# Patient Record
Sex: FEMALE | Race: WHITE | Employment: OTHER | URBAN - METROPOLITAN AREA
[De-identification: names, ages, dates, MRNs, and addresses within clinical notes are randomized per-mention and may not be internally consistent; named-entity substitution may affect disease eponyms.]

---

## 2017-02-02 PROBLEM — C50.919 INVASIVE CARCINOMA OF BREAST (HCC): Status: ACTIVE | Noted: 2017-02-02

## 2017-04-25 PROBLEM — E78.5 HYPERLIPIDEMIA: Status: ACTIVE | Noted: 2017-04-25

## 2017-04-25 PROBLEM — I10 ESSENTIAL HYPERTENSION: Status: ACTIVE | Noted: 2017-04-25

## 2017-04-25 PROBLEM — Z86.59 H/O: DEPRESSION: Status: ACTIVE | Noted: 2017-04-25

## 2017-04-25 PROBLEM — Z86.69 HISTORY OF MIGRAINE HEADACHES: Status: ACTIVE | Noted: 2017-04-25

## 2017-04-25 PROBLEM — R94.31 ABNORMAL ECG: Status: ACTIVE | Noted: 2017-04-25

## 2017-04-25 PROBLEM — I48.92 PAROXYSMAL ATRIAL FLUTTER (HCC): Status: ACTIVE | Noted: 2017-04-25

## 2017-04-25 PROBLEM — Z86.79 HISTORY OF PSVT (PAROXYSMAL SUPRAVENTRICULAR TACHYCARDIA): Status: ACTIVE | Noted: 2017-04-25

## 2017-05-31 PROBLEM — Z98.890 POST-OPERATIVE STATE: Status: ACTIVE | Noted: 2017-05-31

## 2018-03-13 ENCOUNTER — HOSPITAL ENCOUNTER (OUTPATIENT)
Dept: RADIATION ONCOLOGY | Age: 68
Discharge: HOME | End: 2018-03-13
Payer: MEDICARE

## 2018-03-13 ENCOUNTER — PRE-PROCEDURE TELEPHONE (OUTPATIENT)
Dept: RADIATION ONCOLOGY | Age: 68
End: 2018-03-13

## 2018-03-13 VITALS
SYSTOLIC BLOOD PRESSURE: 136 MMHG | WEIGHT: 179 LBS | BODY MASS INDEX: 28.04 KG/M2 | HEART RATE: 61 BPM | DIASTOLIC BLOOD PRESSURE: 55 MMHG

## 2018-03-13 DIAGNOSIS — C50.211 MALIGNANT NEOPLASM OF UPPER-INNER QUADRANT OF RIGHT BREAST IN FEMALE, ESTROGEN RECEPTOR POSITIVE (HCC): Primary | ICD-10-CM

## 2018-03-13 DIAGNOSIS — Z92.3 S/P RADIATION THERAPY 4-12 WKS AGO: ICD-10-CM

## 2018-03-13 DIAGNOSIS — Z17.0 MALIGNANT NEOPLASM OF UPPER-INNER QUADRANT OF RIGHT BREAST IN FEMALE, ESTROGEN RECEPTOR POSITIVE (HCC): Primary | ICD-10-CM

## 2018-03-13 PROCEDURE — 99999 PR OFFICE/OUTPT VISIT,PROCEDURE ONLY: CPT | Performed by: NURSE PRACTITIONER

## 2018-03-13 RX ORDER — AMPICILLIN TRIHYDRATE 250 MG
CAPSULE ORAL
COMMUNITY
End: 2018-06-11

## 2018-03-13 RX ORDER — VITAMIN B COMPLEX
1 CAPSULE ORAL DAILY
COMMUNITY

## 2018-03-13 RX ORDER — TOPIRAMATE 50 MG/1
TABLET, FILM COATED ORAL
Refills: 0 | COMMUNITY
Start: 2018-03-02 | End: 2019-10-14 | Stop reason: SDUPTHER

## 2018-03-13 RX ORDER — VERAPAMIL HYDROCHLORIDE 180 MG/1
CAPSULE, EXTENDED RELEASE ORAL
COMMUNITY
Start: 2018-03-12 | End: 2018-03-13 | Stop reason: SDUPTHER

## 2018-03-13 NOTE — PROGRESS NOTES
RADIATION ONCOLOGY  6 week follow up         3/13/2018      NAME:  Maria Eugenia Kohler OF BIRTH:  1950    CC: Pt returns today for re-assessment of radiation treatment area. Diagnosis:  Right UI breast ER/CA +, Her 2 marcos neg pT2 (sn) pN1a Stage IIB invasive mammary carcinoma s/p lumpectomy and ALN sampling 4/26/17. Adjuvant AC -T chemotherapy completed on 11/9/17.     Subjective: On 01/29/2018, Olivia Gonsales completed 6,040 cGy in 33 fractions directed to the right breast for management of locally advanced right breast cancer. Seen today in 6 week post radiation follow-up visit. She reports she is feeling well. Denies weakness, fatigue, N/V/D or appetite loss. She denies recent cold or flu-like symptoms. She did have mild erythema to treatment site that developed near end of radiation therapy. Erythema resolved. She is no longer applying lotion or ointment to treatment site previously used Calendula and Neosporin with itch/ pain relief. Her Mediport (Right upper chest) removed 02/28/2018. She is performing SBEs. No new lumps, bumps, rashes, nipple discharge or bleeding. She reports mild tenderness present to well-healed surgical scars. She reports rare occasion of pruritis to right whole breast relieved with application Neosporin with itch relief. Patient is following with Dr. Wero Pitts for Medical Oncology [facility] for Cancer) last seen February 28, 2018, next appointment scheduled May 17, 2018. She started on Anastrozole (Arimidex) March 1st, 2018 she denies any intolerable SE/AE. [facility]. Next Mammogram scheduled April 26, 2018 at [facility]. She reports resuming all her prior activities of daily living including housekeeping and/or cooking. She reports \"I love to cook\". Pain: No pain complaints. Past medical, surgical, social and family histories reviewed and updated as indicated.     ALLERGIES:  [facility], Primary Care and Breast Surgery). Surveillance imaging per Med-Onc and/or Breast Surgery. The patient was given our contact number in the event that if at any time they change their mind and would like to return to the clinic to see either myself or one of the Radiation Oncologists, they can simply call us and we would be happy to see them sooner. Thank you for involving us in the management of this extremely pleasant patient. More than 15 min was in direct contact with pt coordinating/giving care. >50% of the visit was spent in counseling the pt on the following: Follow up care    The nurses notes were reviewed and incorporated into this assessment and plan. Questions answered to apparent satisfaction.       Jose Enrique Contreras, MSN, RN, APRN-CNP  Nurse Practitioner for Radiation Oncology  [place]: [phone]   ([facility]: [phone]: [phone]   ([facility]: [phone]: [phone] ([facility]: [phone])

## 2018-04-20 ASSESSMENT — ENCOUNTER SYMPTOMS
SHORTNESS OF BREATH: 0
WHEEZING: 0
NAUSEA: 0
ROS SKIN COMMENTS: DENIES BREAST SKIN CHANGES, ARM SWELLING, OR PALPABLE AXILLARY OR SUPRACLAVICULAR ADENOPATHY.
EYE PAIN: 0
COLOR CHANGE: 0
STRIDOR: 0
ABDOMINAL DISTENTION: 0
CHEST TIGHTNESS: 0
COUGH: 0
APNEA: 0
ABDOMINAL PAIN: 0
DIARRHEA: 0
BACK PAIN: 0
EYE DISCHARGE: 0
VOMITING: 0
CONSTIPATION: 0
BLOOD IN STOOL: 0

## 2018-04-26 ENCOUNTER — HOSPITAL ENCOUNTER (OUTPATIENT)
Dept: GENERAL RADIOLOGY | Age: 68
Discharge: HOME | End: 2018-04-28
Admitting: NURSE PRACTITIONER
Payer: MEDICARE

## 2018-04-26 ENCOUNTER — OFFICE VISIT (OUTPATIENT)
Dept: BREAST CENTER | Age: 68
End: 2018-04-26
Payer: MEDICARE

## 2018-04-26 VITALS
DIASTOLIC BLOOD PRESSURE: 60 MMHG | HEIGHT: 67 IN | SYSTOLIC BLOOD PRESSURE: 124 MMHG | TEMPERATURE: 97.9 F | RESPIRATION RATE: 17 BRPM | BODY MASS INDEX: 27.7 KG/M2 | HEART RATE: 57 BPM | OXYGEN SATURATION: 99 % | WEIGHT: 176.5 LBS

## 2018-04-26 DIAGNOSIS — Z85.3 HISTORY OF BREAST CANCER: ICD-10-CM

## 2018-04-26 DIAGNOSIS — Z12.39 SCREENING FOR BREAST CANCER: ICD-10-CM

## 2018-04-26 DIAGNOSIS — C50.919 INVASIVE CARCINOMA OF BREAST (HCC): Primary | ICD-10-CM

## 2018-04-26 DIAGNOSIS — Z78.0 POST-MENOPAUSAL: ICD-10-CM

## 2018-04-26 PROCEDURE — 77067 SCR MAMMO BI INCL CAD: CPT

## 2018-04-26 PROCEDURE — 99213 OFFICE O/P EST LOW 20 MIN: CPT | Performed by: NURSE PRACTITIONER

## 2018-04-26 PROCEDURE — 99213 OFFICE O/P EST LOW 20 MIN: CPT

## 2018-04-26 ASSESSMENT — ENCOUNTER SYMPTOMS
TROUBLE SWALLOWING: 0
SINUS PAIN: 0
RHINORRHEA: 0
SORE THROAT: 0
CHOKING: 0
VOICE CHANGE: 0
SINUS PRESSURE: 0
EYE ITCHING: 0

## 2018-05-17 ENCOUNTER — HOSPITAL ENCOUNTER (OUTPATIENT)
Age: 68
Discharge: HOME | End: 2018-05-17
Payer: MEDICARE

## 2018-05-17 ENCOUNTER — HOSPITAL ENCOUNTER (OUTPATIENT)
Dept: GENERAL RADIOLOGY | Age: 68
Discharge: HOME | End: 2018-05-19
Payer: MEDICARE

## 2018-05-17 DIAGNOSIS — R52 PAIN: ICD-10-CM

## 2018-05-17 PROCEDURE — 73502 X-RAY EXAM HIP UNI 2-3 VIEWS: CPT

## 2018-06-11 ENCOUNTER — OFFICE VISIT (OUTPATIENT)
Dept: CARDIOLOGY CLINIC | Age: 68
End: 2018-06-11
Payer: MEDICARE

## 2018-06-11 VITALS
HEART RATE: 54 BPM | DIASTOLIC BLOOD PRESSURE: 68 MMHG | WEIGHT: 174.2 LBS | RESPIRATION RATE: 16 BRPM | SYSTOLIC BLOOD PRESSURE: 130 MMHG | BODY MASS INDEX: 26.4 KG/M2 | HEIGHT: 68 IN

## 2018-06-11 DIAGNOSIS — Z86.69 HISTORY OF MIGRAINE HEADACHES: ICD-10-CM

## 2018-06-11 DIAGNOSIS — N18.30 STAGE 3 CHRONIC KIDNEY DISEASE (HCC): ICD-10-CM

## 2018-06-11 DIAGNOSIS — I48.92 PAROXYSMAL ATRIAL FLUTTER (HCC): Primary | ICD-10-CM

## 2018-06-11 DIAGNOSIS — Z86.59 H/O: DEPRESSION: ICD-10-CM

## 2018-06-11 DIAGNOSIS — Z85.3 HISTORY OF BREAST CANCER: ICD-10-CM

## 2018-06-11 DIAGNOSIS — E78.5 HYPERLIPIDEMIA, UNSPECIFIED HYPERLIPIDEMIA TYPE: ICD-10-CM

## 2018-06-11 DIAGNOSIS — I10 ESSENTIAL HYPERTENSION: ICD-10-CM

## 2018-06-11 DIAGNOSIS — I83.93 VARICOSE VEINS OF BOTH LOWER EXTREMITIES: ICD-10-CM

## 2018-06-11 DIAGNOSIS — Z86.79 HISTORY OF PSVT (PAROXYSMAL SUPRAVENTRICULAR TACHYCARDIA): ICD-10-CM

## 2018-06-11 DIAGNOSIS — Z98.890 H/O CARDIAC RADIOFREQUENCY ABLATION: ICD-10-CM

## 2018-06-11 PROCEDURE — 99213 OFFICE O/P EST LOW 20 MIN: CPT | Performed by: INTERNAL MEDICINE

## 2018-06-11 PROCEDURE — 93000 ELECTROCARDIOGRAM COMPLETE: CPT | Performed by: INTERNAL MEDICINE

## 2018-06-13 ENCOUNTER — HOSPITAL ENCOUNTER (OUTPATIENT)
Dept: RADIATION ONCOLOGY | Age: 68
Discharge: HOME | End: 2018-06-13
Payer: MEDICARE

## 2018-06-13 VITALS
HEART RATE: 58 BPM | SYSTOLIC BLOOD PRESSURE: 118 MMHG | DIASTOLIC BLOOD PRESSURE: 68 MMHG | BODY MASS INDEX: 26.68 KG/M2 | WEIGHT: 175.5 LBS

## 2018-06-13 DIAGNOSIS — C50.911 INVASIVE DUCTAL CARCINOMA OF BREAST, FEMALE, RIGHT (HCC): ICD-10-CM

## 2018-06-13 DIAGNOSIS — Z92.3 S/P RADIATION THERAPY > 12 WKS AGO: Primary | ICD-10-CM

## 2018-06-13 PROCEDURE — 99213 OFFICE O/P EST LOW 20 MIN: CPT | Performed by: NURSE PRACTITIONER

## 2018-07-02 ENCOUNTER — TELEPHONE (OUTPATIENT)
Dept: RADIATION ONCOLOGY | Age: 68
End: 2018-07-02

## 2018-07-02 NOTE — TELEPHONE ENCOUNTER
Message left that Carlyn Lopez called to update me she has resolved Anastrozole (I was unable to receive her call- out of office x 1 week).

## 2018-08-20 LAB
CHOLESTEROL, TOTAL: 201 MG/DL
CHOLESTEROL/HDL RATIO: 4.4
HDLC SERPL-MCNC: 46 MG/DL (ref 35–70)
LDL CHOLESTEROL CALCULATED: 120 MG/DL (ref 0–160)
TRIGL SERPL-MCNC: 229 MG/DL
TSH SERPL DL<=0.05 MIU/L-ACNC: NORMAL UIU/ML
VLDLC SERPL CALC-MCNC: 155 MG/DL

## 2018-09-26 PROBLEM — Z98.890 POST-OPERATIVE STATE: Status: RESOLVED | Noted: 2017-05-31 | Resolved: 2018-09-26

## 2018-12-14 ENCOUNTER — HOSPITAL ENCOUNTER (OUTPATIENT)
Dept: RADIATION ONCOLOGY | Age: 68
Discharge: HOME | End: 2018-12-14
Payer: MEDICARE

## 2018-12-14 VITALS
BODY MASS INDEX: 26.8 KG/M2 | RESPIRATION RATE: 18 BRPM | DIASTOLIC BLOOD PRESSURE: 68 MMHG | TEMPERATURE: 97.8 F | WEIGHT: 176.25 LBS | HEART RATE: 58 BPM | SYSTOLIC BLOOD PRESSURE: 138 MMHG

## 2018-12-14 DIAGNOSIS — Z17.0 MALIGNANT NEOPLASM OF UPPER-INNER QUADRANT OF RIGHT BREAST IN FEMALE, ESTROGEN RECEPTOR POSITIVE (HCC): ICD-10-CM

## 2018-12-14 DIAGNOSIS — Z92.3 S/P RADIATION THERAPY > 12 WKS AGO: Primary | ICD-10-CM

## 2018-12-14 DIAGNOSIS — C50.211 MALIGNANT NEOPLASM OF UPPER-INNER QUADRANT OF RIGHT BREAST IN FEMALE, ESTROGEN RECEPTOR POSITIVE (HCC): ICD-10-CM

## 2018-12-14 PROCEDURE — 99213 OFFICE O/P EST LOW 20 MIN: CPT | Performed by: NURSE PRACTITIONER

## 2018-12-14 NOTE — PROGRESS NOTES
dysuria, hematuria or urinary frequency. Musculoskeletal: No gait disturbance, weakness or joint complaints. Integumentary: No rash or pruritis. Neurological: No headache, diplopia, change in muscle strength, numbness or tingling. No change in gait, balance, coordination, mood, affect, memory, mentation, behavior. Psychiatric: No anxiety or depression. Endocrine: No temperature intolerance. No excessive thirst, fluid intake, or urination. No tremor. Hematologic/Lymphatic: No abnormal bruising or bleeding, blood clots or swollen lymph nodes. Allergic/Immunologic: No nasal congestion or hives. PHYSICAL EXAMINATION:   Vitals:    12/14/18 0902   BP: 138/68   Site: Left Upper Arm   Position: Sitting   Cuff Size: Medium Adult   Pulse: 58   Resp: 18   Temp: 97.8 °F (36.6 °C)   TempSrc: Oral   Weight: 176 lb 4 oz (79.9 kg)       Body mass index is 26.8 kg/m². Appearance: Well-developed, well-nourished 78year old female. Skin: Irradiated skin intact, no erythema. Head: Normocephalic, atraumatic  Eyes: EOMI, no conjunctival erythema  ENMT: No pharyngeal erythema, MMM, no rhinorrhea. Neck: Supple, no elevated JVP  Lungs: Clear to auscultation bilaterally. No wheezes, rales or rhonchi. Cardiac: Regular rate and rhythm, +S1S2, no murmurs apparent  Abdomen: Soft, round and non-tender. Bowel sounds present x 4. Extremities: Moves all extremities x 4, no lower extremity edema  Neurologic: Alert and oriented x 3. No focal motor deficits apparent         IMAGING:    Mammogram Digital Screen W CAD Bilateral, 04/26/2018:   MAMMOGRAM FINDINGS:   Finding 1:   There is an area of post-lumpectomy change seen in the right breast.       No suspicious masses, areas of suspicious architectural distortion, suspicious calcifications, or additional suspicious findings are identified.       IMPRESSION:   Area of post-lumpectomy change in the right breast is benign.       Screening mammogram in 1 year is recommended.

## 2019-04-29 ENCOUNTER — HOSPITAL ENCOUNTER (OUTPATIENT)
Dept: GENERAL RADIOLOGY | Age: 69
Discharge: HOME | End: 2019-05-01
Payer: MEDICARE

## 2019-04-29 DIAGNOSIS — Z85.3 HISTORY OF BREAST CANCER: ICD-10-CM

## 2019-04-29 DIAGNOSIS — R92.8 ABNORMAL MAMMOGRAM: ICD-10-CM

## 2019-04-29 DIAGNOSIS — R92.2 DENSE BREAST: ICD-10-CM

## 2019-04-29 DIAGNOSIS — R92.2 DENSE BREAST TISSUE ON MAMMOGRAM: ICD-10-CM

## 2019-04-29 DIAGNOSIS — Z85.3 HISTORY OF BREAST CANCER: Primary | ICD-10-CM

## 2019-04-29 PROCEDURE — 76642 ULTRASOUND BREAST LIMITED: CPT

## 2019-04-29 PROCEDURE — 77067 SCR MAMMO BI INCL CAD: CPT

## 2019-04-29 PROCEDURE — G0279 TOMOSYNTHESIS, MAMMO: HCPCS

## 2019-04-29 PROCEDURE — 77065 DX MAMMO INCL CAD UNI: CPT

## 2019-04-29 NOTE — PROGRESS NOTES
Spoke with patient, will reschedule appointment with our office after recommended MRI. Agrees, and is having labs done Thursday, May 2 at the [facility].

## 2019-05-03 ENCOUNTER — TELEPHONE (OUTPATIENT)
Dept: BREAST CENTER | Age: 69
End: 2019-05-03

## 2019-05-03 NOTE — TELEPHONE ENCOUNTER
Authorization has been obtained for breast MRI [id number] - Authorization # [id number] valid until 8/1/19 --per [facility] Saira Larkin

## 2019-05-21 ENCOUNTER — TELEPHONE (OUTPATIENT)
Dept: BREAST CENTER | Age: 69
End: 2019-05-21

## 2019-05-21 ENCOUNTER — HOSPITAL ENCOUNTER (OUTPATIENT)
Dept: MRI IMAGING | Age: 69
Discharge: HOME | End: 2019-05-23
Payer: MEDICARE

## 2019-05-21 DIAGNOSIS — R92.2 DENSE BREAST TISSUE ON MAMMOGRAM: ICD-10-CM

## 2019-05-21 DIAGNOSIS — Z85.3 HISTORY OF BREAST CANCER: ICD-10-CM

## 2019-05-21 DIAGNOSIS — R92.8 ABNORMAL MAMMOGRAM: ICD-10-CM

## 2019-05-21 DIAGNOSIS — R92.2 DENSE BREAST: ICD-10-CM

## 2019-05-21 PROCEDURE — 6360000004 HC RX CONTRAST MEDICATION: Performed by: RADIOLOGY

## 2019-05-21 PROCEDURE — A9577 INJ MULTIHANCE: HCPCS | Performed by: RADIOLOGY

## 2019-05-21 PROCEDURE — 77049 MRI BREAST C-+ W/CAD BI: CPT

## 2019-05-21 RX ADMIN — GADOBENATE DIMEGLUMINE 20 ML: 529 INJECTION, SOLUTION INTRAVENOUS at 12:15

## 2019-05-21 NOTE — TELEPHONE ENCOUNTER
Discussed MRI breast results (Negative). Schedule clinical follow up. Butch Ibrahim, RN, MSN, APRN-CNP, [address]  Advanced Oncology Certified Nurse Practitioner  Department of Breast Surgery  [facility]  [facility] [place] in collaboration with Dr. Andreina Kaur.  Jared/Humaira Aguilera

## 2019-05-30 ENCOUNTER — OFFICE VISIT (OUTPATIENT)
Dept: CARDIOLOGY CLINIC | Age: 69
End: 2019-05-30
Payer: MEDICARE

## 2019-05-30 VITALS
BODY MASS INDEX: 27.43 KG/M2 | DIASTOLIC BLOOD PRESSURE: 58 MMHG | WEIGHT: 181 LBS | HEIGHT: 68 IN | SYSTOLIC BLOOD PRESSURE: 134 MMHG | HEART RATE: 56 BPM | RESPIRATION RATE: 18 BRPM

## 2019-05-30 DIAGNOSIS — I48.92 PAROXYSMAL ATRIAL FLUTTER (HCC): Primary | ICD-10-CM

## 2019-05-30 PROCEDURE — 99213 OFFICE O/P EST LOW 20 MIN: CPT | Performed by: CLINICAL NURSE SPECIALIST

## 2019-05-30 PROCEDURE — 93000 ELECTROCARDIOGRAM COMPLETE: CPT | Performed by: INTERNAL MEDICINE

## 2019-05-30 NOTE — PROGRESS NOTES
OFFICE VISIT        PRIMARY CARE PHYSICIAN:    Tanja Joe DO       ALLERGIES / SENSITIVITIES:    Allergies   Allergen Reactions    Darvon [Propoxyphene] Other (See Comments)     DIZZY    Cardizem [Diltiazem] Palpitations    Ceftin [Cefuroxime Axetil] Nausea And Vomiting    Claritin [Loratadine] Palpitations    Erythromycin Nausea And Vomiting    Morphine Nausea And Vomiting    Mucinex [Guaifenesin Er] Palpitations    Zyrtec [Cetirizine] Nausea And Vomiting and Other (See Comments)     WEAK        REVIEWED MEDICATIONS:      Current Outpatient Medications:     calcium carbonate-vitamin D (CALTRATE 600+D) 600-400 MG-UNIT TABS per tab, Take 1 tablet by mouth daily, Disp: , Rfl:     topiramate (TOPAMAX) 50 MG tablet, take 1 tablet bid, Disp: , Rfl: 0    Alpha-Lipoic Acid 100 MG CAPS, Take by mouth, Disp: , Rfl:     b complex vitamins capsule, Take 1 capsule by mouth daily, Disp: , Rfl:     anastrozole (ARIMIDEX) 1 MG tablet, Take 1 mg by mouth daily , Disp: , Rfl:     furosemide (LASIX) 20 MG tablet, Take 20 mg by mouth daily , Disp: , Rfl:     verapamil (CALAN SR) 180 MG extended release tablet, Take 180 mg by mouth daily , Disp: , Rfl:     warfarin (COUMADIN) 6 MG tablet, Take by mouth Currently 6mg daily with 3mg on Thurs & Sun; or as directed by PCP, Disp: , Rfl:     escitalopram (LEXAPRO) 10 MG tablet, Take 10 mg by mouth every evening , Disp: , Rfl:     propafenone (RYTHMOL) 225 MG tablet, Take 225 mg by mouth every 8 hours, Disp: , Rfl:       S: REASON FOR VISIT:    Cardiac follow up. History of hypertension, hyperlipidemia, and supraventricular tachycardia/atrial flutter, S/P radiofrequency ablation of the atrial flutter (at the [facility] in [address] [place] [address]). Kaushik Andrade is a pleasant, 60-year-old lady with cardiovascular history as described below. She, however, denies chest pain, palpitations or dyspnea with her daily activities. She denies orthopnea, PND's or unusual fatigue.  She has had some mild ankle swelling since having chemotherapy, and does note that she's been sitting more lately as her  is in an extended care facility and she spends much of the day visiting with him. She denies dizziness, presyncope or syncope. REVIEW OF SYSTEMS:    CONSTITUTIONAL:  Denies fevers, chills, night sweats or fatigue. HEENT:  She has migraine headaches, which are controlled with Topamax. She has a history of bilateral cataract surgery with lens implants, but denies any recent visual changes. She denies any recent changes in hearing. Denies dysphagia, hoarseness, hemoptysis, hematemesis or epistaxis. ENDOCRINE:  Denies polyphagia, polydipsia or polyuria. Denies heat or cold intolerance. MUSCULOSKELETAL:  Denies arthralgias or myalgias. SKIN:  Denies any rashes, ulcers or itching. HEME/LYMPH:  Denies any bleeding or easy bruisability. HEART:  As above. LUNGS:  As above. Denies any cough or sputum production. GI: Denies abdominal pain, nausea, vomiting, diarrhea, constipation, rectal bleeding or tarry stools. :  Denies hematuria or dysuria. PSYCHIATRIC:  She has been under a great deal of stress lately due to her 's illness and financial concerns in that regard. NEUROLOGIC:  Denies dizziness, syncope, or falls. Positive for numbness (due to neuropathy) of the bottom of her feet.         CARDIOVASCULAR HISTORY:    1. History of SVT/atrial flutter, S/P radiofrequency ablation in 1999.  a. On Rythmol therapy. b.  On chronic Coumadin therapy. 2.  Hypertension. 3.  Hyperlipidemia. 4.  Echocardiogram done on 5/31/2017 showed normal left ventricular size, wall thickness, wall motion and systolic function with an estimated ejection fraction of 60-65% with normal LV diastolic function. Normal right ventricular size and function. Moderately dilated left atrium. Mild thickening of the mitral valve leaflets with moderate mitral regurgitation. Mild aortic regurgitation.    PAST MEDICAL HISTORY:  1. As under cardiovascular history. 2.  History of depression. 3.  History of migraine headaches. 4.  History of tonsillectomy. 5.  History of bilateral cataract surgery with bilateral lens implants. 6.  History of colonoscopy. 7.  Fibroadenosis of the breast.   8.  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 She is S/P radiation and chemotherapy. She had a port inserted on 5/26/2017, which was removed on 2/28/2018. She is currently on Arimidex therapy. 9.  Chronic kidney disease.        O:  COMPLETE PHYSICAL EXAM:      BP (!) 134/58   Pulse 56   Resp 18   Ht 5' 8\" (1.727 m)   Wt 181 lb (82.1 kg)   BMI 27.52 kg/m²       General:                       Middle-aged lady in no acute distress. Head & Neck:              Normocephalic and atraumatic head. No JVD. No carotid bruits. Mucous membranes moist.              Chest:                         Symmetrical and nontender. No deformities. Respirations unlabored              Lungs:                         Clear to auscultation bilaterally. Heart:                           Regular rate and rhythm, no murmurs, rubs, or gallops. Abdomen:                    Soft, nontender without organomegaly or masses. Normal bowel sounds. Extremities:                 Trace ankle edema. Bilateral varicose veins and spider veins. Pulses palpable              Skin:                             Normal turgor. No rashes or ulcers noted. Neurologic:                  Oriented x 3. No motor or sensory deficits.          REVIEW OF DIAGNOSTIC TESTS:    1.    CMP 5/01/2019: sodium 142, potassium 3.7, chloride 107, CO2 30, BUN 14, Cr 0.9, glucose 121, GFR Blood tests from 2/28/2018 reviewed: BUN 19, creatinine 1.0, GFR 64, albumin 4.0.   2. CBC 5/01/2019: WBC 6.2, HGB 12.3, HCT 36.5, platelets 184  3. EKG done today showed sinus bradycardia with a heart rate of 56 bpm with low QRS voltages and with poor R wave progression in V1 to V2 (septal infarct pattern) and PACs       ASSESSMENT / DIAGNOSIS:    1.  Valvular heart disease: Moderate mitral regurgitation and mild aortic regurgitation on echocardiogram in 5/2017. 2.  Hypertension: stable              3.  History of \"mild\" hyperlipidemia: Patient on red yeast rice. 4.  History of SVT and atrial flutter, S/P partially successful ablation therapy in 1999: On Rythmol, Verapamil, and chronic Coumadin therapy              5.  Invasive ductal carcinoma of the right breast upper outer quadrant: S/P right breast lumpectomy, sentinel lymph node dissection, radiation and chemotherapy. Patient on Arimidex. 6.  History of depression. 7.  History of migraine headaches. 8.  Chronic kidney disease.          TREATMENT PLAN:  1. Reassure. 2.  Continue current medications. 3.  Patient advised to remain active. 4.  Follow up with Cardiology in 1 year or as needed. Consider echocardiogram at that time for follow up valvular heart disease. 5.  She was asked to contact our office if she has questions or concerns prior to her next scheduled visit. Tiffanie Dowell [facility] [place] [address]. [facility] [address]  [phone]          .

## 2019-05-30 NOTE — PATIENT INSTRUCTIONS
Patient Education        A Healthy Heart: Care Instructions  Your Care Instructions    Heart disease occurs when a substance called plaque builds up in the vessels that supply oxygen-rich blood to your heart. This can narrow the blood vessels and reduce blood flow. A heart attack happens when blood flow is completely blocked. A high-fat diet, smoking, and other factors increase the risk of heart disease. Your doctor has found that you have a chance of having heart disease. You can do lots of things to keep your heart healthy. It may not be easy, but you can change your diet, exercise more, and quit smoking. These steps really work to lower your chance of heart disease. Follow-up care is a key part of your treatment and safety. Be sure to make and go to all appointments, and call your doctor if you are having problems. It's also a good idea to know your test results and keep a list of the medicines you take. How can you care for yourself at home? Diet    · Use less salt when you cook and eat. This helps lower your blood pressure. Taste food before salting. Add only a little salt when you think you need it. With time, your taste buds will adjust to less salt.     · Eat fewer snack items, fast foods, canned soups, and other high-salt, high-fat, processed foods.     · Read food labels and try to avoid saturated and trans fats. They increase your risk of heart disease by raising cholesterol levels.     · Limit the amount of solid fat-butter, margarine, and shortening-you eat. Use olive, peanut, or canola oil when you cook. Bake, broil, and steam foods instead of frying them.     · Eating fish can lower your risk for heart disease. Eat at least 2 servings of fish a week. [place], mackerel, herring, sardines, and chunk light tuna are very good choices. These fish contain omega-3 fatty acids.     · Eat a variety of fruit and vegetables every day.  Dark green, deep orange, red, or yellow fruits and vegetables are especially good for you. Examples include spinach, carrots, peaches, and berries.     · Foods high in fiber can reduce your cholesterol and provide important vitamins and minerals. High-fiber foods include whole-grain cereals and breads, oatmeal, beans, brown rice, citrus fruits, and apples.     · Limit drinks and foods with added sugar. These include candy, desserts, and soda pop.    Lifestyle changes    · If your doctor recommends it, get more exercise. Walking is a good choice. Bit by bit, increase the amount you walk every day. Try for at least 30 minutes on most days of the week. You also may want to swim, bike, or do other activities.     · Do not smoke. If you need help quitting, talk to your doctor about stop-smoking programs and medicines. These can increase your chances of quitting for good. Quitting smoking may be the most important step you can take to protect your heart. It is never too late to quit. You will get health benefits right away.     · Limit alcohol to 2 drinks a day for men and 1 drink a day for women. Too much alcohol can cause health problems. Medicines    · Take your medicines exactly as prescribed. Call your doctor if you think you are having a problem with your medicine.     · If your doctor recommends aspirin, take the amount directed each day. Make sure you take aspirin and not another kind of pain reliever, such as acetaminophen (Tylenol). If you take ibuprofen (such as Advil or Motrin) for other problems, take aspirin at least 2 hours before taking ibuprofen. When should you call for help? Call 911 if you have symptoms of a heart attack.  These may include:    · Chest pain or pressure, or a strange feeling in the chest.     · Sweating.     · Shortness of breath.     · Pain, pressure, or a strange feeling in the back, neck, jaw, or upper belly or in one or both shoulders or arms.     · Lightheadedness or sudden weakness.     · A fast or irregular heartbeat.    After you call 911, the  may tell you to chew 1 adult-strength or 2 to 4 low-dose aspirin. Wait for an ambulance. Do not try to drive yourself.   Watch closely for changes in your health, and be sure to contact your doctor if you have any problems. Where can you learn more? Go to https://chpepiceweb.[email or web address]. org and sign in to your [employer] account. Enter O789 in the [employer] box to learn more about \"A Healthy Heart: Care Instructions. \"     If you do not have an account, please click on the \"Sign Up Now\" link. Current as of: July 22, 2018  Content Version: 12.0  © 0171-7797 Healthwise, Incorporated. Care instructions adapted under license by [facility] ([facility]). If you have questions about a medical condition or this instruction, always ask your healthcare professional. [address] any warranty or liability for your use of this information.

## 2019-07-05 RX ORDER — ESCITALOPRAM OXALATE 10 MG/1
10 TABLET ORAL EVERY EVENING
Qty: 90 TABLET | Refills: 1 | Status: SHIPPED | OUTPATIENT
Start: 2019-07-05 | End: 2020-01-02 | Stop reason: SDUPTHER

## 2019-07-11 PROBLEM — R94.31 ABNORMAL ECG: Status: RESOLVED | Noted: 2017-04-25 | Resolved: 2019-07-11

## 2019-07-12 ENCOUNTER — OFFICE VISIT (OUTPATIENT)
Dept: FAMILY MEDICINE CLINIC | Age: 69
End: 2019-07-12
Payer: MEDICARE

## 2019-07-12 VITALS
WEIGHT: 179 LBS | OXYGEN SATURATION: 98 % | BODY MASS INDEX: 27.13 KG/M2 | SYSTOLIC BLOOD PRESSURE: 120 MMHG | HEIGHT: 68 IN | TEMPERATURE: 98.1 F | HEART RATE: 54 BPM | DIASTOLIC BLOOD PRESSURE: 80 MMHG | RESPIRATION RATE: 16 BRPM

## 2019-07-12 DIAGNOSIS — I48.0 PAROXYSMAL ATRIAL FIBRILLATION (HCC): Primary | ICD-10-CM

## 2019-07-12 DIAGNOSIS — I10 ESSENTIAL HYPERTENSION: ICD-10-CM

## 2019-07-12 DIAGNOSIS — Z23 NEED FOR VACCINATION: ICD-10-CM

## 2019-07-12 LAB
INTERNATIONAL NORMALIZATION RATIO, POC: 3.6
PROTHROMBIN TIME, POC: 43.7

## 2019-07-12 PROCEDURE — 85610 PROTHROMBIN TIME: CPT | Performed by: FAMILY MEDICINE

## 2019-07-12 PROCEDURE — 90670 PCV13 VACCINE IM: CPT | Performed by: FAMILY MEDICINE

## 2019-07-12 PROCEDURE — 99213 OFFICE O/P EST LOW 20 MIN: CPT | Performed by: FAMILY MEDICINE

## 2019-07-12 PROCEDURE — G0009 ADMIN PNEUMOCOCCAL VACCINE: HCPCS | Performed by: FAMILY MEDICINE

## 2019-07-12 ASSESSMENT — ENCOUNTER SYMPTOMS
DIARRHEA: 0
SINUS PAIN: 0
PHOTOPHOBIA: 0
TROUBLE SWALLOWING: 0
EYE DISCHARGE: 0
SHORTNESS OF BREATH: 0
EYE PAIN: 0
CHEST TIGHTNESS: 0
BACK PAIN: 0
BLOOD IN STOOL: 0
NAUSEA: 0
EYE REDNESS: 0
COUGH: 0
ABDOMINAL PAIN: 0
VOMITING: 0
ALLERGIC/IMMUNOLOGIC NEGATIVE: 1
SORE THROAT: 0

## 2019-07-12 ASSESSMENT — PATIENT HEALTH QUESTIONNAIRE - PHQ9
SUM OF ALL RESPONSES TO PHQ9 QUESTIONS 1 & 2: 1
SUM OF ALL RESPONSES TO PHQ QUESTIONS 1-9: 1
2. FEELING DOWN, DEPRESSED OR HOPELESS: 1
SUM OF ALL RESPONSES TO PHQ QUESTIONS 1-9: 1
1. LITTLE INTEREST OR PLEASURE IN DOING THINGS: 0

## 2019-08-06 RX ORDER — AMPICILLIN TRIHYDRATE 250 MG
1 CAPSULE ORAL 2 TIMES DAILY
COMMUNITY
End: 2021-03-19

## 2019-08-08 PROBLEM — N18.30 STAGE 3 CHRONIC KIDNEY DISEASE (HCC): Status: RESOLVED | Noted: 2018-06-11 | Resolved: 2019-08-08

## 2019-08-08 PROBLEM — I48.92 PAROXYSMAL ATRIAL FLUTTER (HCC): Status: RESOLVED | Noted: 2017-04-25 | Resolved: 2019-08-08

## 2019-08-08 PROBLEM — Z86.69 HISTORY OF MIGRAINE HEADACHES: Status: RESOLVED | Noted: 2017-04-25 | Resolved: 2019-08-08

## 2019-08-09 ENCOUNTER — OFFICE VISIT (OUTPATIENT)
Dept: FAMILY MEDICINE CLINIC | Age: 69
End: 2019-08-09
Payer: MEDICARE

## 2019-08-09 VITALS
TEMPERATURE: 97.7 F | WEIGHT: 180 LBS | SYSTOLIC BLOOD PRESSURE: 135 MMHG | OXYGEN SATURATION: 97 % | HEIGHT: 68 IN | BODY MASS INDEX: 27.28 KG/M2 | RESPIRATION RATE: 14 BRPM | HEART RATE: 51 BPM | DIASTOLIC BLOOD PRESSURE: 75 MMHG

## 2019-08-09 DIAGNOSIS — I48.0 PAROXYSMAL ATRIAL FIBRILLATION (HCC): Primary | ICD-10-CM

## 2019-08-09 DIAGNOSIS — I48.20 CHRONIC ATRIAL FIBRILLATION (HCC): ICD-10-CM

## 2019-08-09 LAB
INTERNATIONAL NORMALIZATION RATIO, POC: 2.2
PROTHROMBIN TIME, POC: 26.4

## 2019-08-09 PROCEDURE — 85610 PROTHROMBIN TIME: CPT | Performed by: FAMILY MEDICINE

## 2019-08-09 PROCEDURE — 99213 OFFICE O/P EST LOW 20 MIN: CPT | Performed by: FAMILY MEDICINE

## 2019-08-09 ASSESSMENT — ENCOUNTER SYMPTOMS
BACK PAIN: 0
NAUSEA: 0
ABDOMINAL PAIN: 0
VOMITING: 0
ALLERGIC/IMMUNOLOGIC NEGATIVE: 1
COUGH: 0
EYE REDNESS: 0
CHEST TIGHTNESS: 0
SINUS PAIN: 0
EYE PAIN: 0
PHOTOPHOBIA: 0
TROUBLE SWALLOWING: 0
EYE DISCHARGE: 0
SHORTNESS OF BREATH: 0
BLOOD IN STOOL: 0
DIARRHEA: 0
SORE THROAT: 0

## 2019-08-26 RX ORDER — PROPAFENONE HYDROCHLORIDE 225 MG/1
225 TABLET, FILM COATED ORAL 3 TIMES DAILY
Qty: 90 TABLET | Refills: 0 | Status: SHIPPED | OUTPATIENT
Start: 2019-08-26 | End: 2019-09-09 | Stop reason: ALTCHOICE

## 2019-08-26 RX ORDER — PROPAFENONE HYDROCHLORIDE 225 MG/1
225 CAPSULE, EXTENDED RELEASE ORAL 3 TIMES DAILY
Qty: 270 CAPSULE | Refills: 1 | Status: SHIPPED | OUTPATIENT
Start: 2019-08-26 | End: 2019-09-19 | Stop reason: CLARIF

## 2019-09-09 ENCOUNTER — ANTI-COAG VISIT (OUTPATIENT)
Dept: FAMILY MEDICINE CLINIC | Age: 69
End: 2019-09-09

## 2019-09-09 ENCOUNTER — OFFICE VISIT (OUTPATIENT)
Dept: FAMILY MEDICINE CLINIC | Age: 69
End: 2019-09-09
Payer: MEDICARE

## 2019-09-09 VITALS
TEMPERATURE: 97.7 F | HEIGHT: 68 IN | RESPIRATION RATE: 20 BRPM | OXYGEN SATURATION: 97 % | WEIGHT: 181 LBS | HEART RATE: 62 BPM | DIASTOLIC BLOOD PRESSURE: 64 MMHG | SYSTOLIC BLOOD PRESSURE: 118 MMHG | BODY MASS INDEX: 27.43 KG/M2

## 2019-09-09 DIAGNOSIS — I10 ESSENTIAL HYPERTENSION: ICD-10-CM

## 2019-09-09 DIAGNOSIS — Z23 NEED FOR VACCINATION: ICD-10-CM

## 2019-09-09 DIAGNOSIS — I48.0 PAROXYSMAL ATRIAL FIBRILLATION (HCC): Primary | ICD-10-CM

## 2019-09-09 DIAGNOSIS — S90.211D CONTUSION OF RIGHT GREAT TOE WITH DAMAGE TO NAIL, SUBSEQUENT ENCOUNTER: ICD-10-CM

## 2019-09-09 LAB
INTERNATIONAL NORMALIZATION RATIO, POC: 2.4
PROTHROMBIN TIME, POC: NORMAL

## 2019-09-09 PROCEDURE — 99213 OFFICE O/P EST LOW 20 MIN: CPT | Performed by: FAMILY MEDICINE

## 2019-09-09 PROCEDURE — 85610 PROTHROMBIN TIME: CPT | Performed by: FAMILY MEDICINE

## 2019-09-09 PROCEDURE — 90653 IIV ADJUVANT VACCINE IM: CPT | Performed by: FAMILY MEDICINE

## 2019-09-09 PROCEDURE — 3288F FALL RISK ASSESSMENT DOCD: CPT | Performed by: FAMILY MEDICINE

## 2019-09-09 PROCEDURE — G0008 ADMIN INFLUENZA VIRUS VAC: HCPCS | Performed by: FAMILY MEDICINE

## 2019-09-09 PROCEDURE — G8510 SCR DEP NEG, NO PLAN REQD: HCPCS | Performed by: FAMILY MEDICINE

## 2019-09-09 ASSESSMENT — ENCOUNTER SYMPTOMS
EYE REDNESS: 0
VOMITING: 0
SORE THROAT: 0
BACK PAIN: 0
TROUBLE SWALLOWING: 0
COUGH: 0
SHORTNESS OF BREATH: 0
EYE PAIN: 0
ALLERGIC/IMMUNOLOGIC NEGATIVE: 1
DIARRHEA: 0
EYE DISCHARGE: 0
BLOOD IN STOOL: 0
NAUSEA: 0
ABDOMINAL PAIN: 0
PHOTOPHOBIA: 0
SINUS PAIN: 0
CHEST TIGHTNESS: 0

## 2019-09-09 ASSESSMENT — PATIENT HEALTH QUESTIONNAIRE - PHQ9
SUM OF ALL RESPONSES TO PHQ QUESTIONS 1-9: 0
SUM OF ALL RESPONSES TO PHQ QUESTIONS 1-9: 0
SUM OF ALL RESPONSES TO PHQ9 QUESTIONS 1 & 2: 0
1. LITTLE INTEREST OR PLEASURE IN DOING THINGS: 0
2. FEELING DOWN, DEPRESSED OR HOPELESS: 0

## 2019-09-09 NOTE — PROGRESS NOTES
 Financial resource strain: Not on file   Anum-Richard insecurity:     Worry: Not on file     Inability: Not on file    Transportation needs:     Medical: Not on file     Non-medical: Not on file   Tobacco Use    Smoking status: Never Smoker    Smokeless tobacco: Never Used   Substance and Sexual Activity    Alcohol use: No    Drug use: No    Sexual activity: Not Currently     Partners: Male   Lifestyle    Physical activity:     Days per week: Not on file     Minutes per session: Not on file    Stress: Not on file   Relationships    Social connections:     Talks on phone: Not on file     Gets together: Not on file     Attends [employer] service: Not on file     Active member of club or organization: Not on file     Attends meetings of clubs or organizations: Not on file     Relationship status: Not on file    Intimate partner violence:     Fear of current or ex partner: Not on file     Emotionally abused: Not on file     Physically abused: Not on file     Forced sexual activity: Not on file   Other Topics Concern    Not on file   Social History Narrative    Drinks occasional Pepsi/Coke       Vitals:    09/09/19 1113   BP: 118/64   Pulse: 62   Resp: 20   Temp: 97.7 °F (36.5 °C)   TempSrc: Oral   SpO2: 97%   Weight: 181 lb (82.1 kg)   Height: 5' 8\" (1.727 m)       Patient appears Normal  Physical Exam   Constitutional: She is oriented to person, place, and time. She appears well-developed and well-nourished. HENT:   Head: Atraumatic. Right Ear: External ear normal.   Left Ear: External ear normal.   Nose: Nose normal.   Mouth/Throat: Oropharynx is clear and moist. No oropharyngeal exudate. Eyes: Pupils are equal, round, and reactive to light. Conjunctivae and EOM are normal.   Neck: Normal range of motion. Neck supple. No tracheal deviation present. No thyromegaly present. Cardiovascular: Normal rate, regular rhythm and intact distal pulses. Exam reveals no gallop and no friction rub.    No murmur

## 2019-09-10 ENCOUNTER — OFFICE VISIT (OUTPATIENT)
Dept: PODIATRY | Age: 69
End: 2019-09-10
Payer: MEDICARE

## 2019-09-10 VITALS — WEIGHT: 179 LBS | HEIGHT: 67 IN | BODY MASS INDEX: 28.09 KG/M2

## 2019-09-10 DIAGNOSIS — S99.921S: ICD-10-CM

## 2019-09-10 DIAGNOSIS — M79.671 RIGHT FOOT PAIN: Primary | ICD-10-CM

## 2019-09-10 DIAGNOSIS — Z79.01 ENCOUNTER FOR CURRENT LONG-TERM USE OF ANTICOAGULANTS: ICD-10-CM

## 2019-09-10 DIAGNOSIS — B35.1 TINEA UNGUIUM: ICD-10-CM

## 2019-09-10 PROCEDURE — 99203 OFFICE O/P NEW LOW 30 MIN: CPT | Performed by: PODIATRIST

## 2019-09-10 NOTE — PROGRESS NOTES
New patient in office for right great toe pain. Injured toe in . Discoloration second toenail right foot. Referred by PCP Dr Rubia Storm last seen 19.     9/10/19  Brittany Hansen : 1950 Sex: female  Age: 80 y.o. Patient was referred by Yeiym Mays DO    CC:    Injury right second toe    HPI:   This pleasant 58-year-old female patient is referred to me today following a new injury right second toe. Does state the injury occurred in  of this year. Did have tenderness as well as loosening of the second toenail. States the nail has not fallen off but still has some tenderness on top of the right second toenail and right second toe. Did have radiographs taken today. Has been walking regular shoe. Does take long-term anticoagulant therapy for atrial fibrillation. Denies any current open wounds or skin abrasions. Denies any additional formal treatment or therapy. No additional pedal complaints at this time.     ROS:  Const: Denies constitutional symptoms  Musculo: Denies symptoms other than stated above  Skin: Denies symptoms other than stated above       Current Outpatient Medications:     propafenone (RYTHMOL SR) 225 MG extended release capsule, Take 1 capsule by mouth 3 times daily, Disp: 270 capsule, Rfl: 1    Red Yeast Rice 600 MG CAPS, Take 1 capsule by mouth 2 times daily, Disp: , Rfl:     verapamil (CALAN SR) 180 MG extended release tablet, Take 1 tablet by mouth daily, Disp: 90 tablet, Rfl: 1    escitalopram (LEXAPRO) 10 MG tablet, Take 1 tablet by mouth every evening, Disp: 90 tablet, Rfl: 1    calcium carbonate-vitamin D (CALTRATE 600+D) 600-400 MG-UNIT TABS per tab, Take 1 tablet by mouth daily, Disp: , Rfl:     topiramate (TOPAMAX) 50 MG tablet, take 1 tablet bid, Disp: , Rfl: 0    Alpha-Lipoic Acid 100 MG CAPS, Take by mouth, Disp: , Rfl:     b complex vitamins capsule, Take 1 capsule by mouth daily, Disp: , Rfl:     anastrozole (ARIMIDEX) 1 MG tablet, Take 1 mg by

## 2019-09-13 NOTE — TELEPHONE ENCOUNTER
[facility] called the patient about her script for propafenone. They told her that she needs to call the office on this because this is not the med she has been on. They told her that she was never on the extended release. She is asking if you have changed it?

## 2019-09-19 RX ORDER — PROPAFENONE HYDROCHLORIDE 225 MG/1
225 TABLET, FILM COATED ORAL 3 TIMES DAILY
Qty: 270 TABLET | Refills: 1 | Status: SHIPPED
Start: 2019-09-19 | End: 2020-03-23 | Stop reason: SDUPTHER

## 2019-09-19 RX ORDER — PROPAFENONE HYDROCHLORIDE 225 MG/1
225 TABLET, FILM COATED ORAL 3 TIMES DAILY
Qty: 90 TABLET | Refills: 0 | Status: SHIPPED | OUTPATIENT
Start: 2019-09-19 | End: 2019-09-19 | Stop reason: SDUPTHER

## 2019-09-27 ENCOUNTER — OFFICE VISIT (OUTPATIENT)
Dept: FAMILY MEDICINE CLINIC | Age: 69
End: 2019-09-27
Payer: MEDICARE

## 2019-09-27 VITALS — DIASTOLIC BLOOD PRESSURE: 66 MMHG | SYSTOLIC BLOOD PRESSURE: 120 MMHG | HEART RATE: 60 BPM | OXYGEN SATURATION: 98 %

## 2019-09-27 DIAGNOSIS — S83.8X1A SPRAIN OF OTHER LIGAMENT OF RIGHT KNEE, INITIAL ENCOUNTER: Primary | ICD-10-CM

## 2019-09-27 PROCEDURE — 99214 OFFICE O/P EST MOD 30 MIN: CPT | Performed by: FAMILY MEDICINE

## 2019-09-27 ASSESSMENT — ENCOUNTER SYMPTOMS
BACK PAIN: 0
SORE THROAT: 0
BLOOD IN STOOL: 0
TROUBLE SWALLOWING: 0
ABDOMINAL PAIN: 0
COUGH: 0
EYE PAIN: 0
NAUSEA: 0
VOMITING: 0
CHEST TIGHTNESS: 0
PHOTOPHOBIA: 0
DIARRHEA: 0
ALLERGIC/IMMUNOLOGIC NEGATIVE: 1
SHORTNESS OF BREATH: 0
SINUS PAIN: 0
EYE DISCHARGE: 0
EYE REDNESS: 0

## 2019-10-11 DIAGNOSIS — Z78.0 POST-MENOPAUSAL: ICD-10-CM

## 2019-10-11 DIAGNOSIS — Z85.3 PERSONAL HISTORY OF BREAST CANCER: Primary | ICD-10-CM

## 2019-10-11 DIAGNOSIS — Z78.0 POST-MENOPAUSAL: Primary | ICD-10-CM

## 2019-10-14 RX ORDER — TOPIRAMATE 50 MG/1
50 TABLET, FILM COATED ORAL 2 TIMES DAILY
Qty: 180 TABLET | Refills: 1 | Status: SHIPPED
Start: 2019-10-14 | End: 2020-03-23 | Stop reason: SDUPTHER

## 2019-10-14 ASSESSMENT — ENCOUNTER SYMPTOMS
WHEEZING: 0
ROS SKIN COMMENTS: DENIES BREAST SKIN CHANGES, ARM SWELLING, OR PALPABLE AXILLARY OR SUPRACLAVICULAR ADENOPATHY.
VOMITING: 0
SINUS PRESSURE: 0
DIARRHEA: 0
RHINORRHEA: 0
APNEA: 0
CONSTIPATION: 0
BACK PAIN: 0
EYE DISCHARGE: 0
CHOKING: 0
EYE PAIN: 0
ABDOMINAL DISTENTION: 0
STRIDOR: 0
SINUS PAIN: 0
COLOR CHANGE: 0
VOICE CHANGE: 0
CHEST TIGHTNESS: 0
NAUSEA: 0
SORE THROAT: 0
SHORTNESS OF BREATH: 0
BLOOD IN STOOL: 0
EYE ITCHING: 0
COUGH: 0
TROUBLE SWALLOWING: 0
ABDOMINAL PAIN: 0

## 2019-10-15 ENCOUNTER — HOSPITAL ENCOUNTER (OUTPATIENT)
Dept: GENERAL RADIOLOGY | Age: 69
Discharge: HOME | End: 2019-10-17
Payer: MEDICARE

## 2019-10-15 ENCOUNTER — OFFICE VISIT (OUTPATIENT)
Dept: BREAST CENTER | Age: 69
End: 2019-10-15
Payer: MEDICARE

## 2019-10-15 VITALS
DIASTOLIC BLOOD PRESSURE: 70 MMHG | HEIGHT: 67 IN | RESPIRATION RATE: 16 BRPM | BODY MASS INDEX: 27.78 KG/M2 | OXYGEN SATURATION: 99 % | HEART RATE: 54 BPM | WEIGHT: 177 LBS | SYSTOLIC BLOOD PRESSURE: 140 MMHG

## 2019-10-15 DIAGNOSIS — Z78.0 POST-MENOPAUSAL: ICD-10-CM

## 2019-10-15 DIAGNOSIS — Z85.3 PERSONAL HISTORY OF BREAST CANCER: Primary | ICD-10-CM

## 2019-10-15 DIAGNOSIS — Z85.3 PERSONAL HISTORY OF BREAST CANCER: ICD-10-CM

## 2019-10-15 DIAGNOSIS — Z12.31 ENCOUNTER FOR SCREENING MAMMOGRAM FOR MALIGNANT NEOPLASM OF BREAST: ICD-10-CM

## 2019-10-15 PROCEDURE — 99214 OFFICE O/P EST MOD 30 MIN: CPT | Performed by: NURSE PRACTITIONER

## 2019-10-15 PROCEDURE — 99213 OFFICE O/P EST LOW 20 MIN: CPT

## 2019-10-15 PROCEDURE — 77080 DXA BONE DENSITY AXIAL: CPT

## 2019-10-18 ENCOUNTER — OFFICE VISIT (OUTPATIENT)
Dept: FAMILY MEDICINE CLINIC | Age: 69
End: 2019-10-18
Payer: MEDICARE

## 2019-10-18 VITALS
DIASTOLIC BLOOD PRESSURE: 70 MMHG | TEMPERATURE: 97.3 F | OXYGEN SATURATION: 96 % | SYSTOLIC BLOOD PRESSURE: 130 MMHG | BODY MASS INDEX: 27.95 KG/M2 | HEART RATE: 55 BPM | WEIGHT: 178.06 LBS | HEIGHT: 67 IN

## 2019-10-18 DIAGNOSIS — B02.9 HERPES ZOSTER WITHOUT COMPLICATION: Primary | ICD-10-CM

## 2019-10-18 DIAGNOSIS — I48.20 CHRONIC ATRIAL FIBRILLATION (HCC): ICD-10-CM

## 2019-10-18 PROCEDURE — 99213 OFFICE O/P EST LOW 20 MIN: CPT | Performed by: FAMILY MEDICINE

## 2019-10-18 RX ORDER — WARFARIN SODIUM 6 MG/1
6 TABLET ORAL DAILY
Qty: 30 TABLET | Refills: 0 | Status: SHIPPED | OUTPATIENT
Start: 2019-10-18 | End: 2019-11-11 | Stop reason: SDUPTHER

## 2019-10-18 RX ORDER — VALACYCLOVIR HYDROCHLORIDE 500 MG/1
500 TABLET, FILM COATED ORAL 3 TIMES DAILY
Qty: 21 TABLET | Refills: 0 | Status: SHIPPED | OUTPATIENT
Start: 2019-10-18 | End: 2019-10-25

## 2019-10-18 ASSESSMENT — ENCOUNTER SYMPTOMS
EYE REDNESS: 0
NAUSEA: 0
SHORTNESS OF BREATH: 0
CHEST TIGHTNESS: 0
COLOR CHANGE: 1
PHOTOPHOBIA: 0
ABDOMINAL PAIN: 0
TROUBLE SWALLOWING: 0
EYE DISCHARGE: 0
ALLERGIC/IMMUNOLOGIC NEGATIVE: 1
BLOOD IN STOOL: 0
COUGH: 0
DIARRHEA: 0
BACK PAIN: 0
VOMITING: 0
EYE PAIN: 0
SINUS PAIN: 0
SORE THROAT: 0

## 2019-11-11 ENCOUNTER — OFFICE VISIT (OUTPATIENT)
Dept: FAMILY MEDICINE CLINIC | Age: 69
End: 2019-11-11
Payer: MEDICARE

## 2019-11-11 VITALS
TEMPERATURE: 97.5 F | OXYGEN SATURATION: 96 % | SYSTOLIC BLOOD PRESSURE: 138 MMHG | HEIGHT: 67 IN | HEART RATE: 58 BPM | WEIGHT: 177.02 LBS | BODY MASS INDEX: 27.78 KG/M2 | DIASTOLIC BLOOD PRESSURE: 70 MMHG

## 2019-11-11 VITALS
WEIGHT: 177 LBS | OXYGEN SATURATION: 96 % | SYSTOLIC BLOOD PRESSURE: 138 MMHG | DIASTOLIC BLOOD PRESSURE: 70 MMHG | HEIGHT: 67 IN | HEART RATE: 58 BPM | TEMPERATURE: 97.5 F | BODY MASS INDEX: 27.78 KG/M2

## 2019-11-11 DIAGNOSIS — I48.20 CHRONIC ATRIAL FIBRILLATION (HCC): Primary | ICD-10-CM

## 2019-11-11 DIAGNOSIS — Z00.00 ROUTINE GENERAL MEDICAL EXAMINATION AT A HEALTH CARE FACILITY: Primary | ICD-10-CM

## 2019-11-11 LAB
INTERNATIONAL NORMALIZATION RATIO, POC: 2.4
PROTHROMBIN TIME, POC: 28.5

## 2019-11-11 PROCEDURE — 99213 OFFICE O/P EST LOW 20 MIN: CPT | Performed by: FAMILY MEDICINE

## 2019-11-11 PROCEDURE — G0438 PPPS, INITIAL VISIT: HCPCS | Performed by: PHYSICIAN ASSISTANT

## 2019-11-11 PROCEDURE — 85610 PROTHROMBIN TIME: CPT | Performed by: FAMILY MEDICINE

## 2019-11-11 RX ORDER — WARFARIN SODIUM 6 MG/1
6 TABLET ORAL DAILY
Qty: 30 TABLET | Refills: 0 | Status: SHIPPED | OUTPATIENT
Start: 2019-11-11 | End: 2019-11-19 | Stop reason: SDUPTHER

## 2019-11-11 ASSESSMENT — ENCOUNTER SYMPTOMS
SORE THROAT: 0
ALLERGIC/IMMUNOLOGIC NEGATIVE: 1
NAUSEA: 0
EYE DISCHARGE: 0
VOMITING: 0
COUGH: 0
CHEST TIGHTNESS: 0
BACK PAIN: 0
TROUBLE SWALLOWING: 0
SHORTNESS OF BREATH: 0
BLOOD IN STOOL: 0
SINUS PAIN: 0
PHOTOPHOBIA: 0
EYE REDNESS: 0
EYE PAIN: 0
ABDOMINAL PAIN: 0
DIARRHEA: 0

## 2019-11-11 ASSESSMENT — LIFESTYLE VARIABLES: HOW OFTEN DO YOU HAVE A DRINK CONTAINING ALCOHOL: 0

## 2019-11-11 ASSESSMENT — PATIENT HEALTH QUESTIONNAIRE - PHQ9
SUM OF ALL RESPONSES TO PHQ QUESTIONS 1-9: 0
SUM OF ALL RESPONSES TO PHQ QUESTIONS 1-9: 0

## 2019-11-13 ENCOUNTER — OFFICE VISIT (OUTPATIENT)
Dept: PODIATRY | Age: 69
End: 2019-11-13
Payer: MEDICARE

## 2019-11-13 DIAGNOSIS — Z79.01 ENCOUNTER FOR CURRENT LONG-TERM USE OF ANTICOAGULANTS: ICD-10-CM

## 2019-11-13 DIAGNOSIS — B35.1 TINEA UNGUIUM: ICD-10-CM

## 2019-11-13 DIAGNOSIS — S99.921S: ICD-10-CM

## 2019-11-13 DIAGNOSIS — M79.671 RIGHT FOOT PAIN: Primary | ICD-10-CM

## 2019-11-13 PROCEDURE — 99213 OFFICE O/P EST LOW 20 MIN: CPT | Performed by: PODIATRIST

## 2019-11-19 DIAGNOSIS — I48.20 CHRONIC ATRIAL FIBRILLATION (HCC): ICD-10-CM

## 2019-11-19 RX ORDER — WARFARIN SODIUM 6 MG/1
6 TABLET ORAL DAILY
Qty: 30 TABLET | Refills: 0 | Status: SHIPPED | OUTPATIENT
Start: 2019-11-19 | End: 2019-11-19 | Stop reason: SDUPTHER

## 2019-11-19 RX ORDER — WARFARIN SODIUM 6 MG/1
TABLET ORAL
Qty: 30 TABLET | Refills: 2 | Status: SHIPPED
Start: 2019-11-19 | End: 2020-03-03 | Stop reason: SDUPTHER

## 2019-12-31 ENCOUNTER — OFFICE VISIT (OUTPATIENT)
Dept: FAMILY MEDICINE CLINIC | Age: 69
End: 2019-12-31
Payer: MEDICARE

## 2019-12-31 VITALS
DIASTOLIC BLOOD PRESSURE: 76 MMHG | OXYGEN SATURATION: 97 % | WEIGHT: 177 LBS | HEART RATE: 58 BPM | SYSTOLIC BLOOD PRESSURE: 158 MMHG | BODY MASS INDEX: 27.72 KG/M2

## 2019-12-31 DIAGNOSIS — J06.9 ACUTE UPPER RESPIRATORY INFECTION: ICD-10-CM

## 2019-12-31 DIAGNOSIS — J02.9 SORE THROAT: Primary | ICD-10-CM

## 2019-12-31 LAB — S PYO AG THROAT QL: NORMAL

## 2019-12-31 PROCEDURE — 87880 STREP A ASSAY W/OPTIC: CPT | Performed by: INTERNAL MEDICINE

## 2019-12-31 PROCEDURE — 99213 OFFICE O/P EST LOW 20 MIN: CPT | Performed by: INTERNAL MEDICINE

## 2019-12-31 RX ORDER — AMOXICILLIN 500 MG/1
500 CAPSULE ORAL 3 TIMES DAILY
Qty: 21 CAPSULE | Refills: 0 | Status: SHIPPED | OUTPATIENT
Start: 2019-12-31 | End: 2020-01-07

## 2020-01-02 NOTE — TELEPHONE ENCOUNTER
Last Appointment:  11/11/2019  Future Appointments   Date Time Provider Comfort Contrerasi   1/10/2020 11:00 AM DO Douglas Rivas [facility]   2/12/2020  9:30 AM Jeanette Rodriguez DPM [facility]   5/26/2020 11:30 AM Mynor Mena MD [address] [facility]      Patient calling in to request a refill of [facility]apro [facility].  Order pended, thank you

## 2020-01-03 RX ORDER — ESCITALOPRAM OXALATE 10 MG/1
10 TABLET ORAL EVERY EVENING
Qty: 90 TABLET | Refills: 1 | Status: SHIPPED
Start: 2020-01-03 | End: 2020-06-26 | Stop reason: SDUPTHER

## 2020-01-10 ENCOUNTER — OFFICE VISIT (OUTPATIENT)
Dept: FAMILY MEDICINE CLINIC | Age: 70
End: 2020-01-10
Payer: MEDICARE

## 2020-01-10 VITALS
SYSTOLIC BLOOD PRESSURE: 136 MMHG | OXYGEN SATURATION: 98 % | WEIGHT: 175 LBS | DIASTOLIC BLOOD PRESSURE: 76 MMHG | BODY MASS INDEX: 27.47 KG/M2 | HEIGHT: 67 IN | HEART RATE: 59 BPM | TEMPERATURE: 98 F | RESPIRATION RATE: 15 BRPM

## 2020-01-10 LAB
INTERNATIONAL NORMALIZATION RATIO, POC: 1.8
PROTHROMBIN TIME, POC: 21.2

## 2020-01-10 PROCEDURE — 3288F FALL RISK ASSESSMENT DOCD: CPT | Performed by: FAMILY MEDICINE

## 2020-01-10 PROCEDURE — 99213 OFFICE O/P EST LOW 20 MIN: CPT | Performed by: FAMILY MEDICINE

## 2020-01-10 PROCEDURE — G8510 SCR DEP NEG, NO PLAN REQD: HCPCS | Performed by: FAMILY MEDICINE

## 2020-01-10 PROCEDURE — 85610 PROTHROMBIN TIME: CPT | Performed by: FAMILY MEDICINE

## 2020-01-10 ASSESSMENT — ENCOUNTER SYMPTOMS
COUGH: 0
NAUSEA: 0
EYE DISCHARGE: 0
ABDOMINAL PAIN: 0
SINUS PAIN: 0
DIARRHEA: 0
BLOOD IN STOOL: 0
VOMITING: 0
SHORTNESS OF BREATH: 0
EYE PAIN: 0
TROUBLE SWALLOWING: 0
SORE THROAT: 0
CHEST TIGHTNESS: 0
EYE REDNESS: 0
ALLERGIC/IMMUNOLOGIC NEGATIVE: 1
PHOTOPHOBIA: 0
BACK PAIN: 0

## 2020-01-10 ASSESSMENT — PATIENT HEALTH QUESTIONNAIRE - PHQ9
1. LITTLE INTEREST OR PLEASURE IN DOING THINGS: 0
SUM OF ALL RESPONSES TO PHQ QUESTIONS 1-9: 0
2. FEELING DOWN, DEPRESSED OR HOPELESS: 0
SUM OF ALL RESPONSES TO PHQ QUESTIONS 1-9: 0
SUM OF ALL RESPONSES TO PHQ9 QUESTIONS 1 & 2: 0

## 2020-01-10 NOTE — PROGRESS NOTES
1/10/20  Vijay Gayle : 1950 Sex: female  Age: 77 y.o. Chief Complaint   Patient presents with    Atrial Fibrillation       Recheck INR, 1.8      Review of Systems   Constitutional: Negative for appetite change, fatigue and unexpected weight change. HENT: Negative for congestion, ear pain, hearing loss, sinus pain, sore throat and trouble swallowing. Eyes: Negative for photophobia, pain, discharge and redness. Respiratory: Negative for cough, chest tightness and shortness of breath. Cardiovascular: Negative for chest pain, palpitations and leg swelling. Gastrointestinal: Negative for abdominal pain, blood in stool, diarrhea, nausea and vomiting. Endocrine: Negative. Genitourinary: Negative for dysuria, flank pain, frequency and hematuria. Musculoskeletal: Negative for arthralgias, back pain, joint swelling and myalgias. Skin: Negative. Allergic/Immunologic: Negative. Neurological: Negative for dizziness, seizures, syncope, weakness, light-headedness, numbness and headaches. Hematological: Negative for adenopathy. Does not bruise/bleed easily. Psychiatric/Behavioral: Negative.           Current Outpatient Medications:     escitalopram (LEXAPRO) 10 MG tablet, Take 1 tablet by mouth every evening, Disp: 90 tablet, Rfl: 1    warfarin (COUMADIN) 6 MG tablet, Indications: monday, wednesday, friday 3 mg, all other days are 6 mg 1/2 tab on  and 1 tab all other days of the week OR AS DIRECTED, Disp: 30 tablet, Rfl: 2    topiramate (TOPAMAX) 50 MG tablet, Take 1 tablet by mouth 2 times daily, Disp: 180 tablet, Rfl: 1    propafenone (RYTHMOL) 225 MG tablet, Take 1 tablet by mouth three times daily, Disp: 270 tablet, Rfl: 1    Red Yeast Rice 600 MG CAPS, Take 1 capsule by mouth 2 times daily, Disp: , Rfl:     verapamil (CALAN SR) 180 MG extended release tablet, Take 1 tablet by mouth daily, Disp: 90 tablet, Rfl: 1    calcium carbonate-vitamin D (CALTRATE 600+D) 600-400 MG-UNIT TABS per tab, Take 1 tablet by mouth daily, Disp: , Rfl:     Alpha-Lipoic Acid 100 MG CAPS, Take by mouth, Disp: , Rfl:     b complex vitamins capsule, Take 1 capsule by mouth daily, Disp: , Rfl:     anastrozole (ARIMIDEX) 1 MG tablet, Take 1 mg by mouth daily , Disp: , Rfl:     furosemide (LASIX) 20 MG tablet, Take 20 mg by mouth daily , Disp: , Rfl:   Allergies   Allergen Reactions    Diltiazem Hcl Other (See Comments)     Other reaction(s): weak    Cardizem [Diltiazem] Palpitations    Cefuroxime Axetil Nausea And Vomiting, Other (See Comments) and Palpitations    Claritin [Loratadine] Palpitations    Erythromycin Nausea And Vomiting    Morphine Nausea And Vomiting    Mucinex [Guaifenesin Er] Palpitations    Propoxyphene Other (See Comments) and Nausea And Vomiting     DIZZY  Other reaction(s): Free Text    Zyrtec [Cetirizine] Nausea And Vomiting and Other (See Comments)     WEAK       Past Medical History:   Diagnosis Date    Breast cancer ([facility] [address] 04/2017    Chronic atrial fibrillation     Fibroadenosis of breast     Headache     Hyperlipidemia     Hypertension      Past Surgical History:   Procedure Laterality Date    [facility]GERY  1999    [facility]    BREAST LUMPECTOMY Right 04/26/2017    COLONOSCOPY      EYE SURGERY      SUDHIR LENSE IMPLANTS    OTHER SURGICAL HISTORY      port removal    TONSILLECTOMY      TUNNELED VENOUS PORT PLACEMENT  05/26/2017    Dr. Armando Bueno     Family History   Problem Relation Age of Onset    Cancer Maternal Cousin 80        lung    Heart Disease Mother     Other Mother         [place] Palsy    Other Father         gall bladder problem, hip fx; AAA rupture    Coronary Art Dis Father         MI     Social History     Socioeconomic History    Marital status:      Spouse name: Nano Guerra Number of children: Not on file    Years of education: Not on file    Highest education level: Not on file   Occupational History    Occupation: retired      Comment: Retired 4 th    Social Needs    Financial resource strain: Not on file    Food insecurity:     Worry: Not on file     Inability: Not on file   [employer] needs:     Medical: Not on file     Non-medical: Not on file   Tobacco Use    Smoking status: Never Smoker    Smokeless tobacco: Never Used   Substance and Sexual Activity    Alcohol use: No    Drug use: No    Sexual activity: Not Currently     Partners: Male   Lifestyle    Physical activity:     Days per week: Not on file     Minutes per session: Not on file    Stress: Not on file   Relationships    Social connections:     Talks on phone: Not on file     Gets together: Not on file     Attends Christian service: Not on file     Active member of club or organization: Not on file     Attends meetings of clubs or organizations: Not on file     Relationship status: Not on file    Intimate partner violence:     Fear of current or ex partner: Not on file     Emotionally abused: Not on file     Physically abused: Not on file     Forced sexual activity: Not on file   Other Topics Concern    Not on file   Social History Narrative    Drinks occasional Pepsi/Coke       Vitals:    01/10/20 1125 01/10/20 1138   BP: (!) 140/78 136/76   Pulse: 59    Resp: 15    Temp: 98 °F (36.7 °C)    TempSrc: Temporal    SpO2: 98%    Weight: 175 lb (79.4 kg)    Height: 5' 7\" (1.702 m)        Physical Exam  Vitals signs and nursing note reviewed. Constitutional:       Appearance: She is well-developed. HENT:      Head: Atraumatic. Right Ear: External ear normal.      Left Ear: External ear normal.      Nose: Nose normal.      Mouth/Throat:      Pharynx: No oropharyngeal exudate. Eyes:      Conjunctiva/sclera: Conjunctivae normal.      Pupils: Pupils are equal, round, and reactive to light. Neck:      Musculoskeletal: Normal range of motion and neck supple. Thyroid: No thyromegaly.       Trachea: No tracheal deviation. Cardiovascular:      Rate and Rhythm: Normal rate and regular rhythm. Heart sounds: No murmur. No friction rub. No gallop. Pulmonary:      Effort: Pulmonary effort is normal. No respiratory distress. Breath sounds: Normal breath sounds. Abdominal:      General: Bowel sounds are normal.      Palpations: Abdomen is soft. Musculoskeletal: Normal range of motion. General: No tenderness or deformity. Lymphadenopathy:      Cervical: No cervical adenopathy. Skin:     General: Skin is warm and dry. Capillary Refill: Capillary refill takes less than 2 seconds. Findings: No rash. Neurological:      Mental Status: She is alert and oriented to person, place, and time. Sensory: No sensory deficit. Motor: No abnormal muscle tone. Coordination: Coordination normal.      Deep Tendon Reflexes: Reflexes normal.         Assessment and Plan: India Ramirez was seen today for atrial fibrillation. Diagnoses and all orders for this visit:    Chronic atrial fibrillation  -     POCT INR; Standing  -     POCT INR        Return in about 2 weeks (around 1/24/2020).       Seen By:  Slava Marquez DO

## 2020-01-24 ENCOUNTER — OFFICE VISIT (OUTPATIENT)
Dept: FAMILY MEDICINE CLINIC | Age: 70
End: 2020-01-24
Payer: MEDICARE

## 2020-01-24 VITALS
OXYGEN SATURATION: 96 % | SYSTOLIC BLOOD PRESSURE: 128 MMHG | WEIGHT: 175 LBS | TEMPERATURE: 97.6 F | BODY MASS INDEX: 27.47 KG/M2 | HEIGHT: 67 IN | DIASTOLIC BLOOD PRESSURE: 68 MMHG | HEART RATE: 66 BPM

## 2020-01-24 LAB
INTERNATIONAL NORMALIZATION RATIO, POC: 2.2
PROTHROMBIN TIME, POC: 26.5

## 2020-01-24 PROCEDURE — 99213 OFFICE O/P EST LOW 20 MIN: CPT | Performed by: FAMILY MEDICINE

## 2020-01-24 PROCEDURE — 85610 PROTHROMBIN TIME: CPT | Performed by: FAMILY MEDICINE

## 2020-01-24 ASSESSMENT — ENCOUNTER SYMPTOMS
SORE THROAT: 0
EYE REDNESS: 0
SHORTNESS OF BREATH: 0
EYE PAIN: 0
ABDOMINAL PAIN: 0
DIARRHEA: 0
BACK PAIN: 0
CHEST TIGHTNESS: 0
SINUS PAIN: 0
BLOOD IN STOOL: 0
ALLERGIC/IMMUNOLOGIC NEGATIVE: 1
COUGH: 0
EYE DISCHARGE: 0
NAUSEA: 0
TROUBLE SWALLOWING: 0
PHOTOPHOBIA: 0
VOMITING: 0

## 2020-01-24 NOTE — PROGRESS NOTES
20  Ani Mccloud : 1950 Sex: female  Age: 77 y.o. Chief Complaint   Patient presents with    Office Visit for Anticoagulation Management     PT INR       Recheck, INR, 2.2      Review of Systems   Constitutional: Negative for appetite change, fatigue and unexpected weight change. HENT: Negative for congestion, ear pain, hearing loss, sinus pain, sore throat and trouble swallowing. Eyes: Negative for photophobia, pain, discharge and redness. Respiratory: Negative for cough, chest tightness and shortness of breath. Cardiovascular: Negative for chest pain, palpitations and leg swelling. Gastrointestinal: Negative for abdominal pain, blood in stool, diarrhea, nausea and vomiting. Endocrine: Negative. Genitourinary: Negative for dysuria, flank pain, frequency and hematuria. Musculoskeletal: Negative for arthralgias, back pain, joint swelling and myalgias. Skin: Negative. Allergic/Immunologic: Negative. Neurological: Negative for dizziness, seizures, syncope, weakness, light-headedness, numbness and headaches. Hematological: Negative for adenopathy. Does not bruise/bleed easily. Psychiatric/Behavioral: Negative.           Current Outpatient Medications:     escitalopram (LEXAPRO) 10 MG tablet, Take 1 tablet by mouth every evening, Disp: 90 tablet, Rfl: 1    warfarin (COUMADIN) 6 MG tablet, Indications: monday, wednesday, friday 3 mg, all other days are 6 mg 1/2 tab on M W  and 1 tab all other days of the week OR AS DIRECTED, Disp: 30 tablet, Rfl: 2    topiramate (TOPAMAX) 50 MG tablet, Take 1 tablet by mouth 2 times daily, Disp: 180 tablet, Rfl: 1    propafenone (RYTHMOL) 225 MG tablet, Take 1 tablet by mouth three times daily, Disp: 270 tablet, Rfl: 1    Red Yeast Rice 600 MG CAPS, Take 1 capsule by mouth 2 times daily, Disp: , Rfl:     verapamil (CALAN SR) 180 MG extended release tablet, Take 1 tablet by mouth daily, Disp: 90 tablet, Rfl: 1    calcium Not on file   Occupational History    Occupation: retired      Comment: Retired 4 th    Social Needs    Financial resource strain: Not on file    Food insecurity:     Worry: Not on file     Inability: Not on file   [employer] needs:     Medical: Not on file     Non-medical: Not on file   Tobacco Use    Smoking status: Never Smoker    Smokeless tobacco: Never Used   Substance and Sexual Activity    Alcohol use: No    Drug use: No    Sexual activity: Not Currently     Partners: Male   Lifestyle    Physical activity:     Days per week: Not on file     Minutes per session: Not on file    Stress: Not on file   Relationships    Social connections:     Talks on phone: Not on file     Gets together: Not on file     Attends Buddhism service: Not on file     Active member of club or organization: Not on file     Attends meetings of clubs or organizations: Not on file     Relationship status: Not on file    Intimate partner violence:     Fear of current or ex partner: Not on file     Emotionally abused: Not on file     Physically abused: Not on file     Forced sexual activity: Not on file   Other Topics Concern    Not on file   Social History Narrative    Drinks occasional Pepsi/Coke       Vitals:    01/24/20 1104   BP: 128/68   Site: Left Upper Arm   Position: Sitting   Cuff Size: Medium Adult   Pulse: 66   Temp: 97.6 °F (36.4 °C)   TempSrc: Temporal   SpO2: 96%   Weight: 175 lb (79.4 kg)   Height: 5' 7\" (1.702 m)       Physical Exam  Vitals signs and nursing note reviewed. Constitutional:       Appearance: She is well-developed. HENT:      Head: Atraumatic. Right Ear: External ear normal.      Left Ear: External ear normal.      Nose: Nose normal.      Mouth/Throat:      Pharynx: No oropharyngeal exudate. Eyes:      Conjunctiva/sclera: Conjunctivae normal.      Pupils: Pupils are equal, round, and reactive to light.    Neck:      Musculoskeletal: Normal range of motion and neck supple. Thyroid: No thyromegaly. Trachea: No tracheal deviation. Cardiovascular:      Rate and Rhythm: Normal rate and regular rhythm. Heart sounds: No murmur. No friction rub. No gallop. Pulmonary:      Effort: Pulmonary effort is normal. No respiratory distress. Breath sounds: Normal breath sounds. Abdominal:      General: Bowel sounds are normal.      Palpations: Abdomen is soft. Musculoskeletal: Normal range of motion. General: No tenderness or deformity. Lymphadenopathy:      Cervical: No cervical adenopathy. Skin:     General: Skin is warm and dry. Capillary Refill: Capillary refill takes less than 2 seconds. Findings: No rash. Neurological:      Mental Status: She is alert and oriented to person, place, and time. Sensory: No sensory deficit. Motor: No abnormal muscle tone. Coordination: Coordination normal.      Deep Tendon Reflexes: Reflexes normal.         Assessment and Plan: Memo Lutz was seen today for office visit for anticoagulation management. Diagnoses and all orders for this visit:    Chronic atrial fibrillation  -     POCT INR        Return in about 2 months (around 3/24/2020).       Seen By:  Chas Dale DO

## 2020-02-12 ENCOUNTER — OFFICE VISIT (OUTPATIENT)
Dept: PODIATRY | Age: 70
End: 2020-02-12
Payer: MEDICARE

## 2020-02-12 PROCEDURE — 99999 PR OFFICE/OUTPT VISIT,PROCEDURE ONLY: CPT | Performed by: PODIATRIST

## 2020-02-12 PROCEDURE — 11720 DEBRIDE NAIL 1-5: CPT | Performed by: PODIATRIST

## 2020-02-12 NOTE — PROGRESS NOTES
Patient in office to follow up with pain right foot second toe. No complaints at this time. Oracio Matthews : 1950 Sex: female  Age: 77 y.o. Patient was referred by Mayela Headley DO    CC:    Follow-up injury right second toe    HPI:   Follow-up injury right second toe. Continues long-term Coumadin. No open wound right second toe. Does continue to improve. Still has some tenderness when she is in a closed toed shoe. No additional pedal complaints at this time.     ROS:  Const: Denies constitutional symptoms  Musculo: Denies symptoms other than stated above  Skin: Denies symptoms other than stated above       Current Outpatient Medications:     escitalopram (LEXAPRO) 10 MG tablet, Take 1 tablet by mouth every evening, Disp: 90 tablet, Rfl: 1    warfarin (COUMADIN) 6 MG tablet, Indications: monday, wednesday, friday 3 mg, all other days are 6 mg 1/2 tab on  and 1 tab all other days of the week OR AS DIRECTED, Disp: 30 tablet, Rfl: 2    topiramate (TOPAMAX) 50 MG tablet, Take 1 tablet by mouth 2 times daily, Disp: 180 tablet, Rfl: 1    propafenone (RYTHMOL) 225 MG tablet, Take 1 tablet by mouth three times daily, Disp: 270 tablet, Rfl: 1    Red Yeast Rice 600 MG CAPS, Take 1 capsule by mouth 2 times daily, Disp: , Rfl:     verapamil (CALAN SR) 180 MG extended release tablet, Take 1 tablet by mouth daily, Disp: 90 tablet, Rfl: 1    calcium carbonate-vitamin D (CALTRATE 600+D) 600-400 MG-UNIT TABS per tab, Take 1 tablet by mouth daily, Disp: , Rfl:     Alpha-Lipoic Acid 100 MG CAPS, Take by mouth, Disp: , Rfl:     b complex vitamins capsule, Take 1 capsule by mouth daily, Disp: , Rfl:     anastrozole (ARIMIDEX) 1 MG tablet, Take 1 mg by mouth daily , Disp: , Rfl:     furosemide (LASIX) 20 MG tablet, Take 20 mg by mouth daily , Disp: , Rfl:   Allergies   Allergen Reactions    Diltiazem Hcl Other (See Comments)     Other reaction(s): weak    Cardizem [Diltiazem] Palpitations    Cefuroxime Axetil Nausea And Vomiting, Other (See Comments) and Palpitations    Claritin [Loratadine] Palpitations    Erythromycin Nausea And Vomiting    Morphine Nausea And Vomiting    Mucinex [Guaifenesin Er] Palpitations    Propoxyphene Other (See Comments) and Nausea And Vomiting     DIZZY  Other reaction(s): Free Text    Zyrtec [Cetirizine] Nausea And Vomiting and Other (See Comments)     WEAK       Past Medical History:   Diagnosis Date    Breast cancer ([facility] [address] 04/2017    Chronic atrial fibrillation     Fibroadenosis of breast     Headache     Hyperlipidemia     Hypertension            There were no vitals filed for this visit. Work History/Social History: Retired  [address] [employer]    Focused Lower Extremity Physical Exam:    Neurovascular examination:    Dorsalis Pedis palpable bilateral.  Posterior tibialis palpable bilateral.    Capillary Refill Time:  Immediate return  Hair growth:  Symmetrical and bilateral   Skin:  Not atrophic  Edema: No edema bilateral feet or ankles. Neurologic:  Light touch intact bilateral.      Musculoskeletal/ Orthopedic examination:    Equinis: Absent bilateral  Dorsiflexion, plantarflexion, inversion, eversion bilateral 5 out of 5 muscle strength  Wiggling toes  Negative Homans  No significant tenderness to palpation right second toe. No tenderness with range of motion right second metatarsal phalangeal joint or IPJ right second toe. Dermatology examination:    No open skin lesions or abrasions bilateral lower extremity. Toenails 1-5 right foot thickened, elongated, mycotic, dystrophic with subungual debris. Assessment and Plan: Jaclyn Gabriel was seen today for follow-up and foot pain. Diagnoses and all orders for this visit:    Tinea unguium    Encounter for current long-term use of anticoagulants    Injury of second toe, right, sequela      Follow-up tinea unguium. Pain following injury right second toe is completely healed.   Progressing well.  Manual debridement with standard technique toenails 1 through 5 right in thickness and length. Patient tolerated procedure well. Patient does continue long-term anticoagulant therapy. I will follow-up in 3 months to monitor overall progression. Return in about 2 months (around 4/12/2020). Seen By:  Laura Barksdale DPM      Document was created using voice recognition software. Note was reviewed, however may contain grammatical errors.

## 2020-03-03 ENCOUNTER — OFFICE VISIT (OUTPATIENT)
Dept: FAMILY MEDICINE CLINIC | Age: 70
End: 2020-03-03
Payer: MEDICARE

## 2020-03-03 VITALS
DIASTOLIC BLOOD PRESSURE: 68 MMHG | SYSTOLIC BLOOD PRESSURE: 136 MMHG | WEIGHT: 177 LBS | HEIGHT: 67 IN | HEART RATE: 55 BPM | BODY MASS INDEX: 27.78 KG/M2 | TEMPERATURE: 97.1 F | OXYGEN SATURATION: 98 %

## 2020-03-03 PROCEDURE — 99213 OFFICE O/P EST LOW 20 MIN: CPT | Performed by: FAMILY MEDICINE

## 2020-03-03 RX ORDER — AMOXICILLIN 500 MG/1
500 CAPSULE ORAL 3 TIMES DAILY
Qty: 30 CAPSULE | Refills: 0 | Status: SHIPPED | OUTPATIENT
Start: 2020-03-03 | End: 2020-03-13

## 2020-03-03 RX ORDER — WARFARIN SODIUM 6 MG/1
TABLET ORAL
Qty: 30 TABLET | Refills: 2 | Status: SHIPPED
Start: 2020-03-03 | End: 2020-06-18 | Stop reason: SDUPTHER

## 2020-03-03 ASSESSMENT — ENCOUNTER SYMPTOMS
BACK PAIN: 0
SHORTNESS OF BREATH: 0
DIARRHEA: 0
SINUS PAIN: 0
SINUS PRESSURE: 1
TROUBLE SWALLOWING: 0
EYE PAIN: 0
EYE DISCHARGE: 0
CHEST TIGHTNESS: 0
ABDOMINAL PAIN: 0
PHOTOPHOBIA: 0
ALLERGIC/IMMUNOLOGIC NEGATIVE: 1
COUGH: 1
NAUSEA: 0
SORE THROAT: 0
BLOOD IN STOOL: 0
EYE REDNESS: 0
VOMITING: 0

## 2020-03-03 NOTE — PROGRESS NOTES
3/3/20  Oracio Matthews : 1950 Sex: female  Age: 77 y.o. Chief Complaint   Patient presents with    Pharyngitis    Headache    Epistaxis     pt said that their nose hurts to breathe in with, the air is buring it.  Otalgia     bilateral ear pain, onset about 5 days now. Congestion, pressure, drainage, facial tenderness, mild headache, nose bleeds, onset 5 days ago. Denies fever, chills, diaphoresis, nausea, vomiting, decreased oral intake. Denies other GI or  complaints. OTC treatments minimally effective. Review of Systems   Constitutional: Negative for appetite change, fatigue and unexpected weight change. HENT: Positive for nosebleeds and sinus pressure. Negative for congestion, ear pain, hearing loss, sinus pain, sore throat and trouble swallowing. Eyes: Negative for photophobia, pain, discharge and redness. Respiratory: Positive for cough. Negative for chest tightness and shortness of breath. Cardiovascular: Negative for chest pain, palpitations and leg swelling. Gastrointestinal: Negative for abdominal pain, blood in stool, diarrhea, nausea and vomiting. Endocrine: Negative. Genitourinary: Negative for dysuria, flank pain, frequency and hematuria. Musculoskeletal: Negative for arthralgias, back pain, joint swelling and myalgias. Skin: Negative. Allergic/Immunologic: Negative. Neurological: Negative for dizziness, seizures, syncope, weakness, light-headedness, numbness and headaches. Hematological: Negative for adenopathy. Does not bruise/bleed easily. Psychiatric/Behavioral: Negative.           Current Outpatient Medications:     verapamil (CALAN SR) 180 MG extended release tablet, Take 1 tablet by mouth daily, Disp: 90 tablet, Rfl: 1    warfarin (COUMADIN) 6 MG tablet, Indications: monday, wednesday, friday 3 mg, all other days are 6 mg 1/2 tab on  and 1 tab all other days of the week OR AS DIRECTED, Disp: 30 tablet, Rfl: 2   TUNNELED VENOUS PORT PLACEMENT  05/26/2017    Dr. Josh Russell     Family History   Problem Relation Age of Onset    Cancer Maternal Cousin 80        lung    Heart Disease Mother     Other Mother         [place] Palsy    Other Father         gall bladder problem, hip fx; AAA rupture    Coronary Art Dis Father         MI     Social History     Socioeconomic History    Marital status:      Spouse name: Griffin Bass Number of children: Not on file    Years of education: Not on file    Highest education level: Not on file   Occupational History    Occupation: retired      Comment: Retired 4 th    Social Needs    Financial resource strain: Not on file    Food insecurity:     Worry: Not on file     Inability: Not on file   [employer] needs:     Medical: Not on file     Non-medical: Not on file   Tobacco Use    Smoking status: Never Smoker    Smokeless tobacco: Never Used   Substance and Sexual Activity    Alcohol use: No    Drug use: No    Sexual activity: Not Currently     Partners: Male   Lifestyle    Physical activity:     Days per week: Not on file     Minutes per session: Not on file    Stress: Not on file   Relationships    Social connections:     Talks on phone: Not on file     Gets together: Not on file     Attends Roman Catholic service: Not on file     Active member of club or organization: Not on file     Attends meetings of clubs or organizations: Not on file     Relationship status: Not on file    Intimate partner violence:     Fear of current or ex partner: Not on file     Emotionally abused: Not on file     Physically abused: Not on file     Forced sexual activity: Not on file   Other Topics Concern    Not on file   Social History Narrative    Drinks occasional Pepsi/Coke       Vitals:    03/03/20 0952   BP: 136/68   Pulse: 55   Temp: 97.1 °F (36.2 °C)   TempSrc: Temporal   SpO2: 98%   Weight: 177 lb (80.3 kg)   Height: 5' 7\" (1.702 m)       Physical Exam  Vitals signs and

## 2020-03-03 NOTE — PROGRESS NOTES
3/3/2020  No chief complaint on file. HPI   Denies chest pain, edema, fatigue, palpitations and syncope. Compliance reviewed. No medication side effects noted. Review of Systems   Constitutional: Negative for appetite change, fatigue and unexpected weight change. HENT: Negative for congestion and trouble swallowing. Eyes: Negative for visual disturbance. Respiratory: Negative for chest tightness and shortness of breath. Cardiovascular: Negative for chest pain and leg swelling. Gastrointestinal: Negative for abdominal pain and blood in stool. Endocrine: Negative for cold intolerance, heat intolerance and polyuria. Genitourinary: Negative for difficulty urinating, hematuria and menstrual problem. Musculoskeletal: Negative for arthralgias, gait problem, joint swelling and myalgias. Allergic/Immunologic: Negative for environmental allergies and food allergies. Neurological: Negative for syncope, weakness, light-headedness and numbness. Hematological: Negative for adenopathy. Psychiatric/Behavioral: Negative for suicidal ideas. The patient is not nervous/anxious.          NOT DEPRESSED

## 2020-03-19 LAB
ALBUMIN SERPL-MCNC: 4.1 G/DL
ALP BLD-CCNC: 73 U/L
ALT SERPL-CCNC: 11 U/L
ANION GAP SERPL CALCULATED.3IONS-SCNC: 1.7 MMOL/L
AST SERPL-CCNC: 14 U/L
BASOPHILS ABSOLUTE: 58 /ΜL
BASOPHILS RELATIVE PERCENT: 0.7 %
BILIRUB SERPL-MCNC: 0.5 MG/DL (ref 0.1–1.4)
BUN BLDV-MCNC: 17 MG/DL
CALCIUM SERPL-MCNC: 9.3 MG/DL
CHLORIDE BLD-SCNC: 105 MMOL/L
CO2: 30 MMOL/L
CREAT SERPL-MCNC: 1.07 MG/DL
EOSINOPHILS ABSOLUTE: 133 /ΜL
EOSINOPHILS RELATIVE PERCENT: 1.6 %
GFR CALCULATED: 53
GLUCOSE BLD-MCNC: 95 MG/DL
HCT VFR BLD CALC: 37.1 % (ref 36–46)
HEMOGLOBIN: 12.4 G/DL (ref 12–16)
LYMPHOCYTES ABSOLUTE: 1394 /ΜL
LYMPHOCYTES RELATIVE PERCENT: 16.8 %
MCH RBC QN AUTO: 30.2 PG
MCHC RBC AUTO-ENTMCNC: 33.4 G/DL
MCV RBC AUTO: 90.3 FL
MONOCYTES ABSOLUTE: 789 /ΜL
MONOCYTES RELATIVE PERCENT: 9.5 %
NEUTROPHILS ABSOLUTE: 5926 /ΜL
NEUTROPHILS RELATIVE PERCENT: 71.4 %
PLATELET # BLD: 159 K/ΜL
PMV BLD AUTO: 9 FL
POTASSIUM SERPL-SCNC: 3.9 MMOL/L
RBC # BLD: 4.11 10^6/ΜL
SODIUM BLD-SCNC: 140 MMOL/L
TOTAL PROTEIN: 6.5
WBC # BLD: 8.3 10^3/ML

## 2020-03-23 ENCOUNTER — OFFICE VISIT (OUTPATIENT)
Dept: FAMILY MEDICINE CLINIC | Age: 70
End: 2020-03-23
Payer: MEDICARE

## 2020-03-23 VITALS
HEART RATE: 62 BPM | OXYGEN SATURATION: 98 % | BODY MASS INDEX: 27.78 KG/M2 | WEIGHT: 177 LBS | DIASTOLIC BLOOD PRESSURE: 60 MMHG | SYSTOLIC BLOOD PRESSURE: 120 MMHG | TEMPERATURE: 97.2 F | HEIGHT: 67 IN

## 2020-03-23 PROBLEM — C77.3 BREAST CANCER METASTASIZED TO AXILLARY LYMPH NODE, RIGHT (HCC): Status: ACTIVE | Noted: 2020-03-23

## 2020-03-23 PROBLEM — C50.911 BREAST CANCER METASTASIZED TO AXILLARY LYMPH NODE, RIGHT (HCC): Status: ACTIVE | Noted: 2020-03-23

## 2020-03-23 LAB
BILIRUBIN, POC: NEGATIVE
BLOOD URINE, POC: NORMAL
CLARITY, POC: CLEAR
COLOR, POC: NORMAL
GLUCOSE URINE, POC: NEGATIVE
INTERNATIONAL NORMALIZATION RATIO, POC: 2.6
KETONES, POC: NEGATIVE
LEUKOCYTE EST, POC: NEGATIVE
NITRITE, POC: NORMAL
PH, POC: 6.5
PROTEIN, POC: NEGATIVE
PROTHROMBIN TIME, POC: 30.8
SPECIFIC GRAVITY, POC: 1.01
UROBILINOGEN, POC: 0.2

## 2020-03-23 PROCEDURE — 85610 PROTHROMBIN TIME: CPT | Performed by: FAMILY MEDICINE

## 2020-03-23 PROCEDURE — 81002 URINALYSIS NONAUTO W/O SCOPE: CPT | Performed by: FAMILY MEDICINE

## 2020-03-23 PROCEDURE — 99213 OFFICE O/P EST LOW 20 MIN: CPT | Performed by: FAMILY MEDICINE

## 2020-03-23 RX ORDER — TOPIRAMATE 50 MG/1
50 TABLET, FILM COATED ORAL 2 TIMES DAILY
Qty: 180 TABLET | Refills: 1 | Status: SHIPPED
Start: 2020-03-23 | End: 2020-07-22 | Stop reason: SDUPTHER

## 2020-03-23 RX ORDER — PROPAFENONE HYDROCHLORIDE 225 MG/1
225 TABLET, FILM COATED ORAL 3 TIMES DAILY
Qty: 270 TABLET | Refills: 1 | Status: SHIPPED
Start: 2020-03-23 | End: 2020-09-21 | Stop reason: SDUPTHER

## 2020-03-23 RX ORDER — CIPROFLOXACIN 500 MG/1
500 TABLET, FILM COATED ORAL 2 TIMES DAILY
Qty: 14 TABLET | Refills: 0 | Status: SHIPPED | OUTPATIENT
Start: 2020-03-23 | End: 2020-03-30

## 2020-03-23 ASSESSMENT — ENCOUNTER SYMPTOMS
NAUSEA: 0
ABDOMINAL PAIN: 0
SHORTNESS OF BREATH: 0
VOMITING: 0
CHEST TIGHTNESS: 0
TROUBLE SWALLOWING: 0
ALLERGIC/IMMUNOLOGIC NEGATIVE: 1
PHOTOPHOBIA: 0
EYE DISCHARGE: 0
BLOOD IN STOOL: 0
SINUS PAIN: 0
EYE REDNESS: 0
COUGH: 0
SORE THROAT: 0
EYE PAIN: 0
DIARRHEA: 0
BACK PAIN: 0

## 2020-03-23 NOTE — PROGRESS NOTES
3/23/20  Samia Frank : 1950 Sex: female  Age: 77 y.o. Chief Complaint   Patient presents with    Urinary Tract Infection     pt said they said that the [facility] sent the result to Dr. Brice Claudio, the Doctor at the [facility] is ill now.  Medication Refill     pt would also like to discuss the medication she was given at the [facility] for uti. she said that it's making her really sick so she did not take it today and would like to maybe see about getting something else for the uti       The patient states that they have had dysuria and urinary frequency for the last 5  days. The patient does not admit to suprapubic pressure. The patient has had urgency but denies gross hematuria. The patient denies any abdominal or flank pain. The patient denies nausea and vomiting. No fevers of chills. No prior history of kidney stones. The patient has a history of UTI's and states that this feels the same. [facility] cultured E Coli and placed on Macrobid which she couldn't tolerate      Review of Systems   Constitutional: Negative for appetite change, fatigue and unexpected weight change. HENT: Negative for congestion, ear pain, hearing loss, sinus pain, sore throat and trouble swallowing. Eyes: Negative for photophobia, pain, discharge and redness. Respiratory: Negative for cough, chest tightness and shortness of breath. Cardiovascular: Negative for chest pain, palpitations and leg swelling. Gastrointestinal: Negative for abdominal pain, blood in stool, diarrhea, nausea and vomiting. Endocrine: Negative. Genitourinary: Positive for dysuria, frequency and urgency. Negative for flank pain and hematuria. Musculoskeletal: Negative for arthralgias, back pain, joint swelling and myalgias. Skin: Negative. Allergic/Immunologic: Negative. Neurological: Negative for dizziness, seizures, syncope, weakness, light-headedness, numbness and headaches.    Hematological: Negative for adenopathy. Does not bruise/bleed easily. Psychiatric/Behavioral: Negative.           Current Outpatient Medications:     propafenone (RYTHMOL) 225 MG tablet, Take 1 tablet by mouth three times daily, Disp: 270 tablet, Rfl: 1    topiramate (TOPAMAX) 50 MG tablet, Take 1 tablet by mouth 2 times daily, Disp: 180 tablet, Rfl: 1    verapamil (CALAN SR) 180 MG extended release tablet, Take 1 tablet by mouth daily, Disp: 90 tablet, Rfl: 1    warfarin (COUMADIN) 6 MG tablet, Indications: monday, wednesday, friday 3 mg, all other days are 6 mg 1/2 tab on M W F and 1 tab all other days of the week OR AS DIRECTED, Disp: 30 tablet, Rfl: 2    escitalopram (LEXAPRO) 10 MG tablet, Take 1 tablet by mouth every evening, Disp: 90 tablet, Rfl: 1    Red Yeast Rice 600 MG CAPS, Take 1 capsule by mouth 2 times daily, Disp: , Rfl:     calcium carbonate-vitamin D (CALTRATE 600+D) 600-400 MG-UNIT TABS per tab, Take 1 tablet by mouth daily, Disp: , Rfl:     Alpha-Lipoic Acid 100 MG CAPS, Take by mouth, Disp: , Rfl:     b complex vitamins capsule, Take 1 capsule by mouth daily, Disp: , Rfl:     anastrozole (ARIMIDEX) 1 MG tablet, Take 1 mg by mouth daily , Disp: , Rfl:     furosemide (LASIX) 20 MG tablet, Take 20 mg by mouth daily , Disp: , Rfl:   Allergies   Allergen Reactions    Diltiazem Hcl Other (See Comments)     Other reaction(s): weak    Cardizem [Diltiazem] Palpitations    Cefuroxime Axetil Nausea And Vomiting, Other (See Comments) and Palpitations    Claritin [Loratadine] Palpitations    Erythromycin Nausea And Vomiting    Morphine Nausea And Vomiting    Mucinex [Guaifenesin Er] Palpitations    Propoxyphene Other (See Comments) and Nausea And Vomiting     DIZZY  Other reaction(s): Free Text    Zyrtec [Cetirizine] Nausea And Vomiting and Other (See Comments)     WEAK       Past Medical History:   Diagnosis Date    Breast cancer ([facility] [address] 04/2017    Chronic atrial fibrillation     Fibroadenosis of breast     Headache     Hyperlipidemia     Hypertension      Past Surgical History:   Procedure Laterality Date    [facility]  1999    [facility]    BREAST LUMPECTOMY Right 04/26/2017    COLONOSCOPY      EYE SURGERY      SUDHIR LENSE IMPLANTS    OTHER SURGICAL HISTORY      port removal    TONSILLECTOMY      TUNNELED VENOUS PORT PLACEMENT  05/26/2017    Dr. Sharlene Abebe     Family History   Problem Relation Age of Onset    Cancer Maternal Cousin 80        lung    Heart Disease Mother     Other Mother         [place] Palsy    Other Father         gall bladder problem, hip fx; AAA rupture    Coronary Art Dis Father         MI     Social History     Socioeconomic History    Marital status:      Spouse name: Carmen Butcher Number of children: Not on file    Years of education: Not on file    Highest education level: Not on file   Occupational History    Occupation: retired      Comment: Retired 4 th    Social Needs    Financial resource strain: Not on file    Food insecurity     Worry: Not on file     Inability: Not on file   [employer] needs     Medical: Not on file     Non-medical: Not on file   Tobacco Use    Smoking status: Never Smoker    Smokeless tobacco: Never Used   Substance and Sexual Activity    Alcohol use: No    Drug use: No    Sexual activity: Not Currently     Partners: Male   Lifestyle    Physical activity     Days per week: Not on file     Minutes per session: Not on file    Stress: Not on file   Relationships    Social connections     Talks on phone: Not on file     Gets together: Not on file     Attends Orthodoxy service: Not on file     Active member of club or organization: Not on file     Attends meetings of clubs or organizations: Not on file     Relationship status: Not on file    Intimate partner violence     Fear of current or ex partner: Not on file     Emotionally abused: Not on file     Physically abused: Not on file     Forced sexual

## 2020-04-01 ENCOUNTER — TELEPHONE (OUTPATIENT)
Dept: PRIMARY CARE CLINIC | Age: 70
End: 2020-04-01

## 2020-04-02 ENCOUNTER — OFFICE VISIT (OUTPATIENT)
Dept: PRIMARY CARE CLINIC | Age: 70
End: 2020-04-02
Payer: MEDICARE

## 2020-04-02 ENCOUNTER — HOSPITAL ENCOUNTER (OUTPATIENT)
Age: 70
Discharge: HOME | End: 2020-04-04
Payer: MEDICARE

## 2020-04-02 VITALS
WEIGHT: 179 LBS | RESPIRATION RATE: 15 BRPM | BODY MASS INDEX: 28.09 KG/M2 | HEIGHT: 67 IN | SYSTOLIC BLOOD PRESSURE: 124 MMHG | DIASTOLIC BLOOD PRESSURE: 70 MMHG | OXYGEN SATURATION: 98 % | HEART RATE: 68 BPM | TEMPERATURE: 97.8 F

## 2020-04-02 LAB
INTERNATIONAL NORMALIZATION RATIO, POC: 1.9
PROTHROMBIN TIME, POC: 22.2

## 2020-04-02 PROCEDURE — 99213 OFFICE O/P EST LOW 20 MIN: CPT | Performed by: FAMILY MEDICINE

## 2020-04-02 PROCEDURE — 85610 PROTHROMBIN TIME: CPT | Performed by: FAMILY MEDICINE

## 2020-04-02 PROCEDURE — 87088 URINE BACTERIA CULTURE: CPT

## 2020-04-02 RX ORDER — FLUCONAZOLE 150 MG/1
150 TABLET ORAL ONCE
Qty: 1 TABLET | Refills: 0 | Status: SHIPPED | OUTPATIENT
Start: 2020-04-02 | End: 2020-04-02

## 2020-04-02 RX ORDER — OXYCODONE HYDROCHLORIDE AND ACETAMINOPHEN 5; 325 MG/1; MG/1
1 TABLET ORAL EVERY 6 HOURS PRN
Qty: 30 TABLET | Refills: 0 | Status: CANCELLED | OUTPATIENT
Start: 2020-04-02 | End: 2020-04-09

## 2020-04-02 ASSESSMENT — ENCOUNTER SYMPTOMS
NAUSEA: 0
EYE PAIN: 0
ABDOMINAL PAIN: 0
BLOOD IN STOOL: 0
TROUBLE SWALLOWING: 0
SORE THROAT: 0
ALLERGIC/IMMUNOLOGIC NEGATIVE: 1
COUGH: 0
SINUS PAIN: 0
EYE DISCHARGE: 0
EYE REDNESS: 0
VOMITING: 0
DIARRHEA: 0
BACK PAIN: 0
CHEST TIGHTNESS: 0
PHOTOPHOBIA: 0
SHORTNESS OF BREATH: 0

## 2020-04-02 NOTE — PROGRESS NOTES
Conjunctiva/sclera: Conjunctivae normal.      Pupils: Pupils are equal, round, and reactive to light. Neck:      Musculoskeletal: Normal range of motion and neck supple. Thyroid: No thyromegaly. Trachea: No tracheal deviation. Cardiovascular:      Rate and Rhythm: Normal rate and regular rhythm. Heart sounds: No murmur. No friction rub. No gallop. Pulmonary:      Effort: Pulmonary effort is normal. No respiratory distress. Breath sounds: Normal breath sounds. Abdominal:      General: Bowel sounds are normal.      Palpations: Abdomen is soft. Musculoskeletal: Normal range of motion. General: No tenderness or deformity. Lymphadenopathy:      Cervical: No cervical adenopathy. Skin:     General: Skin is warm and dry. Capillary Refill: Capillary refill takes less than 2 seconds. Findings: No rash. Neurological:      Mental Status: She is alert and oriented to person, place, and time. Sensory: No sensory deficit. Motor: No abnormal muscle tone. Coordination: Coordination normal.      Deep Tendon Reflexes: Reflexes normal.         Assessment and Plan: Lenn Hammans was seen today for urinary tract infection and atrial fibrillation. Diagnoses and all orders for this visit:    Urinary tract infection with hematuria, site unspecified  -     Culture, Urine; Future  -     fluconazole (DIFLUCAN) 150 MG tablet; Take 1 tablet by mouth once for 1 dose    History of breast cancer    Chronic atrial fibrillation  -     POCT INR    Adjust warfarin to 6 mg 5x week, 3 mg 2x week    Return in about 4 weeks (around 4/30/2020).       Seen By:  Tatiana Gamez DO

## 2020-04-04 LAB — URINE CULTURE, ROUTINE: NORMAL

## 2020-04-28 ENCOUNTER — HOSPITAL ENCOUNTER (OUTPATIENT)
Age: 70
Discharge: HOME | End: 2020-04-30
Payer: MEDICARE

## 2020-04-28 PROCEDURE — 87077 CULTURE AEROBIC IDENTIFY: CPT

## 2020-04-28 PROCEDURE — 87088 URINE BACTERIA CULTURE: CPT

## 2020-04-28 PROCEDURE — 87186 SC STD MICRODIL/AGAR DIL: CPT

## 2020-04-28 PROCEDURE — 88175 CYTOPATH C/V AUTO FLUID REDO: CPT

## 2020-05-01 LAB
ORGANISM: ABNORMAL
URINE CULTURE, ROUTINE: ABNORMAL

## 2020-05-04 ENCOUNTER — OFFICE VISIT (OUTPATIENT)
Dept: PRIMARY CARE CLINIC | Age: 70
End: 2020-05-04
Payer: MEDICARE

## 2020-05-04 VITALS
SYSTOLIC BLOOD PRESSURE: 138 MMHG | BODY MASS INDEX: 28.72 KG/M2 | HEIGHT: 67 IN | WEIGHT: 183 LBS | OXYGEN SATURATION: 98 % | RESPIRATION RATE: 15 BRPM | TEMPERATURE: 97.5 F | DIASTOLIC BLOOD PRESSURE: 70 MMHG | HEART RATE: 56 BPM

## 2020-05-04 LAB
INTERNATIONAL NORMALIZATION RATIO, POC: 2.4
PROTHROMBIN TIME, POC: 29.2

## 2020-05-04 PROCEDURE — 85610 PROTHROMBIN TIME: CPT | Performed by: FAMILY MEDICINE

## 2020-05-04 PROCEDURE — 99213 OFFICE O/P EST LOW 20 MIN: CPT | Performed by: FAMILY MEDICINE

## 2020-05-04 ASSESSMENT — ENCOUNTER SYMPTOMS
TROUBLE SWALLOWING: 0
SINUS PAIN: 0
SHORTNESS OF BREATH: 0
SORE THROAT: 0
PHOTOPHOBIA: 0
VOMITING: 0
BACK PAIN: 0
COUGH: 0
EYE REDNESS: 0
NAUSEA: 0
ABDOMINAL PAIN: 0
ALLERGIC/IMMUNOLOGIC NEGATIVE: 1
BLOOD IN STOOL: 0
DIARRHEA: 0
EYE DISCHARGE: 0
CHEST TIGHTNESS: 0
EYE PAIN: 0

## 2020-05-04 NOTE — PROGRESS NOTES
20  Brenna Zamora : 1950 Sex: female  Age: 77 y.o. Chief Complaint   Patient presents with   [facility] Visit for Anticoagulation Management       Recheck, INR, 2.4. Denies any new complaints      Review of Systems   Constitutional: Negative for appetite change, fatigue and unexpected weight change. HENT: Negative for congestion, ear pain, hearing loss, sinus pain, sore throat and trouble swallowing. Eyes: Negative for photophobia, pain, discharge and redness. Respiratory: Negative for cough, chest tightness and shortness of breath. Cardiovascular: Negative for chest pain, palpitations and leg swelling. Gastrointestinal: Negative for abdominal pain, blood in stool, diarrhea, nausea and vomiting. Endocrine: Negative. Genitourinary: Negative for dysuria, flank pain, frequency and hematuria. Musculoskeletal: Negative for arthralgias, back pain, joint swelling and myalgias. Skin: Negative. Allergic/Immunologic: Negative. Neurological: Negative for dizziness, seizures, syncope, weakness, light-headedness, numbness and headaches. Hematological: Negative for adenopathy. Does not bruise/bleed easily. Psychiatric/Behavioral: Negative.           Current Outpatient Medications:     propafenone (RYTHMOL) 225 MG tablet, Take 1 tablet by mouth three times daily, Disp: 270 tablet, Rfl: 1    topiramate (TOPAMAX) 50 MG tablet, Take 1 tablet by mouth 2 times daily, Disp: 180 tablet, Rfl: 1    verapamil (CALAN SR) 180 MG extended release tablet, Take 1 tablet by mouth daily, Disp: 90 tablet, Rfl: 1    warfarin (COUMADIN) 6 MG tablet, Indications: monday, wednesday, friday 3 mg, all other days are 6 mg 1/2 tab on  and 1 tab all other days of the week OR AS DIRECTED, Disp: 30 tablet, Rfl: 2    escitalopram (LEXAPRO) 10 MG tablet, Take 1 tablet by mouth every evening, Disp: 90 tablet, Rfl: 1    Red Yeast Rice 600 MG CAPS, Take 1 capsule by mouth 2 times daily, Disp: , Rfl:    calcium carbonate-vitamin D (CALTRATE 600+D) 600-400 MG-UNIT TABS per tab, Take 1 tablet by mouth daily, Disp: , Rfl:     Alpha-Lipoic Acid 100 MG CAPS, Take by mouth, Disp: , Rfl:     b complex vitamins capsule, Take 1 capsule by mouth daily, Disp: , Rfl:     anastrozole (ARIMIDEX) 1 MG tablet, Take 1 mg by mouth daily , Disp: , Rfl:     furosemide (LASIX) 20 MG tablet, Take 20 mg by mouth daily , Disp: , Rfl:   Allergies   Allergen Reactions    Diltiazem Hcl Other (See Comments)     Other reaction(s): weak    Cardizem [Diltiazem] Palpitations    Cefuroxime Axetil Nausea And Vomiting, Other (See Comments) and Palpitations    Claritin [Loratadine] Palpitations    Erythromycin Nausea And Vomiting    Morphine Nausea And Vomiting    Mucinex [Guaifenesin Er] Palpitations    Propoxyphene Other (See Comments) and Nausea And Vomiting     DIZZY  Other reaction(s): Free Text    Zyrtec [Cetirizine] Nausea And Vomiting and Other (See Comments)     WEAK       Past Medical History:   Diagnosis Date    Breast cancer ([facility] [address] 04/2017    Chronic atrial fibrillation     Fibroadenosis of breast     Headache     Hyperlipidemia     Hypertension      Past Surgical History:   Procedure Laterality Date    [facility]    BREAST LUMPECTOMY Right 04/26/2017    COLONOSCOPY      EYE SURGERY      SUDHIR LENSE IMPLANTS    OTHER SURGICAL HISTORY      port removal    TONSILLECTOMY      TUNNELED VENOUS PORT PLACEMENT  05/26/2017    Dr. Usman James     Family History   Problem Relation Age of Onset    Cancer Maternal Cousin 80        lung    Heart Disease Mother     Other Mother         [place] Palsy    Other Father         gall bladder problem, hip fx; AAA rupture    Coronary Art Dis Father         MI     Social History     Socioeconomic History    Marital status:      Spouse name: Jeris Babinski Number of children: Not on file    Years of education: Not on file   Expect Labs education tracheal deviation. Cardiovascular:      Rate and Rhythm: Normal rate and regular rhythm. Heart sounds: No murmur. No friction rub. No gallop. Pulmonary:      Effort: Pulmonary effort is normal. No respiratory distress. Breath sounds: Normal breath sounds. Abdominal:      General: Bowel sounds are normal.      Palpations: Abdomen is soft. Musculoskeletal: Normal range of motion. General: No tenderness or deformity. Lymphadenopathy:      Cervical: No cervical adenopathy. Skin:     General: Skin is warm and dry. Capillary Refill: Capillary refill takes less than 2 seconds. Findings: No rash. Neurological:      Mental Status: She is alert and oriented to person, place, and time. Sensory: No sensory deficit. Motor: No abnormal muscle tone. Coordination: Coordination normal.      Deep Tendon Reflexes: Reflexes normal.         Assessment and Plan: Ana Paula Mya was seen today for office visit for anticoagulation management. Diagnoses and all orders for this visit:    Chronic atrial fibrillation  -     POCT INR    Essential hypertension        Return in about 4 weeks (around 6/1/2020).       Seen By:  Argentina Baires DO

## 2020-05-08 ENCOUNTER — TELEPHONE (OUTPATIENT)
Dept: CARDIOLOGY CLINIC | Age: 70
End: 2020-05-08

## 2020-05-12 ENCOUNTER — VIRTUAL VISIT (OUTPATIENT)
Dept: CARDIOLOGY CLINIC | Age: 70
End: 2020-05-12
Payer: MEDICARE

## 2020-05-12 VITALS — HEIGHT: 67 IN | WEIGHT: 180 LBS | BODY MASS INDEX: 28.25 KG/M2 | OXYGEN SATURATION: 96 %

## 2020-05-12 PROCEDURE — 99213 OFFICE O/P EST LOW 20 MIN: CPT | Performed by: INTERNAL MEDICINE

## 2020-05-12 NOTE — PROGRESS NOTES
fraction of 60-65% with normal LV diastolic function. Normal right ventricular size and function. Moderately dilated left atrium. Mild thickening of the mitral valve leaflets with moderate mitral regurgitation. Mild aortic regurgitation.       PAST MEDICAL HISTORY:  1.  As under cardiovascular history. 2.  History of depression. 3.  History of migraine headaches. 4.  History of tonsillectomy. 5.  History of bilateral cataract surgery with bilateral lens implants. 6.  History of colonoscopy. 7.  Fibroadenosis of the breast.   8.  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 She is S/P radiation and chemotherapy. She had a port inserted on 5/26/2017, which was removed on 2/28/2018. She is currently on Arimidex therapy. 9.  Chronic kidney disease.        Prior to Visit Medications    Medication Sig Taking?  Authorizing Provider   nitrofurantoin, macrocrystal-monohydrate, (MACROBID) 100 MG capsule Take 1 capsule by mouth 2 times daily for 7 days Yes Olvin Kearney MD   propafenone (RYTHMOL) 225 MG tablet Take 1 tablet by mouth three times daily Yes Ilioneli Ayala DO   topiramate (TOPAMAX) 50 MG tablet Take 1 tablet by mouth 2 times daily Yes Duke Ayala DO   verapamil (CALAN SR) 180 MG extended release tablet Take 1 tablet by mouth daily Yes Duke Ayala DO   warfarin (COUMADIN) 6 MG tablet Indications: monday, wednesday, friday 3 mg, all other days are 6 mg 1/2 tab on M W F and 1 tab all other days of the week OR AS DIRECTED Yes Duke Ayala DO   escitalopram (LEXAPRO) 10 MG tablet Take 1 tablet by mouth every evening Yes Ilion JILLIAN Ayala DO   Red Yeast Rice 600 MG CAPS Take 1 capsule by mouth 2 times daily Yes Historical Provider, MD   calcium carbonate-vitamin D (CALTRATE 600+D) 600-400 MG-UNIT TABS per tab Take 1 tablet by mouth daily Yes Historical Provider, MD Alpha-Lipoic Acid 100 MG CAPS Take by mouth Yes Historical Provider, MD   b complex vitamins capsule Take 1 capsule by mouth daily Yes Historical Provider, MD   anastrozole (ARIMIDEX) 1 MG tablet Take 1 mg by mouth daily  Yes Historical Provider, MD   furosemide (LASIX) 20 MG tablet Take 20 mg by mouth daily  Yes Historical Provider, MD       Social History     Tobacco Use    Smoking status: Never Smoker    Smokeless tobacco: Never Used   Substance Use Topics    Alcohol use: No     Comment: occasionally pop    Drug use: No            PHYSICAL EXAMINATION:  [ INSTRUCTIONS:  \"[x]\" Indicates a positive item  \"[]\" Indicates a negative item  -- DELETE ALL ITEMS NOT EXAMINED]  Vital Signs: (As obtained by patient/caregiver or practitioner observation)    Blood pressure-  Heart rate- 66   Respiratory rate- 16   Temperature-  Pulse oximetry- 96% RA    Constitutional: [x] Appears well-developed and well-nourished [x] No apparent distress      [] Abnormal-   Mental status  [x] Alert and awake  [x] Oriented to person/place/time [x]Able to follow commands      Eyes:  EOM    [x]  Normal  [] Abnormal-  Sclera  [x]  Normal  [] Abnormal -         Discharge [x]  None visible  [] Abnormal -    HENT:   [x] Normocephalic, atraumatic.   [] Abnormal   [] Mouth/Throat: Mucous membranes are moist.     External Ears [x] Normal  [] Abnormal-     Neck: [x] No visualized mass     Pulmonary/Chest: [x] Respiratory effort normal.  [x] No visualized signs of difficulty breathing or respiratory distress        [] Abnormal-      Musculoskeletal:   [x] Normal gait with no signs of ataxia         [x] Normal range of motion of neck        [] Abnormal-       Neurological:        [x] No Facial Asymmetry (Cranial nerve 7 motor function) (limited exam to video visit)          [x] No gaze palsy        [] Abnormal-         Skin:        [x] No significant exanthematous lesions or discoloration noted on facial skin         [] Abnormal- Psychiatric:       [x] Normal Affect [x] No Hallucinations        [] Abnormal-     Other pertinent observable physical exam findings-     ASSESSMENT:   1.  Valvular heart disease: Moderate mitral regurgitation and mild aortic regurgitation on echocardiogram in 5/2017.       2.  Hypertension: stable    3.  Mixed hyperlipidemia: Patient on red yeast rice.      4.  History of SVT and atrial flutter, S/P partially successful ablation therapy in 1999: On Rythmol, Verapamil, and chronic Coumadin therapy     5.  Invasive ductal carcinoma of the right breast upper outer quadrant: S/P right breast lumpectomy, sentinel lymph node dissection, radiation and chemotherapy. Patient on Arimidex.       6.  History of depression.       7.  History of migraine headaches. 8.  Chronic kidney disease, stage II       PLAN:  1. Reassure  2. Stay active  3. Check Echo  4. FU 1 year ( pending results of Echo )    Brenna Zamora is a 77 y.o. female being evaluated by a Virtual Visit (video visit) encounter to address concerns as mentioned above. A caregiver was present when appropriate. Due to this being a TeleHealth encounter (During [facility] public health emergency), evaluation of the following organ systems was limited: Vitals/Constitutional/EENT/Resp/CV/GI//MS/Neuro/Skin/Heme-Lymph-Imm. Pursuant to the emergency declaration under the [address] [place] [address] and the [employer] and [employer] Act, this Virtual Visit was conducted with patient's (and/or legal guardian's) consent, to reduce the patient's risk of exposure to COVID-19 and provide necessary medical care. The patient (and/or legal guardian) has also been advised to contact this office for worsening conditions or problems, and seek emergency medical treatment and/or call 911 if deemed necessary.      Patient identification was verified at the start of the visit: Yes    Total time spent

## 2020-05-13 ENCOUNTER — OFFICE VISIT (OUTPATIENT)
Dept: PODIATRY | Age: 70
End: 2020-05-13
Payer: MEDICARE

## 2020-05-13 PROCEDURE — 11055 PARING/CUTG B9 HYPRKER LES 1: CPT | Performed by: PODIATRIST

## 2020-05-13 NOTE — PROGRESS NOTES
Jeff Deutsch is here today for nail care. her PCP is Mac Isidro DO last OV was  20. No longer having pain to right foot/toes. Jeff Deutsch : 1950 Sex: female  Age: 77 y.o. Patient was referred by Mac Isidro DO    CC:    Follow-up painful toenails 1-5 right    HPI:   Follow-up painful toenails 1-5 right  No open wounds. Left foot no pain. Right second toenail still some tenderness  Continues long-term Coumadin. No additional pedal complaints at this time.     ROS:  Const: Denies constitutional symptoms  Musculo: Denies symptoms other than stated above  Skin: Denies symptoms other than stated above       Current Outpatient Medications:     propafenone (RYTHMOL) 225 MG tablet, Take 1 tablet by mouth three times daily, Disp: 270 tablet, Rfl: 1    topiramate (TOPAMAX) 50 MG tablet, Take 1 tablet by mouth 2 times daily, Disp: 180 tablet, Rfl: 1    verapamil (CALAN SR) 180 MG extended release tablet, Take 1 tablet by mouth daily, Disp: 90 tablet, Rfl: 1    warfarin (COUMADIN) 6 MG tablet, Indications: monday, wednesday, friday 3 mg, all other days are 6 mg 1/2 tab on M W  and 1 tab all other days of the week OR AS DIRECTED, Disp: 30 tablet, Rfl: 2    escitalopram (LEXAPRO) 10 MG tablet, Take 1 tablet by mouth every evening, Disp: 90 tablet, Rfl: 1    Red Yeast Rice 600 MG CAPS, Take 1 capsule by mouth 2 times daily, Disp: , Rfl:     calcium carbonate-vitamin D (CALTRATE 600+D) 600-400 MG-UNIT TABS per tab, Take 1 tablet by mouth daily, Disp: , Rfl:     Alpha-Lipoic Acid 100 MG CAPS, Take by mouth, Disp: , Rfl:     b complex vitamins capsule, Take 1 capsule by mouth daily, Disp: , Rfl:     anastrozole (ARIMIDEX) 1 MG tablet, Take 1 mg by mouth daily , Disp: , Rfl:     furosemide (LASIX) 20 MG tablet, Take 20 mg by mouth daily , Disp: , Rfl:   Allergies   Allergen Reactions    Diltiazem Hcl Other (See Comments)     Other reaction(s): weak    Cardizem [Diltiazem]

## 2020-05-22 ASSESSMENT — ENCOUNTER SYMPTOMS
WHEEZING: 0
TROUBLE SWALLOWING: 0
COUGH: 0
STRIDOR: 0
SHORTNESS OF BREATH: 0
RHINORRHEA: 0
BACK PAIN: 0
CHOKING: 0
EYE PAIN: 0
CHEST TIGHTNESS: 0
EYE DISCHARGE: 0
CONSTIPATION: 0
DIARRHEA: 0
VOICE CHANGE: 0
ROS SKIN COMMENTS: DENIES BREAST SKIN CHANGES, ARM SWELLING, OR PALPABLE AXILLARY OR SUPRACLAVICULAR ADENOPATHY.
SINUS PRESSURE: 0
ABDOMINAL PAIN: 0
SINUS PAIN: 0
VOMITING: 0
BLOOD IN STOOL: 0
NAUSEA: 0
APNEA: 0
ABDOMINAL DISTENTION: 0
EYE ITCHING: 0
SORE THROAT: 0
COLOR CHANGE: 0

## 2020-05-22 NOTE — PROGRESS NOTES
Subjective:  Stage II carcinoma of the right breast: ER/PA positive HER-2/marcos negative disease.  -Adjuvant chemotherapy per Dr. Leroy Hull with AC-T.completed on 11/09/2017.    -Post-operative RT:  Radiation completed 01/29/2018.   -ET:  Arimidex    Some elements of all sections below were copied from my previous note 10/15/19 and have been re-examined and updated where appropriate. All elements reflect the medical decision making of today May 27, 2020    Patient ID: Genesis Nascimento is a 77 y.o. female. HPI  Patient is here for a follow up visit. She underwent a right breast needle localized lumpectomy, right axillary sentinel lymph node excision on April 26, 2017. Pathological evaluation demonstrated:    A. Right breast, upper quadrant: Invasive mammary carcinoma of no special type (ductal, not otherwise specified), (grade 1) involving the anterior margin of excision. Tumor size: 2.2cm    B. Right breast, anterior inferior margin: Positive for small focus of invasive mammary carcinoma of no special type (ductal, not otherwise specified).  Final margin free of carcinoma.    C. Buckner lymph node #1: Negative for metastatic carcinoma on H&E and pankeratin stained sections.    D. Buckner lymph node #2: Positive for metastatic carcinoma on H&E and pankeratin stained sections.    E. Buckner lymph node #3: Positive for metastatic carcinoma on H&E and pankeratin stained sections.    F. Buckner lymph node #4: Negative for metastatic carcinoma on H&E and pankeratin stained sections.     Lymph-Vascular Invasion  -present  Pathologic Staging  -pT2 (sn)pN1a  Ancillary Studies  (on [id number])   Estrogen receptor: 93%    Progesterone receptor:  77%    HER-2/marcos protein expression: Negative, 1+                                          Past Medical History:   Diagnosis Date    Breast cancer ([facility] [address] 04/2017    Chronic atrial fibrillation     Fibroadenosis of breast     Headache     Hyperlipidemia     Hypertension      Past Surgical History:   Procedure Laterality Date    [facility]    BREAST LUMPECTOMY Right 04/26/2017    COLONOSCOPY      EYE SURGERY      SUDHIR LENSE IMPLANTS    OTHER SURGICAL HISTORY      port removal    TONSILLECTOMY      TUNNELED VENOUS PORT PLACEMENT  05/26/2017    Dr. Raghu Harmon     Social History     Socioeconomic History    Marital status:      Spouse name: Ilan Valencia Number of children: Not on file    Years of education: Not on file    Highest education level: Not on file   Occupational History    Occupation: retired      Comment: Retired 4 th    Social Needs    Financial resource strain: Not on file    Food insecurity     Worry: Not on file     Inability: Not on file   [employer] needs     Medical: Not on file     Non-medical: Not on file   Tobacco Use    Smoking status: Never Smoker    Smokeless tobacco: Never Used   Substance and Sexual Activity    Alcohol use: No     Comment: occasionally pop    Drug use: No    Sexual activity: Not Currently     Partners: Male   Lifestyle    Physical activity     Days per week: Not on file     Minutes per session: Not on file    Stress: Not on file   Relationships    Social connections     Talks on phone: Not on file     Gets together: Not on file     Attends Zoroastrianism service: Not on file     Active member of club or organization: Not on file     Attends meetings of clubs or organizations: Not on file     Relationship status: Not on file    Intimate partner violence     Fear of current or ex partner: Not on file     Emotionally abused: Not on file     Physically abused: Not on file     Forced sexual activity: Not on file   Other Topics Concern    Not on file   Social History Narrative    Drinks occasional Pepsi/Coke     Current Outpatient Medications on File Prior to Visit   Medication Sig Dispense Refill    propafenone (RYTHMOL) 225 MG tablet Take 1 tablet by mouth three times daily 270

## 2020-05-27 ENCOUNTER — OFFICE VISIT (OUTPATIENT)
Dept: BREAST CENTER | Age: 70
End: 2020-05-27
Payer: MEDICARE

## 2020-05-27 ENCOUNTER — HOSPITAL ENCOUNTER (OUTPATIENT)
Dept: GENERAL RADIOLOGY | Age: 70
Discharge: HOME | End: 2020-05-29
Payer: MEDICARE

## 2020-05-27 ENCOUNTER — OFFICE VISIT (OUTPATIENT)
Dept: FAMILY MEDICINE CLINIC | Age: 70
End: 2020-05-27
Payer: MEDICARE

## 2020-05-27 ENCOUNTER — HOSPITAL ENCOUNTER (OUTPATIENT)
Age: 70
Discharge: HOME | End: 2020-05-29
Payer: MEDICARE

## 2020-05-27 VITALS
HEIGHT: 67 IN | DIASTOLIC BLOOD PRESSURE: 70 MMHG | RESPIRATION RATE: 18 BRPM | WEIGHT: 182 LBS | OXYGEN SATURATION: 98 % | HEART RATE: 66 BPM | BODY MASS INDEX: 28.56 KG/M2 | SYSTOLIC BLOOD PRESSURE: 132 MMHG | TEMPERATURE: 98 F

## 2020-05-27 VITALS
SYSTOLIC BLOOD PRESSURE: 122 MMHG | TEMPERATURE: 98.2 F | HEART RATE: 53 BPM | DIASTOLIC BLOOD PRESSURE: 60 MMHG | BODY MASS INDEX: 28.25 KG/M2 | RESPIRATION RATE: 20 BRPM | WEIGHT: 180 LBS | OXYGEN SATURATION: 98 % | HEIGHT: 67 IN

## 2020-05-27 LAB
APPEARANCE FLUID: NORMAL
BILIRUBIN, POC: NORMAL
BLOOD URINE, POC: NORMAL
CLARITY, POC: CLEAR
COLOR, POC: YELLOW
GLUCOSE URINE, POC: NORMAL
KETONES, POC: NORMAL
LEUKOCYTE EST, POC: NORMAL
NITRITE, POC: NORMAL
PH, POC: 7
PROTEIN, POC: NORMAL
SPECIFIC GRAVITY, POC: 1.01
UROBILINOGEN, POC: 0.2

## 2020-05-27 PROCEDURE — 81002 URINALYSIS NONAUTO W/O SCOPE: CPT | Performed by: FAMILY MEDICINE

## 2020-05-27 PROCEDURE — 87088 URINE BACTERIA CULTURE: CPT

## 2020-05-27 PROCEDURE — 99213 OFFICE O/P EST LOW 20 MIN: CPT | Performed by: NURSE PRACTITIONER

## 2020-05-27 PROCEDURE — 99213 OFFICE O/P EST LOW 20 MIN: CPT | Performed by: FAMILY MEDICINE

## 2020-05-27 PROCEDURE — 77063 BREAST TOMOSYNTHESIS BI: CPT

## 2020-05-27 RX ORDER — NITROFURANTOIN 25; 75 MG/1; MG/1
100 CAPSULE ORAL 2 TIMES DAILY
Qty: 20 CAPSULE | Refills: 0 | Status: SHIPPED | OUTPATIENT
Start: 2020-05-27 | End: 2020-06-06

## 2020-05-27 ASSESSMENT — ENCOUNTER SYMPTOMS
EYE DISCHARGE: 0
SHORTNESS OF BREATH: 0
PHOTOPHOBIA: 0
BLOOD IN STOOL: 0
SINUS PAIN: 0
BACK PAIN: 0
ABDOMINAL PAIN: 0
DIARRHEA: 0
NAUSEA: 0
COUGH: 0
ALLERGIC/IMMUNOLOGIC NEGATIVE: 1
SORE THROAT: 0
EYE REDNESS: 0
TROUBLE SWALLOWING: 0
EYE PAIN: 0
VOMITING: 0
CHEST TIGHTNESS: 0

## 2020-05-27 NOTE — PROGRESS NOTES
180 tablet, Rfl: 1    verapamil (CALAN SR) 180 MG extended release tablet, Take 1 tablet by mouth daily, Disp: 90 tablet, Rfl: 1    warfarin (COUMADIN) 6 MG tablet, Indications: monday, wednesday, friday 3 mg, all other days are 6 mg 1/2 tab on M W F and 1 tab all other days of the week OR AS DIRECTED, Disp: 30 tablet, Rfl: 2    escitalopram (LEXAPRO) 10 MG tablet, Take 1 tablet by mouth every evening, Disp: 90 tablet, Rfl: 1    Red Yeast Rice 600 MG CAPS, Take 1 capsule by mouth 2 times daily, Disp: , Rfl:     calcium carbonate-vitamin D (CALTRATE 600+D) 600-400 MG-UNIT TABS per tab, Take 1 tablet by mouth daily, Disp: , Rfl:     Alpha-Lipoic Acid 100 MG CAPS, Take by mouth, Disp: , Rfl:     b complex vitamins capsule, Take 1 capsule by mouth daily, Disp: , Rfl:     anastrozole (ARIMIDEX) 1 MG tablet, Take 1 mg by mouth daily , Disp: , Rfl:     furosemide (LASIX) 20 MG tablet, Take 20 mg by mouth daily , Disp: , Rfl:   Allergies   Allergen Reactions    Diltiazem Hcl Other (See Comments)     Other reaction(s): weak    Cardizem [Diltiazem] Palpitations    Cefuroxime Axetil Nausea And Vomiting, Other (See Comments) and Palpitations    Claritin [Loratadine] Palpitations    Erythromycin Nausea And Vomiting    Morphine Nausea And Vomiting    Mucinex [Guaifenesin Er] Palpitations    Propoxyphene Other (See Comments) and Nausea And Vomiting     DIZZY  Other reaction(s): Free Text    Zyrtec [Cetirizine] Nausea And Vomiting and Other (See Comments)     WEAK       Past Medical History:   Diagnosis Date    Breast cancer ([facility] [address] 04/2017    Chronic atrial fibrillation     Fibroadenosis of breast     Headache     Hyperlipidemia     Hypertension      Past Surgical History:   Procedure Laterality Date    [facility]    BREAST LUMPECTOMY Right 04/26/2017    COLONOSCOPY      EYE SURGERY      SUDHIR LENSE IMPLANTS    OTHER SURGICAL HISTORY      port removal    capsule by mouth 2 times daily for 10 days        Return in about 2 weeks (around 6/10/2020).       Seen By:  Braeden Garcia DO

## 2020-05-30 LAB — URINE CULTURE, ROUTINE: NORMAL

## 2020-06-04 ENCOUNTER — OFFICE VISIT (OUTPATIENT)
Dept: FAMILY MEDICINE CLINIC | Age: 70
End: 2020-06-04
Payer: MEDICARE

## 2020-06-04 VITALS
HEIGHT: 67 IN | OXYGEN SATURATION: 99 % | RESPIRATION RATE: 16 BRPM | SYSTOLIC BLOOD PRESSURE: 120 MMHG | HEART RATE: 56 BPM | TEMPERATURE: 97.3 F | BODY MASS INDEX: 28.41 KG/M2 | DIASTOLIC BLOOD PRESSURE: 72 MMHG | WEIGHT: 181 LBS

## 2020-06-04 LAB
INTERNATIONAL NORMALIZATION RATIO, POC: 1.9
PROTHROMBIN TIME, POC: 23

## 2020-06-04 PROCEDURE — 85610 PROTHROMBIN TIME: CPT | Performed by: FAMILY MEDICINE

## 2020-06-04 PROCEDURE — 99213 OFFICE O/P EST LOW 20 MIN: CPT | Performed by: FAMILY MEDICINE

## 2020-06-04 ASSESSMENT — ENCOUNTER SYMPTOMS
EYE DISCHARGE: 0
BACK PAIN: 0
CHEST TIGHTNESS: 0
DIARRHEA: 0
SORE THROAT: 0
PHOTOPHOBIA: 0
SINUS PAIN: 0
ABDOMINAL PAIN: 0
TROUBLE SWALLOWING: 0
BLOOD IN STOOL: 0
EYE REDNESS: 0
ALLERGIC/IMMUNOLOGIC NEGATIVE: 1
VOMITING: 0
EYE PAIN: 0
NAUSEA: 0
SHORTNESS OF BREATH: 0
COUGH: 0

## 2020-06-04 NOTE — PROGRESS NOTES
20  Everett Quezada : 1950 Sex: female  Age: 77 y.o. Chief Complaint   Patient presents with    Atrial Fibrillation       Recheck INR, was on antibiotics recently. INR today 1.9, 5 mg warfarin daily      Review of Systems   Constitutional: Negative for appetite change, fatigue and unexpected weight change. HENT: Negative for congestion, ear pain, hearing loss, sinus pain, sore throat and trouble swallowing. Eyes: Negative for photophobia, pain, discharge and redness. Respiratory: Negative for cough, chest tightness and shortness of breath. Cardiovascular: Negative for chest pain, palpitations and leg swelling. Gastrointestinal: Negative for abdominal pain, blood in stool, diarrhea, nausea and vomiting. Endocrine: Negative. Genitourinary: Negative for dysuria, flank pain, frequency and hematuria. Musculoskeletal: Negative for arthralgias, back pain, joint swelling and myalgias. Skin: Negative. Allergic/Immunologic: Negative. Neurological: Negative for dizziness, seizures, syncope, weakness, light-headedness, numbness and headaches. Hematological: Negative for adenopathy. Does not bruise/bleed easily. Psychiatric/Behavioral: Negative.           Current Outpatient Medications:     nitrofurantoin, macrocrystal-monohydrate, (MACROBID) 100 MG capsule, Take 1 capsule by mouth 2 times daily for 10 days, Disp: 20 capsule, Rfl: 0    propafenone (RYTHMOL) 225 MG tablet, Take 1 tablet by mouth three times daily, Disp: 270 tablet, Rfl: 1    topiramate (TOPAMAX) 50 MG tablet, Take 1 tablet by mouth 2 times daily, Disp: 180 tablet, Rfl: 1    verapamil (CALAN SR) 180 MG extended release tablet, Take 1 tablet by mouth daily, Disp: 90 tablet, Rfl: 1    warfarin (COUMADIN) 6 MG tablet, Indications: monday, wednesday, friday 3 mg, all other days are 6 mg 1/2 tab on  W  and 1 tab all other days of the week OR AS DIRECTED, Disp: 30 tablet, Rfl: 2    escitalopram (LEXAPRO) 10 MG Socioeconomic History    Marital status:      Spouse name: Rubina Pires Number of children: Not on file    Years of education: Not on file    Highest education level: Not on file   Occupational History    Occupation: retired      Comment: Retired 4 th    Social Needs    Financial resource strain: Not on file    Food insecurity     Worry: Not on file     Inability: Not on file   [employer] needs     Medical: Not on file     Non-medical: Not on file   Tobacco Use    Smoking status: Never Smoker    Smokeless tobacco: Never Used   Substance and Sexual Activity    Alcohol use: No     Comment: occasionally pop    Drug use: No    Sexual activity: Not Currently     Partners: Male   Lifestyle    Physical activity     Days per week: Not on file     Minutes per session: Not on file    Stress: Not on file   Relationships    Social connections     Talks on phone: Not on file     Gets together: Not on file     Attends Cheondoism service: Not on file     Active member of club or organization: Not on file     Attends meetings of clubs or organizations: Not on file     Relationship status: Not on file    Intimate partner violence     Fear of current or ex partner: Not on file     Emotionally abused: Not on file     Physically abused: Not on file     Forced sexual activity: Not on file   Other Topics Concern    Not on file   Social History Narrative    Drinks occasional Pepsi/Coke       Vitals:    06/04/20 1038   BP: 120/72   Pulse: 56   Resp: 16   Temp: 97.3 °F (36.3 °C)   TempSrc: Temporal   SpO2: 99%   Weight: 181 lb (82.1 kg)   Height: 5' 7\" (1.702 m)       Physical Exam  Vitals signs and nursing note reviewed. Constitutional:       Appearance: She is well-developed. HENT:      Head: Atraumatic. Right Ear: External ear normal.      Left Ear: External ear normal.      Nose: Nose normal.      Mouth/Throat:      Pharynx: No oropharyngeal exudate.    Eyes:      Conjunctiva/sclera: Conjunctivae normal.      Pupils: Pupils are equal, round, and reactive to light. Neck:      Musculoskeletal: Normal range of motion and neck supple. Thyroid: No thyromegaly. Trachea: No tracheal deviation. Cardiovascular:      Rate and Rhythm: Normal rate and regular rhythm. Heart sounds: No murmur. No friction rub. No gallop. Pulmonary:      Effort: Pulmonary effort is normal. No respiratory distress. Breath sounds: Normal breath sounds. Abdominal:      General: Bowel sounds are normal.      Palpations: Abdomen is soft. Musculoskeletal: Normal range of motion. General: No tenderness or deformity. Lymphadenopathy:      Cervical: No cervical adenopathy. Skin:     General: Skin is warm and dry. Capillary Refill: Capillary refill takes less than 2 seconds. Findings: No rash. Neurological:      Mental Status: She is alert and oriented to person, place, and time. Sensory: No sensory deficit. Motor: No abnormal muscle tone. Coordination: Coordination normal.      Deep Tendon Reflexes: Reflexes normal.         Assessment and Plan: [facility] was seen today for atrial fibrillation. Diagnoses and all orders for this visit:    Chronic atrial fibrillation  -     POCT INR    Extra 1/2 dose today, then resume regular schedule    Return in about 4 weeks (around 7/2/2020).       Seen By:  Tatinaa Gamez DO

## 2020-06-12 ENCOUNTER — TELEPHONE (OUTPATIENT)
Dept: CARDIOLOGY | Age: 70
End: 2020-06-12

## 2020-06-16 ENCOUNTER — TELEPHONE (OUTPATIENT)
Dept: CARDIOLOGY | Age: 70
End: 2020-06-16

## 2020-06-18 RX ORDER — WARFARIN SODIUM 6 MG/1
TABLET ORAL
Qty: 30 TABLET | Refills: 5 | Status: SHIPPED
Start: 2020-06-18 | End: 2021-01-04 | Stop reason: SDUPTHER

## 2020-06-19 ENCOUNTER — HOSPITAL ENCOUNTER (OUTPATIENT)
Dept: CARDIOLOGY | Age: 70
Discharge: HOME | End: 2020-06-19
Payer: MEDICARE

## 2020-06-19 LAB
LV EF: 60 %
LVEF MODALITY: NORMAL

## 2020-06-19 PROCEDURE — 93306 TTE W/DOPPLER COMPLETE: CPT

## 2020-06-26 RX ORDER — ESCITALOPRAM OXALATE 10 MG/1
10 TABLET ORAL EVERY EVENING
Qty: 90 TABLET | Refills: 1 | Status: SHIPPED
Start: 2020-06-26 | End: 2020-12-04 | Stop reason: SDUPTHER

## 2020-07-06 ENCOUNTER — OFFICE VISIT (OUTPATIENT)
Dept: FAMILY MEDICINE CLINIC | Age: 70
End: 2020-07-06
Payer: MEDICARE

## 2020-07-06 VITALS
TEMPERATURE: 97.5 F | HEIGHT: 67 IN | OXYGEN SATURATION: 99 % | HEART RATE: 60 BPM | WEIGHT: 181 LBS | BODY MASS INDEX: 28.41 KG/M2 | SYSTOLIC BLOOD PRESSURE: 126 MMHG | DIASTOLIC BLOOD PRESSURE: 68 MMHG

## 2020-07-06 LAB
INTERNATIONAL NORMALIZATION RATIO, POC: 2.5
PROTHROMBIN TIME, POC: 30.1

## 2020-07-06 PROCEDURE — 99213 OFFICE O/P EST LOW 20 MIN: CPT | Performed by: FAMILY MEDICINE

## 2020-07-06 PROCEDURE — 85610 PROTHROMBIN TIME: CPT | Performed by: FAMILY MEDICINE

## 2020-07-06 ASSESSMENT — ENCOUNTER SYMPTOMS
NAUSEA: 0
SORE THROAT: 0
DIARRHEA: 0
ABDOMINAL PAIN: 0
BLOOD IN STOOL: 0
SINUS PAIN: 0
ALLERGIC/IMMUNOLOGIC NEGATIVE: 1
EYE PAIN: 0
COUGH: 0
VOMITING: 0
BACK PAIN: 0
CHEST TIGHTNESS: 0
EYE REDNESS: 0
TROUBLE SWALLOWING: 0
SHORTNESS OF BREATH: 0
PHOTOPHOBIA: 0
EYE DISCHARGE: 0

## 2020-07-06 NOTE — PROGRESS NOTES
calcium carbonate-vitamin D (CALTRATE 600+D) 600-400 MG-UNIT TABS per tab, Take 1 tablet by mouth daily, Disp: , Rfl:     Alpha-Lipoic Acid 100 MG CAPS, Take by mouth, Disp: , Rfl:     b complex vitamins capsule, Take 1 capsule by mouth daily, Disp: , Rfl:     anastrozole (ARIMIDEX) 1 MG tablet, Take 1 mg by mouth daily , Disp: , Rfl:     furosemide (LASIX) 20 MG tablet, Take 20 mg by mouth daily , Disp: , Rfl:   Allergies   Allergen Reactions    Diltiazem Hcl Other (See Comments)     Other reaction(s): weak    Cardizem [Diltiazem] Palpitations    Cefuroxime Axetil Nausea And Vomiting, Other (See Comments) and Palpitations    Claritin [Loratadine] Palpitations    Erythromycin Nausea And Vomiting    Morphine Nausea And Vomiting    Mucinex [Guaifenesin Er] Palpitations    Propoxyphene Other (See Comments) and Nausea And Vomiting     DIZZY  Other reaction(s): Free Text    Zyrtec [Cetirizine] Nausea And Vomiting and Other (See Comments)     WEAK       Past Medical History:   Diagnosis Date    Breast cancer ([facility] [address] 04/2017    Chronic atrial fibrillation     Fibroadenosis of breast     Headache     Hyperlipidemia     Hypertension      Past Surgical History:   Procedure Laterality Date    [facility]    BREAST LUMPECTOMY Right 04/26/2017    COLONOSCOPY      EYE SURGERY      SUDHIR LENSE IMPLANTS    OTHER SURGICAL HISTORY      port removal    TONSILLECTOMY      TUNNELED VENOUS PORT PLACEMENT  05/26/2017    Dr. Jessi Headley     Family History   Problem Relation Age of Onset    Cancer Maternal Cousin 80        lung    Heart Disease Mother     Other Mother         [place] Palsy    Other Father         gall bladder problem, hip fx; AAA rupture    Coronary Art Dis Father         MI     Social History     Socioeconomic History    Marital status:      Spouse name: Ez Suzi Number of children: Not on file    Years of education: Not on file   [employer] education

## 2020-07-22 ENCOUNTER — OFFICE VISIT (OUTPATIENT)
Dept: PODIATRY | Age: 70
End: 2020-07-22
Payer: MEDICARE

## 2020-07-22 ENCOUNTER — TELEPHONE (OUTPATIENT)
Dept: FAMILY MEDICINE CLINIC | Age: 70
End: 2020-07-22

## 2020-07-22 VITALS — BODY MASS INDEX: 28.41 KG/M2 | WEIGHT: 181 LBS | HEIGHT: 67 IN

## 2020-07-22 PROCEDURE — 99213 OFFICE O/P EST LOW 20 MIN: CPT | Performed by: PODIATRIST

## 2020-07-22 PROCEDURE — 29580 STRAPPING UNNA BOOT: CPT | Performed by: PODIATRIST

## 2020-07-22 RX ORDER — TOPIRAMATE 50 MG/1
50 TABLET, FILM COATED ORAL 2 TIMES DAILY
Qty: 15 TABLET | Refills: 0 | Status: SHIPPED
Start: 2020-07-22 | End: 2020-09-21 | Stop reason: SDUPTHER

## 2020-07-22 RX ORDER — METHYLPREDNISOLONE 4 MG/1
TABLET ORAL
Qty: 1 KIT | Refills: 0 | Status: SHIPPED
Start: 2020-07-22 | End: 2020-09-09

## 2020-07-22 NOTE — PROGRESS NOTES
Santa Avery is here today with c/o  bilat foot pain. her PCP is Nichelle Su DO last OV was 20. States pain started Monday. Denies any injury. Having difficulty walking. Santa Avery : 1950 Sex: female  Age: 77 y.o. Patient was referred by Nichelle Su DO    CC:    Pain left heel. HPI:   Presents today with a new pain which started on Monday on the bottom of her left heel. States she has had difficulty walking denies injury or trauma. Denies any calf pain. States pain is on the bottom the left heel. Worse in the morning worse at the end the day. Has been standing on her feet more as her family is in town. Has been walking more. States she has not fallen or any injury she is aware of. Denies current use of anti-inflammatories. Does continue long-term anticoagulant therapy.       ROS:  Const: Denies constitutional symptoms  Musculo: Denies symptoms other than stated above  Skin: Denies symptoms other than stated above       Current Outpatient Medications:     methylPREDNISolone (MEDROL DOSEPACK) 4 MG tablet, Take 1 filippo by mouth as directed, Disp: 1 kit, Rfl: 0    topiramate (TOPAMAX) 50 MG tablet, Take 1 tablet by mouth 2 times daily, Disp: 15 tablet, Rfl: 0    escitalopram (LEXAPRO) 10 MG tablet, Take 1 tablet by mouth every evening, Disp: 90 tablet, Rfl: 1    warfarin (COUMADIN) 6 MG tablet, Indications: monday, wednesday, friday 3 mg, all other days are 6 mg 1/2 tab on M W  and 1 tab all other days of the week OR AS DIRECTED, Disp: 30 tablet, Rfl: 5    propafenone (RYTHMOL) 225 MG tablet, Take 1 tablet by mouth three times daily, Disp: 270 tablet, Rfl: 1    verapamil (CALAN SR) 180 MG extended release tablet, Take 1 tablet by mouth daily, Disp: 90 tablet, Rfl: 1    Red Yeast Rice 600 MG CAPS, Take 1 capsule by mouth 2 times daily, Disp: , Rfl:     calcium carbonate-vitamin D (CALTRATE 600+D) 600-400 MG-UNIT TABS per tab, Take 1 tablet by mouth daily, Disp: , Rfl:     Alpha-Lipoic Acid 100 MG CAPS, Take by mouth, Disp: , Rfl:     b complex vitamins capsule, Take 1 capsule by mouth daily, Disp: , Rfl:     anastrozole (ARIMIDEX) 1 MG tablet, Take 1 mg by mouth daily , Disp: , Rfl:     furosemide (LASIX) 20 MG tablet, Take 20 mg by mouth daily , Disp: , Rfl:   Allergies   Allergen Reactions    Diltiazem Hcl Other (See Comments)     Other reaction(s): weak    Cardizem [Diltiazem] Palpitations    Cefuroxime Axetil Nausea And Vomiting, Other (See Comments) and Palpitations    Claritin [Loratadine] Palpitations    Erythromycin Nausea And Vomiting    Morphine Nausea And Vomiting    Mucinex [Guaifenesin Er] Palpitations    Propoxyphene Other (See Comments) and Nausea And Vomiting     DIZZY  Other reaction(s): Free Text    Zyrtec [Cetirizine] Nausea And Vomiting and Other (See Comments)     WEAK       Past Medical History:   Diagnosis Date    [facility]    Chronic atrial fibrillation     Fibroadenosis of breast     Headache     Hyperlipidemia     Hypertension            Vitals:    07/22/20 1036   Weight: 181 lb (82.1 kg)   Height: 5' 7\" (1.702 m)       Work History/Social History: Retired  [employer]    Focused Lower Extremity Physical Exam:    Neurovascular examination:    Dorsalis Pedis palpable bilateral.  Posterior tibialis palpable bilateral.    Capillary Refill Time:  Immediate return  Hair growth:  Symmetrical and bilateral   Skin:  Not atrophic  Edema: No edema bilateral feet or ankles. Neurologic:  Light touch intact bilateral.      Musculoskeletal/ Orthopedic examination:    Equinis: Absent bilateral  Dorsiflexion, plantarflexion, inversion, eversion bilateral 5 out of 5 muscle strength  Wiggling toes  Negative Homans  Mild tenderness second toenail right foot  Tenderness to palpation medial and central band left plantar fascia.   Tenderness with windlass mechanism medial band left plantar fascia  Negative calcaneal squeeze test    Dermatology examination:    No open skin lesions or abrasions bilateral lower extremity. Toenails 1-5 right foot thickened, elongated, mycotic, dystrophic with subungual debris. Assessment and Plan: Inessa Alford was seen today for callouses and nail problem. Diagnoses and all orders for this visit:    Encounter for current long-term use of anticoagulants    Plantar fasciitis    Pain in left foot    Corns and callosities    Other orders  -     methylPREDNISolone (MEDROL DOSEPACK) 4 MG tablet; Take 1 filippo by mouth as directed      Patient presents today new pain on the bottom left heel. Presents clinically left plantar fasciitis    Medrol Dosepak take as directed. Risk and benefits discussed. An unna boot  compressive wrap was applied to the left lower extremity. It's purpose is to  decrease  the amount of edema present, and to allow proper healing of the soft tissues. The patient was instructed to keep the unna boot clean, dry and intact until the next follow up visit. The patient was instructed to look for signs of redness, irritation, blistering and pain. If these or any other symptoms were to develop, they were advised to contact the office immediately for reevaluation. Unna boot and Ace bandage for edema control. Keep clean dry intact next 3-5 days. Any increasing calf pain removed sooner to go directly to the emergency room. Follow-up 1 week. Formal debridement all toenails at this time. Return in about 1 week (around 7/29/2020). Seen By:  Virginie Ramachandran DPM      Document was created using voice recognition software. Note was reviewed, however may contain grammatical errors.

## 2020-07-22 NOTE — TELEPHONE ENCOUNTER
She forgot to order her Topamax refill from [facility] and now she is down to 2 pills. She is asking you to send a weeks worth of this to [facility], while she is waiting for the shipment to come. I have this ready to send.

## 2020-07-29 ENCOUNTER — OFFICE VISIT (OUTPATIENT)
Dept: PODIATRY | Age: 70
End: 2020-07-29
Payer: MEDICARE

## 2020-07-29 PROCEDURE — 11056 PARNG/CUTG B9 HYPRKR LES 2-4: CPT | Performed by: PODIATRIST

## 2020-07-29 PROCEDURE — 11721 DEBRIDE NAIL 6 OR MORE: CPT | Performed by: PODIATRIST

## 2020-07-29 NOTE — PROGRESS NOTES
Ursula Mazariegos is here today for nail care and to follow up with bilat foot pain. Unna boot removed from left ankle at apt. States pain is better. her PCP is Agnes Jefferson DO last OV was 20. Ursula Mazariegos : 1950 Sex: female  Age: 77 y.o. Patient was referred by Agnes Jefferson DO    CC:    Follow-up pain left foot  Follow-up painful elongated toenails 1 through 5 right and left. HPI:   Follow-up pain left foot. Did have heel pain on last visit. Tolerating the boot very well. Denies any heel pain today. Does continue long-term anticoagulant therapy with Coumadin. Here today painful elongated toenails and calluses. No new pedal complaints.         ROS:  Const: Denies constitutional symptoms  Musculo: Denies symptoms other than stated above  Skin: Denies symptoms other than stated above       Current Outpatient Medications:     methylPREDNISolone (MEDROL DOSEPACK) 4 MG tablet, Take 1 filippo by mouth as directed, Disp: 1 kit, Rfl: 0    topiramate (TOPAMAX) 50 MG tablet, Take 1 tablet by mouth 2 times daily, Disp: 15 tablet, Rfl: 0    escitalopram (LEXAPRO) 10 MG tablet, Take 1 tablet by mouth every evening, Disp: 90 tablet, Rfl: 1    warfarin (COUMADIN) 6 MG tablet, Indications: monday, wednesday, friday 3 mg, all other days are 6 mg 1/2 tab on M W F and 1 tab all other days of the week OR AS DIRECTED, Disp: 30 tablet, Rfl: 5    propafenone (RYTHMOL) 225 MG tablet, Take 1 tablet by mouth three times daily, Disp: 270 tablet, Rfl: 1    verapamil (CALAN SR) 180 MG extended release tablet, Take 1 tablet by mouth daily, Disp: 90 tablet, Rfl: 1    Red Yeast Rice 600 MG CAPS, Take 1 capsule by mouth 2 times daily, Disp: , Rfl:     calcium carbonate-vitamin D (CALTRATE 600+D) 600-400 MG-UNIT TABS per tab, Take 1 tablet by mouth daily, Disp: , Rfl:     Alpha-Lipoic Acid 100 MG CAPS, Take by mouth, Disp: , Rfl:     b complex vitamins capsule, Take 1 capsule by mouth daily, Disp: , Rfl:   anastrozole (ARIMIDEX) 1 MG tablet, Take 1 mg by mouth daily , Disp: , Rfl:     furosemide (LASIX) 20 MG tablet, Take 20 mg by mouth daily , Disp: , Rfl:   Allergies   Allergen Reactions    Diltiazem Hcl Other (See Comments)     Other reaction(s): weak    Cardizem [Diltiazem] Palpitations    Cefuroxime Axetil Nausea And Vomiting, Other (See Comments) and Palpitations    Claritin [Loratadine] Palpitations    Erythromycin Nausea And Vomiting    Morphine Nausea And Vomiting    Mucinex [Guaifenesin Er] Palpitations    Propoxyphene Other (See Comments) and Nausea And Vomiting     DIZZY  Other reaction(s): Free Text    Zyrtec [Cetirizine] Nausea And Vomiting and Other (See Comments)     WEAK       Past Medical History:   Diagnosis Date    Breast cancer ([facility] [address] 04/2017    Chronic atrial fibrillation     Fibroadenosis of breast     Headache     Hyperlipidemia     Hypertension            There were no vitals filed for this visit. Work History/Social History: Retired  [employer]    Focused Lower Extremity Physical Exam:    Neurovascular examination:    Dorsalis Pedis palpable bilateral.  Posterior tibialis palpable bilateral.    Capillary Refill Time:  Immediate return  Hair growth:  Symmetrical and bilateral   Skin:  Not atrophic  Edema: No edema bilateral feet or ankles. Neurologic:  Light touch intact bilateral.      Musculoskeletal/ Orthopedic examination:    Equinis: Absent bilateral  Dorsiflexion, plantarflexion, inversion, eversion bilateral 5 out of 5 muscle strength  Wiggling toes  Negative Homans  No pain to palpation medial band plantar fascial left heel  Negative calcaneal squeeze test  Tenderness to palpation all toenails 1-5 right and left. Dermatology examination:    No open skin lesions or abrasions bilateral lower extremity. Toenails 1-5 right foot thickened, elongated, mycotic, dystrophic with subungual debris.   Hyperkeratotic tissue plantar first metatarsal head bilateral      Assessment and Plan: Giana Mccallum was seen today for callouses, nail problem and foot pain. Diagnoses and all orders for this visit:    Tinea unguium    Corns and callosities    Encounter for current long-term use of anticoagulants    Plantar fasciitis    Pain in left foot      Follow-up tinea unguium. Did have heel pain at last visit. Has responded very well with conservative treatment with Unna boot. Pain-free today. Manual debridement with standard technique toenails 1 through 5 right in thickness and length. Patient tolerated procedure well. Paring with #15 blade hyperkeratotic tissue plantar first metatarsal head bilateral.  No open skin lesions or abrasions. She does continue long-term anticoagulant therapy with Coumadin. I will follow-up 2 months    Return in about 2 months (around 9/29/2020). Seen By:  Esteban Cowan DPM      Document was created using voice recognition software. Note was reviewed, however may contain grammatical errors.

## 2020-08-06 ENCOUNTER — OFFICE VISIT (OUTPATIENT)
Dept: FAMILY MEDICINE CLINIC | Age: 70
End: 2020-08-06
Payer: MEDICARE

## 2020-08-06 VITALS
OXYGEN SATURATION: 98 % | TEMPERATURE: 97.6 F | HEART RATE: 60 BPM | RESPIRATION RATE: 16 BRPM | SYSTOLIC BLOOD PRESSURE: 122 MMHG | DIASTOLIC BLOOD PRESSURE: 70 MMHG | BODY MASS INDEX: 27.47 KG/M2 | HEIGHT: 67 IN | WEIGHT: 175 LBS

## 2020-08-06 LAB
INTERNATIONAL NORMALIZATION RATIO, POC: 2.9
PROTHROMBIN TIME, POC: 34.4

## 2020-08-06 PROCEDURE — 85610 PROTHROMBIN TIME: CPT | Performed by: FAMILY MEDICINE

## 2020-08-06 PROCEDURE — 99213 OFFICE O/P EST LOW 20 MIN: CPT | Performed by: FAMILY MEDICINE

## 2020-08-06 ASSESSMENT — ENCOUNTER SYMPTOMS
ALLERGIC/IMMUNOLOGIC NEGATIVE: 1
DIARRHEA: 0
SHORTNESS OF BREATH: 0
NAUSEA: 0
SINUS PAIN: 0
EYE REDNESS: 0
TROUBLE SWALLOWING: 0
COUGH: 0
BLOOD IN STOOL: 0
EYE DISCHARGE: 0
VOMITING: 0
BACK PAIN: 0
SORE THROAT: 0
EYE PAIN: 0
PHOTOPHOBIA: 0
CHEST TIGHTNESS: 0
ABDOMINAL PAIN: 0

## 2020-08-06 NOTE — PROGRESS NOTES
20  [facility] 1950 Sex: female  Age: 77 y.o. Chief Complaint   Patient presents with    Atrial Fibrillation       Recheck, INR 2.9      Review of Systems   Constitutional: Negative for appetite change, fatigue and unexpected weight change. HENT: Negative for congestion, ear pain, hearing loss, sinus pain, sore throat and trouble swallowing. Eyes: Negative for photophobia, pain, discharge and redness. Respiratory: Negative for cough, chest tightness and shortness of breath. Cardiovascular: Negative for chest pain, palpitations and leg swelling. Gastrointestinal: Negative for abdominal pain, blood in stool, diarrhea, nausea and vomiting. Endocrine: Negative. Genitourinary: Negative for dysuria, flank pain, frequency and hematuria. Musculoskeletal: Negative for arthralgias, back pain, joint swelling and myalgias. Skin: Negative. Allergic/Immunologic: Negative. Neurological: Negative for dizziness, seizures, syncope, weakness, light-headedness, numbness and headaches. Hematological: Negative for adenopathy. Does not bruise/bleed easily. Psychiatric/Behavioral: Negative.           Current Outpatient Medications:     methylPREDNISolone (MEDROL DOSEPACK) 4 MG tablet, Take 1 filippo by mouth as directed, Disp: 1 kit, Rfl: 0    topiramate (TOPAMAX) 50 MG tablet, Take 1 tablet by mouth 2 times daily, Disp: 15 tablet, Rfl: 0    escitalopram (LEXAPRO) 10 MG tablet, Take 1 tablet by mouth every evening, Disp: 90 tablet, Rfl: 1    warfarin (COUMADIN) 6 MG tablet, Indications: monday, wednesday, friday 3 mg, all other days are 6 mg 1/2 tab on  and 1 tab all other days of the week OR AS DIRECTED, Disp: 30 tablet, Rfl: 5    propafenone (RYTHMOL) 225 MG tablet, Take 1 tablet by mouth three times daily, Disp: 270 tablet, Rfl: 1    verapamil (CALAN SR) 180 MG extended release tablet, Take 1 tablet by mouth daily, Disp: 90 tablet, Rfl: 1    Red Yeast Rice 600 MG CAPS, Take 1 capsule by mouth 2 times daily, Disp: , Rfl:     calcium carbonate-vitamin D (CALTRATE 600+D) 600-400 MG-UNIT TABS per tab, Take 1 tablet by mouth daily, Disp: , Rfl:     Alpha-Lipoic Acid 100 MG CAPS, Take by mouth, Disp: , Rfl:     b complex vitamins capsule, Take 1 capsule by mouth daily, Disp: , Rfl:     anastrozole (ARIMIDEX) 1 MG tablet, Take 1 mg by mouth daily , Disp: , Rfl:     furosemide (LASIX) 20 MG tablet, Take 20 mg by mouth daily , Disp: , Rfl:   Allergies   Allergen Reactions    Diltiazem Hcl Other (See Comments)     Other reaction(s): weak    Cardizem [Diltiazem] Palpitations    Cefuroxime Axetil Nausea And Vomiting, Other (See Comments) and Palpitations    Claritin [Loratadine] Palpitations    Erythromycin Nausea And Vomiting    Morphine Nausea And Vomiting    Mucinex [Guaifenesin Er] Palpitations    Propoxyphene Other (See Comments) and Nausea And Vomiting     DIZZY  Other reaction(s): Free Text    Zyrtec [Cetirizine] Nausea And Vomiting and Other (See Comments)     WEAK       Past Medical History:   Diagnosis Date    Breast cancer ([facility] [address] 04/2017    Chronic atrial fibrillation     Fibroadenosis of breast     Headache     Hyperlipidemia     Hypertension      Past Surgical History:   Procedure Laterality Date    [facility]ERY  [facility]    BREAST LUMPECTOMY Right 04/26/2017    COLONOSCOPY      EYE SURGERY      SUDHIR LENSE IMPLANTS    OTHER SURGICAL HISTORY      port removal    TONSILLECTOMY      TUNNELED VENOUS PORT PLACEMENT  05/26/2017    Dr. Pam Nicole     Family History   Problem Relation Age of Onset    Cancer Maternal Cousin 80        lung    Heart Disease Mother     Other Mother         [place] Palsy    Other Father         gall bladder problem, hip fx; AAA rupture    Coronary Art Dis Father         MI     Social History     Socioeconomic History    Marital status:      Spouse name: Uziel Szymanski Number of children: Not on file   

## 2020-08-26 NOTE — TELEPHONE ENCOUNTER
Name of Medication(s) Requested:  Verapamil    Pharmacy Requested:   [facility]    Medication(s) pended? [x] Yes  [] No    Last Appointment:  8/6/2020    Future appts:  Future Appointments   Date Time Provider Comfort Cassidy   9/9/2020  1:30 PM [place] Ashlyn Yoo  [address] [place] [address]   9/30/2020 10:00 AM Blanka Colbert DPM [place] [facility]   10/16/2020  9:15 AM [facility] BCC DEXA SEYZ ABDU [facility]   10/16/2020 10:00 AM Olga Melendez, APRN - CNP [facility]   5/4/2021 10:00 AM Joan De MD ADV [facility]        Does patient need call back?   [] Yes  [x] No

## 2020-09-09 ENCOUNTER — OFFICE VISIT (OUTPATIENT)
Dept: FAMILY MEDICINE CLINIC | Age: 70
End: 2020-09-09
Payer: MEDICARE

## 2020-09-09 VITALS
DIASTOLIC BLOOD PRESSURE: 78 MMHG | RESPIRATION RATE: 16 BRPM | TEMPERATURE: 97.3 F | HEART RATE: 56 BPM | WEIGHT: 177 LBS | SYSTOLIC BLOOD PRESSURE: 120 MMHG | OXYGEN SATURATION: 98 % | HEIGHT: 67 IN | BODY MASS INDEX: 27.78 KG/M2

## 2020-09-09 LAB
INTERNATIONAL NORMALIZATION RATIO, POC: 2.7
PROTHROMBIN TIME, POC: 32.8

## 2020-09-09 PROCEDURE — 99213 OFFICE O/P EST LOW 20 MIN: CPT | Performed by: FAMILY MEDICINE

## 2020-09-09 PROCEDURE — 85610 PROTHROMBIN TIME: CPT | Performed by: FAMILY MEDICINE

## 2020-09-09 ASSESSMENT — ENCOUNTER SYMPTOMS
ABDOMINAL PAIN: 0
EYE PAIN: 0
SORE THROAT: 0
PHOTOPHOBIA: 0
VOMITING: 0
EYE DISCHARGE: 0
SHORTNESS OF BREATH: 0
COUGH: 0
EYE REDNESS: 0
TROUBLE SWALLOWING: 0
SINUS PAIN: 0
BACK PAIN: 0
NAUSEA: 0
CHEST TIGHTNESS: 0
DIARRHEA: 0
BLOOD IN STOOL: 0
ALLERGIC/IMMUNOLOGIC NEGATIVE: 1

## 2020-09-09 NOTE — PROGRESS NOTES
20  Meeta Segal : 1950 Sex: female  Age: 77 y.o. Chief Complaint   Patient presents with    Atrial Fibrillation       Recheck, refills, doing well      Review of Systems   Constitutional: Negative for appetite change, fatigue and unexpected weight change. HENT: Negative for congestion, ear pain, hearing loss, sinus pain, sore throat and trouble swallowing. Eyes: Negative for photophobia, pain, discharge and redness. Respiratory: Negative for cough, chest tightness and shortness of breath. Cardiovascular: Negative for chest pain, palpitations and leg swelling. Gastrointestinal: Negative for abdominal pain, blood in stool, diarrhea, nausea and vomiting. Endocrine: Negative. Genitourinary: Negative for dysuria, flank pain, frequency and hematuria. Musculoskeletal: Negative for arthralgias, back pain, joint swelling and myalgias. Skin: Negative. Allergic/Immunologic: Negative. Neurological: Negative for dizziness, seizures, syncope, weakness, light-headedness, numbness and headaches. Hematological: Negative for adenopathy. Does not bruise/bleed easily. Psychiatric/Behavioral: Negative.           Current Outpatient Medications:     verapamil (CALAN SR) 180 MG extended release tablet, Take 1 tablet by mouth daily, Disp: 90 tablet, Rfl: 0    topiramate (TOPAMAX) 50 MG tablet, Take 1 tablet by mouth 2 times daily, Disp: 15 tablet, Rfl: 0    escitalopram (LEXAPRO) 10 MG tablet, Take 1 tablet by mouth every evening, Disp: 90 tablet, Rfl: 1    warfarin (COUMADIN) 6 MG tablet, Indications: monday, wednesday, friday 3 mg, all other days are 6 mg 1/2 tab on  and 1 tab all other days of the week OR AS DIRECTED, Disp: 30 tablet, Rfl: 5    propafenone (RYTHMOL) 225 MG tablet, Take 1 tablet by mouth three times daily, Disp: 270 tablet, Rfl: 1    Red Yeast Rice 600 MG CAPS, Take 1 capsule by mouth 2 times daily, Disp: , Rfl:     calcium carbonate-vitamin D (CALTRATE 600+D) 600-400 MG-UNIT TABS per tab, Take 1 tablet by mouth daily, Disp: , Rfl:     Alpha-Lipoic Acid 100 MG CAPS, Take by mouth, Disp: , Rfl:     b complex vitamins capsule, Take 1 capsule by mouth daily, Disp: , Rfl:     anastrozole (ARIMIDEX) 1 MG tablet, Take 1 mg by mouth daily , Disp: , Rfl:     furosemide (LASIX) 20 MG tablet, Take 20 mg by mouth daily , Disp: , Rfl:   Allergies   Allergen Reactions    Diltiazem Hcl Other (See Comments)     Other reaction(s): weak    Cardizem [Diltiazem] Palpitations    Cefuroxime Axetil Nausea And Vomiting, Other (See Comments) and Palpitations    Claritin [Loratadine] Palpitations    Erythromycin Nausea And Vomiting    Morphine Nausea And Vomiting    Mucinex [Guaifenesin Er] Palpitations    Propoxyphene Other (See Comments) and Nausea And Vomiting     DIZZY  Other reaction(s): Free Text    Zyrtec [Cetirizine] Nausea And Vomiting and Other (See Comments)     WEAK       Past Medical History:   Diagnosis Date    Breast cancer ([facility] [address] 04/2017    Chronic atrial fibrillation     Fibroadenosis of breast     Headache     Hyperlipidemia     Hypertension      Past Surgical History:   Procedure Laterality Date    [facility]    BREAST LUMPECTOMY Right 04/26/2017    COLONOSCOPY      EYE SURGERY      SUDHIR LENSE IMPLANTS    OTHER SURGICAL HISTORY      port removal    TONSILLECTOMY      TUNNELED VENOUS PORT PLACEMENT  05/26/2017    Dr. Karina Souza     Family History   Problem Relation Age of Onset    Cancer Maternal Cousin 80        lung    Heart Disease Mother     Other Mother         [place] Palsy    Other Father         gall bladder problem, hip fx; AAA rupture    Coronary Art Dis Father         MI     Social History     Socioeconomic History    Marital status:      Spouse name: Clifford Barnett Number of children: Not on file    Years of education: Not on file    Highest education level: Not on file   Occupational History    Occupation: retired      Comment: Retired 4 th    Social Needs    Financial resource strain: Not on file    Food insecurity     Worry: Not on file     Inability: Not on file   [employer] needs     Medical: Not on file     Non-medical: Not on file   Tobacco Use    Smoking status: Never Smoker    Smokeless tobacco: Never Used   Substance and Sexual Activity    Alcohol use: No     Comment: occasionally pop    Drug use: No    Sexual activity: Not Currently     Partners: Male   Lifestyle    Physical activity     Days per week: Not on file     Minutes per session: Not on file    Stress: Not on file   Relationships    Social connections     Talks on phone: Not on file     Gets together: Not on file     Attends Jewish service: Not on file     Active member of club or organization: Not on file     Attends meetings of clubs or organizations: Not on file     Relationship status: Not on file    Intimate partner violence     Fear of current or ex partner: Not on file     Emotionally abused: Not on file     Physically abused: Not on file     Forced sexual activity: Not on file   Other Topics Concern    Not on file   Social History Narrative    Drinks occasional Pepsi/Coke       Vitals:    09/09/20 1338   BP: 120/78   Pulse: 56   Resp: 16   Temp: 97.3 °F (36.3 °C)   TempSrc: Temporal   SpO2: 98%   Weight: 177 lb (80.3 kg)   Height: 5' 7\" (1.702 m)       Physical Exam  Vitals signs and nursing note reviewed. Constitutional:       Appearance: She is well-developed. HENT:      Head: Atraumatic. Right Ear: External ear normal.      Left Ear: External ear normal.      Nose: Nose normal.      Mouth/Throat:      Pharynx: No oropharyngeal exudate. Eyes:      Conjunctiva/sclera: Conjunctivae normal.      Pupils: Pupils are equal, round, and reactive to light. Neck:      Musculoskeletal: Normal range of motion and neck supple. Thyroid: No thyromegaly.       Trachea: No tracheal deviation. Cardiovascular:      Rate and Rhythm: Normal rate and regular rhythm. Heart sounds: No murmur. No friction rub. No gallop. Pulmonary:      Effort: Pulmonary effort is normal. No respiratory distress. Breath sounds: Normal breath sounds. Abdominal:      General: Bowel sounds are normal.      Palpations: Abdomen is soft. Musculoskeletal: Normal range of motion. General: No tenderness or deformity. Lymphadenopathy:      Cervical: No cervical adenopathy. Skin:     General: Skin is warm and dry. Capillary Refill: Capillary refill takes less than 2 seconds. Findings: No rash. Neurological:      Mental Status: She is alert and oriented to person, place, and time. Sensory: No sensory deficit. Motor: No abnormal muscle tone. Coordination: Coordination normal.      Deep Tendon Reflexes: Reflexes normal.         Assessment and Plan: Jimmie Hopkins was seen today for atrial fibrillation. Diagnoses and all orders for this visit:    Essential hypertension    Chronic atrial fibrillation  -     POCT INR    Migraine without aura, not refractory        Return in about 4 weeks (around 10/7/2020).       Seen By:  Bora Tolbert DO

## 2020-09-21 RX ORDER — PROPAFENONE HYDROCHLORIDE 225 MG/1
225 TABLET, FILM COATED ORAL 3 TIMES DAILY
Qty: 270 TABLET | Refills: 1 | Status: SHIPPED
Start: 2020-09-21 | End: 2021-03-26 | Stop reason: SDUPTHER

## 2020-09-21 RX ORDER — TOPIRAMATE 50 MG/1
50 TABLET, FILM COATED ORAL 2 TIMES DAILY
Qty: 180 TABLET | Refills: 0 | Status: SHIPPED
Start: 2020-09-21 | End: 2021-01-19 | Stop reason: SDUPTHER

## 2020-09-21 NOTE — TELEPHONE ENCOUNTER
Name of Medication(s) Requested:  Propafenone & Topamax. Pharmacy Requested:   [facility]    Medication(s) pended? [x] Yes  [] No    Last Appointment:  9/9/2020    Future appts:  Future Appointments   Date Time Provider Comfort Benjamin   9/30/2020 10:00 AM [facility]   10/9/2020 11:00 AM [place] Sunday Martin, DO AGUSTIN RANGEL [facility]   10/16/2020  9:15 AM [facility] BCC DEXA SEYZ ABDU BC [facility]   10/16/2020 10:00 AM Nahomy Almeida, APRN - CNP [facility]   5/4/2021 10:00 AM Parul Pike MD ADV [facility]        Does patient need call back?   [] Yes  [x] No

## 2020-09-29 ENCOUNTER — OFFICE VISIT (OUTPATIENT)
Dept: FAMILY MEDICINE CLINIC | Age: 70
End: 2020-09-29
Payer: MEDICARE

## 2020-09-29 VITALS
BODY MASS INDEX: 28.09 KG/M2 | DIASTOLIC BLOOD PRESSURE: 80 MMHG | WEIGHT: 179 LBS | SYSTOLIC BLOOD PRESSURE: 138 MMHG | HEIGHT: 67 IN | TEMPERATURE: 98.4 F | HEART RATE: 65 BPM | OXYGEN SATURATION: 98 %

## 2020-09-29 PROCEDURE — 99213 OFFICE O/P EST LOW 20 MIN: CPT | Performed by: FAMILY MEDICINE

## 2020-09-29 RX ORDER — PREDNISONE 10 MG/1
TABLET ORAL
Qty: 18 TABLET | Refills: 0 | Status: SHIPPED | OUTPATIENT
Start: 2020-09-29 | End: 2020-10-08

## 2020-09-29 RX ORDER — AMOXICILLIN 500 MG/1
500 CAPSULE ORAL 3 TIMES DAILY
Qty: 30 CAPSULE | Refills: 0 | Status: SHIPPED | OUTPATIENT
Start: 2020-09-29 | End: 2020-10-09

## 2020-09-29 ASSESSMENT — ENCOUNTER SYMPTOMS
EYE REDNESS: 0
DIARRHEA: 0
SHORTNESS OF BREATH: 0
SORE THROAT: 0
VOMITING: 0
EYE PAIN: 0
CHEST TIGHTNESS: 0
NAUSEA: 0
SINUS PRESSURE: 1
TROUBLE SWALLOWING: 0
BLOOD IN STOOL: 0
ABDOMINAL PAIN: 0
COUGH: 0
BACK PAIN: 0
PHOTOPHOBIA: 0
EYE DISCHARGE: 0
SINUS PAIN: 0
ALLERGIC/IMMUNOLOGIC NEGATIVE: 1

## 2020-09-29 ASSESSMENT — PATIENT HEALTH QUESTIONNAIRE - PHQ9
2. FEELING DOWN, DEPRESSED OR HOPELESS: 0
SUM OF ALL RESPONSES TO PHQ9 QUESTIONS 1 & 2: 0
SUM OF ALL RESPONSES TO PHQ QUESTIONS 1-9: 0
SUM OF ALL RESPONSES TO PHQ QUESTIONS 1-9: 0
1. LITTLE INTEREST OR PLEASURE IN DOING THINGS: 0

## 2020-09-29 NOTE — PROGRESS NOTES
20  Meeta Segal : 1950 Sex: female  Age: 77 y.o. Chief Complaint   Patient presents with    Ear Fullness     [id number]       Congestion, pressure, drainage, facial tenderness, right earache, vertigo, onset 3 days ago. Denies fever, chills, diaphoresis, nausea, vomiting, decreased oral intake. Denies other GI or  complaints. OTC treatments minimally effective. Review of Systems   Constitutional: Negative for appetite change, fatigue and unexpected weight change. HENT: Positive for ear pain, postnasal drip and sinus pressure. Negative for congestion, hearing loss, sinus pain, sore throat and trouble swallowing. Eyes: Negative for photophobia, pain, discharge and redness. Respiratory: Negative for cough, chest tightness and shortness of breath. Cardiovascular: Negative for chest pain, palpitations and leg swelling. Gastrointestinal: Negative for abdominal pain, blood in stool, diarrhea, nausea and vomiting. Endocrine: Negative. Genitourinary: Negative for dysuria, flank pain, frequency and hematuria. Musculoskeletal: Negative for arthralgias, back pain, joint swelling and myalgias. Skin: Negative. Allergic/Immunologic: Negative. Neurological: Positive for dizziness. Negative for seizures, syncope, weakness, light-headedness, numbness and headaches. Hematological: Negative for adenopathy. Does not bruise/bleed easily. Psychiatric/Behavioral: Negative. Current Outpatient Medications:     predniSONE (DELTASONE) 10 MG tablet, Take 3 tablets by mouth daily for 3 days, THEN 2 tablets daily for 3 days, THEN 1 tablet daily for 3 days. , Disp: 18 tablet, Rfl: 0    amoxicillin (AMOXIL) 500 MG capsule, Take 1 capsule by mouth 3 times daily for 10 days, Disp: 30 capsule, Rfl: 0    propafenone (RYTHMOL) 225 MG tablet, Take 1 tablet by mouth three times daily, Disp: 270 tablet, Rfl: 1    topiramate (TOPAMAX) 50 MG tablet, Take 1 tablet by mouth 2 times daily, Disp: 180 tablet, Rfl: 0    verapamil (CALAN SR) 180 MG extended release tablet, Take 1 tablet by mouth daily, Disp: 90 tablet, Rfl: 0    escitalopram (LEXAPRO) 10 MG tablet, Take 1 tablet by mouth every evening, Disp: 90 tablet, Rfl: 1    warfarin (COUMADIN) 6 MG tablet, Indications: monday, wednesday, friday 3 mg, all other days are 6 mg 1/2 tab on M W F and 1 tab all other days of the week OR AS DIRECTED, Disp: 30 tablet, Rfl: 5    Red Yeast Rice 600 MG CAPS, Take 1 capsule by mouth 2 times daily, Disp: , Rfl:     calcium carbonate-vitamin D (CALTRATE 600+D) 600-400 MG-UNIT TABS per tab, Take 1 tablet by mouth daily, Disp: , Rfl:     Alpha-Lipoic Acid 100 MG CAPS, Take by mouth, Disp: , Rfl:     b complex vitamins capsule, Take 1 capsule by mouth daily, Disp: , Rfl:     anastrozole (ARIMIDEX) 1 MG tablet, Take 1 mg by mouth daily , Disp: , Rfl:     furosemide (LASIX) 20 MG tablet, Take 20 mg by mouth daily , Disp: , Rfl:   Allergies   Allergen Reactions    Diltiazem Hcl Other (See Comments)     Other reaction(s): weak    Cardizem [Diltiazem] Palpitations    Cefuroxime Axetil Nausea And Vomiting, Other (See Comments) and Palpitations    Claritin [Loratadine] Palpitations    Erythromycin Nausea And Vomiting    Morphine Nausea And Vomiting    Mucinex [Guaifenesin Er] Palpitations    Propoxyphene Other (See Comments) and Nausea And Vomiting     DIZZY  Other reaction(s): Free Text    Zyrtec [Cetirizine] Nausea And Vomiting and Other (See Comments)     WEAK       Past Medical History:   Diagnosis Date    Breast cancer ([facility] [address] 04/2017    Chronic atrial fibrillation     Fibroadenosis of breast     Headache     Hyperlipidemia     Hypertension      Past Surgical History:   Procedure Laterality Date    [facility]    BREAST LUMPECTOMY Right 04/26/2017    COLONOSCOPY      EYE SURGERY      SUDHIR LENSE IMPLANTS    OTHER SURGICAL HISTORY      port removal    TONSILLECTOMY      TUNNELED VENOUS PORT PLACEMENT  05/26/2017    Dr. Josue Calderon     Family History   Problem Relation Age of Onset    Cancer Maternal Cousin 80        lung    Heart Disease Mother     Other Mother         [place] Palsy    Other Father         gall bladder problem, hip fx; AAA rupture    Coronary Art Dis Father         MI     Social History     Socioeconomic History    Marital status:      Spouse name: Kali Baker Number of children: Not on file    Years of education: Not on file    Highest education level: Not on file   Occupational History    Occupation: retired      Comment: Retired 4 th    Social Needs    Financial resource strain: Not on file    Food insecurity     Worry: Not on file     Inability: Not on file   [employer] needs     Medical: Not on file     Non-medical: Not on file   Tobacco Use    Smoking status: Never Smoker    Smokeless tobacco: Never Used   Substance and Sexual Activity    Alcohol use: No     Comment: occasionally pop    Drug use: No    Sexual activity: Not Currently     Partners: Male   Lifestyle    Physical activity     Days per week: Not on file     Minutes per session: Not on file    Stress: Not on file   Relationships    Social connections     Talks on phone: Not on file     Gets together: Not on file     Attends Yarsanism service: Not on file     Active member of club or organization: Not on file     Attends meetings of clubs or organizations: Not on file     Relationship status: Not on file    Intimate partner violence     Fear of current or ex partner: Not on file     Emotionally abused: Not on file     Physically abused: Not on file     Forced sexual activity: Not on file   Other Topics Concern    Not on file   Social History Narrative    Drinks occasional Pepsi/Coke       Vitals:    09/29/20 0857   BP: 138/80   Pulse: 65   Temp: 98.4 °F (36.9 °C)   SpO2: 98%   Weight: 179 lb (81.2 kg)   Height: 5' 7\" (1.702 m) Physical Exam  Vitals signs and nursing note reviewed. Constitutional:       Appearance: She is well-developed. HENT:      Head: Atraumatic. Right Ear: External ear normal. There is impacted cerumen. Left Ear: External ear normal.      Nose: Nose normal.      Mouth/Throat:      Pharynx: No oropharyngeal exudate. Eyes:      Conjunctiva/sclera: Conjunctivae normal.      Pupils: Pupils are equal, round, and reactive to light. Neck:      Musculoskeletal: Normal range of motion and neck supple. Thyroid: No thyromegaly. Trachea: No tracheal deviation. Cardiovascular:      Rate and Rhythm: Normal rate and regular rhythm. Heart sounds: No murmur. No friction rub. No gallop. Pulmonary:      Effort: Pulmonary effort is normal. No respiratory distress. Breath sounds: Normal breath sounds. Abdominal:      General: Bowel sounds are normal.      Palpations: Abdomen is soft. Musculoskeletal: Normal range of motion. General: No tenderness or deformity. Lymphadenopathy:      Cervical: No cervical adenopathy. Skin:     General: Skin is warm and dry. Capillary Refill: Capillary refill takes less than 2 seconds. Findings: No rash. Neurological:      Mental Status: She is alert and oriented to person, place, and time. Sensory: No sensory deficit. Motor: No abnormal muscle tone. Coordination: Coordination normal.      Deep Tendon Reflexes: Reflexes normal.         Assessment and Plan: Eliza Mann was seen today for ear fullness. Diagnoses and all orders for this visit:    Acute non-recurrent pansinusitis  -     predniSONE (DELTASONE) 10 MG tablet; Take 3 tablets by mouth daily for 3 days, THEN 2 tablets daily for 3 days, THEN 1 tablet daily for 3 days. -     amoxicillin (AMOXIL) 500 MG capsule;  Take 1 capsule by mouth 3 times daily for 10 days    Impacted cerumen of right ear  Debrox drops 2 AD BID      Return recheck 3-5 days if not improved.       Seen By:  Kaiden Show, DO

## 2020-09-30 ENCOUNTER — PROCEDURE VISIT (OUTPATIENT)
Dept: PODIATRY | Age: 70
End: 2020-09-30

## 2020-09-30 VITALS — RESPIRATION RATE: 12 BRPM | BODY MASS INDEX: 28.09 KG/M2 | HEIGHT: 67 IN | WEIGHT: 179 LBS

## 2020-09-30 NOTE — PROGRESS NOTES
Nick Gallardo is here today for nail care. her PCP is Selene Duncan DO last OV was 20. Nick Gallardo : 1950 Sex: female  Age: 77 y.o. Patient was referred by Selene Duncan DO    CC:    Follow-up painful toenails 1-5 right    HPI:   Follow-up painful toenails 1-5 right  Does continue long-term anticoagulant therapy with Coumadin. Denies any foot or ankle pain today. Does have tenderness right foot overlying especially the second toenail. No open wounds. ROS:  Const: Denies constitutional symptoms  Musculo: Denies symptoms other than stated above  Skin: Denies symptoms other than stated above       Current Outpatient Medications:     predniSONE (DELTASONE) 10 MG tablet, Take 3 tablets by mouth daily for 3 days, THEN 2 tablets daily for 3 days, THEN 1 tablet daily for 3 days. , Disp: 18 tablet, Rfl: 0    amoxicillin (AMOXIL) 500 MG capsule, Take 1 capsule by mouth 3 times daily for 10 days, Disp: 30 capsule, Rfl: 0    propafenone (RYTHMOL) 225 MG tablet, Take 1 tablet by mouth three times daily, Disp: 270 tablet, Rfl: 1    topiramate (TOPAMAX) 50 MG tablet, Take 1 tablet by mouth 2 times daily, Disp: 180 tablet, Rfl: 0    verapamil (CALAN SR) 180 MG extended release tablet, Take 1 tablet by mouth daily, Disp: 90 tablet, Rfl: 0    escitalopram (LEXAPRO) 10 MG tablet, Take 1 tablet by mouth every evening, Disp: 90 tablet, Rfl: 1    warfarin (COUMADIN) 6 MG tablet, Indications: monday, wednesday, friday 3 mg, all other days are 6 mg 1/2 tab on M  and 1 tab all other days of the week OR AS DIRECTED, Disp: 30 tablet, Rfl: 5    Red Yeast Rice 600 MG CAPS, Take 1 capsule by mouth 2 times daily, Disp: , Rfl:     calcium carbonate-vitamin D (CALTRATE 600+D) 600-400 MG-UNIT TABS per tab, Take 1 tablet by mouth daily, Disp: , Rfl:     Alpha-Lipoic Acid 100 MG CAPS, Take by mouth, Disp: , Rfl:     b complex vitamins capsule, Take 1 capsule by mouth daily, Disp: , Rfl:     anastrozole metatarsal head right foot. Assessment and Plan: Giana Mccallum was seen today for nail problem. Diagnoses and all orders for this visit:    Tinea unguium    Corns and callosities    Encounter for current long-term use of anticoagulants    Plantar fasciitis    Hammer toe of right foot      Patient seen follow-up today. Manual debridement with standard technique toenails 1 through 5 right in thickness and length. Patient tolerated procedure well. Patient does have follow-up with her oncologist upcoming at the [facility]. Follow-up 2 months. Return in about 2 months (around 11/30/2020). Seen By:  Esteban Cowan DPM      Document was created using voice recognition software. Note was reviewed, however may contain grammatical errors.

## 2020-10-02 ENCOUNTER — OFFICE VISIT (OUTPATIENT)
Dept: FAMILY MEDICINE CLINIC | Age: 70
End: 2020-10-02
Payer: MEDICARE

## 2020-10-02 VITALS
HEART RATE: 61 BPM | DIASTOLIC BLOOD PRESSURE: 68 MMHG | OXYGEN SATURATION: 98 % | RESPIRATION RATE: 18 BRPM | TEMPERATURE: 96.9 F | SYSTOLIC BLOOD PRESSURE: 126 MMHG | BODY MASS INDEX: 28.09 KG/M2 | HEIGHT: 67 IN | WEIGHT: 179 LBS

## 2020-10-02 PROCEDURE — 99213 OFFICE O/P EST LOW 20 MIN: CPT | Performed by: FAMILY MEDICINE

## 2020-10-02 ASSESSMENT — ENCOUNTER SYMPTOMS
DIARRHEA: 0
SORE THROAT: 1
SINUS PAIN: 0
CHEST TIGHTNESS: 0
BACK PAIN: 0
EYE DISCHARGE: 0
EYE REDNESS: 0
ABDOMINAL PAIN: 0
SHORTNESS OF BREATH: 0
NAUSEA: 0
EYE PAIN: 0
VOMITING: 0
TROUBLE SWALLOWING: 0
PHOTOPHOBIA: 0
ALLERGIC/IMMUNOLOGIC NEGATIVE: 1
BLOOD IN STOOL: 0
COUGH: 0

## 2020-10-02 NOTE — PROGRESS NOTES
normal. Tympanic membrane is erythematous and retracted. Left Ear: External ear normal.      Nose: Nose normal.      Mouth/Throat:      Pharynx: No oropharyngeal exudate. Eyes:      Conjunctiva/sclera: Conjunctivae normal.      Pupils: Pupils are equal, round, and reactive to light. Neck:      Musculoskeletal: Normal range of motion and neck supple. Thyroid: No thyromegaly. Trachea: No tracheal deviation. Cardiovascular:      Rate and Rhythm: Normal rate and regular rhythm. Heart sounds: No murmur. No friction rub. No gallop. Pulmonary:      Effort: Pulmonary effort is normal. No respiratory distress. Breath sounds: Normal breath sounds. Abdominal:      General: Bowel sounds are normal.      Palpations: Abdomen is soft. Musculoskeletal: Normal range of motion. General: No tenderness or deformity. Lymphadenopathy:      Cervical: No cervical adenopathy. Skin:     General: Skin is warm and dry. Capillary Refill: Capillary refill takes less than 2 seconds. Findings: No rash. Neurological:      Mental Status: She is alert and oriented to person, place, and time. Sensory: No sensory deficit. Motor: No abnormal muscle tone. Coordination: Coordination normal.      Deep Tendon Reflexes: Reflexes normal.         Assessment and Plan: Judy Lawton was seen today for otalgia. Diagnoses and all orders for this visit:    Non-recurrent acute suppurative otitis media of right ear without spontaneous rupture of tympanic membrane  Continue and finish natibiotics      Return in about 1 week (around 10/9/2020).       Seen By:  Tete Holliday DO

## 2020-10-09 ENCOUNTER — OFFICE VISIT (OUTPATIENT)
Dept: FAMILY MEDICINE CLINIC | Age: 70
End: 2020-10-09
Payer: MEDICARE

## 2020-10-09 VITALS
SYSTOLIC BLOOD PRESSURE: 138 MMHG | RESPIRATION RATE: 16 BRPM | DIASTOLIC BLOOD PRESSURE: 70 MMHG | BODY MASS INDEX: 27.94 KG/M2 | TEMPERATURE: 97.3 F | WEIGHT: 178 LBS | HEART RATE: 54 BPM | HEIGHT: 67 IN | OXYGEN SATURATION: 99 %

## 2020-10-09 LAB
INTERNATIONAL NORMALIZATION RATIO, POC: 2.4
PROTHROMBIN TIME, POC: 29

## 2020-10-09 PROCEDURE — 85610 PROTHROMBIN TIME: CPT | Performed by: FAMILY MEDICINE

## 2020-10-09 PROCEDURE — 99213 OFFICE O/P EST LOW 20 MIN: CPT | Performed by: FAMILY MEDICINE

## 2020-10-09 PROCEDURE — 90694 VACC AIIV4 NO PRSRV 0.5ML IM: CPT | Performed by: FAMILY MEDICINE

## 2020-10-09 PROCEDURE — G0008 ADMIN INFLUENZA VIRUS VAC: HCPCS | Performed by: FAMILY MEDICINE

## 2020-10-09 RX ORDER — PREDNISONE 10 MG/1
10 TABLET ORAL 2 TIMES DAILY
Qty: 14 TABLET | Refills: 0 | Status: SHIPPED | OUTPATIENT
Start: 2020-10-09 | End: 2020-10-14

## 2020-10-09 ASSESSMENT — ENCOUNTER SYMPTOMS
VOMITING: 0
EYE REDNESS: 0
CHEST TIGHTNESS: 0
COUGH: 0
SORE THROAT: 0
BACK PAIN: 0
DIARRHEA: 0
BLOOD IN STOOL: 0
PHOTOPHOBIA: 0
SHORTNESS OF BREATH: 0
EYE DISCHARGE: 0
SINUS PAIN: 0
ALLERGIC/IMMUNOLOGIC NEGATIVE: 1
TROUBLE SWALLOWING: 0
EYE PAIN: 0
ABDOMINAL PAIN: 0
NAUSEA: 0

## 2020-10-09 NOTE — PROGRESS NOTES
10/9/20  Mimi Bryant : 1950 Sex: female  Age: 72 y.o. Chief Complaint   Patient presents with    Atrial Fibrillation       Recheck, refills, fluvacc      Review of Systems   Constitutional: Negative for appetite change, fatigue and unexpected weight change. HENT: Negative for congestion, ear pain, hearing loss, sinus pain, sore throat and trouble swallowing. Eyes: Negative for photophobia, pain, discharge and redness. Respiratory: Negative for cough, chest tightness and shortness of breath. Cardiovascular: Negative for chest pain, palpitations and leg swelling. Gastrointestinal: Negative for abdominal pain, blood in stool, diarrhea, nausea and vomiting. Endocrine: Negative. Genitourinary: Negative for dysuria, flank pain, frequency and hematuria. Musculoskeletal: Negative for arthralgias, back pain, joint swelling and myalgias. Skin: Negative. Allergic/Immunologic: Negative. Neurological: Negative for dizziness, seizures, syncope, weakness, light-headedness, numbness and headaches. Hematological: Negative for adenopathy. Does not bruise/bleed easily. Psychiatric/Behavioral: Negative.           Current Outpatient Medications:     predniSONE (DELTASONE) 10 MG tablet, Take 1 tablet by mouth 2 times daily for 5 days, Disp: 14 tablet, Rfl: 0    amoxicillin (AMOXIL) 500 MG capsule, Take 1 capsule by mouth 3 times daily for 10 days, Disp: 30 capsule, Rfl: 0    propafenone (RYTHMOL) 225 MG tablet, Take 1 tablet by mouth three times daily, Disp: 270 tablet, Rfl: 1    topiramate (TOPAMAX) 50 MG tablet, Take 1 tablet by mouth 2 times daily, Disp: 180 tablet, Rfl: 0    verapamil (CALAN SR) 180 MG extended release tablet, Take 1 tablet by mouth daily, Disp: 90 tablet, Rfl: 0    escitalopram (LEXAPRO) 10 MG tablet, Take 1 tablet by mouth every evening, Disp: 90 tablet, Rfl: 1    warfarin (COUMADIN) 6 MG tablet, Indications: monday, wednesday, friday 3 mg, all other days are 6 mg 1/2 tab on M W F and 1 tab all other days of the week OR AS DIRECTED, Disp: 30 tablet, Rfl: 5    Red Yeast Rice 600 MG CAPS, Take 1 capsule by mouth 2 times daily, Disp: , Rfl:     calcium carbonate-vitamin D (CALTRATE 600+D) 600-400 MG-UNIT TABS per tab, Take 1 tablet by mouth daily, Disp: , Rfl:     Alpha-Lipoic Acid 100 MG CAPS, Take by mouth, Disp: , Rfl:     b complex vitamins capsule, Take 1 capsule by mouth daily, Disp: , Rfl:     anastrozole (ARIMIDEX) 1 MG tablet, Take 1 mg by mouth daily , Disp: , Rfl:     furosemide (LASIX) 20 MG tablet, Take 20 mg by mouth daily , Disp: , Rfl:   Allergies   Allergen Reactions    Diltiazem Hcl Other (See Comments)     Other reaction(s): weak    Cardizem [Diltiazem] Palpitations    Cefuroxime Axetil Nausea And Vomiting, Other (See Comments) and Palpitations    Claritin [Loratadine] Palpitations    Erythromycin Nausea And Vomiting    Morphine Nausea And Vomiting    Mucinex [Guaifenesin Er] Palpitations    Propoxyphene Other (See Comments) and Nausea And Vomiting     DIZZY  Other reaction(s): Free Text    Zyrtec [Cetirizine] Nausea And Vomiting and Other (See Comments)     WEAK       Past Medical History:   Diagnosis Date    Breast cancer ([facility] [address] 04/2017    Chronic atrial fibrillation (HCC)     Fibroadenosis of breast     Headache     Hyperlipidemia     Hypertension      Past Surgical History:   Procedure Laterality Date    [facility]    BREAST LUMPECTOMY Right 04/26/2017    COLONOSCOPY      EYE SURGERY      SUDHIR LENSE IMPLANTS    OTHER SURGICAL HISTORY      port removal    TONSILLECTOMY      TUNNELED VENOUS PORT PLACEMENT  05/26/2017    Dr. Guillermo Congress     Family History   Problem Relation Age of Onset    Cancer Maternal Cousin 80        lung    Heart Disease Mother     Other Mother         [place] Palsy    Other Father         gall bladder problem, hip fx; AAA rupture    Coronary Art Dis Father         MI Social History     Socioeconomic History    Marital status:      Spouse name: Carrol Silvestre Number of children: Not on file    Years of education: Not on file    Highest education level: Not on file   Occupational History    Occupation: retired      Comment: Retired 4 th    Social Needs    Financial resource strain: Not on file    Food insecurity     Worry: Not on file     Inability: Not on file   [employer] needs     Medical: Not on file     Non-medical: Not on file   Tobacco Use    Smoking status: Never Smoker    Smokeless tobacco: Never Used   Substance and Sexual Activity    Alcohol use: No     Comment: occasionally pop    Drug use: No    Sexual activity: Not Currently     Partners: Male   Lifestyle    Physical activity     Days per week: Not on file     Minutes per session: Not on file    Stress: Not on file   Relationships    Social connections     Talks on phone: Not on file     Gets together: Not on file     Attends Jewish service: Not on file     Active member of club or organization: Not on file     Attends meetings of clubs or organizations: Not on file     Relationship status: Not on file    Intimate partner violence     Fear of current or ex partner: Not on file     Emotionally abused: Not on file     Physically abused: Not on file     Forced sexual activity: Not on file   Other Topics Concern    Not on file   Social History Narrative    Drinks occasional Pepsi/Coke       Vitals:    10/09/20 1104   BP: 138/70   Pulse: 54   Resp: 16   Temp: 97.3 °F (36.3 °C)   TempSrc: Temporal   SpO2: 99%   Weight: 178 lb (80.7 kg)   Height: 5' 7\" (1.702 m)       Physical Exam  Vitals signs and nursing note reviewed. Constitutional:       Appearance: She is well-developed. HENT:      Head: Atraumatic. Right Ear: External ear normal.      Left Ear: External ear normal.      Nose: Nose normal.      Mouth/Throat:      Pharynx: No oropharyngeal exudate.    Eyes: Conjunctiva/sclera: Conjunctivae normal.      Pupils: Pupils are equal, round, and reactive to light. Neck:      Musculoskeletal: Normal range of motion and neck supple. Thyroid: No thyromegaly. Trachea: No tracheal deviation. Cardiovascular:      Rate and Rhythm: Normal rate and regular rhythm. Heart sounds: No murmur. No friction rub. No gallop. Pulmonary:      Effort: Pulmonary effort is normal. No respiratory distress. Breath sounds: Normal breath sounds. Abdominal:      General: Bowel sounds are normal.      Palpations: Abdomen is soft. Musculoskeletal: Normal range of motion. General: No tenderness or deformity. Lymphadenopathy:      Cervical: No cervical adenopathy. Skin:     General: Skin is warm and dry. Capillary Refill: Capillary refill takes less than 2 seconds. Findings: No rash. Neurological:      Mental Status: She is alert and oriented to person, place, and time. Sensory: No sensory deficit. Motor: No abnormal muscle tone. Coordination: Coordination normal.      Deep Tendon Reflexes: Reflexes normal.         Assessment and Plan: Judy Lawton was seen today for atrial fibrillation. Diagnoses and all orders for this visit:    Need for vaccination  -     INFLUENZA, QUADV, ADJUVANTED, 72 YRS =, IM, PF, PREFILL SYR, 0.5ML (FLUAD)    Chronic atrial fibrillation (HCC)  -     POCT INR    Dysfunction of right eustachian tube  -     predniSONE (DELTASONE) 10 MG tablet; Take 1 tablet by mouth 2 times daily for 5 days        Return in about 4 weeks (around 11/6/2020).       Seen By:  Tete Holliday DO

## 2020-10-12 ASSESSMENT — ENCOUNTER SYMPTOMS
TROUBLE SWALLOWING: 0
SORE THROAT: 0
WHEEZING: 0
ROS SKIN COMMENTS: DENIES BREAST SKIN CHANGES, ARM SWELLING, OR PALPABLE AXILLARY OR SUPRACLAVICULAR ADENOPATHY.
VOICE CHANGE: 0
CHOKING: 0
DIARRHEA: 0
SHORTNESS OF BREATH: 0
BACK PAIN: 0
ABDOMINAL PAIN: 0
BLOOD IN STOOL: 0
STRIDOR: 0
VOMITING: 0
EYE PAIN: 0
SINUS PRESSURE: 0
SINUS PAIN: 0
EYE DISCHARGE: 0
APNEA: 0
CONSTIPATION: 0
RHINORRHEA: 0
COLOR CHANGE: 0
ABDOMINAL DISTENTION: 0
EYE ITCHING: 0
CHEST TIGHTNESS: 0
COUGH: 0
NAUSEA: 0

## 2020-10-12 NOTE — PROGRESS NOTES
Subjective:  Stage II carcinoma of the right breast: ER/[place] positive HER-2/marcos negative disease.  -Adjuvant chemotherapy per Dr. Corbett Kussmaul with AC-T.completed on 11/09/2017.    -Post-operative RT:  Radiation completed 01/29/2018.   -ET:  Arimidex Feb 2018    Some elements of all sections below were copied from my previous note 10/15/19 and have been re-examined and updated where appropriate. All elements reflect the medical decision making of today May 27, 2020    Patient ID: Heather Fuentes is a 72 y.o. female. HPI  Patient is here for a follow up visit. She underwent a right breast needle localized lumpectomy, right axillary sentinel lymph node excision on April 26, 2017. Pathological evaluation demonstrated:    A. Right breast, upper quadrant: Invasive mammary carcinoma of no special type (ductal, not otherwise specified), (grade 1) involving the anterior margin of excision. Tumor size: 2.2cm    B. Right breast, anterior inferior margin: Positive for small focus of invasive mammary carcinoma of no special type (ductal, not otherwise specified).  Final margin free of carcinoma.    C. [place] lymph node #1: Negative for metastatic carcinoma on H&E and pankeratin stained sections.    D. [place] lymph node #2: Positive for metastatic carcinoma on H&E and pankeratin stained sections.    E. [place] lymph node #3: Positive for metastatic carcinoma on H&E and pankeratin stained sections.    F. [place] lymph node #4: Negative for metastatic carcinoma on H&E and pankeratin stained sections.     Lymph-Vascular Invasion  -present  Pathologic Staging  -pT2 (sn)pN1a  Ancillary Studies  (on [id number])   Estrogen receptor: 93%    Progesterone receptor:  77%    HER-2/marcos protein expression: Negative, 1+                                          Past Medical History:   Diagnosis Date    Breast cancer ([facility] [address] 04/2017    Chronic atrial fibrillation (HCC)     Fibroadenosis of breast     Headache     Hyperlipidemia     Hypertension      Past Surgical History:   Procedure Laterality Date    [facility]    BREAST LUMPECTOMY Right 04/26/2017    COLONOSCOPY      EYE SURGERY      SUDHIR LENSE IMPLANTS    OTHER SURGICAL HISTORY      port removal    TONSILLECTOMY      TUNNELED VENOUS PORT PLACEMENT  05/26/2017    Dr. Ramiro Madrid     Social History     Socioeconomic History    Marital status:      Spouse name: Wilfrido Wahl Number of children: Not on file    Years of education: Not on file    Highest education level: Not on file   Occupational History    Occupation: retired      Comment: Retired 4 th    Social Needs    Financial resource strain: Not on file    Food insecurity     Worry: Not on file     Inability: Not on file   [employer] needs     Medical: Not on file     Non-medical: Not on file   Tobacco Use    Smoking status: Never Smoker    Smokeless tobacco: Never Used   Substance and Sexual Activity    Alcohol use: No     Comment: occasionally pop    Drug use: No    Sexual activity: Not Currently     Partners: Male   Lifestyle    Physical activity     Days per week: Not on file     Minutes per session: Not on file    Stress: Not on file   Relationships    Social connections     Talks on phone: Not on file     Gets together: Not on file     Attends Christianity service: Not on file     Active member of club or organization: Not on file     Attends meetings of clubs or organizations: Not on file     Relationship status: Not on file    Intimate partner violence     Fear of current or ex partner: Not on file     Emotionally abused: Not on file     Physically abused: Not on file     Forced sexual activity: Not on file   Other Topics Concern    Not on file   Social History Narrative    Drinks occasional Pepsi/Coke     Current Outpatient Medications on File Prior to Visit   Medication Sig Dispense Refill    predniSONE (DELTASONE) 10 MG tablet Take 1 tablet by mouth 2 times daily for 5 days 14 tablet 0    propafenone (RYTHMOL) 225 MG tablet Take 1 tablet by mouth three times daily 270 tablet 1    topiramate (TOPAMAX) 50 MG tablet Take 1 tablet by mouth 2 times daily 180 tablet 0    verapamil (CALAN SR) 180 MG extended release tablet Take 1 tablet by mouth daily 90 tablet 0    escitalopram (LEXAPRO) 10 MG tablet Take 1 tablet by mouth every evening 90 tablet 1    warfarin (COUMADIN) 6 MG tablet Indications: monday, wednesday, friday 3 mg, all other days are 6 mg 1/2 tab on M W F and 1 tab all other days of the week OR AS DIRECTED 30 tablet 5    Red Yeast Rice 600 MG CAPS Take 1 capsule by mouth 2 times daily      calcium carbonate-vitamin D (CALTRATE 600+D) 600-400 MG-UNIT TABS per tab Take 1 tablet by mouth daily      Alpha-Lipoic Acid 100 MG CAPS Take by mouth      b complex vitamins capsule Take 1 capsule by mouth daily      anastrozole (ARIMIDEX) 1 MG tablet Take 1 mg by mouth daily       furosemide (LASIX) 20 MG tablet Take 20 mg by mouth daily        Current Facility-Administered Medications on File Prior to Visit   Medication Dose Route Frequency Provider Last Rate Last Dose    perflutren lipid microspheres (DEFINITY) injection 1.65 mg  1.5 mL Intravenous ONCE PRN Taylor Patterson MD         Allergies   Allergen Reactions    Diltiazem Hcl Other (See Comments)     Other reaction(s): weak    Cardizem [Diltiazem] Palpitations    Cefuroxime Axetil Nausea And Vomiting, Other (See Comments) and Palpitations    Claritin [Loratadine] Palpitations    Erythromycin Nausea And Vomiting    Morphine Nausea And Vomiting    Mucinex [Guaifenesin Er] Palpitations    Propoxyphene Other (See Comments) and Nausea And Vomiting     DIZZY  Other reaction(s): Free Text    Zyrtec [Cetirizine] Nausea And Vomiting and Other (See Comments)     WEAK       Review of Systems   Constitutional: Negative for activity change, appetite change, chills, fatigue, fever and unexpected weight change. Doing well. She still cares for her  who is non-ambulatory. She reports her tumor markers were elevated at her medical oncology office; she underwent restaging scans on 10/15/2020. HENT: Negative. Negative for congestion, postnasal drip, rhinorrhea, sinus pressure, sinus pain, sore throat, trouble swallowing and voice change. Eyes: Negative for pain, discharge, itching and visual disturbance. Respiratory: Negative for apnea, cough, choking, chest tightness, shortness of breath, wheezing and stridor. Cardiovascular: Negative for chest pain, palpitations and leg swelling. Trace ankle edema bilaterally. Gastrointestinal: Negative for abdominal distention, abdominal pain, blood in stool, constipation, diarrhea, nausea and vomiting. Endocrine: Negative for cold intolerance and heat intolerance. Genitourinary: Negative for difficulty urinating, dysuria, frequency and hematuria. Recurrent UTI's with E-Coli. AB per Gyn and PCP. In addition, she reports both uterine and bladder prolapse. Musculoskeletal: Negative for arthralgias, back pain, gait problem, joint swelling, myalgias, neck pain and neck stiffness. Intermittent back and hip discomfort; likely r/t lifting and caring for her . It is stable. Skin: Negative for color change, pallor and rash. Denies breast skin changes, arm swelling, or palpable axillary or supraclavicular adenopathy. Allergic/Immunologic: Negative for environmental allergies and food allergies. Neurological: Positive for numbness (CIPN remains subjectively stable. ). Negative for dizziness, seizures, syncope, speech difficulty, weakness, light-headedness and headaches. 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 Stable. Hematological: Negative for adenopathy. Does not bruise/bleed easily.    Psychiatric/Behavioral: Negative for agitation, confusion and decreased right breast: ER/DE positive, HER-2/marcos negative disease. She who underwent a right breast needle localized lumpectomy, right axillary sentinel lymph node excision on April 26, 2017. Pathological evaluation demonstrated:    A. Right breast, upper quadrant: Invasive mammary carcinoma of no special type (ductal, not otherwise specified), (grade 1) involving the anterior margin of excision. Tumor size: 2.2cm    B. Right breast, anterior inferior margin: Positive for small focus of invasive mammary carcinoma of no special type (ductal, not otherwise specified).  Final margin free of carcinoma.    C. [place] lymph node #1: Negative for metastatic carcinoma on H&E and pankeratin stained sections.    D. [place] lymph node #2: Positive for metastatic carcinoma on H&E and pankeratin stained sections.    E. [place] lymph node #3: Positive for metastatic carcinoma on H&E and pankeratin stained sections.    F. [place] lymph node #4: Negative for metastatic carcinoma on H&E and pankeratin stained sections. Lymph-Vascular Invasion  -present  Pathologic Staging  -pT2 (sn)pN1a  Ancillary Studies  (on [id number])   Estrogen receptor: 93%    Progesterone receptor:  77%    HER-2/marcos protein expression: Negative, 1+                                        Metastatic workup May 12, 2017 (ct chest, abdomen/pelvis and bone scan): Negative.    -Adjuvant chemotherapy per Dr. Stefany Mathew with AC-T.completed on 11/09/2017.    -Post-operative RT:  Radiation completed 01/29/2018.    -CIPN:  Subjectively stable. -Bilateral Mammogram 04/29/2019:  Asymmetry in CC view of left breast.  Additional imaging recommended.  -Left breast US, finding in question on mammogram is not seen on US. MRI recommended. -Dexa Bone Density 10/14/2019:  Normal bone density LS; Osteopenia of bilateral femoral necks; no statistically significant change.   On Ca+/Vit D.    -MRI bilateral breast on 05/21/2019:  Benign with no evidence of malignancy.  -Digital bilateral screening mammogram 05/27/2020 @ [facility]:  Negative. -Reports recent tumor markers were mildly elevated at medical oncology office. -10/15/2020 Restaging scans (CT of the chest, abdomen and pelvis, and bone scan) with no convincing evidence of metastatic disease. On CT of the abdomen and pelvis, there was mention of a tiny locule of gas in the urinary bladder. She has had no recent bladder instrumentation. In light of her recurrent urinary tract infections, her reported bladder and uterine prolapse, will refer her to urology for further evaluation and recommendations. Her  follows with Dr. Wisam Thomas and she requests referral to same. Clinically, she continues to do well and without evidence of recurrent disease. Plan:       1. Continue monthly breast self examination. Bring any changes to your physician's attention. 2. Continue healthy diet and exercise routinely as tolerated. 3. Avoid alcohol or limit alcohol intake to < 3 drinks per week. 4. Arimidex 1 mg daily at the discretion of Medical Oncology. 5. Ca/Vit D daily while on Arimidex (1200 mg Ca+ daily/400-800 IU Vit D daily). 6. Mammogram May 2021 (ordered). 7. Continue follow up with Medical Oncology and Primary Care. 8. Refer to urology for recurrent UTI as well as recent CT scanning noting a tiny gas locule in the urinary bladder. 9. RTC 6 months with repeat mammogram and bone density prior. During today's visit, face-to-face time 25 minutes, greater than 50% in counseling education and coordination of care. All questions were answered to her apparent satisfaction, and she is agreeable to the plan as outlined above. Theo Padilla, RN, MSN, APRN-CNP, [address]  Advanced Oncology Certified Nurse Practitioner  Department of Breast Surgery  [facility]/  [facility] in collaboration with Dr. Cassidy Dubon.  Jared/Dr. Young Mealing     10/16/2020

## 2020-10-15 ENCOUNTER — HOSPITAL ENCOUNTER (OUTPATIENT)
Dept: NUCLEAR MEDICINE | Age: 70
Discharge: HOME | End: 2020-10-15
Payer: MEDICARE

## 2020-10-15 ENCOUNTER — HOSPITAL ENCOUNTER (OUTPATIENT)
Dept: CT IMAGING | Age: 70
Discharge: HOME | End: 2020-10-17
Payer: MEDICARE

## 2020-10-15 PROCEDURE — 3430000000 HC RX DIAGNOSTIC RADIOPHARMACEUTICAL: Performed by: RADIOLOGY

## 2020-10-15 PROCEDURE — 71260 CT THORAX DX C+: CPT

## 2020-10-15 PROCEDURE — 74177 CT ABD & PELVIS W/CONTRAST: CPT

## 2020-10-15 PROCEDURE — 78306 BONE IMAGING WHOLE BODY: CPT

## 2020-10-15 PROCEDURE — A9503 TC99M MEDRONATE: HCPCS | Performed by: RADIOLOGY

## 2020-10-15 PROCEDURE — 6360000004 HC RX CONTRAST MEDICATION: Performed by: RADIOLOGY

## 2020-10-15 RX ORDER — TC 99M MEDRONATE 20 MG/10ML
25 INJECTION, POWDER, LYOPHILIZED, FOR SOLUTION INTRAVENOUS
Status: COMPLETED | OUTPATIENT
Start: 2020-10-15 | End: 2020-10-15

## 2020-10-15 RX ADMIN — TC 99M MEDRONATE 25 MILLICURIE: 20 INJECTION, POWDER, LYOPHILIZED, FOR SOLUTION INTRAVENOUS at 11:00

## 2020-10-15 RX ADMIN — IOHEXOL 50 ML: 240 INJECTION, SOLUTION INTRATHECAL; INTRAVASCULAR; INTRAVENOUS; ORAL at 12:30

## 2020-10-15 RX ADMIN — IOPAMIDOL 100 ML: 755 INJECTION, SOLUTION INTRAVENOUS at 12:30

## 2020-10-16 ENCOUNTER — TELEPHONE (OUTPATIENT)
Dept: BREAST CENTER | Age: 70
End: 2020-10-16

## 2020-10-16 ENCOUNTER — OFFICE VISIT (OUTPATIENT)
Dept: BREAST CENTER | Age: 70
End: 2020-10-16
Payer: MEDICARE

## 2020-10-16 ENCOUNTER — HOSPITAL ENCOUNTER (OUTPATIENT)
Dept: GENERAL RADIOLOGY | Age: 70
Discharge: HOME | End: 2020-10-18
Payer: MEDICARE

## 2020-10-16 VITALS
HEIGHT: 67 IN | SYSTOLIC BLOOD PRESSURE: 138 MMHG | TEMPERATURE: 97.3 F | RESPIRATION RATE: 18 BRPM | HEART RATE: 52 BPM | WEIGHT: 177 LBS | OXYGEN SATURATION: 97 % | BODY MASS INDEX: 27.78 KG/M2 | DIASTOLIC BLOOD PRESSURE: 72 MMHG

## 2020-10-16 PROCEDURE — 99213 OFFICE O/P EST LOW 20 MIN: CPT | Performed by: NURSE PRACTITIONER

## 2020-10-16 PROCEDURE — 99214 OFFICE O/P EST MOD 30 MIN: CPT | Performed by: NURSE PRACTITIONER

## 2020-10-16 NOTE — TELEPHONE ENCOUNTER
Submitted referral to [facility] - faxed patient information. Their office staff will contact patient.

## 2020-10-16 NOTE — PATIENT INSTRUCTIONS

## 2020-10-20 ENCOUNTER — TELEPHONE (OUTPATIENT)
Dept: FAMILY MEDICINE CLINIC | Age: 70
End: 2020-10-20

## 2020-10-20 NOTE — TELEPHONE ENCOUNTER
Patient calling she has had no improvement in her ear discomfort.  Please refer her to ENT dr Sisi Sun

## 2020-10-22 ENCOUNTER — TELEPHONE (OUTPATIENT)
Dept: ENT CLINIC | Age: 70
End: 2020-10-22

## 2020-10-22 NOTE — TELEPHONE ENCOUNTER
Called patient in regards to scheduling appointment patient has had ear infection for two months rt ear and has taken antibiotics ,ear drops, steroids showing no relief.

## 2020-10-22 NOTE — TELEPHONE ENCOUNTER
Pt called in to scheduled an appt, we have a referral from Dr. Dawood Thompson. Pt would like to know if we could move her appt up sooner? Please, advise. Thank you.

## 2020-10-28 ENCOUNTER — OFFICE VISIT (OUTPATIENT)
Dept: ENT CLINIC | Age: 70
End: 2020-10-28
Payer: MEDICARE

## 2020-10-28 ENCOUNTER — PROCEDURE VISIT (OUTPATIENT)
Dept: AUDIOLOGY | Age: 70
End: 2020-10-28
Payer: MEDICARE

## 2020-10-28 VITALS — TEMPERATURE: 98 F | HEIGHT: 67 IN | BODY MASS INDEX: 27.47 KG/M2 | WEIGHT: 175 LBS

## 2020-10-28 PROCEDURE — 92567 TYMPANOMETRY: CPT | Performed by: AUDIOLOGIST

## 2020-10-28 PROCEDURE — 99204 OFFICE O/P NEW MOD 45 MIN: CPT | Performed by: OTOLARYNGOLOGY

## 2020-10-28 PROCEDURE — 92557 COMPREHENSIVE HEARING TEST: CPT | Performed by: AUDIOLOGIST

## 2020-10-28 ASSESSMENT — ENCOUNTER SYMPTOMS
ALLERGIC/IMMUNOLOGIC NEGATIVE: 1
APNEA: 0
RHINORRHEA: 0
CHEST TIGHTNESS: 0
EYE PAIN: 0
ABDOMINAL DISTENTION: 0
EYE DISCHARGE: 0
SINUS PRESSURE: 1
ABDOMINAL PAIN: 0

## 2020-10-28 NOTE — PROGRESS NOTES
Subjective:      Patient ID:  Apolonia Berger is a 72 y.o. female. HPI:    Patient presents today for right sided Hearing loss. Condition has been present for 2 month(s). She states she has right sided hearing loss, with popping sensation when yawning. She describes intermittent pain and tinnitus. She does describe double vision and dizziness in the AM during which time she hears a sloshing sound in her ears.        Past Medical History:   Diagnosis Date    Breast cancer ([address] 04/2017    Chronic atrial fibrillation (HCC)     Fibroadenosis of breast     Headache     Hyperlipidemia     Hypertension      Past Surgical History:   Procedure Laterality Date    [facility]    BREAST LUMPECTOMY Right 04/26/2017    COLONOSCOPY      EYE SURGERY      SUDHIR LENSE IMPLANTS    OTHER SURGICAL HISTORY      port removal    TONSILLECTOMY      TUNNELED VENOUS PORT PLACEMENT  05/26/2017    Dr. Shashank Bell     Family History   Problem Relation Age of Onset    Cancer Maternal Cousin 80        lung    Heart Disease Mother     Other Mother         [place] Palsy    Other Father         gall bladder problem, hip fx; AAA rupture    Coronary Art Dis Father         MI     Social History     Socioeconomic History    Marital status:      Spouse name: Herman Cheadle Number of children: None    Years of education: None    Highest education level: None   Occupational History    Occupation: retired      Comment: Retired 4 th    Social Needs    Financial resource strain: None    Food insecurity     Worry: None     Inability: None    Transportation needs     Medical: None     Non-medical: None   Tobacco Use    Smoking status: Never Smoker    Smokeless tobacco: Never Used   Substance and Sexual Activity    Alcohol use: No     Comment: occasionally pop    Drug use: No    Sexual activity: Not Currently     Partners: Male   Lifestyle    Physical activity     Days per week: None Minutes per session: None    Stress: None   Relationships    Social connections     Talks on phone: None     Gets together: None     Attends Worship service: None     Active member of club or organization: None     Attends meetings of clubs or organizations: None     Relationship status: None    Intimate partner violence     Fear of current or ex partner: None     Emotionally abused: None     Physically abused: None     Forced sexual activity: None   Other Topics Concern    None   Social History Narrative    Drinks occasional Pepsi/Coke     Allergies   Allergen Reactions    Diltiazem Hcl Other (See Comments)     Other reaction(s): weak    Cardizem [Diltiazem] Palpitations    Cefuroxime Axetil Nausea And Vomiting, Other (See Comments) and Palpitations    Claritin [Loratadine] Palpitations    Erythromycin Nausea And Vomiting    Morphine Nausea And Vomiting    Mucinex [Guaifenesin Er] Palpitations    Propoxyphene Other (See Comments) and Nausea And Vomiting     DIZZY  Other reaction(s): Free Text    Zyrtec [Cetirizine] Nausea And Vomiting and Other (See Comments)     WEAK       Review of Systems   Constitutional: Negative for chills and fever. HENT: Positive for congestion, ear pain, hearing loss, nosebleeds, sinus pressure and tinnitus. Negative for ear discharge, postnasal drip and rhinorrhea. Eyes: Negative for pain and discharge. Respiratory: Negative for apnea and chest tightness. Cardiovascular: Negative for chest pain. Gastrointestinal: Negative for abdominal distention and abdominal pain. Endocrine: Negative for cold intolerance and heat intolerance. Genitourinary: Positive for difficulty urinating. Musculoskeletal: Negative. Allergic/Immunologic: Negative. Neurological: Positive for dizziness. Negative for headaches. Hematological: Negative. Psychiatric/Behavioral: Negative.                 Objective:     Vitals:    10/28/20 1002   Temp: 98 °F (36.7 °C) Physical Exam  Constitutional:       Appearance: Normal appearance. HENT:      Head: Normocephalic and atraumatic. Right Ear: Ear canal and external ear normal. Tympanic membrane is retracted. Left Ear: Tympanic membrane, ear canal and external ear normal.      Nose:      Left Nostril: Epistaxis present. Mouth/Throat:      Mouth: Mucous membranes are moist.   Eyes:      Extraocular Movements: Extraocular movements intact. Pupils: Pupils are equal, round, and reactive to light. Neck:      Musculoskeletal: Normal range of motion. Cardiovascular:      Rate and Rhythm: Normal rate and regular rhythm. Pulses: Normal pulses. Heart sounds: Normal heart sounds. Pulmonary:      Effort: Pulmonary effort is normal.      Breath sounds: Normal breath sounds. Abdominal:      General: Abdomen is flat. Musculoskeletal: Normal range of motion. Skin:     General: Skin is warm. Neurological:      General: No focal deficit present. Mental Status: She is alert and oriented to person, place, and time. Psychiatric:         Mood and Affect: Mood normal.         Audio:              Assessment:       Diagnosis Orders   1. Sensorineural hearing loss (SNHL) of both ears  Ambulatory referral to Audiology              Plan:      Hearing is diminished. Pt does not have hearing aids at this time. Pt is  a candidate for hearing aids and is not interested in discussing this with audiology. Also discussed the importance of hearing protection from now on to preserve remaining hearing.      Call or return to clinic prn if these symptoms worsen or fail to improve as anticipated    Follow up in 1 year(s)

## 2020-10-29 NOTE — PROGRESS NOTES
This patient was referred for audiometric/tympanometric testing by Dr. Nicole Mattson due to hearing loss. Audiometry revealed mild to moderate sensorineural hearing loss bilaterally. Reliability was good. Speech reception thresholds were in good agreement with the pure tone averages, bilaterally. Speech discrimination scores were excellent, bilaterally. Tympanometry revealed normal middle ear peak pressure and compliance, bilaterally. The results were reviewed with the patient and physician. Recommendations for follow up will be made pending physician consult.     Chin Harris/CCC-A  [place] Lic # [id number]

## 2020-11-06 ENCOUNTER — OFFICE VISIT (OUTPATIENT)
Dept: FAMILY MEDICINE CLINIC | Age: 70
End: 2020-11-06
Payer: MEDICARE

## 2020-11-06 VITALS
BODY MASS INDEX: 28.25 KG/M2 | WEIGHT: 180 LBS | OXYGEN SATURATION: 99 % | RESPIRATION RATE: 16 BRPM | HEART RATE: 63 BPM | TEMPERATURE: 97.2 F | HEIGHT: 67 IN | DIASTOLIC BLOOD PRESSURE: 76 MMHG | SYSTOLIC BLOOD PRESSURE: 140 MMHG

## 2020-11-06 LAB
INTERNATIONAL NORMALIZATION RATIO, POC: 2.5
PROTHROMBIN TIME, POC: 29.7

## 2020-11-06 PROCEDURE — 85610 PROTHROMBIN TIME: CPT | Performed by: FAMILY MEDICINE

## 2020-11-06 PROCEDURE — 99213 OFFICE O/P EST LOW 20 MIN: CPT | Performed by: FAMILY MEDICINE

## 2020-11-06 ASSESSMENT — ENCOUNTER SYMPTOMS
ALLERGIC/IMMUNOLOGIC NEGATIVE: 1
EYE REDNESS: 0
DIARRHEA: 0
ABDOMINAL PAIN: 0
SORE THROAT: 0
EYE PAIN: 0
NAUSEA: 0
VOMITING: 0
PHOTOPHOBIA: 0
BACK PAIN: 0
EYE DISCHARGE: 0
SHORTNESS OF BREATH: 0
CHEST TIGHTNESS: 0
SINUS PAIN: 0
COUGH: 0
TROUBLE SWALLOWING: 0
BLOOD IN STOOL: 0

## 2020-11-06 NOTE — PROGRESS NOTES
20  Heath Thayer : 1950 Sex: female  Age: 72 y.o. Chief Complaint   Patient presents with    Atrial Fibrillation       Recheck, INR 2.5, doing well. Denies any new complaints      Review of Systems   Constitutional: Negative for appetite change, fatigue and unexpected weight change. HENT: Negative for congestion, ear pain, hearing loss, sinus pain, sore throat and trouble swallowing. Eyes: Negative for photophobia, pain, discharge and redness. Respiratory: Negative for cough, chest tightness and shortness of breath. Cardiovascular: Negative for chest pain, palpitations and leg swelling. Gastrointestinal: Negative for abdominal pain, blood in stool, diarrhea, nausea and vomiting. Endocrine: Negative. Genitourinary: Negative for dysuria, flank pain, frequency and hematuria. Musculoskeletal: Negative for arthralgias, back pain, joint swelling and myalgias. Skin: Negative. Allergic/Immunologic: Negative. Neurological: Negative for dizziness, seizures, syncope, weakness, light-headedness, numbness and headaches. Hematological: Negative for adenopathy. Does not bruise/bleed easily. Psychiatric/Behavioral: Negative.           Current Outpatient Medications:     verapamil (CALAN SR) 180 MG extended release tablet, Take 1 tablet by mouth daily, Disp: 90 tablet, Rfl: 0    propafenone (RYTHMOL) 225 MG tablet, Take 1 tablet by mouth three times daily, Disp: 270 tablet, Rfl: 1    topiramate (TOPAMAX) 50 MG tablet, Take 1 tablet by mouth 2 times daily, Disp: 180 tablet, Rfl: 0    escitalopram (LEXAPRO) 10 MG tablet, Take 1 tablet by mouth every evening, Disp: 90 tablet, Rfl: 1    warfarin (COUMADIN) 6 MG tablet, Indications: monday, wednesday, friday 3 mg, all other days are 6 mg 1/2 tab on M W  and 1 tab all other days of the week OR AS DIRECTED, Disp: 30 tablet, Rfl: 5    Red Yeast Rice 600 MG CAPS, Take 1 capsule by mouth 2 times daily, Disp: , Rfl:     calcium carbonate-vitamin D (CALTRATE 600+D) 600-400 MG-UNIT TABS per tab, Take 1 tablet by mouth daily, Disp: , Rfl:     Alpha-Lipoic Acid 100 MG CAPS, Take by mouth, Disp: , Rfl:     b complex vitamins capsule, Take 1 capsule by mouth daily, Disp: , Rfl:     anastrozole (ARIMIDEX) 1 MG tablet, Take 1 mg by mouth daily , Disp: , Rfl:     furosemide (LASIX) 20 MG tablet, Take 20 mg by mouth daily , Disp: , Rfl:   Allergies   Allergen Reactions    Diltiazem Hcl Other (See Comments)     Other reaction(s): weak    Cardizem [Diltiazem] Palpitations    Cefuroxime Axetil Nausea And Vomiting, Other (See Comments) and Palpitations    Claritin [Loratadine] Palpitations    Erythromycin Nausea And Vomiting    Morphine Nausea And Vomiting    Mucinex [Guaifenesin Er] Palpitations    Propoxyphene Other (See Comments) and Nausea And Vomiting     DIZZY  Other reaction(s): Free Text    Zyrtec [Cetirizine] Nausea And Vomiting and Other (See Comments)     WEAK       Past Medical History:   Diagnosis Date    Breast cancer ([facility] [address] 04/2017    Chronic atrial fibrillation (HCC)     Fibroadenosis of breast     Headache     Hyperlipidemia     Hypertension      Past Surgical History:   Procedure Laterality Date    [facility]    BREAST LUMPECTOMY Right 04/26/2017    COLONOSCOPY      EYE SURGERY      SUDHIR LENSE IMPLANTS    OTHER SURGICAL HISTORY      port removal    TONSILLECTOMY      TUNNELED VENOUS PORT PLACEMENT  05/26/2017    Dr. Tobi Alves     Family History   Problem Relation Age of Onset    Cancer Maternal Cousin 80        lung    Heart Disease Mother     Other Mother         [place] Palsy    Other Father         gall bladder problem, hip fx; AAA rupture    Coronary Art Dis Father         MI     Social History     Socioeconomic History    Marital status:      Spouse name: Cathleen George Number of children: Not on file    Years of education: Not on file   Elyssafregori education level: Not on file   Occupational History    Occupation: retired      Comment: Retired 4 th    Social Needs    Financial resource strain: Not on file    Food insecurity     Worry: Not on file     Inability: Not on file   [employer] needs     Medical: Not on file     Non-medical: Not on file   Tobacco Use    Smoking status: Never Smoker    Smokeless tobacco: Never Used   Substance and Sexual Activity    Alcohol use: No     Comment: occasionally pop    Drug use: No    Sexual activity: Not Currently     Partners: Male   Lifestyle    Physical activity     Days per week: Not on file     Minutes per session: Not on file    Stress: Not on file   Relationships    Social connections     Talks on phone: Not on file     Gets together: Not on file     Attends Anglican service: Not on file     Active member of club or organization: Not on file     Attends meetings of clubs or organizations: Not on file     Relationship status: Not on file    Intimate partner violence     Fear of current or ex partner: Not on file     Emotionally abused: Not on file     Physically abused: Not on file     Forced sexual activity: Not on file   Other Topics Concern    Not on file   Social History Narrative    Drinks occasional Pepsi/Coke       Vitals:    11/06/20 1102   BP: (!) 140/76   Pulse: 63   Resp: 16   Temp: 97.2 °F (36.2 °C)   TempSrc: Temporal   SpO2: 99%   Weight: 180 lb (81.6 kg)   Height: 5' 7\" (1.702 m)       Physical Exam  Vitals signs and nursing note reviewed. Constitutional:       Appearance: She is well-developed. HENT:      Head: Atraumatic. Right Ear: External ear normal.      Left Ear: External ear normal.      Nose: Nose normal.      Mouth/Throat:      Pharynx: No oropharyngeal exudate. Eyes:      Conjunctiva/sclera: Conjunctivae normal.      Pupils: Pupils are equal, round, and reactive to light. Neck:      Musculoskeletal: Normal range of motion and neck supple.       Thyroid: No

## 2020-11-18 ENCOUNTER — OFFICE VISIT (OUTPATIENT)
Dept: FAMILY MEDICINE CLINIC | Age: 70
End: 2020-11-18
Payer: MEDICARE

## 2020-11-18 VITALS
DIASTOLIC BLOOD PRESSURE: 68 MMHG | BODY MASS INDEX: 28.25 KG/M2 | SYSTOLIC BLOOD PRESSURE: 130 MMHG | TEMPERATURE: 97.1 F | HEIGHT: 67 IN | WEIGHT: 180 LBS | OXYGEN SATURATION: 96 % | HEART RATE: 60 BPM | RESPIRATION RATE: 16 BRPM

## 2020-11-18 PROCEDURE — 99213 OFFICE O/P EST LOW 20 MIN: CPT | Performed by: FAMILY MEDICINE

## 2020-11-18 RX ORDER — PREDNISONE 10 MG/1
10 TABLET ORAL 2 TIMES DAILY
Qty: 10 TABLET | Refills: 0 | Status: SHIPPED | OUTPATIENT
Start: 2020-11-18 | End: 2020-11-23

## 2020-11-18 RX ORDER — AMOXICILLIN 500 MG/1
500 CAPSULE ORAL 3 TIMES DAILY
Qty: 30 CAPSULE | Refills: 0 | Status: SHIPPED | OUTPATIENT
Start: 2020-11-18 | End: 2020-11-28

## 2020-11-18 ASSESSMENT — ENCOUNTER SYMPTOMS
EYE DISCHARGE: 0
PHOTOPHOBIA: 0
DIARRHEA: 0
SHORTNESS OF BREATH: 0
ALLERGIC/IMMUNOLOGIC NEGATIVE: 1
SINUS PAIN: 0
CHEST TIGHTNESS: 0
COUGH: 0
EYE REDNESS: 0
VOMITING: 0
SORE THROAT: 1
TROUBLE SWALLOWING: 0
EYE PAIN: 0
ABDOMINAL PAIN: 0
SINUS PRESSURE: 1
NAUSEA: 0
BACK PAIN: 0
BLOOD IN STOOL: 0

## 2020-11-18 NOTE — PROGRESS NOTES
20  Pawel Harmon : 1950 Sex: female  Age: 72 y.o. Chief Complaint   Patient presents with    Otalgia       Congestion, pressure, drainage, facial tenderness, sore throat, onset 3 days ago. Denies fever, chills, diaphoresis, nausea, vomiting, decreased oral intake. Denies other GI or  complaints. OTC treatments minimally effective. Review of Systems   Constitutional: Negative for appetite change, fatigue and unexpected weight change. HENT: Positive for congestion, sinus pressure and sore throat. Negative for ear pain, hearing loss, sinus pain and trouble swallowing. Eyes: Negative for photophobia, pain, discharge and redness. Respiratory: Negative for cough, chest tightness and shortness of breath. Cardiovascular: Negative for chest pain, palpitations and leg swelling. Gastrointestinal: Negative for abdominal pain, blood in stool, diarrhea, nausea and vomiting. Endocrine: Negative. Genitourinary: Negative for dysuria, flank pain, frequency and hematuria. Musculoskeletal: Negative for arthralgias, back pain, joint swelling and myalgias. Skin: Negative. Allergic/Immunologic: Negative. Neurological: Negative for dizziness, seizures, syncope, weakness, light-headedness, numbness and headaches. Hematological: Negative for adenopathy. Does not bruise/bleed easily. Psychiatric/Behavioral: Negative.           Current Outpatient Medications:     amoxicillin (AMOXIL) 500 MG capsule, Take 1 capsule by mouth 3 times daily for 10 days, Disp: 30 capsule, Rfl: 0    predniSONE (DELTASONE) 10 MG tablet, Take 1 tablet by mouth 2 times daily for 5 days, Disp: 10 tablet, Rfl: 0    verapamil (CALAN SR) 180 MG extended release tablet, Take 1 tablet by mouth daily, Disp: 90 tablet, Rfl: 0    propafenone (RYTHMOL) 225 MG tablet, Take 1 tablet by mouth three times daily, Disp: 270 tablet, Rfl: 1    topiramate (TOPAMAX) 50 MG tablet, Take 1 tablet by mouth 2 times daily, Disp: 180 tablet, Rfl: 0    escitalopram (LEXAPRO) 10 MG tablet, Take 1 tablet by mouth every evening, Disp: 90 tablet, Rfl: 1    warfarin (COUMADIN) 6 MG tablet, Indications: monday, wednesday, friday 3 mg, all other days are 6 mg 1/2 tab on M W F and 1 tab all other days of the week OR AS DIRECTED, Disp: 30 tablet, Rfl: 5    Red Yeast Rice 600 MG CAPS, Take 1 capsule by mouth 2 times daily, Disp: , Rfl:     calcium carbonate-vitamin D (CALTRATE 600+D) 600-400 MG-UNIT TABS per tab, Take 1 tablet by mouth daily, Disp: , Rfl:     Alpha-Lipoic Acid 100 MG CAPS, Take by mouth, Disp: , Rfl:     b complex vitamins capsule, Take 1 capsule by mouth daily, Disp: , Rfl:     anastrozole (ARIMIDEX) 1 MG tablet, Take 1 mg by mouth daily , Disp: , Rfl:     furosemide (LASIX) 20 MG tablet, Take 20 mg by mouth daily , Disp: , Rfl:   Allergies   Allergen Reactions    Diltiazem Hcl Other (See Comments)     Other reaction(s): weak    Cardizem [Diltiazem] Palpitations    Cefuroxime Axetil Nausea And Vomiting, Other (See Comments) and Palpitations    Claritin [Loratadine] Palpitations    Erythromycin Nausea And Vomiting    Morphine Nausea And Vomiting    Mucinex [Guaifenesin Er] Palpitations    Propoxyphene Other (See Comments) and Nausea And Vomiting     DIZZY  Other reaction(s): Free Text    Zyrtec [Cetirizine] Nausea And Vomiting and Other (See Comments)     WEAK       Past Medical History:   Diagnosis Date    Breast cancer ([facility] [address] 04/2017    Chronic atrial fibrillation (HCC)     Fibroadenosis of breast     Headache     Hyperlipidemia     Hypertension      Past Surgical History:   Procedure Laterality Date    [facility]  1999    [facility]    BREAST LUMPECTOMY Right 04/26/2017    COLONOSCOPY      EYE SURGERY      SUDHIR LENSE IMPLANTS    OTHER SURGICAL HISTORY      port removal    TONSILLECTOMY      TUNNELED VENOUS PORT PLACEMENT  05/26/2017    Dr. Guillermo [place]     Family History   Problem Relation Age of Onset    Cancer Maternal Cousin 80        lung    Heart Disease Mother     Other Mother         [place] Palsy    Other Father         gall bladder problem, hip fx; AAA rupture    Coronary Art Dis Father         MI     Social History     Socioeconomic History    Marital status:      Spouse name: Tiney Collet Number of children: Not on file    Years of education: Not on file    Highest education level: Not on file   Occupational History    Occupation: retired      Comment: Retired 4 th    Social Needs    Financial resource strain: Not on file    Food insecurity     Worry: Not on file     Inability: Not on file   [employer] needs     Medical: Not on file     Non-medical: Not on file   Tobacco Use    Smoking status: Never Smoker    Smokeless tobacco: Never Used   Substance and Sexual Activity    Alcohol use: No     Comment: occasionally pop    Drug use: No    Sexual activity: Not Currently     Partners: Male   Lifestyle    Physical activity     Days per week: Not on file     Minutes per session: Not on file    Stress: Not on file   Relationships    Social connections     Talks on phone: Not on file     Gets together: Not on file     Attends Buddhist service: Not on file     Active member of club or organization: Not on file     Attends meetings of clubs or organizations: Not on file     Relationship status: Not on file    Intimate partner violence     Fear of current or ex partner: Not on file     Emotionally abused: Not on file     Physically abused: Not on file     Forced sexual activity: Not on file   Other Topics Concern    Not on file   Social History Narrative    Drinks occasional Pepsi/Coke       Vitals:    11/18/20 1151   BP: 130/68   Pulse: 60   Resp: 16   Temp: 97.1 °F (36.2 °C)   TempSrc: Temporal   SpO2: 96%   Weight: 180 lb (81.6 kg)   Height: 5' 7\" (1.702 m)       Physical Exam  Vitals signs and nursing note reviewed.    Constitutional: Appearance: She is well-developed. HENT:      Head: Atraumatic. Right Ear: External ear normal. Tympanic membrane is retracted. Left Ear: External ear normal. Tympanic membrane is retracted. Nose: Nose normal.      Mouth/Throat:      Pharynx: Posterior oropharyngeal erythema present. No oropharyngeal exudate. Eyes:      Conjunctiva/sclera: Conjunctivae normal.      Pupils: Pupils are equal, round, and reactive to light. Neck:      Musculoskeletal: Normal range of motion and neck supple. Thyroid: No thyromegaly. Trachea: No tracheal deviation. Cardiovascular:      Rate and Rhythm: Normal rate and regular rhythm. Heart sounds: No murmur. No friction rub. No gallop. Pulmonary:      Effort: Pulmonary effort is normal. No respiratory distress. Breath sounds: Normal breath sounds. Abdominal:      General: Bowel sounds are normal.      Palpations: Abdomen is soft. Musculoskeletal: Normal range of motion. General: No tenderness or deformity. Lymphadenopathy:      Cervical: No cervical adenopathy. Skin:     General: Skin is warm and dry. Capillary Refill: Capillary refill takes less than 2 seconds. Findings: No rash. Neurological:      Mental Status: She is alert and oriented to person, place, and time. Sensory: No sensory deficit. Motor: No abnormal muscle tone. Coordination: Coordination normal.      Deep Tendon Reflexes: Reflexes normal.         Assessment and Plan: [facility] was seen today for otalgia. Diagnoses and all orders for this visit:    Acute non-recurrent pansinusitis  -     amoxicillin (AMOXIL) 500 MG capsule; Take 1 capsule by mouth 3 times daily for 10 days  -     predniSONE (DELTASONE) 10 MG tablet;  Take 1 tablet by mouth 2 times daily for 5 days    Tylenol, fluids, rest, cool mist, call, recheck here or to ER immediately if condition worsens      Return in about 1 week (around 11/25/2020), or rechekc if not improved.       Seen By:  Felecia Su, DO

## 2020-12-02 ENCOUNTER — PROCEDURE VISIT (OUTPATIENT)
Dept: PODIATRY | Age: 70
End: 2020-12-02
Payer: MEDICARE

## 2020-12-02 PROCEDURE — 11721 DEBRIDE NAIL 6 OR MORE: CPT | Performed by: PODIATRIST

## 2020-12-02 RX ORDER — AMMONIUM LACTATE 12 G/100G
LOTION TOPICAL
Qty: 400 G | Refills: 2 | Status: SHIPPED
Start: 2020-12-02 | End: 2022-07-11

## 2020-12-02 NOTE — PROGRESS NOTES
Apolonia Berger is here today for nail care. her PCP is Eldon Hector DO last OV was 20. Apolonia Berger : 1950 Sex: female  Age: 72 y.o. Patient was referred by Eldon Hector DO    CC:    Follow-up painful toenails 1-5     HPI:   Follow-up painful toenails 1-5   No open wounds or skin lesions. Does have ammonium lactate 12%. Does have history of heel pain no heel pain today. No calf pain no additional pedal complaints.     ROS:  Const: Denies constitutional symptoms  Musculo: Denies symptoms other than stated above  Skin: Denies symptoms other than stated above       Current Outpatient Medications:     ammonium lactate (LAC-HYDRIN) 12 % lotion, Apply topically twice daily, Disp: 400 g, Rfl: 2    escitalopram (LEXAPRO) 10 MG tablet, Take 1 tablet by mouth every evening, Disp: 90 tablet, Rfl: 1    verapamil (CALAN SR) 180 MG extended release tablet, Take 1 tablet by mouth daily, Disp: 90 tablet, Rfl: 0    propafenone (RYTHMOL) 225 MG tablet, Take 1 tablet by mouth three times daily, Disp: 270 tablet, Rfl: 1    topiramate (TOPAMAX) 50 MG tablet, Take 1 tablet by mouth 2 times daily, Disp: 180 tablet, Rfl: 0    warfarin (COUMADIN) 6 MG tablet, Indications: monday, wednesday, friday 3 mg, all other days are 6 mg 1/2 tab on M W F and 1 tab all other days of the week OR AS DIRECTED, Disp: 30 tablet, Rfl: 5    Red Yeast Rice 600 MG CAPS, Take 1 capsule by mouth 2 times daily, Disp: , Rfl:     calcium carbonate-vitamin D (CALTRATE 600+D) 600-400 MG-UNIT TABS per tab, Take 1 tablet by mouth daily, Disp: , Rfl:     Alpha-Lipoic Acid 100 MG CAPS, Take by mouth, Disp: , Rfl:     b complex vitamins capsule, Take 1 capsule by mouth daily, Disp: , Rfl:     anastrozole (ARIMIDEX) 1 MG tablet, Take 1 mg by mouth daily , Disp: , Rfl:     furosemide (LASIX) 20 MG tablet, Take 20 mg by mouth daily , Disp: , Rfl:   Allergies   Allergen Reactions    Diltiazem Hcl Other (See Comments)     Other reaction(s): weak    Cardizem [Diltiazem] Palpitations    Cefuroxime Axetil Nausea And Vomiting, Other (See Comments) and Palpitations    Claritin [Loratadine] Palpitations    Erythromycin Nausea And Vomiting    Morphine Nausea And Vomiting    Mucinex [Guaifenesin Er] Palpitations    Propoxyphene Other (See Comments) and Nausea And Vomiting     DIZZY  Other reaction(s): Free Text    Zyrtec [Cetirizine] Nausea And Vomiting and Other (See Comments)     WEAK       Past Medical History:   Diagnosis Date    Breast cancer ([facility] [address] 04/2017    Chronic atrial fibrillation (HCC)     Fibroadenosis of breast     Headache     Hyperlipidemia     Hypertension            There were no vitals filed for this visit. Work History/Social History: Retired  [employer] [employer]    Focused Lower Extremity Physical Exam:    Neurovascular examination:    Dorsalis Pedis palpable bilateral.  Posterior tibialis palpable bilateral.    Capillary Refill Time:  Immediate return  Hair growth:  Symmetrical and bilateral   Skin:  Not atrophic  Edema: No edema bilateral feet or ankles. Neurologic:  Light touch intact bilateral.      Musculoskeletal/ Orthopedic examination:    Equinis: Absent bilateral  Dorsiflexion, plantarflexion, inversion, eversion bilateral 5 out of 5 muscle strength  Wiggling toes  Negative Homans  No pain to palpation bilateral heels  Flexible contracted hammertoe second third and fourth digit right foot    Dermatology examination:    No open skin lesions or abrasions bilateral lower extremity. Toenails 1-5 right foot thickened, elongated, mycotic, dystrophic with subungual debris. No open wounds. Assessment and Plan: Chu Marquis was seen today for callouses and nail problem.     Diagnoses and all orders for this visit:    Tinea unguium    Corns and callosities    Encounter for current long-term use of anticoagulants    Plantar fasciitis    Hammer toe of right foot    Other orders  -     ammonium lactate (LAC-HYDRIN) 12 % lotion; Apply topically twice daily            Follow-up  Does continue long-term anticoagulant therapy. Manual debridement with standard technique toenails 1 through 5 right in thickness and length. Patient tolerated procedure well. Continue ammonium lactate 12% daily on the bottom both feet. Avoid barefoot. Follow-up 2 months. Return in about 2 months (around 2/2/2021). Seen By:  Alan Sandoval DPM      Document was created using voice recognition software. Note was reviewed, however may contain grammatical errors.

## 2020-12-04 ENCOUNTER — OFFICE VISIT (OUTPATIENT)
Dept: FAMILY MEDICINE CLINIC | Age: 70
End: 2020-12-04
Payer: MEDICARE

## 2020-12-04 VITALS — WEIGHT: 179 LBS | BODY MASS INDEX: 28.09 KG/M2 | RESPIRATION RATE: 16 BRPM | HEIGHT: 67 IN

## 2020-12-04 LAB
INTERNATIONAL NORMALIZATION RATIO, POC: 3
PROTHROMBIN TIME, POC: 36.4

## 2020-12-04 PROCEDURE — 85610 PROTHROMBIN TIME: CPT | Performed by: FAMILY MEDICINE

## 2020-12-04 PROCEDURE — 99213 OFFICE O/P EST LOW 20 MIN: CPT | Performed by: FAMILY MEDICINE

## 2020-12-04 RX ORDER — ESCITALOPRAM OXALATE 10 MG/1
10 TABLET ORAL EVERY EVENING
Qty: 90 TABLET | Refills: 1 | Status: SHIPPED
Start: 2020-12-04 | End: 2021-07-07 | Stop reason: SDUPTHER

## 2020-12-04 ASSESSMENT — ENCOUNTER SYMPTOMS
SHORTNESS OF BREATH: 0
EYE PAIN: 0
VOMITING: 0
BACK PAIN: 0
TROUBLE SWALLOWING: 0
BLOOD IN STOOL: 0
EYE REDNESS: 0
ALLERGIC/IMMUNOLOGIC NEGATIVE: 1
SORE THROAT: 0
CHEST TIGHTNESS: 0
ABDOMINAL PAIN: 0
DIARRHEA: 0
EYE DISCHARGE: 0
PHOTOPHOBIA: 0
COUGH: 0
SINUS PAIN: 0
NAUSEA: 0

## 2020-12-04 NOTE — PROGRESS NOTES
other days of the week OR AS DIRECTED, Disp: 30 tablet, Rfl: 5    Red Yeast Rice 600 MG CAPS, Take 1 capsule by mouth 2 times daily, Disp: , Rfl:     calcium carbonate-vitamin D (CALTRATE 600+D) 600-400 MG-UNIT TABS per tab, Take 1 tablet by mouth daily, Disp: , Rfl:     Alpha-Lipoic Acid 100 MG CAPS, Take by mouth, Disp: , Rfl:     b complex vitamins capsule, Take 1 capsule by mouth daily, Disp: , Rfl:     anastrozole (ARIMIDEX) 1 MG tablet, Take 1 mg by mouth daily , Disp: , Rfl:     furosemide (LASIX) 20 MG tablet, Take 20 mg by mouth daily , Disp: , Rfl:   Allergies   Allergen Reactions    Diltiazem Hcl Other (See Comments)     Other reaction(s): weak    Cardizem [Diltiazem] Palpitations    Cefuroxime Axetil Nausea And Vomiting, Other (See Comments) and Palpitations    Claritin [Loratadine] Palpitations    Erythromycin Nausea And Vomiting    Morphine Nausea And Vomiting    Mucinex [Guaifenesin Er] Palpitations    Propoxyphene Other (See Comments) and Nausea And Vomiting     DIZZY  Other reaction(s): Free Text    Zyrtec [Cetirizine] Nausea And Vomiting and Other (See Comments)     WEAK       Past Medical History:   Diagnosis Date    Breast cancer ([facility] [address] 04/2017    Chronic atrial fibrillation (HCC)     Fibroadenosis of breast     Headache     Hyperlipidemia     Hypertension      Past Surgical History:   Procedure Laterality Date    [facility]    BREAST LUMPECTOMY Right 04/26/2017    COLONOSCOPY      EYE SURGERY      SUDHIR LENSE IMPLANTS    OTHER SURGICAL HISTORY      port removal    TONSILLECTOMY      TUNNELED VENOUS PORT PLACEMENT  05/26/2017    Dr. Jose Mendez     Family History   Problem Relation Age of Onset    Cancer Maternal Cousin 80        lung    Heart Disease Mother     Other Mother         [place] Palsy    Other Father         gall bladder problem, hip fx; AAA rupture    Coronary Art Dis Father         MI     Social History Socioeconomic History    Marital status:      Spouse name: Wilfrido Wahl Number of children: Not on file    Years of education: Not on file    Highest education level: Not on file   Occupational History    Occupation: retired      Comment: Retired 4 th    Social Needs    Financial resource strain: Not on file    Food insecurity     Worry: Not on file     Inability: Not on file   [employer] needs     Medical: Not on file     Non-medical: Not on file   Tobacco Use    Smoking status: Never Smoker    Smokeless tobacco: Never Used   Substance and Sexual Activity    Alcohol use: No     Comment: occasionally pop    Drug use: No    Sexual activity: Not Currently     Partners: Male   Lifestyle    Physical activity     Days per week: Not on file     Minutes per session: Not on file    Stress: Not on file   Relationships    Social connections     Talks on phone: Not on file     Gets together: Not on file     Attends Latter-day service: Not on file     Active member of club or organization: Not on file     Attends meetings of clubs or organizations: Not on file     Relationship status: Not on file    Intimate partner violence     Fear of current or ex partner: Not on file     Emotionally abused: Not on file     Physically abused: Not on file     Forced sexual activity: Not on file   Other Topics Concern    Not on file   Social History Narrative    Drinks occasional Pepsi/Coke       Vitals:    12/04/20 1053   Resp: 16   Weight: 179 lb (81.2 kg)   Height: 5' 7\" (1.702 m)       Physical Exam  Vitals signs and nursing note reviewed. Constitutional:       Appearance: She is well-developed. HENT:      Head: Atraumatic. Right Ear: External ear normal. Tympanic membrane is retracted. Left Ear: External ear normal. Tympanic membrane is retracted. Ears:      Comments: Possible small perforation AD     Nose: Nose normal.      Mouth/Throat:      Pharynx: No oropharyngeal exudate. Eyes:      Conjunctiva/sclera: Conjunctivae normal.      Pupils: Pupils are equal, round, and reactive to light. Neck:      Musculoskeletal: Normal range of motion and neck supple. Thyroid: No thyromegaly. Trachea: No tracheal deviation. Cardiovascular:      Rate and Rhythm: Normal rate and regular rhythm. Heart sounds: No murmur. No friction rub. No gallop. Pulmonary:      Effort: Pulmonary effort is normal. No respiratory distress. Breath sounds: Normal breath sounds. Abdominal:      General: Bowel sounds are normal.      Palpations: Abdomen is soft. Musculoskeletal: Normal range of motion. General: No tenderness or deformity. Lymphadenopathy:      Cervical: No cervical adenopathy. Skin:     General: Skin is warm and dry. Capillary Refill: Capillary refill takes less than 2 seconds. Findings: No rash. Neurological:      Mental Status: She is alert and oriented to person, place, and time. Sensory: No sensory deficit. Motor: No abnormal muscle tone. Coordination: Coordination normal.      Deep Tendon Reflexes: Reflexes normal.         Assessment and Plan: Reggie Bautista was seen today for atrial fibrillation and otalgia. Diagnoses and all orders for this visit:    Right ear pain  See Dr Jeramy Macias for this, to call his office for appointment    Chronic atrial fibrillation ([address]  -     POCT INR        Return in about 4 weeks (around 1/1/2021).       Seen By:  Toby Steinberg DO

## 2020-12-23 ENCOUNTER — OFFICE VISIT (OUTPATIENT)
Dept: ENT CLINIC | Age: 70
End: 2020-12-23
Payer: MEDICARE

## 2020-12-23 VITALS — WEIGHT: 180 LBS | HEIGHT: 67 IN | BODY MASS INDEX: 28.25 KG/M2 | TEMPERATURE: 98 F

## 2020-12-23 PROCEDURE — 99213 OFFICE O/P EST LOW 20 MIN: CPT | Performed by: OTOLARYNGOLOGY

## 2020-12-23 PROCEDURE — 69210 REMOVE IMPACTED EAR WAX UNI: CPT | Performed by: OTOLARYNGOLOGY

## 2020-12-23 ASSESSMENT — ENCOUNTER SYMPTOMS
CHEST TIGHTNESS: 0
APNEA: 0
RHINORRHEA: 0
SINUS PRESSURE: 1
EYE PAIN: 0
ALLERGIC/IMMUNOLOGIC NEGATIVE: 1
ABDOMINAL PAIN: 0
EYE DISCHARGE: 0
ABDOMINAL DISTENTION: 0

## 2020-12-23 NOTE — PROGRESS NOTES
Subjective:      Patient ID:  Mabel Montague is a 72 y.o. female. HPI:    Patient presents today for recheck of the right ear. Condition has been present for 2 week(s). Pt states her ear popped and she could hear better.   PCP thinks there may be a perforation      Past Medical History:   Diagnosis Date    Breast cancer ([address] 04/2017    Chronic atrial fibrillation (HCC)     Fibroadenosis of breast     Headache     Hyperlipidemia     Hypertension      Past Surgical History:   Procedure Laterality Date    [facility]    BREAST LUMPECTOMY Right 04/26/2017    COLONOSCOPY      EYE SURGERY      SUDHIR LENSE IMPLANTS    OTHER SURGICAL HISTORY      port removal    TONSILLECTOMY      TUNNELED VENOUS PORT PLACEMENT  05/26/2017    Dr. Mustapha Garcia     Family History   Problem Relation Age of Onset    Cancer Maternal Cousin 80        lung    Heart Disease Mother     Other Mother         [place] Palsy    Other Father         gall bladder problem, hip fx; AAA rupture    Coronary Art Dis Father         MI     Social History     Socioeconomic History    Marital status:      Spouse name: Emanuel Isidro Number of children: None    Years of education: None    Highest education level: None   Occupational History    Occupation: retired      Comment: Retired 4 th    Social Needs    Financial resource strain: None    Food insecurity     Worry: None     Inability: None    Transportation needs     Medical: None     Non-medical: None   Tobacco Use    Smoking status: Never Smoker    Smokeless tobacco: Never Used   Substance and Sexual Activity    Alcohol use: No     Comment: occasionally pop    Drug use: No    Sexual activity: Not Currently     Partners: Male   Lifestyle    Physical activity     Days per week: None     Minutes per session: None    Stress: None   Relationships    Social connections     Talks on phone: None     Gets together: None     Attends Zoroastrianism service: None     Active member of club or organization: None     Attends meetings of clubs or organizations: None     Relationship status: None    Intimate partner violence     Fear of current or ex partner: None     Emotionally abused: None     Physically abused: None     Forced sexual activity: None   Other Topics Concern    None   Social History Narrative    Drinks occasional Pepsi/Coke     Allergies   Allergen Reactions    Diltiazem Hcl Other (See Comments)     Other reaction(s): weak    Cardizem [Diltiazem] Palpitations    Cefuroxime Axetil Nausea And Vomiting, Other (See Comments) and Palpitations    Claritin [Loratadine] Palpitations    Erythromycin Nausea And Vomiting    Morphine Nausea And Vomiting    Mucinex [Guaifenesin Er] Palpitations    Propoxyphene Other (See Comments) and Nausea And Vomiting     DIZZY  Other reaction(s): Free Text    Zyrtec [Cetirizine] Nausea And Vomiting and Other (See Comments)     WEAK       Review of Systems   Constitutional: Negative for chills and fever. HENT: Positive for congestion, ear pain, hearing loss, nosebleeds, sinus pressure and tinnitus. Negative for ear discharge, postnasal drip and rhinorrhea. Eyes: Negative for pain and discharge. Respiratory: Negative for apnea and chest tightness. Cardiovascular: Negative for chest pain. Gastrointestinal: Negative for abdominal distention and abdominal pain. Endocrine: Negative for cold intolerance and heat intolerance. Genitourinary: Positive for difficulty urinating. Musculoskeletal: Negative. Allergic/Immunologic: Negative. Neurological: Positive for dizziness. Negative for headaches. Hematological: Negative. Psychiatric/Behavioral: Negative. Objective:     Vitals:    12/23/20 1020   Temp: 98 °F (36.7 °C)     Physical Exam  Constitutional:       Appearance: Normal appearance. HENT:      Head: Normocephalic and atraumatic.       Right Ear: Ear canal and external ear normal. There is impacted cerumen. Tympanic membrane is not retracted. Left Ear: Tympanic membrane, ear canal and external ear normal.      Nose:      Left Nostril: Epistaxis present. Mouth/Throat:      Mouth: Mucous membranes are moist.   Eyes:      Extraocular Movements: Extraocular movements intact. Pupils: Pupils are equal, round, and reactive to light. Neck:      Musculoskeletal: Normal range of motion. Cardiovascular:      Rate and Rhythm: Normal rate and regular rhythm. Pulses: Normal pulses. Heart sounds: Normal heart sounds. Pulmonary:      Effort: Pulmonary effort is normal.      Breath sounds: Normal breath sounds. Abdominal:      General: Abdomen is flat. Musculoskeletal: Normal range of motion. Skin:     General: Skin is warm. Neurological:      General: No focal deficit present. Mental Status: She is alert and oriented to person, place, and time. Psychiatric:         Mood and Affect: Mood normal.         Cerumen removal      Auditory canal(s) right ear partially obstructed with cerumen. A microscope was used due to deep impaction of the cerumen. Cerumen was gently removed using soft plastic curette. Tympanic membranes are intact following the procedure. Auditory canals appear normal.                Assessment:       Diagnosis Orders   1.  Sensorineural hearing loss (SNHL) of both ears                Plan:      Ear is doing better I would like to recheck the hearing test again as the scheduled visit  Follow up as scheduled

## 2021-01-04 ENCOUNTER — OFFICE VISIT (OUTPATIENT)
Dept: FAMILY MEDICINE CLINIC | Age: 71
End: 2021-01-04
Payer: MEDICARE

## 2021-01-04 VITALS
RESPIRATION RATE: 18 BRPM | DIASTOLIC BLOOD PRESSURE: 76 MMHG | OXYGEN SATURATION: 98 % | HEART RATE: 55 BPM | BODY MASS INDEX: 28.31 KG/M2 | SYSTOLIC BLOOD PRESSURE: 140 MMHG | WEIGHT: 180.4 LBS | HEIGHT: 67 IN | TEMPERATURE: 96.7 F

## 2021-01-04 VITALS
HEART RATE: 57 BPM | BODY MASS INDEX: 28.25 KG/M2 | HEIGHT: 67 IN | SYSTOLIC BLOOD PRESSURE: 140 MMHG | OXYGEN SATURATION: 98 % | TEMPERATURE: 96.9 F | WEIGHT: 180 LBS | RESPIRATION RATE: 18 BRPM | DIASTOLIC BLOOD PRESSURE: 72 MMHG

## 2021-01-04 DIAGNOSIS — Z00.00 ROUTINE GENERAL MEDICAL EXAMINATION AT A HEALTH CARE FACILITY: Primary | ICD-10-CM

## 2021-01-04 DIAGNOSIS — I48.20 CHRONIC ATRIAL FIBRILLATION (HCC): ICD-10-CM

## 2021-01-04 LAB
INTERNATIONAL NORMALIZATION RATIO, POC: 2.4
PROTHROMBIN TIME, POC: 28.5

## 2021-01-04 PROCEDURE — 99213 OFFICE O/P EST LOW 20 MIN: CPT | Performed by: FAMILY MEDICINE

## 2021-01-04 PROCEDURE — 85610 PROTHROMBIN TIME: CPT | Performed by: FAMILY MEDICINE

## 2021-01-04 PROCEDURE — G0439 PPPS, SUBSEQ VISIT: HCPCS | Performed by: FAMILY MEDICINE

## 2021-01-04 PROCEDURE — G8510 SCR DEP NEG, NO PLAN REQD: HCPCS | Performed by: FAMILY MEDICINE

## 2021-01-04 RX ORDER — WARFARIN SODIUM 6 MG/1
TABLET ORAL
Qty: 30 TABLET | Refills: 5 | Status: SHIPPED
Start: 2021-01-04 | End: 2021-08-23 | Stop reason: SDUPTHER

## 2021-01-04 ASSESSMENT — ENCOUNTER SYMPTOMS
ALLERGIC/IMMUNOLOGIC NEGATIVE: 1
BLOOD IN STOOL: 0
SHORTNESS OF BREATH: 0
SORE THROAT: 0
EYE DISCHARGE: 0
CHEST TIGHTNESS: 0
SINUS PAIN: 0
EYE PAIN: 0
EYE REDNESS: 0
ABDOMINAL PAIN: 0
TROUBLE SWALLOWING: 0
VOMITING: 0
NAUSEA: 0
DIARRHEA: 0
BACK PAIN: 0
COUGH: 0
PHOTOPHOBIA: 0

## 2021-01-04 ASSESSMENT — PATIENT HEALTH QUESTIONNAIRE - PHQ9
SUM OF ALL RESPONSES TO PHQ QUESTIONS 1-9: 0

## 2021-01-04 ASSESSMENT — LIFESTYLE VARIABLES: HOW OFTEN DO YOU HAVE A DRINK CONTAINING ALCOHOL: 0

## 2021-01-04 NOTE — PROGRESS NOTES
Medicare Annual Wellness Visit  Name: Iker Koenig Date: 2021   MRN: [id number] Sex: Female   Age: 72 y.o. Ethnicity: Non-/Non    : 1950 Race: Leslye Stahl is here for Medicare AWV    Screenings for behavioral, psychosocial and functional/safety risks, and cognitive dysfunction are all negative except as indicated below. These results, as well as other patient data from the [address] form, are documented in Flowsheets linked to this Encounter. Allergies   Allergen Reactions    Diltiazem Hcl Other (See Comments)     Other reaction(s): weak    Cardizem [Diltiazem] Palpitations    Cefuroxime Axetil Nausea And Vomiting, Other (See Comments) and Palpitations    Claritin [Loratadine] Palpitations    Erythromycin Nausea And Vomiting    Morphine Nausea And Vomiting    Mucinex [Guaifenesin Er] Palpitations    Propoxyphene Other (See Comments) and Nausea And Vomiting     DIZZY  Other reaction(s): Free Text    Zyrtec [Cetirizine] Nausea And Vomiting and Other (See Comments)     WEAK         Prior to Visit Medications    Medication Sig Taking?  Authorizing Provider   warfarin (COUMADIN) 6 MG tablet Indications: monday, wednesday, friday 3 mg, all other days are 6 mg 1/2 tab on M W  and 1 tab all other days of the week OR AS DIRECTED  Chandler JILLIAN Ayala, DO   escitalopram (LEXAPRO) 10 MG tablet Take 1 tablet by mouth every evening  Chandler L Jamie, DO   ammonium lactate (LAC-HYDRIN) 12 % lotion Apply topically twice daily  Galindo Mcmullen DPM   verapamil (CALAN SR) 180 MG extended release tablet Take 1 tablet by mouth daily  Chandler L Ayala, DO   propafenone (RYTHMOL) 225 MG tablet Take 1 tablet by mouth three times daily  Chandler L Ayala, DO   topiramate (TOPAMAX) 50 MG tablet Take 1 tablet by mouth 2 times daily  Chandler L Ayala, DO   Red Yeast Rice 600 MG CAPS Take 1 capsule by mouth 2 times daily  Historical Provider, MD   calcium carbonate-vitamin D (CALTRATE 600+D) 600-400 MG-UNIT TABS per tab Take 1 tablet by mouth daily  Historical Provider, MD   Alpha-Lipoic Acid 100 MG CAPS Take by mouth  Historical Provider, MD   b complex vitamins capsule Take 1 capsule by mouth daily  Historical Provider, MD   anastrozole (ARIMIDEX) 1 MG tablet Take 1 mg by mouth daily   Historical Provider, MD   furosemide (LASIX) 20 MG tablet Take 20 mg by mouth daily   Historical Provider, MD         Past Medical History:   Diagnosis Date    Breast cancer ([address] 04/2017    Chronic atrial fibrillation (HCC)     Fibroadenosis of breast     Headache     Hyperlipidemia     Hypertension        Past Surgical History:   Procedure Laterality Date    [facility]    BREAST LUMPECTOMY Right 04/26/2017    COLONOSCOPY      EYE SURGERY      SUDHIR LENSE IMPLANTS    OTHER SURGICAL HISTORY      port removal    TONSILLECTOMY      TUNNELED VENOUS PORT PLACEMENT  05/26/2017    Dr. Laly Mac         Family History   Problem Relation Age of Onset    Cancer Maternal Cousin 80        lung    Heart Disease Mother     Other Mother         [place] Palsy    Other Father         gall bladder problem, hip fx; AAA rupture    Coronary Art Dis Father         MI       CareTeam (Including outside providers/suppliers regularly involved in providing care):   Patient Care Team:  Mark Ayala DO as PCP - General (Family Medicine)  Mark Ayala DO as PCP - [facility] Empaneled Provider  Claudio Moreau MD as Consulting Physician (Cardiology)  Ralph Porter DPM as Consulting Physician (Podiatry)  Danyel Kraft MD (Ophthalmology)  Ramiro Brantley MD as Consulting Physician (Hematology and Oncology)  Radha Workman MD as Surgeon (General Surgery)    Wt Readings from Last 3 Encounters:   01/04/21 180 lb (81.6 kg)   01/04/21 180 lb 6.4 oz (81.8 kg)   12/23/20 180 lb (81.6 kg)     Vitals:    01/04/21 1314   BP: (!) 140/72   Pulse: 57   Resp: 18   Temp: 96.9 °F (36.1 °C) SpO2: 98%   Weight: 180 lb (81.6 kg)   Height: 5' 7\" (1.702 m)     Body mass index is 28.19 kg/m². Based upon direct observation of the patient, evaluation of cognition reveals recent and remote memory intact. Patient's complete Health Risk Assessment and screening values have been reviewed and are found in Flowsheets. The following problems were reviewed today and where indicated follow up appointments were made and/or referrals ordered. Positive Risk Factor Screenings with Interventions:               No Positive Risk Factors identified today.     Personalized Preventive Plan   Current Health Maintenance Status  Immunization History   Administered Date(s) Administered    Influenza Vaccine, unspecified formulation 09/21/2009, 10/14/2011, 10/03/2012, 09/29/2013, 10/13/2014, 10/10/2015    Influenza Virus Vaccine 09/21/2009, 10/14/2011, 10/19/2017    Influenza, High Dose (Fluzone 65 yrs and older) 10/19/2016, 11/04/2018    Influenza, Quadv, adjuvanted, 65 yrs +, IM, PF (Fluad) 10/09/2020    Influenza, Triv, inactivated, subunit, adjuvanted, IM (Fluad 65 yrs and older) 09/09/2019    Pneumococcal Conjugate 13-valent (Calleen Fossa) 10/19/2016, 07/12/2019    Tdap (Boostrix, Adacel) 09/01/2009    Zoster Recombinant (Shingrix) 11/13/2019, 02/19/2020        Health Maintenance   Topic Date Due    Hepatitis C screen  1950    Annual Wellness Visit (AWV)  06/21/2019    Pneumococcal 65+ yrs at Risk Vaccine (2 of 2 - PPSV23) 07/12/2020    Potassium monitoring  03/18/2021    Creatinine monitoring  03/18/2021    Breast cancer screen  05/27/2021    Colon cancer screen colonoscopy  06/06/2021    Lipid screen  08/20/2023    DTaP/Tdap/Td vaccine (3 - Td) 06/04/2030    DEXA (modify frequency per FRAX score)  Completed    Flu vaccine  Completed    Shingles Vaccine  Completed    Hepatitis A vaccine  Aged Out    Hepatitis B vaccine  Aged Out    Hib vaccine  Aged Out    Meningococcal (ACWY) vaccine Aged Out     Recommendations for Preventive Services Due: see orders and patient instructions/AVS.  . Recommended screening schedule for the next 5-10 years is provided to the patient in written form: see Patient Ziyad Leyva was seen today for medicare awv. Diagnoses and all orders for this visit:    Routine general medical examination at a health care facility  1 year                 Cardiovascular Disease Risk Counseling: Assessed the patient's risk to develop cardiovascular disease and reviewed main risk factors. Reviewed steps to reduce disease risk including:   · Quitting tobacco use, reducing amount smoked, or not starting the habit  · Making healthy food choices  · Being physically active and gradualy increasing activity levels   · Reduce weight and determine a healthy BMI goal  · Monitor blood pressure and treat if higher than 140/90 mmHg  · Maintain blood total cholesterol levels under 5 mmol/l or 190 mg/dl  · Maintain LDL cholesterol levels under 3.0 mmol/l or 115 mg/dl   · Control blood glucose levels  · Consider taking aspirin (75 mg daily), once blood pressure is controlled   Provided a follow up plan.   Time spent (minutes): 5 min

## 2021-01-04 NOTE — PROGRESS NOTES
carbonate-vitamin D (CALTRATE 600+D) 600-400 MG-UNIT TABS per tab, Take 1 tablet by mouth daily, Disp: , Rfl:     Alpha-Lipoic Acid 100 MG CAPS, Take by mouth, Disp: , Rfl:     b complex vitamins capsule, Take 1 capsule by mouth daily, Disp: , Rfl:     anastrozole (ARIMIDEX) 1 MG tablet, Take 1 mg by mouth daily , Disp: , Rfl:     furosemide (LASIX) 20 MG tablet, Take 20 mg by mouth daily , Disp: , Rfl:     topiramate (TOPAMAX) 50 MG tablet, Take 1 tablet by mouth 2 times daily, Disp: 180 tablet, Rfl: 0  Allergies   Allergen Reactions    Diltiazem Hcl Other (See Comments)     Other reaction(s): weak    Cardizem [Diltiazem] Palpitations    Cefuroxime Axetil Nausea And Vomiting, Other (See Comments) and Palpitations    Claritin [Loratadine] Palpitations    Erythromycin Nausea And Vomiting    Morphine Nausea And Vomiting    Mucinex [Guaifenesin Er] Palpitations    Propoxyphene Other (See Comments) and Nausea And Vomiting     DIZZY  Other reaction(s): Free Text    Zyrtec [Cetirizine] Nausea And Vomiting and Other (See Comments)     WEAK       Past Medical History:   Diagnosis Date    Breast [facility] ([address] 04/2017    Chronic atrial fibrillation (HCC)     Fibroadenosis of breast     Headache     Hyperlipidemia     Hypertension      Past Surgical History:   Procedure Laterality Date    [facility]    BREAST LUMPECTOMY Right 04/26/2017    COLONOSCOPY      EYE SURGERY      SUDHIR LENSE IMPLANTS    OTHER SURGICAL HISTORY      port removal    TONSILLECTOMY      TUNNELED VENOUS PORT PLACEMENT  05/26/2017    Dr. Amari Hill     Family History   Problem Relation Age of Onset    Cancer Maternal Cousin 80        lung    Heart Disease Mother     Other Mother         [place]alsy    Other Father         gall bladder problem, hip fx; AAA rupture    Coronary Art Dis Father         MI     Social History     Socioeconomic History    Marital status:      Spouse name: Zoie Pate Number of children: Not on file    Years of education: Not on file    Highest education level: Not on file   Occupational History    Occupation: retired      Comment: Retired 4 th    Social Needs    Financial resource strain: Not on file    Food insecurity     Worry: Not on file     Inability: Not on file   [employer] needs     Medical: Not on file     Non-medical: Not on file   Tobacco Use    Smoking status: Never Smoker    Smokeless tobacco: Never Used   Substance and Sexual Activity    Alcohol use: No     Comment: occasionally pop    Drug use: No    Sexual activity: Not Currently     Partners: Male   Lifestyle    Physical activity     Days per week: Not on file     Minutes per session: Not on file    Stress: Not on file   Relationships    Social connections     Talks on phone: Not on file     Gets together: Not on file     Attends Worship service: Not on file     Active member of club or organization: Not on file     Attends meetings of clubs or organizations: Not on file     Relationship status: Not on file    Intimate partner violence     Fear of current or ex partner: Not on file     Emotionally abused: Not on file     Physically abused: Not on file     Forced sexual activity: Not on file   Other Topics Concern    Not on file   Social History Narrative    Drinks occasional Pepsi/Coke       Vitals:    01/04/21 1255   BP: (!) 140/76   Pulse: 55   Resp: 18   Temp: 96.7 °F (35.9 °C)   SpO2: 98%   Weight: 180 lb 6.4 oz (81.8 kg)   Height: 5' 7\" (1.702 m)       Physical Exam  Vitals signs and nursing note reviewed. Constitutional:       Appearance: She is well-developed. HENT:      Head: Atraumatic. Right Ear: External ear normal.      Left Ear: External ear normal.      Nose: Nose normal.      Mouth/Throat:      Pharynx: No oropharyngeal exudate. Eyes:      Conjunctiva/sclera: Conjunctivae normal.      Pupils: Pupils are equal, round, and reactive to light. Neck:      Musculoskeletal: Normal range of motion and neck supple. Thyroid: No thyromegaly. Trachea: No tracheal deviation. Cardiovascular:      Rate and Rhythm: Normal rate. Rhythm irregular. Heart sounds: No murmur. No friction rub. No gallop. Pulmonary:      Effort: Pulmonary effort is normal. No respiratory distress. Breath sounds: Normal breath sounds. Abdominal:      General: Bowel sounds are normal.      Palpations: Abdomen is soft. Musculoskeletal: Normal range of motion. General: No tenderness or deformity. Lymphadenopathy:      Cervical: No cervical adenopathy. Skin:     General: Skin is warm and dry. Capillary Refill: Capillary refill takes less than 2 seconds. Findings: No rash. Neurological:      Mental Status: She is alert and oriented to person, place, and time. Sensory: No sensory deficit. Motor: No abnormal muscle tone. Coordination: Coordination normal.      Deep Tendon Reflexes: Reflexes normal.         Assessment and Plan: Nghia Bergeron was seen today for other and atrial fibrillation. Diagnoses and all orders for this visit:    Chronic atrial fibrillation (HCC)  -     warfarin (COUMADIN) 6 MG tablet; Indications: monday, wednesday, friday 3 mg, all other days are 6 mg 1/2 tab on M W F and 1 tab all other days of the week OR AS DIRECTED  -     POCT INR        Return in about 4 weeks (around 2/1/2021).       Seen By:  Mark Ayala DO

## 2021-01-04 NOTE — PATIENT INSTRUCTIONS
Personalized Preventive Plan for Freya Moyer - 1/4/2021  Medicare offers a range of preventive health benefits. Some of the tests and screenings are paid in full while other may be subject to a deductible, co-insurance, and/or copay. Some of these benefits include a comprehensive review of your medical history including lifestyle, illnesses that may run in your family, and various assessments and screenings as appropriate. After reviewing your medical record and screening and assessments performed today your provider may have ordered immunizations, labs, imaging, and/or referrals for you. A list of these orders (if applicable) as well as your Preventive Care list are included within your After Visit Summary for your review. Other Preventive Recommendations:    · A preventive eye exam performed by an eye specialist is recommended every 1-2 years to screen for glaucoma; cataracts, macular degeneration, and other eye disorders. · A preventive dental visit is recommended every 6 months. · Try to get at least 150 minutes of exercise per week or 10,000 steps per day on a pedometer . · Order or download the FREE \"Exercise & Physical Activity: Your Everyday Guide\" from [employer]. Call 5-609.589.4815 or search [employer] online. · You need 4142-3929 mg of calcium and 5400-5810 IU of vitamin D per day. It is possible to meet your calcium requirement with diet alone, but a vitamin D supplement is usually necessary to meet this goal.  · When exposed to the sun, use a sunscreen that protects against both UVA and UVB radiation with an SPF of 30 or greater. Reapply every 2 to 3 hours or after sweating, drying off with a towel, or swimming. · Always wear a seat belt when traveling in a car. Always wear a helmet when riding a bicycle or motorcycle.

## 2021-01-19 DIAGNOSIS — G43.009 MIGRAINE WITHOUT AURA, NOT REFRACTORY: ICD-10-CM

## 2021-01-19 RX ORDER — TOPIRAMATE 50 MG/1
50 TABLET, FILM COATED ORAL 2 TIMES DAILY
Qty: 180 TABLET | Refills: 1 | Status: SHIPPED
Start: 2021-01-19 | End: 2021-07-27 | Stop reason: SDUPTHER

## 2021-01-19 NOTE — TELEPHONE ENCOUNTER
Last Appointment:  1/4/2021  Future Appointments   Date Time Provider Comfort Benjamin   2/3/2021 10:45 AM SHEA [facility]   2/4/2021 10:30 AM DO AGUSTIN Vasquez [facility]   4/19/2021 10:00 AM Nadine Reyes MD [address]   5/4/2021 10:00 AM Reza Gregory MD [facility]   10/29/2021  9:00 AM Christina Sweeney DO [facility]

## 2021-02-03 ENCOUNTER — PROCEDURE VISIT (OUTPATIENT)
Dept: PODIATRY | Age: 71
End: 2021-02-03
Payer: MEDICARE

## 2021-02-03 DIAGNOSIS — M20.41 HAMMER TOE OF RIGHT FOOT: ICD-10-CM

## 2021-02-03 DIAGNOSIS — Z79.01 ENCOUNTER FOR CURRENT LONG-TERM USE OF ANTICOAGULANTS: ICD-10-CM

## 2021-02-03 DIAGNOSIS — L84 CORNS AND CALLOSITIES: ICD-10-CM

## 2021-02-03 DIAGNOSIS — M72.2 PLANTAR FASCIITIS: ICD-10-CM

## 2021-02-03 DIAGNOSIS — B35.1 TINEA UNGUIUM: Primary | ICD-10-CM

## 2021-02-03 PROCEDURE — 11721 DEBRIDE NAIL 6 OR MORE: CPT | Performed by: PODIATRIST

## 2021-02-03 NOTE — PROGRESS NOTES
Ashley Mckenzie is here today for nail care. her PCP is Mercy Shaikh DO last OV was 21. Ashley Mckenzie : 1950 Sex: female  Age: 72 y.o. Patient was referred by Mercy Shaikh DO    CC:    Follow-up painful toenails 1-5 right and left    HPI:   Follow-up painful toenails 1-5 right and left  Follow-up elongated toenails both feet. Does have callus tissue right second toe and thickened elongated right great toenail and right second toenail. No open wounds. No additional pedal complaints.           ROS:  Const: Denies constitutional symptoms  Musculo: Denies symptoms other than stated above  Skin: Denies symptoms other than stated above       Current Outpatient Medications:     topiramate (TOPAMAX) 50 MG tablet, Take 1 tablet by mouth 2 times daily, Disp: 180 tablet, Rfl: 1    warfarin (COUMADIN) 6 MG tablet, Indications: monday, wednesday, friday 3 mg, all other days are 6 mg 1/2 tab on  W  and 1 tab all other days of the week OR AS DIRECTED, Disp: 30 tablet, Rfl: 5    escitalopram (LEXAPRO) 10 MG tablet, Take 1 tablet by mouth every evening, Disp: 90 tablet, Rfl: 1    ammonium lactate (LAC-HYDRIN) 12 % lotion, Apply topically twice daily, Disp: 400 g, Rfl: 2    verapamil (CALAN SR) 180 MG extended release tablet, Take 1 tablet by mouth daily, Disp: 90 tablet, Rfl: 0    propafenone (RYTHMOL) 225 MG tablet, Take 1 tablet by mouth three times daily, Disp: 270 tablet, Rfl: 1    Red Yeast Rice 600 MG CAPS, Take 1 capsule by mouth 2 times daily, Disp: , Rfl:     calcium carbonate-vitamin D (CALTRATE 600+D) 600-400 MG-UNIT TABS per tab, Take 1 tablet by mouth daily, Disp: , Rfl:     Alpha-Lipoic Acid 100 MG CAPS, Take by mouth, Disp: , Rfl:     b complex vitamins capsule, Take 1 capsule by mouth daily, Disp: , Rfl:     anastrozole (ARIMIDEX) 1 MG tablet, Take 1 mg by mouth daily , Disp: , Rfl:     furosemide (LASIX) 20 MG tablet, Take 20 mg by mouth daily , Disp: , Rfl:   Allergies Allergen Reactions    Diltiazem Hcl Other (See Comments)     Other reaction(s): weak    Cardizem [Diltiazem] Palpitations    Cefuroxime Axetil Nausea And Vomiting, Other (See Comments) and Palpitations    Claritin [Loratadine] Palpitations    Erythromycin Nausea And Vomiting    Morphine Nausea And Vomiting    Mucinex [Guaifenesin Er] Palpitations    Propoxyphene Other (See Comments) and Nausea And Vomiting     DIZZY  Other reaction(s): Free Text    Zyrtec [Cetirizine] Nausea And Vomiting and Other (See Comments)     WEAK       Past Medical History:   Diagnosis Date    Breast cancer ([facility] [address] 04/2017    Chronic atrial fibrillation (HCC)     Fibroadenosis of breast     Headache     Hyperlipidemia     Hypertension            There were no vitals filed for this visit. Work History/Social History: Retired  [employer] [employer]    Focused Lower Extremity Physical Exam:    Neurovascular examination:    Dorsalis Pedis palpable bilateral.  Posterior tibialis palpable bilateral.    Capillary Refill Time:  Immediate return  Hair growth:  Symmetrical and bilateral   Skin:  Not atrophic  Edema: No edema bilateral feet or ankles. Neurologic:  Light touch intact bilateral.      Musculoskeletal/ Orthopedic examination:    Equinis: Absent bilateral  Dorsiflexion, plantarflexion, inversion, eversion bilateral 5 out of 5 muscle strength  Wiggling toes  Negative Homans  No pain to palpation bilateral heels  Flexible contracted hammertoe second third and fourth digit right foot    Dermatology examination:    No open skin lesions or abrasions bilateral lower extremity. Toenails 1-5 right and left foot thickened, elongated, mycotic, dystrophic with subungual debris. Hyperkeratotic tissue distal second digit right foot      Assessment and Plan: John Denise was seen today for callouses and nail problem.     Diagnoses and all orders for this visit:    Tinea unguium    Corns and callosities    Encounter for current long-term use of anticoagulants    Plantar fasciitis    Hammer toe of right foot          Eden Cuevas presents for follow-up elongated toenails both feet. Does continue long-term anticoagulant therapy    Manual debridement with standard technique toenails 1 through 5 right and left in thickness and length. Patient tolerated procedure well. Continue ammonium lactate 12% twice daily. Avoid barefoot. Follow-up 2 months        Return in about 2 months (around 4/3/2021). Seen By:  Sharon Vergara DPM      Document was created using voice recognition software. Note was reviewed, however may contain grammatical errors.

## 2021-02-04 ENCOUNTER — OFFICE VISIT (OUTPATIENT)
Dept: FAMILY MEDICINE CLINIC | Age: 71
End: 2021-02-04
Payer: MEDICARE

## 2021-02-04 VITALS
TEMPERATURE: 97.4 F | OXYGEN SATURATION: 99 % | SYSTOLIC BLOOD PRESSURE: 138 MMHG | HEART RATE: 56 BPM | DIASTOLIC BLOOD PRESSURE: 70 MMHG | WEIGHT: 182 LBS | BODY MASS INDEX: 28.56 KG/M2 | RESPIRATION RATE: 16 BRPM | HEIGHT: 67 IN

## 2021-02-04 DIAGNOSIS — I48.20 CHRONIC ATRIAL FIBRILLATION (HCC): ICD-10-CM

## 2021-02-04 LAB
INTERNATIONAL NORMALIZATION RATIO, POC: 2.2
PROTHROMBIN TIME, POC: 26.4

## 2021-02-04 PROCEDURE — 85610 PROTHROMBIN TIME: CPT | Performed by: FAMILY MEDICINE

## 2021-02-04 PROCEDURE — 99213 OFFICE O/P EST LOW 20 MIN: CPT | Performed by: FAMILY MEDICINE

## 2021-02-04 ASSESSMENT — ENCOUNTER SYMPTOMS
VOMITING: 0
BLOOD IN STOOL: 0
COUGH: 0
PHOTOPHOBIA: 0
SHORTNESS OF BREATH: 0
EYE DISCHARGE: 0
EYE REDNESS: 0
TROUBLE SWALLOWING: 0
EYE PAIN: 0
DIARRHEA: 0
CHEST TIGHTNESS: 0
NAUSEA: 0
ALLERGIC/IMMUNOLOGIC NEGATIVE: 1
BACK PAIN: 0
SINUS PAIN: 0
SORE THROAT: 0
ABDOMINAL PAIN: 0

## 2021-02-04 NOTE — PROGRESS NOTES
21  Hima Solis : 1950 Sex: female  Age: 72 y.o. Assessment and Plan: Galindo Santamaria was seen today for atrial fibrillation. Diagnoses and all orders for this visit:    Chronic atrial fibrillation ([facility] [address]  -     POCT INR        Return in about 4 weeks (around 3/4/2021). Chief Complaint   Patient presents with    Atrial Fibrillation       Presents today for recheck INR. Is therapeutic at 2.1. No Dospan of warfarin dose needed, she is to continue on current schedule. Review of Systems   Constitutional: Negative for appetite change, fatigue and unexpected weight change. HENT: Negative for congestion, ear pain, hearing loss, sinus pain, sore throat and trouble swallowing. Eyes: Negative for photophobia, pain, discharge and redness. Respiratory: Negative for cough, chest tightness and shortness of breath. Cardiovascular: Negative for chest pain, palpitations and leg swelling. Gastrointestinal: Negative for abdominal pain, blood in stool, diarrhea, nausea and vomiting. Endocrine: Negative. Genitourinary: Negative for dysuria, flank pain, frequency and hematuria. Musculoskeletal: Negative for arthralgias, back pain, joint swelling and myalgias. Skin: Negative. Allergic/Immunologic: Negative. Neurological: Negative for dizziness, seizures, syncope, weakness, light-headedness, numbness and headaches. Hematological: Negative for adenopathy. Does not bruise/bleed easily. Psychiatric/Behavioral: Negative.           Current Outpatient Medications:     topiramate (TOPAMAX) 50 MG tablet, Take 1 tablet by mouth 2 times daily, Disp: 180 tablet, Rfl: 1    warfarin (COUMADIN) 6 MG tablet, Indications: monday, wednesday, friday 3 mg, all other days are 6 mg 1/2 tab on M W  and 1 tab all other days of the week OR AS DIRECTED, Disp: 30 tablet, Rfl: 5    escitalopram (LEXAPRO) 10 MG tablet, Take 1 tablet by mouth every evening, Disp: 90 tablet, Rfl: 1    ammonium lactate (LAC-HYDRIN) 12 % lotion, Apply topically twice daily, Disp: 400 g, Rfl: 2    verapamil (CALAN SR) 180 MG extended release tablet, Take 1 tablet by mouth daily, Disp: 90 tablet, Rfl: 0    propafenone (RYTHMOL) 225 MG tablet, Take 1 tablet by mouth three times daily, Disp: 270 tablet, Rfl: 1    Red Yeast Rice 600 MG CAPS, Take 1 capsule by mouth 2 times daily, Disp: , Rfl:     calcium carbonate-vitamin D (CALTRATE 600+D) 600-400 MG-UNIT TABS per tab, Take 1 tablet by mouth daily, Disp: , Rfl:     Alpha-Lipoic Acid 100 MG CAPS, Take by mouth, Disp: , Rfl:     b complex vitamins capsule, Take 1 capsule by mouth daily, Disp: , Rfl:     anastrozole (ARIMIDEX) 1 MG tablet, Take 1 mg by mouth daily , Disp: , Rfl:     furosemide (LASIX) 20 MG tablet, Take 20 mg by mouth daily , Disp: , Rfl:   Allergies   Allergen Reactions    Diltiazem Hcl Other (See Comments)     Other reaction(s): weak    Cardizem [Diltiazem] Palpitations    Cefuroxime Axetil Nausea And Vomiting, Other (See Comments) and Palpitations    Claritin [Loratadine] Palpitations    Erythromycin Nausea And Vomiting    Morphine Nausea And Vomiting    Mucinex [Guaifenesin Er] Palpitations    Propoxyphene Other (See Comments) and Nausea And Vomiting     DIZZY  Other reaction(s): Free Text    Zyrtec [Cetirizine] Nausea And Vomiting and Other (See Comments)     WEAK       Past Medical History:   Diagnosis Date    Breast cancer ([facility] [address] 04/2017    Chronic atrial fibrillation (HCC)     Fibroadenosis of breast     Headache     Hyperlipidemia     Hypertension      Past Surgical History:   Procedure Laterality Date    [facility]    BREAST LUMPECTOMY Right 04/26/2017    COLONOSCOPY      EYE SURGERY      SUDHIR LENSE IMPLANTS    OTHER SURGICAL HISTORY      port removal    TONSILLECTOMY      TUNNELED VENOUS PORT PLACEMENT  05/26/2017    Dr. Rojas Pate     Family History   Problem Relation Age of Onset    Cancer Maternal Cousin 80        lung    Heart Disease Mother     Other Mother         [place] Palsy    Other Father         gall bladder problem, hip fx; AAA rupture    Coronary Art Dis Father         MI     Social History     Socioeconomic History    Marital status:      Spouse name: Elizabeth Davies Number of children: Not on file    Years of education: Not on file    Highest education level: Not on file   Occupational History    Occupation: retired      Comment: Retired 4 th    Social Needs    Financial resource strain: Not on file    Food insecurity     Worry: Not on file     Inability: Not on file   [employer] needs     Medical: Not on file     Non-medical: Not on file   Tobacco Use    Smoking status: Never Smoker    Smokeless tobacco: Never Used   Substance and Sexual Activity    Alcohol use: No     Comment: occasionally pop    Drug use: No    Sexual activity: Not Currently     Partners: Male   Lifestyle    Physical activity     Days per week: Not on file     Minutes per session: Not on file    Stress: Not on file   Relationships    Social connections     Talks on phone: Not on file     Gets together: Not on file     Attends Islam service: Not on file     Active member of club or organization: Not on file     Attends meetings of clubs or organizations: Not on file     Relationship status: Not on file    Intimate partner violence     Fear of current or ex partner: Not on file     Emotionally abused: Not on file     Physically abused: Not on file     Forced sexual activity: Not on file   Other Topics Concern    Not on file   Social History Narrative    Drinks occasional Pepsi/Coke       Vitals:    02/04/21 1033   BP: 138/70   Pulse: 56   Resp: 16   Temp: 97.4 °F (36.3 °C)   TempSrc: Temporal   SpO2: 99%   Weight: 182 lb (82.6 kg)   Height: 5' 7\" (1.702 m)       Physical Exam  Vitals signs and nursing note reviewed. Constitutional:       Appearance: She is well-developed. HENT:      Head: Atraumatic. Right Ear: External ear normal.      Left Ear: External ear normal.      Nose: Nose normal.      Mouth/Throat:      Pharynx: No oropharyngeal exudate. Eyes:      Conjunctiva/sclera: Conjunctivae normal.      Pupils: Pupils are equal, round, and reactive to light. Neck:      Musculoskeletal: Normal range of motion and neck supple. Thyroid: No thyromegaly. Trachea: No tracheal deviation. Cardiovascular:      Rate and Rhythm: Normal rate and regular rhythm. Heart sounds: No murmur. No friction rub. No gallop. Pulmonary:      Effort: Pulmonary effort is normal. No respiratory distress. Breath sounds: Normal breath sounds. Abdominal:      General: Bowel sounds are normal.      Palpations: Abdomen is soft. Musculoskeletal: Normal range of motion. General: No tenderness or deformity. Lymphadenopathy:      Cervical: No cervical adenopathy. Skin:     General: Skin is warm and dry. Capillary Refill: Capillary refill takes less than 2 seconds. Findings: No rash. Neurological:      Mental Status: She is alert and oriented to person, place, and time. Sensory: No sensory deficit. Motor: No abnormal muscle tone.       Coordination: Coordination normal.      Deep Tendon Reflexes: Reflexes normal.             Seen By:  Tip Gagnon DO

## 2021-02-19 ENCOUNTER — APPOINTMENT (OUTPATIENT)
Dept: CT IMAGING | Age: 71
End: 2021-02-19
Payer: MEDICARE

## 2021-02-19 ENCOUNTER — HOSPITAL ENCOUNTER (EMERGENCY)
Age: 71
Discharge: HOME | End: 2021-02-19
Attending: EMERGENCY MEDICINE
Payer: MEDICARE

## 2021-02-19 ENCOUNTER — OFFICE VISIT (OUTPATIENT)
Dept: FAMILY MEDICINE CLINIC | Age: 71
End: 2021-02-19
Payer: MEDICARE

## 2021-02-19 VITALS
SYSTOLIC BLOOD PRESSURE: 155 MMHG | HEART RATE: 60 BPM | DIASTOLIC BLOOD PRESSURE: 69 MMHG | RESPIRATION RATE: 16 BRPM | HEIGHT: 67 IN | TEMPERATURE: 96.9 F | WEIGHT: 181 LBS | OXYGEN SATURATION: 98 % | BODY MASS INDEX: 28.41 KG/M2

## 2021-02-19 VITALS
OXYGEN SATURATION: 99 % | DIASTOLIC BLOOD PRESSURE: 74 MMHG | BODY MASS INDEX: 28.41 KG/M2 | RESPIRATION RATE: 18 BRPM | SYSTOLIC BLOOD PRESSURE: 138 MMHG | WEIGHT: 181 LBS | HEIGHT: 67 IN | HEART RATE: 59 BPM | TEMPERATURE: 97.7 F

## 2021-02-19 DIAGNOSIS — R30.0 DYSURIA: ICD-10-CM

## 2021-02-19 DIAGNOSIS — M89.9 LESION OF PELVIC BONE: ICD-10-CM

## 2021-02-19 DIAGNOSIS — R30.0 DYSURIA: Primary | ICD-10-CM

## 2021-02-19 DIAGNOSIS — R10.32 LEFT LOWER QUADRANT ABDOMINAL PAIN: ICD-10-CM

## 2021-02-19 DIAGNOSIS — R10.32 LEFT LOWER QUADRANT ABDOMINAL PAIN: Primary | ICD-10-CM

## 2021-02-19 DIAGNOSIS — M54.16 LUMBAR RADICULOPATHY: ICD-10-CM

## 2021-02-19 LAB
ALBUMIN SERPL-MCNC: 4.3 G/DL (ref 3.5–5.2)
ALP BLD-CCNC: 74 U/L (ref 35–104)
ALT SERPL-CCNC: 14 U/L (ref 0–32)
ANION GAP SERPL CALCULATED.3IONS-SCNC: 7 MMOL/L (ref 7–16)
APPEARANCE FLUID: NORMAL
AST SERPL-CCNC: 18 U/L (ref 0–31)
BACTERIA: ABNORMAL /HPF
BASOPHILS ABSOLUTE: 0.07 E9/L (ref 0–0.2)
BASOPHILS RELATIVE PERCENT: 1.1 % (ref 0–2)
BILIRUB SERPL-MCNC: 0.5 MG/DL (ref 0–1.2)
BILIRUBIN URINE: NEGATIVE
BILIRUBIN, POC: NORMAL
BLOOD URINE, POC: NORMAL
BLOOD, URINE: ABNORMAL
BUN BLDV-MCNC: 17 MG/DL (ref 8–23)
CALCIUM SERPL-MCNC: 9.8 MG/DL (ref 8.6–10.2)
CHLORIDE BLD-SCNC: 106 MMOL/L (ref 98–107)
CLARITY, POC: CLEAR
CLARITY: CLEAR
CO2: 28 MMOL/L (ref 22–29)
COLOR, POC: YELLOW
COLOR: YELLOW
CREAT SERPL-MCNC: 1.1 MG/DL (ref 0.5–1)
EKG ATRIAL RATE: 58 BPM
EKG P AXIS: 92 DEGREES
EKG P-R INTERVAL: 162 MS
EKG Q-T INTERVAL: 438 MS
EKG QRS DURATION: 90 MS
EKG QTC CALCULATION (BAZETT): 429 MS
EKG R AXIS: -4 DEGREES
EKG T AXIS: -16 DEGREES
EKG VENTRICULAR RATE: 58 BPM
EOSINOPHILS ABSOLUTE: 0.1 E9/L (ref 0.05–0.5)
EOSINOPHILS RELATIVE PERCENT: 1.6 % (ref 0–6)
GFR AFRICAN AMERICAN: 60
GFR NON-AFRICAN AMERICAN: 49 ML/MIN/1.73
GLUCOSE BLD-MCNC: 93 MG/DL (ref 74–99)
GLUCOSE URINE, POC: NORMAL
GLUCOSE URINE: NEGATIVE MG/DL
HCT VFR BLD CALC: 40.1 % (ref 34–48)
HEMOGLOBIN: 13 G/DL (ref 11.5–15.5)
IMMATURE GRANULOCYTES #: 0.02 E9/L
IMMATURE GRANULOCYTES %: 0.3 % (ref 0–5)
INR BLD: 3.3
KETONES, POC: NORMAL
KETONES, URINE: NEGATIVE MG/DL
LACTIC ACID: 1.2 MMOL/L (ref 0.5–2.2)
LEUKOCYTE EST, POC: NORMAL
LEUKOCYTE ESTERASE, URINE: ABNORMAL
LIPASE: 40 U/L (ref 13–60)
LYMPHOCYTES ABSOLUTE: 1.42 E9/L (ref 1.5–4)
LYMPHOCYTES RELATIVE PERCENT: 23.2 % (ref 20–42)
MCH RBC QN AUTO: 30.4 PG (ref 26–35)
MCHC RBC AUTO-ENTMCNC: 32.4 % (ref 32–34.5)
MCV RBC AUTO: 93.9 FL (ref 80–99.9)
MONOCYTES ABSOLUTE: 0.57 E9/L (ref 0.1–0.95)
MONOCYTES RELATIVE PERCENT: 9.3 % (ref 2–12)
NEUTROPHILS ABSOLUTE: 3.94 E9/L (ref 1.8–7.3)
NEUTROPHILS RELATIVE PERCENT: 64.5 % (ref 43–80)
NITRITE, POC: NORMAL
NITRITE, URINE: NEGATIVE
PDW BLD-RTO: 12.9 FL (ref 11.5–15)
PH UA: 6 (ref 5–9)
PH, POC: 6
PLATELET # BLD: 166 E9/L (ref 130–450)
PMV BLD AUTO: 9.9 FL (ref 7–12)
POTASSIUM REFLEX MAGNESIUM: 3.7 MMOL/L (ref 3.5–5)
PROTEIN UA: NEGATIVE MG/DL
PROTEIN, POC: NORMAL
PROTHROMBIN TIME: 39.7 SEC (ref 9.3–12.4)
RBC # BLD: 4.27 E12/L (ref 3.5–5.5)
RBC UA: ABNORMAL /HPF (ref 0–2)
SODIUM BLD-SCNC: 141 MMOL/L (ref 132–146)
SPECIFIC GRAVITY UA: 1.01 (ref 1–1.03)
SPECIFIC GRAVITY, POC: 1.01
TOTAL PROTEIN: 7.1 G/DL (ref 6.4–8.3)
UROBILINOGEN, POC: 0.2
UROBILINOGEN, URINE: 0.2 E.U./DL
WBC # BLD: 6.1 E9/L (ref 4.5–11.5)
WBC UA: ABNORMAL /HPF (ref 0–5)

## 2021-02-19 PROCEDURE — 83690 ASSAY OF LIPASE: CPT

## 2021-02-19 PROCEDURE — 6360000004 HC RX CONTRAST MEDICATION: Performed by: RADIOLOGY

## 2021-02-19 PROCEDURE — 81001 URINALYSIS AUTO W/SCOPE: CPT

## 2021-02-19 PROCEDURE — 85610 PROTHROMBIN TIME: CPT

## 2021-02-19 PROCEDURE — 99214 OFFICE O/P EST MOD 30 MIN: CPT | Performed by: FAMILY MEDICINE

## 2021-02-19 PROCEDURE — 85025 COMPLETE CBC W/AUTO DIFF WBC: CPT

## 2021-02-19 PROCEDURE — 99283 EMERGENCY DEPT VISIT LOW MDM: CPT

## 2021-02-19 PROCEDURE — 93010 ELECTROCARDIOGRAM REPORT: CPT | Performed by: INTERNAL MEDICINE

## 2021-02-19 PROCEDURE — 83605 ASSAY OF LACTIC ACID: CPT

## 2021-02-19 PROCEDURE — 74177 CT ABD & PELVIS W/CONTRAST: CPT

## 2021-02-19 PROCEDURE — 81002 URINALYSIS NONAUTO W/O SCOPE: CPT | Performed by: FAMILY MEDICINE

## 2021-02-19 PROCEDURE — 93005 ELECTROCARDIOGRAM TRACING: CPT | Performed by: EMERGENCY MEDICINE

## 2021-02-19 PROCEDURE — 80053 COMPREHEN METABOLIC PANEL: CPT

## 2021-02-19 RX ORDER — CYCLOBENZAPRINE HCL 5 MG
5 TABLET ORAL 2 TIMES DAILY PRN
Qty: 10 TABLET | Refills: 0 | Status: SHIPPED | OUTPATIENT
Start: 2021-02-19 | End: 2021-03-01

## 2021-02-19 RX ADMIN — IOPAMIDOL 75 ML: 755 INJECTION, SOLUTION INTRAVENOUS at 16:10

## 2021-02-19 NOTE — ED PROVIDER NOTES
HPI:  2/19/21, Time: 3:45 PM HIPOLITO Garcia is a 72 y.o. female presenting to the ED for abdominal pain beginning a few days ago. Symptoms have been mild to moderate in severity and intermittent. She describes it as a \"pulling\" sensation to her right side and right lower abdomen. States that she occasionally has radiation of pain behind both of her legs. Symptoms are worse with certain positions and ambulation and better with Tylenol and rest.  She is concerned that she may have pulled something. States that she frequently helps her  transfer and get around the house. No falls or trauma. No associated symptoms of fevers, black or bloody stools, hematemesis, chest pain, shortness of breath, cough, or dysuria. No bowel or bladder incontinence, saddle anesthesia, or leg numbness or weakness. Patient states that she has a longstanding history of back problems. States she recently had a lumbar x-ray showing arthritis. Patient takes Coumadin for A. Fib. Lumbar x-ray performed as an outpatient today showed degenerative changes. Review of Systems:   Pertinent positives and negatives are stated within HPI, all other systems reviewed and are negative.          --------------------------------------------- PAST HISTORY ---------------------------------------------  Past Medical History:  has a past medical history of Breast cancer ([facility] [address] [facility] Fibroadenosis of breast, Headache, Hyperlipidemia, and Hypertension. Past Surgical History:  has a past surgical history that includes Atrial ablation surgery (1999); Colonoscopy; Tonsillectomy; eye surgery; Breast lumpectomy (Right, 04/26/2017); Tunneled venous port placement (05/26/2017); and other surgical history. Social History:  reports that she has never smoked. She has never used smokeless tobacco. She reports that she does not drink alcohol or use drugs.     Family History: family history includes Cancer (age of onset: 80) in her maternal cousin; Coronary Art Dis in her father; Heart Disease in her mother; Other in her father and mother. The patients home medications have been reviewed.     Allergies: Diltiazem hcl, Cardizem [diltiazem], Cefuroxime axetil, Claritin [loratadine], Erythromycin, Morphine, Mucinex [guaifenesin er], Propoxyphene, and Zyrtec [cetirizine]    -------------------------------------------------- RESULTS -------------------------------------------------  All laboratory and radiology results have been personally reviewed by myself   LABS:  Results for orders placed or performed during the hospital encounter of 02/19/21   CBC Auto Differential   Result Value Ref Range    WBC 6.1 4.5 - 11.5 E9/L    RBC 4.27 3.50 - 5.50 E12/L    Hemoglobin 13.0 11.5 - 15.5 g/dL    Hematocrit 40.1 34.0 - 48.0 %    MCV 93.9 80.0 - 99.9 fL    MCH 30.4 26.0 - 35.0 pg    MCHC 32.4 32.0 - 34.5 %    RDW 12.9 11.5 - 15.0 fL    Platelets 615 412 - 749 E9/L    MPV 9.9 7.0 - 12.0 fL    Neutrophils % 64.5 43.0 - 80.0 %    Immature Granulocytes % 0.3 0.0 - 5.0 %    Lymphocytes % 23.2 20.0 - 42.0 %    Monocytes % 9.3 2.0 - 12.0 %    Eosinophils % 1.6 0.0 - 6.0 %    Basophils % 1.1 0.0 - 2.0 %    Neutrophils Absolute 3.94 1.80 - 7.30 E9/L    Immature Granulocytes # 0.02 E9/L    Lymphocytes Absolute 1.42 (L) 1.50 - 4.00 E9/L    Monocytes Absolute 0.57 0.10 - 0.95 E9/L    Eosinophils Absolute 0.10 0.05 - 0.50 E9/L    Basophils Absolute 0.07 0.00 - 0.20 E9/L   Comprehensive Metabolic Panel w/ Reflex to MG   Result Value Ref Range    Sodium 141 132 - 146 mmol/L    Potassium reflex Magnesium 3.7 3.5 - 5.0 mmol/L    Chloride 106 98 - 107 mmol/L    CO2 28 22 - 29 mmol/L    Anion Gap 7 7 - 16 mmol/L    Glucose 93 74 - 99 mg/dL    BUN 17 8 - 23 mg/dL    CREATININE 1.1 (H) 0.5 - 1.0 mg/dL    GFR Non-African American 49 >=60 mL/min/1.73    GFR African American 60     Calcium 9.8 8.6 - 10.2 mg/dL    Total Protein 7.1 6.4 - 8.3 g/dL    Albumin lb (82.1 kg)   SpO2 98%   BMI 28.35 kg/m²   Oxygen Saturation Interpretation: Normal      ---------------------------------------------------PHYSICAL EXAM--------------------------------------      Constitutional/General: Alert and oriented x3, well appearing, non toxic in NAD  Head: Normocephalic and atraumatic  Eyes: PERRL, EOMI  Mouth: Oropharynx clear, handling secretions, no trismus  Neck: Supple, full ROM,   Pulmonary: Lungs clear to auscultation bilaterally, no wheezes, rales, or rhonchi. Not in respiratory distress  Cardiovascular:  Regular rate and rhythm, no murmurs, gallops, or rubs. 2+ distal pulses  Abdomen: Soft,  non distended, LLQ and suprapubic tenderness, no rebound, no guarding. Extremities: Moves all extremities x 4. Warm and well perfused  Skin: warm and dry without rash  Neurologic: GCS 15, no focal motor or sensory deficits   Psych: Normal Affect      ------------------------------ ED COURSE/MEDICAL DECISION MAKING----------------------  Medications   iopamidol (ISOVUE-370) 76 % injection 75 mL (75 mLs Intravenous Given 2/19/21 1610)       Medical Decision Making/ED COURSE:   Patient is a 72-year-old female presenting with abdominal pain. In the ED, patient was hemodynamically stable and afebrile. On exam, patient was nontoxic with mild abdominal tenderness. No signs or symptoms of spinal cord compression. Labs and CT AP obtained. Offered pain medication but declined. I reviewed and interpreted labs. INR was mildly supratherapeutic at 3.3. Creatinine elevation of 1.1, near patient's baseline. No acute abnormalities on abdominal CT. I did discuss incidental findings of iliac lesion as well as T11 hemangioma. Patient has a remote history of breast cancer. Follows with Dr. Renuka Young. Advised hematology follow-up as well as PCP follow-up for further work-up.   Patient is nontoxic on reevaluation with a benign exam.  Plan to treat for possible musculoskeletal pain with muscle relaxants and Tylenol. Supportive care instructions and strict ED return precautions discussed. Patient remained hemodynamically stable throughout ED course. ED Course as of Feb 19 2131 Fri Feb 19, 2021   1602 EKG: This EKG is signed and interpreted by me. Rate: 58  Rhythm: Sinus  Interpretation: Sinus bradycardia, normal MN interval, normal QRS, normal QT interval, nonspecific T wave flattening   Comparison: stable as compared to patient's most recent EKG        [JA]      ED Course User Index  [JA] Yoseph King MD         Counseling: The emergency provider has spoken with the patient and discussed todays results, in addition to providing specific details for the plan of care and counseling regarding the diagnosis and prognosis. Questions are answered at this time and they are agreeable with the plan.      --------------------------------- IMPRESSION AND DISPOSITION ---------------------------------    IMPRESSION  1. Left lower quadrant abdominal pain    2. Lesion of pelvic bone        DISPOSITION  Disposition: Discharge to home  Patient condition is stable      NOTE: This report was transcribed using voice recognition software.  Every effort was made to ensure accuracy; however, inadvertent computerized transcription errors may be present    I, Yoseph King MD, am the primary provider of this record       Yoseph King MD  02/19/21 2133

## 2021-02-19 NOTE — ED NOTES
Bed:  [place] [address]  Expected date:   Expected time:   Means of arrival:   Comments:  [address], [facility]  02/19/21 1964

## 2021-02-19 NOTE — PROGRESS NOTES
21  Les Speak : 1950 Sex: female  Age: 72 y.o. Assessment and Plan: Harmony Butts was seen today for hip pain. Diagnoses and all orders for this visit:    Dysuria  -     POCT Urinalysis no Micro  -     Culture, Urine; Future    Lumbar radiculopathy  -     XR LUMBAR SPINE (2-3 VIEWS); Future    Left lower quadrant abdominal pain  Pain is severe and unrelenting. It may very well be radicular pain from the lower back, however, her abdominal process cannot be ruled out. We will therefore go to [facility] emergency room for evaluation and CT of the abdomen to make certain that she is not early diverticulitis. Emergency room was notified, patient is acceptable with this management      No follow-ups on file. Chief Complaint   Patient presents with    Hip Pain     left sided, denies trauma       Back pain. The patient states that they are experiencing low back pain. The pain started 7 days ago. The patient denies any trauma or injury. The pain is currently a 6/10 on the pain scale and is described as sharp   The patient has been using rest, tylenol at home with no relief of their symptoms. The pain is worse with movement. The patient admits radicular symptoms. They deny any numbness, tingling or weakness to the extremities. They deny any saddle anesthesia. No bowel or bladder incontinence. No fever or chills. No dysuria, urinary, frequency, or gross hematuria. No abdominal pain, nausea, vomiting, or diarrhea. No history of kidney stones.          Review of Systems      Current Outpatient Medications:     topiramate (TOPAMAX) 50 MG tablet, Take 1 tablet by mouth 2 times daily, Disp: 180 tablet, Rfl: 1    warfarin (COUMADIN) 6 MG tablet, Indications: monday, wednesday, friday 3 mg, all other days are 6 mg 1/2 tab on M W  and 1 tab all other days of the week OR AS DIRECTED, Disp: 30 tablet, Rfl: 5    escitalopram (LEXAPRO) 10 MG tablet, Take 1 tablet by mouth every evening, Disp: 90 tablet, Rfl: 1    ammonium lactate (LAC-HYDRIN) 12 % lotion, Apply topically twice daily, Disp: 400 g, Rfl: 2    verapamil (CALAN SR) 180 MG extended release tablet, Take 1 tablet by mouth daily, Disp: 90 tablet, Rfl: 0    propafenone (RYTHMOL) 225 MG tablet, Take 1 tablet by mouth three times daily, Disp: 270 tablet, Rfl: 1    Red Yeast Rice 600 MG CAPS, Take 1 capsule by mouth 2 times daily, Disp: , Rfl:     calcium carbonate-vitamin D (CALTRATE 600+D) 600-400 MG-UNIT TABS per tab, Take 1 tablet by mouth daily, Disp: , Rfl:     Alpha-Lipoic Acid 100 MG CAPS, Take by mouth, Disp: , Rfl:     b complex vitamins capsule, Take 1 capsule by mouth daily, Disp: , Rfl:     anastrozole (ARIMIDEX) 1 MG tablet, Take 1 mg by mouth daily , Disp: , Rfl:     furosemide (LASIX) 20 MG tablet, Take 20 mg by mouth daily , Disp: , Rfl:   Allergies   Allergen Reactions    Diltiazem Hcl Other (See Comments)     Other reaction(s): weak    Cardizem [Diltiazem] Palpitations    Cefuroxime Axetil Nausea And Vomiting, Other (See Comments) and Palpitations    Claritin [Loratadine] Palpitations    Erythromycin Nausea And Vomiting    Morphine Nausea And Vomiting    Mucinex [Guaifenesin Er] Palpitations    Propoxyphene Other (See Comments) and Nausea And Vomiting     DIZZY  Other reaction(s): Free Text    Zyrtec [Cetirizine] Nausea And Vomiting and Other (See Comments)     WEAK       Past Medical History:   Diagnosis Date    Breast cancer ([facility] [address] 04/2017    Chronic atrial fibrillation (HCC)     Fibroadenosis of breast     Headache     Hyperlipidemia     Hypertension      Past Surgical History:   Procedure Laterality Date    [facility]    BREAST LUMPECTOMY Right 04/26/2017    COLONOSCOPY      EYE SURGERY      SUDHIR LENSE IMPLANTS    OTHER SURGICAL HISTORY      port removal    TONSILLECTOMY      TUNNELED VENOUS PORT PLACEMENT  05/26/2017    Dr. Blanche Ragsdale History   Problem Relation Age of Onset    Cancer Maternal Cousin 80        lung    Heart Disease Mother     Other Mother         [place] Palsy    Other Father         gall bladder problem, hip fx; AAA rupture    Coronary Art Dis Father         MI     Social History     Socioeconomic History    Marital status:      Spouse name: Jie Johnson Number of children: Not on file    Years of education: Not on file    Highest education level: Not on file   Occupational History    Occupation: retired      Comment: Retired 4 th    Social Needs    Financial resource strain: Not on file    Food insecurity     Worry: Not on file     Inability: Not on file   [employer] needs     Medical: Not on file     Non-medical: Not on file   Tobacco Use    Smoking status: Never Smoker    Smokeless tobacco: Never Used   Substance and Sexual Activity    Alcohol use: No     Comment: occasionally pop    Drug use: No    Sexual activity: Not Currently     Partners: Male   Lifestyle    Physical activity     Days per week: Not on file     Minutes per session: Not on file    Stress: Not on file   Relationships    Social connections     Talks on phone: Not on file     Gets together: Not on file     Attends [employer] service: Not on file     Active member of club or organization: Not on file     Attends meetings of clubs or organizations: Not on file     Relationship status: Not on file    Intimate partner violence     Fear of current or ex partner: Not on file     Emotionally abused: Not on file     Physically abused: Not on file     Forced sexual activity: Not on file   Other Topics Concern    Not on file   Social History Narrative    Drinks occasional Pepsi/Coke       Vitals:    02/19/21 1318   BP: 138/74   Pulse: 59   Resp: 18   Temp: 97.7 °F (36.5 °C)   TempSrc: Temporal   SpO2: 99%   Weight: 181 lb (82.1 kg)   Height: 5' 7\" (1.702 m)       Physical Exam        Seen By:  Cletis Hamman, DO

## 2021-02-23 LAB — URINE CULTURE, ROUTINE: NORMAL

## 2021-02-25 DIAGNOSIS — I10 ESSENTIAL HYPERTENSION: ICD-10-CM

## 2021-02-25 NOTE — TELEPHONE ENCOUNTER
Name of Medication(s) Requested:  Verapamil    Pharmacy Requested:   [facility]    Medication(s) pended? [x] Yes  [] No    Last Appointment:  2/19/2021    Future appts:  Future Appointments   Date Time Provider Comfort Cassidy   3/4/2021 10:30 AM Putnam DO AGUSTIN [facility]   4/14/2021 10:00 AM Bull Roberson DPM [facility]   4/19/2021 10:00 AM Sheila Sims MD [address] [id number]   5/4/2021 10:00 AM Tierra Wilkins MD [facility]   10/29/2021  9:00 AM DO Christina Sah[place]furt ENT [facility]        Does patient need call back?   [] Yes  [x] No

## 2021-03-04 ENCOUNTER — OFFICE VISIT (OUTPATIENT)
Dept: FAMILY MEDICINE CLINIC | Age: 71
End: 2021-03-04
Payer: MEDICARE

## 2021-03-04 VITALS
WEIGHT: 182 LBS | BODY MASS INDEX: 28.56 KG/M2 | TEMPERATURE: 97 F | HEIGHT: 67 IN | OXYGEN SATURATION: 98 % | DIASTOLIC BLOOD PRESSURE: 70 MMHG | HEART RATE: 67 BPM | RESPIRATION RATE: 17 BRPM | SYSTOLIC BLOOD PRESSURE: 136 MMHG

## 2021-03-04 DIAGNOSIS — S90.30XA CONTUSION OF DORSUM OF FOOT: Primary | ICD-10-CM

## 2021-03-04 DIAGNOSIS — I48.20 CHRONIC ATRIAL FIBRILLATION (HCC): ICD-10-CM

## 2021-03-04 LAB
INTERNATIONAL NORMALIZATION RATIO, POC: 3.3
PROTHROMBIN TIME, POC: 39.9

## 2021-03-04 PROCEDURE — 85610 PROTHROMBIN TIME: CPT | Performed by: FAMILY MEDICINE

## 2021-03-04 PROCEDURE — 99214 OFFICE O/P EST MOD 30 MIN: CPT | Performed by: FAMILY MEDICINE

## 2021-03-04 ASSESSMENT — ENCOUNTER SYMPTOMS
EYE PAIN: 0
NAUSEA: 0
BACK PAIN: 0
DIARRHEA: 0
SHORTNESS OF BREATH: 0
PHOTOPHOBIA: 0
BLOOD IN STOOL: 0
COUGH: 0
CHEST TIGHTNESS: 0
ALLERGIC/IMMUNOLOGIC NEGATIVE: 1
ABDOMINAL PAIN: 0
TROUBLE SWALLOWING: 0
VOMITING: 0
EYE DISCHARGE: 0
SORE THROAT: 0
SINUS PAIN: 0
EYE REDNESS: 0

## 2021-03-04 NOTE — PROGRESS NOTES
3/4/21  Ashley Mckenzie : 1950 Sex: female  Age: 72 y.o. Assessment and Plan: [facility] was seen today for atrial fibrillation and foot pain. Diagnoses and all orders for this visit:    Contusion of dorsum of foot  -     XR FOOT LEFT (MIN 3 VIEWS); Future  -     XR FOOT RIGHT (MIN 3 VIEWS); Future    Chronic atrial fibrillation (HCC)  -     POCT INR        Return in about 2 weeks (around 3/18/2021). Chief Complaint   Patient presents with    Atrial Fibrillation    Foot Pain     adrian ran over in wheelchair yesterday       HPI    Review of Systems   Constitutional: Negative for appetite change, fatigue and unexpected weight change. HENT: Negative for congestion, ear pain, hearing loss, sinus pain, sore throat and trouble swallowing. Eyes: Negative for photophobia, pain, discharge and redness. Respiratory: Negative for cough, chest tightness and shortness of breath. Cardiovascular: Negative for chest pain, palpitations and leg swelling. Gastrointestinal: Negative for abdominal pain, blood in stool, diarrhea, nausea and vomiting. Endocrine: Negative. Genitourinary: Negative for dysuria, flank pain, frequency and hematuria. Musculoskeletal: Positive for arthralgias, gait problem and joint swelling. Negative for back pain and myalgias. Feet   Skin: Negative. Allergic/Immunologic: Negative. Neurological: Negative for dizziness, seizures, syncope, weakness, light-headedness, numbness and headaches. Hematological: Negative for adenopathy. Does not bruise/bleed easily. Psychiatric/Behavioral: Negative.           Current Outpatient Medications:     verapamil (CALAN SR) 180 MG extended release tablet, Take 1 tablet by mouth daily, Disp: 90 tablet, Rfl: 1    topiramate (TOPAMAX) 50 MG tablet, Take 1 tablet by mouth 2 times daily, Disp: 180 tablet, Rfl: 1    warfarin (COUMADIN) 6 MG tablet, Indications: monday, wednesday, friday 3 mg, all other days are 6 mg 1/2 tab on M 05/26/2017    Dr. Juanita Salgado     Family History   Problem Relation Age of Onset    Cancer Maternal Cousin 80        lung    Heart Disease Mother     Other Mother         [place] Palsy    Other Father         gall bladder problem, hip fx; AAA rupture    Coronary Art Dis Father         MI     Social History     Socioeconomic History    Marital status:      Spouse name: Niya Boston Number of children: Not on file    Years of education: Not on file    Highest education level: Not on file   Occupational History    Occupation: retired      Comment: Retired 4 th    Social Needs    Financial resource strain: Not on file    Food insecurity     Worry: Not on file     Inability: Not on file   [employer] needs     Medical: Not on file     Non-medical: Not on file   Tobacco Use    Smoking status: Never Smoker    Smokeless tobacco: Never Used   Substance and Sexual Activity    Alcohol use: No     Comment: occasionally pop    Drug use: No    Sexual activity: Not Currently     Partners: Male   Lifestyle    Physical activity     Days per week: Not on file     Minutes per session: Not on file    Stress: Not on file   Relationships    Social connections     Talks on phone: Not on file     Gets together: Not on file     Attends Religion service: Not on file     Active member of club or organization: Not on file     Attends meetings of clubs or organizations: Not on file     Relationship status: Not on file    Intimate partner violence     Fear of current or ex partner: Not on file     Emotionally abused: Not on file     Physically abused: Not on file     Forced sexual activity: Not on file   Other Topics Concern    Not on file   Social History Narrative    Drinks occasional Pepsi/Coke       Vitals:    03/04/21 1049   BP: 136/70   Pulse: 67   Resp: 17   Temp: 97 °F (36.1 °C)   TempSrc: Temporal   SpO2: 98%   Weight: 182 lb (82.6 kg)   Height: 5' 7\" (1.702 m)       Physical Exam  Vitals signs and nursing note reviewed. Constitutional:       Appearance: She is well-developed. HENT:      Head: Atraumatic. Right Ear: External ear normal.      Left Ear: External ear normal.      Nose: Nose normal.      Mouth/Throat:      Pharynx: No oropharyngeal exudate. Eyes:      Conjunctiva/sclera: Conjunctivae normal.      Pupils: Pupils are equal, round, and reactive to light. Neck:      Musculoskeletal: Normal range of motion and neck supple. Thyroid: No thyromegaly. Trachea: No tracheal deviation. Cardiovascular:      Rate and Rhythm: Normal rate and regular rhythm. Heart sounds: No murmur. No friction rub. No gallop. Pulmonary:      Effort: Pulmonary effort is normal. No respiratory distress. Breath sounds: Normal breath sounds. Abdominal:      General: Bowel sounds are normal.      Palpations: Abdomen is soft. Musculoskeletal: Normal range of motion. General: Swelling and tenderness present. No deformity. Comments: Pain, stiffness, ecchymosis L>R   Lymphadenopathy:      Cervical: No cervical adenopathy. Skin:     General: Skin is warm and dry. Capillary Refill: Capillary refill takes less than 2 seconds. Findings: No rash. Neurological:      Mental Status: She is alert and oriented to person, place, and time. Sensory: No sensory deficit. Motor: No abnormal muscle tone.       Coordination: Coordination normal.      Deep Tendon Reflexes: Reflexes normal.             Seen By:  Susanne Vogt DO

## 2021-03-12 ENCOUNTER — HOSPITAL ENCOUNTER (OUTPATIENT)
Dept: PET IMAGING | Age: 71
Discharge: HOME | End: 2021-03-14
Payer: MEDICARE

## 2021-03-12 DIAGNOSIS — C50.211 MALIGNANT NEOPLASM OF UPPER-INNER QUADRANT OF RIGHT FEMALE BREAST, UNSPECIFIED ESTROGEN RECEPTOR STATUS (HCC): ICD-10-CM

## 2021-03-12 LAB — METER GLUCOSE: 94 MG/DL (ref 74–99)

## 2021-03-12 PROCEDURE — 78815 PET IMAGE W/CT SKULL-THIGH: CPT

## 2021-03-12 PROCEDURE — A9552 F18 FDG: HCPCS | Performed by: RADIOLOGY

## 2021-03-12 PROCEDURE — 82962 GLUCOSE BLOOD TEST: CPT

## 2021-03-12 PROCEDURE — 3430000000 HC RX DIAGNOSTIC RADIOPHARMACEUTICAL: Performed by: RADIOLOGY

## 2021-03-12 RX ORDER — FLUDEOXYGLUCOSE F 18 200 MCI/ML
15 INJECTION, SOLUTION INTRAVENOUS
Status: COMPLETED | OUTPATIENT
Start: 2021-03-12 | End: 2021-03-12

## 2021-03-12 RX ADMIN — FLUDEOXYGLUCOSE F 18 15 MILLICURIE: 200 INJECTION, SOLUTION INTRAVENOUS at 12:35

## 2021-03-18 ENCOUNTER — OFFICE VISIT (OUTPATIENT)
Dept: FAMILY MEDICINE CLINIC | Age: 71
End: 2021-03-18
Payer: MEDICARE

## 2021-03-18 VITALS
DIASTOLIC BLOOD PRESSURE: 80 MMHG | BODY MASS INDEX: 28.09 KG/M2 | OXYGEN SATURATION: 98 % | TEMPERATURE: 97.3 F | RESPIRATION RATE: 18 BRPM | SYSTOLIC BLOOD PRESSURE: 140 MMHG | HEART RATE: 63 BPM | HEIGHT: 67 IN | WEIGHT: 179 LBS

## 2021-03-18 DIAGNOSIS — I48.20 CHRONIC ATRIAL FIBRILLATION (HCC): ICD-10-CM

## 2021-03-18 DIAGNOSIS — C77.3 BREAST CANCER METASTASIZED TO AXILLARY LYMPH NODE, RIGHT (HCC): ICD-10-CM

## 2021-03-18 DIAGNOSIS — C50.911 BREAST CANCER METASTASIZED TO AXILLARY LYMPH NODE, RIGHT (HCC): ICD-10-CM

## 2021-03-18 LAB
INTERNATIONAL NORMALIZATION RATIO, POC: 1.9
PROTHROMBIN TIME, POC: 23.2

## 2021-03-18 PROCEDURE — 85610 PROTHROMBIN TIME: CPT | Performed by: FAMILY MEDICINE

## 2021-03-18 PROCEDURE — 99213 OFFICE O/P EST LOW 20 MIN: CPT | Performed by: FAMILY MEDICINE

## 2021-03-18 ASSESSMENT — ENCOUNTER SYMPTOMS
SHORTNESS OF BREATH: 0
EYE PAIN: 0
BACK PAIN: 0
BLOOD IN STOOL: 0
COUGH: 0
NAUSEA: 0
EYE REDNESS: 0
VOMITING: 0
ALLERGIC/IMMUNOLOGIC NEGATIVE: 1
EYE DISCHARGE: 0
PHOTOPHOBIA: 0
CHEST TIGHTNESS: 0
TROUBLE SWALLOWING: 0
DIARRHEA: 0
SORE THROAT: 0
ABDOMINAL PAIN: 0
SINUS PAIN: 0

## 2021-03-18 NOTE — PROGRESS NOTES
3/18/21  Irais Tariq : 1950 Sex: female  Age: 72 y.o. Assessment and Plan: Cammy Brown was seen today for atrial fibrillation. Diagnoses and all orders for this visit:    Chronic atrial fibrillation ([address]  -     POCT INR  INR 1.9, adjust warfarin to 6 mg 5 days a week 3 mg 2 days a week. Check INR in 2 weeks. Breast cancer metastasized to axillary lymph node, right (HCC)  Has probable metastatic disease to the left iliac crest.  This is the cause of her pain in the hip and leg. She is scheduled for CT-guided biopsy of this area and palliative radiation therapy as well      Return in about 2 weeks (around 2021). Chief Complaint   Patient presents with    Atrial Fibrillation       HPI    Review of Systems   Constitutional: Negative for appetite change, fatigue and unexpected weight change. HENT: Negative for congestion, ear pain, hearing loss, sinus pain, sore throat and trouble swallowing. Eyes: Negative for photophobia, pain, discharge and redness. Respiratory: Negative for cough, chest tightness and shortness of breath. Cardiovascular: Negative for chest pain, palpitations and leg swelling. Gastrointestinal: Negative for abdominal pain, blood in stool, diarrhea, nausea and vomiting. Endocrine: Negative. Genitourinary: Negative for dysuria, flank pain, frequency and hematuria. Musculoskeletal: Positive for arthralgias. Negative for back pain, joint swelling and myalgias. Left hip and leg   Skin: Negative. Allergic/Immunologic: Negative. Neurological: Negative for dizziness, seizures, syncope, weakness, light-headedness, numbness and headaches. Hematological: Negative for adenopathy. Does not bruise/bleed easily. Psychiatric/Behavioral: Negative.           Current Outpatient Medications:     verapamil (CALAN SR) 180 MG extended release tablet, Take 1 tablet by mouth daily, Disp: 90 tablet, Rfl: 1    topiramate (TOPAMAX) 50 MG tablet, Take 1 tablet by mouth 2 times daily, Disp: 180 tablet, Rfl: 1    warfarin (COUMADIN) 6 MG tablet, Indications: monday, wednesday, friday 3 mg, all other days are 6 mg 1/2 tab on M W F and 1 tab all other days of the week OR AS DIRECTED, Disp: 30 tablet, Rfl: 5    escitalopram (LEXAPRO) 10 MG tablet, Take 1 tablet by mouth every evening, Disp: 90 tablet, Rfl: 1    ammonium lactate (LAC-HYDRIN) 12 % lotion, Apply topically twice daily, Disp: 400 g, Rfl: 2    propafenone (RYTHMOL) 225 MG tablet, Take 1 tablet by mouth three times daily, Disp: 270 tablet, Rfl: 1    Red Yeast Rice 600 MG CAPS, Take 1 capsule by mouth 2 times daily, Disp: , Rfl:     calcium carbonate-vitamin D (CALTRATE 600+D) 600-400 MG-UNIT TABS per tab, Take 1 tablet by mouth daily, Disp: , Rfl:     Alpha-Lipoic Acid 100 MG CAPS, Take by mouth, Disp: , Rfl:     b complex vitamins capsule, Take 1 capsule by mouth daily, Disp: , Rfl:     anastrozole (ARIMIDEX) 1 MG tablet, Take 1 mg by mouth daily , Disp: , Rfl:     furosemide (LASIX) 20 MG tablet, Take 20 mg by mouth daily , Disp: , Rfl:   Allergies   Allergen Reactions    Diltiazem Hcl Other (See Comments)     Other reaction(s): weak    Cardizem [Diltiazem] Palpitations    Cefuroxime Axetil Nausea And Vomiting, Other (See Comments) and Palpitations    Claritin [Loratadine] Palpitations    Erythromycin Nausea And Vomiting    Morphine Nausea And Vomiting    Mucinex [Guaifenesin Er] Palpitations    Propoxyphene Other (See Comments) and Nausea And Vomiting     DIZZY  Other reaction(s): Free Text    Zyrtec [Cetirizine] Nausea And Vomiting and Other (See Comments)     WEAK       Past Medical History:   Diagnosis Date    Breast cancer ([facility] [address] 04/2017    Chronic atrial fibrillation (HCC)     Fibroadenosis of breast     Headache     Hyperlipidemia     Hypertension      Past Surgical History:   Procedure Laterality Date    [facility]    [facility]    BREAST LUMPECTOMY Right 04/26/2017    COLONOSCOPY      EYE SURGERY      SUDHIR LENSE IMPLANTS    OTHER SURGICAL HISTORY      port removal    TONSILLECTOMY      TUNNELED VENOUS PORT PLACEMENT  05/26/2017    Dr. Wojciech Mary     Family History   Problem Relation Age of Onset    Cancer Maternal Cousin 80        lung    Heart Disease Mother     Other Mother         [facility] Palsy    Other Father         gall bladder problem, hip fx; AAA rupture    Coronary Art Dis Father         MI     Social History     Socioeconomic History    Marital status:      Spouse name: Jorge Isaac Number of children: Not on file    Years of education: Not on file    Highest education level: Not on file   Occupational History    Occupation: retired      Comment: Retired 4 th    Social Needs    Financial resource strain: Not on file    Food insecurity     Worry: Not on file     Inability: Not on file   [employer] needs     Medical: Not on file     Non-medical: Not on file   Tobacco Use    Smoking status: Never Smoker    Smokeless tobacco: Never Used   Substance and Sexual Activity    Alcohol use: No     Comment: occasionally pop    Drug use: No    Sexual activity: Not Currently     Partners: Male   Lifestyle    Physical activity     Days per week: Not on file     Minutes per session: Not on file    Stress: Not on file   Relationships    Social connections     Talks on phone: Not on file     Gets together: Not on file     Attends Christianity service: Not on file     Active member of club or organization: Not on file     Attends meetings of clubs or organizations: Not on file     Relationship status: Not on file    Intimate partner violence     Fear of current or ex partner: Not on file     Emotionally abused: Not on file     Physically abused: Not on file     Forced sexual activity: Not on file   Other Topics Concern    Not on file   Social History Narrative    Drinks occasional Pepsi/Coke       Vitals:    03/18/21 1052 03/18/21

## 2021-03-19 ENCOUNTER — TELEPHONE (OUTPATIENT)
Dept: RADIATION ONCOLOGY | Age: 71
End: 2021-03-19

## 2021-03-19 ENCOUNTER — HOSPITAL ENCOUNTER (OUTPATIENT)
Dept: RADIATION ONCOLOGY | Age: 71
Discharge: HOME | End: 2021-03-19
Payer: MEDICARE

## 2021-03-19 VITALS
HEART RATE: 58 BPM | RESPIRATION RATE: 18 BRPM | SYSTOLIC BLOOD PRESSURE: 152 MMHG | WEIGHT: 178.25 LBS | DIASTOLIC BLOOD PRESSURE: 72 MMHG | OXYGEN SATURATION: 96 % | TEMPERATURE: 97.2 F | BODY MASS INDEX: 27.92 KG/M2

## 2021-03-19 DIAGNOSIS — M89.9 BONE LESION: Primary | ICD-10-CM

## 2021-03-19 PROCEDURE — 99205 OFFICE O/P NEW HI 60 MIN: CPT

## 2021-03-19 RX ORDER — ACETAMINOPHEN 500 MG
1000 TABLET ORAL EVERY 6 HOURS PRN
COMMUNITY
End: 2022-02-24

## 2021-03-19 ASSESSMENT — PAIN DESCRIPTION - FREQUENCY: FREQUENCY: CONTINUOUS

## 2021-03-19 ASSESSMENT — PAIN DESCRIPTION - DESCRIPTORS: DESCRIPTORS: DISCOMFORT;THROBBING

## 2021-03-19 NOTE — TELEPHONE ENCOUNTER
I called Ever Larose to give her simulation appointment for Wednesday 3/24/21 @ 1000 AM at [facility], she verbalizes understanding.

## 2021-03-19 NOTE — PROGRESS NOTES
Radiation Oncology Consult Note         3/19/2021    Freya Moyer  72 y.o.   1950        Referring Physician: Dr. Moraima Valverde      PCP:  Keli Hillman DO      DIAGNOSIS:   History of right breast cancer, stage IIB, now with left iliac crest painful lytic lesion      History of Present Illness:   Ms. Freya Moyer  is a 72y.o. year old female, with history of right breast stage IIb ER/VT positive HER-2 negative IDC, diagnosed 2017. Following conservative surgery and chemotherapy, she had adjuvant radiation therapy to the right breast, completing 1/29/2018. She was doing regular follow-ups at [facility]. About a month ago she was seen in the ER for progressive pain left anterior iliac crest area, nonradiating. CT scan showed a lytic lesion with a soft tissue component. Later a PET/CT confirmed increased uptake at that site. She is scheduled for a needle guided biopsy of this lesion. CT abdomen 2/19/2021  Impression   1.  No acute intra-abdominal/intrapelvic process is identified. 2. A lytic lesion in the anterior left iliac crest is mildly increased and is   compatible with an osseous metastasis.      PET/CT 3/12/2021  Impression   Solitary metabolically active metastatic lesion in the left iliac crest.           Past Medical History:      Diagnosis Date    Breast cancer ([address] 04/2017    Chronic atrial fibrillation (HCC)     Fibroadenosis of breast     Headache     Hyperlipidemia     Hypertension        Past Surgical History:      Procedure Laterality Date    [facility]    BREAST LUMPECTOMY Right 04/26/2017    COLONOSCOPY      EYE SURGERY      SUDHIR LENSE IMPLANTS    OTHER SURGICAL HISTORY      port removal    TONSILLECTOMY      TUNNELED VENOUS PORT PLACEMENT  05/26/2017    Dr. Amari Hill       Allergies   Allergen Reactions    Diltiazem Hcl Other (See Comments)     Other reaction(s): weak    Cardizem [Diltiazem] Palpitations    Cefuroxime Heart Disease Mother    [facility] Other Mother         [place] Palsy    Other Father         gall bladder problem, hip fx; AAA rupture    Coronary Art Dis Father         MI       Social History:       reports that she has never smoked. She has never used smokeless tobacco..   reports no history of alcohol use. .   reports no history of drug use. Review of Systems:  Obtained from the patient, chart review and nursing assessment. Negative/noncontributory otherwise    Constitutional:  No fever, chills or night sweats. Denies recent weight loss. Eyes:  No blurred or changes in vision. Denies discharge or pain. ENT:  No headaches, hearing loss or vertigo. No mouth sores or sore throat. No change in taste or smell. Cardiovascular:  No chest discomfort, dyspnea on exertion or palpitations. Respiratory: Has no cough or wheezing. Has no sputum production or hemoptysis. Has no pleuritic pain. Gastrointestinal: No abdominal pain, appetite loss or nausea. No change in bowel habits. No hematochezia or melena. Genitourinary: Patient acknowledges no dysuria, trouble voiding, urgency or hematuria. No nocturia or increased frequency. Musculoskeletal: No gait disturbance, weakness or joint complaints. Integumentary: No rash or pruritis. Neurological: No headache, diplopia, syncope, change in muscle strength, numbness or tingling. No change in gait, balance, coordination, mood, affect, memory, mentation, behavior. Psychiatric: No anxiety, or depression. Endocrine: No temperature intolerance. No excessive thirst, fluid intake, or urination. No tremor. Hematologic/Lymphatic: No abnormal bruising or bleeding, blood clots or swollen lymph nodes. Allergic/Immunologic: No nasal congestion or hives.         Physical examination:   Vitals:    03/19/21 1251 03/19/21 1322   BP: (!) 152/72 (!) 152/72   Pulse: 58    Resp: 18    Temp: 97.2 °F (36.2 °C)    TempSrc: Temporal    SpO2: 96%    Weight: 178 lb 4 oz (80.9 kg)       ECOG Performance Status: 01    Constitutional: Looks healthy. ENT: Mask on    Lymphatic: No palpable neck or axillary lymphadenopathy. Breast exam: Right breast with mild fibrosis. No evidence of recurrence. Left breast normal to palpation    Respiratory: Lungs clear bilaterally    Cardiovascular:.  S1-S2 regular    Abdomen: Soft nontender no organomegaly    Musculoskeletal:Extremities: .  Tenderness left anterior iliac crest.    CNS:No significant limitation of hip flexion. Radiation safety and oncologic treatment support:    - Previous radiation history:  No  - History of autoimmune or connective tissue disease:  No  - Nutritional support/ PEG:  Not applicable  - Dental evaluation:  Not applicable  -  requested:  Not asked for.  - Oncology Nurse navigator requested:  - Transportation for daily treatment:  Self    Other Investigations/Laboratory Data:    Lab Results   Component Value Date    WBC 6.1 02/19/2021    HGB 13.0 02/19/2021    HCT 40.1 02/19/2021    MCV 93.9 02/19/2021     02/19/2021       Lab Results   Component Value Date     02/19/2021    K 3.7 02/19/2021     02/19/2021    CO2 28 02/19/2021    BUN 17 02/19/2021    CREATININE 1.1 02/19/2021    GLUCOSE 93 02/19/2021    CALCIUM 9.8 02/19/2021       Tumor markers: No results found for: PSA, CEA, , [id number],     Impression: Most likely breast cancer metastatic to left anterior iliac spine. However, other histology to be ruled out for systemic treatment. Discussion/Plan:She is scheduled for needle guided biopsy. Following that palliative radiation therapy, fractionated over 2 weeks, to this lesion is indicated, to improve local control, including symptoms, ambulation and other aspects. Side effects will be minimal, mentioned. All questions answered. Consent obtained. NCCN guidelines, version 2020 is used to help review treatment recommendations.         Procedures and process involved

## 2021-03-19 NOTE — PROGRESS NOTES
Na Moreno  1950 72 y.o. Referring Physician: Von Shah    PCP: Nam Dubon DO     Vitals:    21 1251   BP: (!) 152/72   Pulse: 58   Resp: 18   Temp: 97.2 °F (36.2 °C)   SpO2: 96%        Wt Readings from Last 3 Encounters:   21 178 lb 4 oz (80.9 kg)   21 179 lb (81.2 kg)   21 182 lb (82.6 kg)        Body mass index is 27.92 kg/m². Chief Complaint:   Chief Complaint   Patient presents with    Cancer         Cancer Staging  No matching staging information was found for the patient. Prior Radiation Therapy? YES: Site Treated: right breast          Facility: [facility]          Date: 17 to     Concurrent Chemo/radiation? No    Prior Chemotherapy? YES: Site Treated: Right breast          Facility: [facility]          Date:     Prior Hormonal Therapy? YES: Site Treated: Right breast          Facility: [facility]           Date:  to present    Head and Neck Cancer? No, patient does NOT have HN cancer.       LMP: 42's    Age at first Menses: 14    : 2    Para: 2        Current Outpatient Medications   Medication Sig Dispense Refill    acetaminophen (TYLENOL) 500 MG tablet Take 1,000 mg by mouth every 6 hours as needed for Pain      verapamil (CALAN SR) 180 MG extended release tablet Take 1 tablet by mouth daily 90 tablet 1    topiramate (TOPAMAX) 50 MG tablet Take 1 tablet by mouth 2 times daily 180 tablet 1    warfarin (COUMADIN) 6 MG tablet Indications: monday, wednesday, friday 3 mg, all other days are 6 mg 1/2 tab on  and 1 tab all other days of the week OR AS DIRECTED 30 tablet 5    escitalopram (LEXAPRO) 10 MG tablet Take 1 tablet by mouth every evening 90 tablet 1    ammonium lactate (LAC-HYDRIN) 12 % lotion Apply topically twice daily 400 g 2    propafenone (RYTHMOL) 225 MG tablet Take 1 tablet by mouth three times daily 270 tablet 1    calcium carbonate-vitamin D (CALTRATE 600+D) 600-400 MG-UNIT TABS per tab Take 1 tablet by mouth daily      Alpha-Lipoic Acid 100 MG CAPS Take by mouth      b complex vitamins capsule Take 1 capsule by mouth daily      anastrozole (ARIMIDEX) 1 MG tablet Take 1 mg by mouth daily       furosemide (LASIX) 20 MG tablet Take 20 mg by mouth daily        Current Facility-Administered Medications   Medication Dose Route Frequency Provider Last Rate Last Admin    perflutren lipid microspheres (DEFINITY) injection 1.65 mg  1.5 mL Intravenous ONCE PRN Donna Marinelli MD           Past Medical History:   Diagnosis Date    Breast cancer ([facility] [address] 04/2017    Chronic atrial fibrillation (HCC)     Fibroadenosis of breast     Headache     Hyperlipidemia     Hypertension        Past Surgical History:   Procedure Laterality Date    [facility]    BREAST LUMPECTOMY Right 04/26/2017    COLONOSCOPY      EYE SURGERY      SUDHIR LENSE IMPLANTS    OTHER SURGICAL HISTORY      port removal    TONSILLECTOMY      TUNNELED VENOUS PORT PLACEMENT  05/26/2017    Dr. Bj Fagan       Family History   Problem Relation Age of Onset    Cancer Maternal Cousin 80        lung    Heart Disease Mother     Other Mother         [place] Palsy    Other Father         gall bladder problem, hip fx; AAA rupture    Coronary Art Dis Father         MI       Social History     Socioeconomic History    Marital status:      Spouse name: Leonarda Kayser Number of children: Not on file    Years of education: Not on file    Highest education level: Not on file   Occupational History    Occupation: retired      Comment: Retired 4 th    Social Needs    Financial resource strain: Not on file    Food insecurity     Worry: Not on file     Inability: Not on file   [employer] needs     Medical: Not on file     Non-medical: Not on file   Tobacco Use    Smoking status: Never Smoker    Smokeless tobacco: Never Used   Substance and Sexual Activity    Alcohol use:  No Comment: occasionally pop    Drug use: No    Sexual activity: Not Currently     Partners: Male   Lifestyle    Physical activity     Days per week: Not on file     Minutes per session: Not on file    Stress: Not on file   Relationships    Social connections     Talks on phone: Not on file     Gets together: Not on file     Attends Oriental orthodox service: Not on file     Active member of club or organization: Not on file     Attends meetings of clubs or organizations: Not on file     Relationship status: Not on file    Intimate partner violence     Fear of current or ex partner: Not on file     Emotionally abused: Not on file     Physically abused: Not on file     Forced sexual activity: Not on file   Other Topics Concern    Not on file   Social History Narrative    Drinks occasional Pepsi/Coke           Occupation: teacher  Retired:  YES: Patient is retired from [employer]. REVIEW OF SYSTEMS:  Yolanda Vela is a very pleasant 72years old female, she is here today to discuss receiving Palliative Radiation Therapy to left  Pelvis to relieve pain. She is alert and oriented x 4, ambulatory without any assistance, complaints of pain @ 6/10 to left hip. Yolanda Vela has a history of Right breast cancer in 2017, which she was treated with Radiation and Chemotherapy. She is on Arimidex as prescribed, tolerating side effects well. According to patient she's been having a lot of pain to her left hip area, she went to her PCP. She then was sent to ER by her PCP to be evaluated. She underwent CT of the abdomen and pelvis 02/19/21 which revealed Lytic lesion in the anterior left iliac crest is mildly increased and is compatible with an osseous metastasis. 3/12/21 She underwent PET scan which revealed Intense FDG activity associated with the lytic lesion in the left iliac crest.  She follows with Dr Chauncey Wilson oncology at [facility] [place], operated by [facility], her last appointment was 3/15/2021.   Patient states Dr. Ke Nielson office is arranging for a biopsy, I informed her that e are going to coordinate with them with scheduling/starting treatment. I informed patient I will give her a call with simulation appointment probably anytime on Wednesday, she verbalizes understaniding. Approximately spent >20 minutes with patient, answered all questions based on nursing perspective. <<For Level 5, 10 or more systems>>     Pacemaker/Defibulator/ICD:  No    Mediport: No        FALLS RISK SCREENING ASSESSMENT    Instructions:  Assess the patient and enter the appropriate indicators that are present for fall risk identification. Total the numbers entered and assign a fall risk score from Table 2.  Reassess patient at a minimum every 12 weeks or with status change. Assessment   Date  3/19/2021     1. Mental Ability: confusion/cognitively impaired No - 0       2. Elimination Issues: incontinence, frequency No - 0       3. Ambulatory: use of assistive devices (walker, cane, off-loading devices), attached to equipment (IV pole, oxygen) No - 0     4. Sensory Limitations: dizziness, vertigo, impaired vision No - 0       5. Age 72 years or greater - 1       10. Medication: diuretics, strong analgesics, hypnotics, sedatives, antihypertensive agents   Yes - 3   7. Falls:  recent history of falls within the last 3 months (not to include slipping or tripping)   No - 0   TOTAL 4    If score of 4 or greater was education given? Yes       TABLE 2   Risk Score Risk Level Plan of Care   0-3 Little or  No Risk 1. Provide assistance as indicated for ambulation activities  2. Reorient confused/cognitively impaired patient  3. Call-light/bell within patient's reach  4. Chair/bed in low position, stretcher/bed with siderails up except when performing patient care activities  5.   Educate patient/family/caregiver on falls prevention  6.  Reassess in 12 weeks or with any noted change in patient condition which places them at a risk for a fall   4-6 Moderate Risk 1. Provide assistance as indicated for ambulation activities  2. Reorient confused/cognitively impaired patient  3. Call-light/bell within patient's reach  4. Chair/bed in low position, stretcher/bed with siderails up except when performing patient care activities  5. Educate patient/family/caregiver on falls prevention  6. Falls risk precaution (Yellow sticker Level II) placed on patient chart   7 or   Higher High Risk 1. Place patient in easily observable treatment room  2. Patient attended at all times by family member or staff  3. Provide assistance as indicated for ambulation activities  4. Reorient confused/cognitively impaired patient  5. Call-light/bell within patient's reach  6. Chair/bed in low position, stretcher/bed with siderails up except when performing patient care activities  7. Educate patient/family/caregiver on falls prevention  8. Falls risk precaution (Yellow sticker Level III) placed on patient chart           MALNUTRITION RISK SCREENING ASSESSMENT    Instructions:  Assess the patient and enter the appropriate indicators that are present for nutrition risk identification. Total the numbers entered and assign a risk score. Follow the appropriate action for total score listed below. Assessment   Date  3/19/2021     1. Have you lost weight without trying? 0- No     2. Have you been eating poorly because of a decreased appetite? 0- No   3. Do you have a diagnosis of head and neck cancer?       0- No                                                                                    TOTAL 0          Score of 0-1: No action  Score 2 or greater:  · For Non-Diabetic Patient: Recommend adding Ensure Complete 2 x daily and provide patient with Ensure wellness bag with coupons  · For Diabetic Patient: Recommend adding Glucerna Shake 2 x daily and provide patient with Glucerna Wellness bag with coupons  · Route to the dietitian via  [facility] REFERRAL    · Are you having  difficulty performing daily routine tasks  due to fatigue or weakness (ie: bathing/showering, dressing, housework, meal prep, work, child Selene Culp): No     · Do you have any arm flexibility/ROM restrictions, swelling or pain that limit activity: No     · Any changes in memory, attention/focus that impact daily activities: No     · Do you avoid participation in leisure/social activity due weakness, fatigue or pain: Yes    ARE ANY OF THE ABOVE ARE ANSWERED YES: Yes - but NO OT referral request sent due to patient refusal.          PT ASSESSMENT FOR REFERRAL    · Have you had any recent falls in past 2 months: No     · Do you have difficulty  going up/down stairs: No     · Are you having difficulty walking: Yes / painful    · Do you often hold onto furniture/environmental supports or feel off balance when you are walking: No     · Do you need to take rest breaks when you are walking: Yes/pain     · Any pain on scale of 1-10 that limits your mobility: Yes 6/10    ARE ANY OF THE ABOVE ARE ANSWERED YES: Yes - but NO PT referral request sent due to patient refusal.           LYMPHEDEMA SCREENING ASSESSMENT FOR PATIENTS WITH BREAST CANCER    The patient reports the following signs/symptoms of lymphedema: None    Please ask the provider to assess patient for lymphedema for any reported signs or symptoms so a referral to Lymphedema Therapy can be considered. [facility]Y REFERRAL    - Is patient planned to receive Cisplatin? No. This patient is not planned to start Cisplatin. - Is patient planned to receive radiation therapy that may be directed toward auditory canals or nerves? No. Patient is not planned to start radiation therapy to auditory canals or nerves. - Is patient complaining of new onset hearing loss? No. Patient is not complaining of new onset hearing loss.           Patient education given on Radiation therapy, side effects and fall safety prevention utilizing slides and handouts. The patient expresses understanding and acceptance of instructions.  Ngozi Wagoner 3/19/2021 12:59 PM           Hermenia Charmayne Hammed

## 2021-03-19 NOTE — TELEPHONE ENCOUNTER
I called [facility]. [facility] [place] elevated 152/72 with a repeat of 152/72 left arm taken manually, pt assymtomatic, no answer I left a VM.

## 2021-03-24 ENCOUNTER — TELEPHONE (OUTPATIENT)
Dept: RADIATION ONCOLOGY | Age: 71
End: 2021-03-24

## 2021-03-24 ENCOUNTER — HOSPITAL ENCOUNTER (OUTPATIENT)
Dept: RADIATION ONCOLOGY | Age: 71
Discharge: HOME | End: 2021-03-24
Attending: RADIOLOGY
Payer: MEDICARE

## 2021-03-24 PROCEDURE — 77262 THER RADIOLOGY TX PLNG INTRM: CPT | Performed by: RADIOLOGY

## 2021-03-24 PROCEDURE — 77334 RADIATION TREATMENT AID(S): CPT | Performed by: RADIOLOGY

## 2021-03-24 NOTE — TELEPHONE ENCOUNTER
I called [facility] to inquire re: if patient is scheduled for biopsy, no answer.

## 2021-03-25 ENCOUNTER — TELEPHONE (OUTPATIENT)
Dept: RADIATION ONCOLOGY | Age: 71
End: 2021-03-25

## 2021-03-25 ENCOUNTER — HOSPITAL ENCOUNTER (OUTPATIENT)
Dept: RADIATION ONCOLOGY | Age: 71
Discharge: HOME | End: 2021-03-25
Attending: RADIOLOGY
Payer: MEDICARE

## 2021-03-25 DIAGNOSIS — I48.20 CHRONIC ATRIAL FIBRILLATION (HCC): ICD-10-CM

## 2021-03-25 PROCEDURE — 77334 RADIATION TREATMENT AID(S): CPT | Performed by: RADIOLOGY

## 2021-03-25 PROCEDURE — 77307 TELETHX ISODOSE PLAN CPLX: CPT | Performed by: RADIOLOGY

## 2021-03-25 RX ORDER — PROPAFENONE HYDROCHLORIDE 225 MG/1
225 TABLET, FILM COATED ORAL 3 TIMES DAILY
Qty: 270 TABLET | Refills: 1 | Status: CANCELLED | OUTPATIENT
Start: 2021-03-25

## 2021-03-25 NOTE — TELEPHONE ENCOUNTER
Name of Medication(s) Requested:  Propafenone    Pharmacy Requested:   [facility]    Medication(s) pended? [x] Yes  [] No    Last Appointment:  3/18/2021    Future appts:  Future Appointments   Date Time Provider Comfort Benjamin   4/1/2021 10:45 AM [employer] DO AGUSTIN Krishnamurthy [facility]   4/14/2021 10:00 AM SHEA Bruce [facility]   4/19/2021 10:00 AM Nilo Weiner MD [address]   5/4/2021 10:00 AM Marjorie Roberts MD ADV WMNS [facility]   10/29/2021  9:00 AM DO Jose Martin [facility]        Does patient need call back?   [] Yes  [x] No

## 2021-03-26 ENCOUNTER — TELEPHONE (OUTPATIENT)
Dept: RADIATION ONCOLOGY | Age: 71
End: 2021-03-26

## 2021-03-26 RX ORDER — PROPAFENONE HYDROCHLORIDE 225 MG/1
225 TABLET, FILM COATED ORAL 3 TIMES DAILY
Qty: 270 TABLET | Refills: 1 | Status: SHIPPED
Start: 2021-03-26 | End: 2021-09-27 | Stop reason: SDUPTHER

## 2021-03-26 NOTE — TELEPHONE ENCOUNTER
Chery Glover called back and asked why you cant fill this like you have in the past? Is there a reason it should be done by a cardiologist?

## 2021-03-26 NOTE — TELEPHONE ENCOUNTER
I called Daniel Newman from [facility] again today to inquire re: date patient will have her biopsy, no answer left a VM.

## 2021-03-29 ENCOUNTER — TELEPHONE (OUTPATIENT)
Dept: RADIATION ONCOLOGY | Age: 71
End: 2021-03-29

## 2021-03-29 NOTE — TELEPHONE ENCOUNTER
Sammi Martinez from [facility] called this office to inform us that Luna Healy is scheduled for he biopsy on 4/22/21 here in [facility] per patient's request.

## 2021-03-30 ENCOUNTER — TELEPHONE (OUTPATIENT)
Dept: RADIATION ONCOLOGY | Age: 71
End: 2021-03-30

## 2021-03-30 DIAGNOSIS — Z01.818 PRE-OP TESTING: Primary | ICD-10-CM

## 2021-03-30 NOTE — TELEPHONE ENCOUNTER
This RN called Kilo Olguin at [facility] at [phone] this morning, Per Dr. Santos Mcdonnell request re:  Biopsy need to be done sooner than 4/21/21, no answer I left a VM and my direct phone #.

## 2021-03-30 NOTE — TELEPHONE ENCOUNTER
This RN called Peyton Slater this morning @ 2762 to see if she can have her Biopsy done first available, [place] since our simulation and plan is already completed. I also connected her to Dr. Donald Nicole, then I called Rafita Jones from [facility].

## 2021-03-30 NOTE — TELEPHONE ENCOUNTER
Anamaria from [facility] called me back stating that she called Interventional Radiology and left a VM to change Biopsy date  sooner.

## 2021-03-31 ENCOUNTER — HOSPITAL ENCOUNTER (OUTPATIENT)
Age: 71
Discharge: HOME | End: 2021-04-02
Payer: MEDICARE

## 2021-03-31 DIAGNOSIS — Z01.818 PRE-OP TESTING: ICD-10-CM

## 2021-03-31 PROCEDURE — U0005 INFEC AGEN DETEC AMPLI PROBE: HCPCS

## 2021-03-31 PROCEDURE — U0003 INFECTIOUS AGENT DETECTION BY NUCLEIC ACID (DNA OR RNA); SEVERE ACUTE RESPIRATORY SYNDROME CORONAVIRUS 2 (SARS-COV-2) (CORONAVIRUS DISEASE [COVID-19]), AMPLIFIED PROBE TECHNIQUE, MAKING USE OF HIGH THROUGHPUT TECHNOLOGIES AS DESCRIBED BY CMS-2020-01-R: HCPCS

## 2021-04-01 ENCOUNTER — OFFICE VISIT (OUTPATIENT)
Dept: FAMILY MEDICINE CLINIC | Age: 71
End: 2021-04-01
Payer: MEDICARE

## 2021-04-01 VITALS
HEIGHT: 67 IN | OXYGEN SATURATION: 99 % | HEART RATE: 59 BPM | TEMPERATURE: 97.3 F | DIASTOLIC BLOOD PRESSURE: 80 MMHG | SYSTOLIC BLOOD PRESSURE: 138 MMHG | BODY MASS INDEX: 28.56 KG/M2 | WEIGHT: 182 LBS | RESPIRATION RATE: 18 BRPM

## 2021-04-01 DIAGNOSIS — C50.911 BREAST CANCER METASTASIZED TO AXILLARY LYMPH NODE, RIGHT (HCC): ICD-10-CM

## 2021-04-01 DIAGNOSIS — M89.9 BONE LESION: ICD-10-CM

## 2021-04-01 DIAGNOSIS — I10 ESSENTIAL HYPERTENSION: Primary | ICD-10-CM

## 2021-04-01 DIAGNOSIS — I48.20 CHRONIC ATRIAL FIBRILLATION (HCC): ICD-10-CM

## 2021-04-01 DIAGNOSIS — C77.3 BREAST CANCER METASTASIZED TO AXILLARY LYMPH NODE, RIGHT (HCC): ICD-10-CM

## 2021-04-01 LAB
INTERNATIONAL NORMALIZATION RATIO, POC: 2.3
PROTHROMBIN TIME, POC: 27.5
SARS-COV-2: NOT DETECTED
SOURCE: NORMAL

## 2021-04-01 PROCEDURE — 85610 PROTHROMBIN TIME: CPT | Performed by: FAMILY MEDICINE

## 2021-04-01 PROCEDURE — 99213 OFFICE O/P EST LOW 20 MIN: CPT | Performed by: FAMILY MEDICINE

## 2021-04-01 ASSESSMENT — ENCOUNTER SYMPTOMS
EYE DISCHARGE: 0
ALLERGIC/IMMUNOLOGIC NEGATIVE: 1
SHORTNESS OF BREATH: 0
DIARRHEA: 0
EYE REDNESS: 0
TROUBLE SWALLOWING: 0
BACK PAIN: 0
SORE THROAT: 0
PHOTOPHOBIA: 0
SINUS PAIN: 0
ABDOMINAL PAIN: 0
EYE PAIN: 0
VOMITING: 0
BLOOD IN STOOL: 0
COUGH: 0
CHEST TIGHTNESS: 0
NAUSEA: 0

## 2021-04-01 NOTE — PROGRESS NOTES
21  Javad Dust : 1950 Sex: female  Age: 72 y.o. Assessment and Plan: Arjun Ruth was seen today for atrial fibrillation. Diagnoses and all orders for this visit:    Essential hypertension  Controlled on current medications. Chronic atrial fibrillation (HCC)  -     POCT INR    Bone lesion  For biopsy next week at [facility]. For warfarin 5 days prior to procedure. Recheck here in 2 weeks for INR. 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 Return in about 2 weeks (around 4/15/2021). Chief Complaint   Patient presents with    Atrial Fibrillation       Presents for recheck pro time today INR two-point she is having surgery next week biopsy of her right left iliac crest will be off warfarin for 5 days prior. She is to follow in 2 weeks for another pro time. Review of Systems   Constitutional: Negative for appetite change, fatigue and unexpected weight change. HENT: Negative for congestion, ear pain, hearing loss, sinus pain, sore throat and trouble swallowing. Eyes: Negative for photophobia, pain, discharge and redness. Respiratory: Negative for cough, chest tightness and shortness of breath. Cardiovascular: Negative for chest pain, palpitations and leg swelling. Gastrointestinal: Negative for abdominal pain, blood in stool, diarrhea, nausea and vomiting. Endocrine: Negative. Genitourinary: Negative for dysuria, flank pain, frequency and hematuria. Musculoskeletal: Positive for arthralgias and gait problem. Negative for back pain, joint swelling and myalgias. Left hip   Skin: Negative. Allergic/Immunologic: Negative. Neurological: Negative for dizziness, seizures, syncope, weakness, light-headedness, numbness and headaches. Hematological: Negative for adenopathy. Does not bruise/bleed easily. Psychiatric/Behavioral: Negative.           Current Outpatient Medications:     propafenone (RYTHMOL) 225 MG tablet, Take 1 tablet by mouth three times daily, Disp: 270 tablet, Rfl: 1    acetaminophen (TYLENOL) 500 MG tablet, Take 1,000 mg by mouth every 6 hours as needed for Pain, Disp: , Rfl:     verapamil (CALAN SR) 180 MG extended release tablet, Take 1 tablet by mouth daily, Disp: 90 tablet, Rfl: 1    topiramate (TOPAMAX) 50 MG tablet, Take 1 tablet by mouth 2 times daily, Disp: 180 tablet, Rfl: 1    warfarin (COUMADIN) 6 MG tablet, Indications: monday, wednesday, friday 3 mg, all other days are 6 mg 1/2 tab on M W F and 1 tab all other days of the week OR AS DIRECTED, Disp: 30 tablet, Rfl: 5    escitalopram (LEXAPRO) 10 MG tablet, Take 1 tablet by mouth every evening, Disp: 90 tablet, Rfl: 1    ammonium lactate (LAC-HYDRIN) 12 % lotion, Apply topically twice daily, Disp: 400 g, Rfl: 2    calcium carbonate-vitamin D (CALTRATE 600+D) 600-400 MG-UNIT TABS per tab, Take 1 tablet by mouth daily, Disp: , Rfl:     Alpha-Lipoic Acid 100 MG CAPS, Take by mouth, Disp: , Rfl:     b complex vitamins capsule, Take 1 capsule by mouth daily, Disp: , Rfl:     anastrozole (ARIMIDEX) 1 MG tablet, Take 1 mg by mouth daily , Disp: , Rfl:     furosemide (LASIX) 20 MG tablet, Take 20 mg by mouth daily , Disp: , Rfl:   Allergies   Allergen Reactions    Diltiazem Hcl Other (See Comments)     Other reaction(s): weak    Cardizem [Diltiazem] Palpitations    Cefuroxime Axetil Nausea And Vomiting, Other (See Comments) and Palpitations    Claritin [Loratadine] Palpitations    Erythromycin Nausea And Vomiting    Morphine Nausea And Vomiting    Mucinex [Guaifenesin Er] Palpitations    Propoxyphene Other (See Comments) and Nausea And Vomiting     DIZZY  Other reaction(s): Free Text    Zyrtec [Cetirizine] Nausea And Vomiting and Other (See Comments)     WEAK       Past Medical History:   Diagnosis Date    [facility]    Chronic atrial fibrillation (HCC)     Fibroadenosis of breast     Headache  Hyperlipidemia     Hypertension      Past Surgical History:   Procedure Laterality Date    [facility]    BREAST LUMPECTOMY Right 04/26/2017    COLONOSCOPY      EYE SURGERY      SUDHIR LENSE IMPLANTS    OTHER SURGICAL HISTORY      port removal    TONSILLECTOMY      TUNNELED VENOUS PORT PLACEMENT  05/26/2017    Dr. Lilliam Lane     Family History   Problem Relation Age of Onset    Cancer Maternal Cousin 80        lung    Heart Disease Mother     Other Mother         [place] Palsy    Other Father         gall bladder problem, hip fx; AAA rupture    Coronary Art Dis Father         MI     Social History     Socioeconomic History    Marital status:      Spouse name: Amy Gomez Number of children: Not on file    Years of education: Not on file    Highest education level: Not on file   Occupational History    Occupation: retired      Comment: Retired 4 th    Social Needs    Financial resource strain: Not on file    Food insecurity     Worry: Not on file     Inability: Not on file   [employer] needs     Medical: Not on file     Non-medical: Not on file   Tobacco Use    Smoking status: Never Smoker    Smokeless tobacco: Never Used   Substance and Sexual Activity    Alcohol use: No     Comment: occasionally pop    Drug use: No    Sexual activity: Not Currently     Partners: Male   Lifestyle    Physical activity     Days per week: Not on file     Minutes per session: Not on file    Stress: Not on file   Relationships    Social connections     Talks on phone: Not on file     Gets together: Not on file     Attends Hoahaoism service: Not on file     Active member of club or organization: Not on file     Attends meetings of clubs or organizations: Not on file     Relationship status: Not on file    Intimate partner violence     Fear of current or ex partner: Not on file     Emotionally abused: Not on file     Physically abused: Not on file Forced sexual activity: Not on file   Other Topics Concern    Not on file   Social History Narrative    Drinks occasional Pepsi/Coke       Vitals:    04/01/21 1052   BP: 138/80   Pulse: 59   Resp: 18   Temp: 97.3 °F (36.3 °C)   TempSrc: Temporal   SpO2: 99%   Weight: 182 lb (82.6 kg)   Height: 5' 7\" (1.702 m)       Physical Exam  Vitals signs and nursing note reviewed. Constitutional:       Appearance: She is well-developed. HENT:      Head: Atraumatic. Right Ear: External ear normal.      Left Ear: External ear normal.      Nose: Nose normal.      Mouth/Throat:      Pharynx: No oropharyngeal exudate. Eyes:      Conjunctiva/sclera: Conjunctivae normal.      Pupils: Pupils are equal, round, and reactive to light. Neck:      Musculoskeletal: Normal range of motion and neck supple. Thyroid: No thyromegaly. Trachea: No tracheal deviation. Cardiovascular:      Rate and Rhythm: Normal rate and regular rhythm. Heart sounds: No murmur. No friction rub. No gallop. Pulmonary:      Effort: Pulmonary effort is normal. No respiratory distress. Breath sounds: Normal breath sounds. Abdominal:      General: Bowel sounds are normal.      Palpations: Abdomen is soft. Musculoskeletal: Normal range of motion. General: No tenderness or deformity. Comments: Pain to palpation left hip   Lymphadenopathy:      Cervical: No cervical adenopathy. Skin:     General: Skin is warm and dry. Capillary Refill: Capillary refill takes less than 2 seconds. Findings: No rash. Neurological:      Mental Status: She is alert and oriented to person, place, and time. Sensory: No sensory deficit. Motor: No abnormal muscle tone.       Coordination: Coordination normal.      Deep Tendon Reflexes: Reflexes normal.             Seen By:  Timur Barton DO

## 2021-04-07 ENCOUNTER — HOSPITAL ENCOUNTER (OUTPATIENT)
Dept: CT IMAGING | Age: 71
Discharge: HOME | End: 2021-04-09
Payer: MEDICARE

## 2021-04-07 ENCOUNTER — ANESTHESIA EVENT (OUTPATIENT)
Dept: CT IMAGING | Age: 71
End: 2021-04-07

## 2021-04-07 ENCOUNTER — ANESTHESIA (OUTPATIENT)
Dept: CT IMAGING | Age: 71
End: 2021-04-07

## 2021-04-07 VITALS
WEIGHT: 180 LBS | OXYGEN SATURATION: 95 % | HEART RATE: 50 BPM | DIASTOLIC BLOOD PRESSURE: 72 MMHG | RESPIRATION RATE: 18 BRPM | BODY MASS INDEX: 26.66 KG/M2 | TEMPERATURE: 97.4 F | SYSTOLIC BLOOD PRESSURE: 165 MMHG | HEIGHT: 69 IN

## 2021-04-07 VITALS — OXYGEN SATURATION: 96 % | SYSTOLIC BLOOD PRESSURE: 112 MMHG | DIASTOLIC BLOOD PRESSURE: 54 MMHG

## 2021-04-07 DIAGNOSIS — C50.211 MALIGNANT NEOPLASM OF UPPER-INNER QUADRANT OF RIGHT BREAST IN FEMALE, ESTROGEN RECEPTOR POSITIVE (HCC): ICD-10-CM

## 2021-04-07 DIAGNOSIS — Z17.0 MALIGNANT NEOPLASM OF UPPER-INNER QUADRANT OF RIGHT BREAST IN FEMALE, ESTROGEN RECEPTOR POSITIVE (HCC): ICD-10-CM

## 2021-04-07 LAB
ANION GAP SERPL CALCULATED.3IONS-SCNC: 9 MMOL/L (ref 7–16)
APTT: 28.3 SEC (ref 24.5–35.1)
BASOPHILS ABSOLUTE: 0.06 E9/L (ref 0–0.2)
BASOPHILS RELATIVE PERCENT: 1 % (ref 0–2)
BUN BLDV-MCNC: 21 MG/DL (ref 8–23)
CALCIUM SERPL-MCNC: 9.2 MG/DL (ref 8.6–10.2)
CHLORIDE BLD-SCNC: 106 MMOL/L (ref 98–107)
CO2: 27 MMOL/L (ref 22–29)
CREAT SERPL-MCNC: 1.1 MG/DL (ref 0.5–1)
EOSINOPHILS ABSOLUTE: 0.11 E9/L (ref 0.05–0.5)
EOSINOPHILS RELATIVE PERCENT: 1.9 % (ref 0–6)
GFR AFRICAN AMERICAN: 60
GFR NON-AFRICAN AMERICAN: 49 ML/MIN/1.73
GLUCOSE BLD-MCNC: 100 MG/DL (ref 74–99)
HCT VFR BLD CALC: 39.7 % (ref 34–48)
HEMOGLOBIN: 13 G/DL (ref 11.5–15.5)
IMMATURE GRANULOCYTES #: 0.01 E9/L
IMMATURE GRANULOCYTES %: 0.2 % (ref 0–5)
INR BLD: 1.1
LYMPHOCYTES ABSOLUTE: 1.39 E9/L (ref 1.5–4)
LYMPHOCYTES RELATIVE PERCENT: 24.2 % (ref 20–42)
MCH RBC QN AUTO: 30.1 PG (ref 26–35)
MCHC RBC AUTO-ENTMCNC: 32.7 % (ref 32–34.5)
MCV RBC AUTO: 91.9 FL (ref 80–99.9)
MONOCYTES ABSOLUTE: 0.58 E9/L (ref 0.1–0.95)
MONOCYTES RELATIVE PERCENT: 10.1 % (ref 2–12)
NEUTROPHILS ABSOLUTE: 3.6 E9/L (ref 1.8–7.3)
NEUTROPHILS RELATIVE PERCENT: 62.6 % (ref 43–80)
PDW BLD-RTO: 12.5 FL (ref 11.5–15)
PLATELET # BLD: 167 E9/L (ref 130–450)
PMV BLD AUTO: 9.9 FL (ref 7–12)
POTASSIUM SERPL-SCNC: 4.3 MMOL/L (ref 3.5–5)
PROTHROMBIN TIME: 11.9 SEC (ref 9.3–12.4)
RBC # BLD: 4.32 E12/L (ref 3.5–5.5)
SODIUM BLD-SCNC: 142 MMOL/L (ref 132–146)
WBC # BLD: 5.8 E9/L (ref 4.5–11.5)

## 2021-04-07 PROCEDURE — 85730 THROMBOPLASTIN TIME PARTIAL: CPT

## 2021-04-07 PROCEDURE — 88342 IMHCHEM/IMCYTCHM 1ST ANTB: CPT

## 2021-04-07 PROCEDURE — 2580000003 HC RX 258: Performed by: NURSE ANESTHETIST, CERTIFIED REGISTERED

## 2021-04-07 PROCEDURE — 7100000011 HC PHASE II RECOVERY - ADDTL 15 MIN

## 2021-04-07 PROCEDURE — 88311 DECALCIFY TISSUE: CPT

## 2021-04-07 PROCEDURE — 80048 BASIC METABOLIC PNL TOTAL CA: CPT

## 2021-04-07 PROCEDURE — 77012 CT SCAN FOR NEEDLE BIOPSY: CPT | Performed by: RADIOLOGY

## 2021-04-07 PROCEDURE — 20225 BONE BIOPSY TROCAR/NDL DEEP: CPT

## 2021-04-07 PROCEDURE — 3700000001 HC ADD 15 MINUTES (ANESTHESIA)

## 2021-04-07 PROCEDURE — 85025 COMPLETE CBC W/AUTO DIFF WBC: CPT

## 2021-04-07 PROCEDURE — 88307 TISSUE EXAM BY PATHOLOGIST: CPT

## 2021-04-07 PROCEDURE — 99212 OFFICE O/P EST SF 10 MIN: CPT | Performed by: RADIOLOGY

## 2021-04-07 PROCEDURE — 3700000000 HC ANESTHESIA ATTENDED CARE

## 2021-04-07 PROCEDURE — 6360000002 HC RX W HCPCS: Performed by: NURSE ANESTHETIST, CERTIFIED REGISTERED

## 2021-04-07 PROCEDURE — 88341 IMHCHEM/IMCYTCHM EA ADD ANTB: CPT

## 2021-04-07 PROCEDURE — 36415 COLL VENOUS BLD VENIPUNCTURE: CPT

## 2021-04-07 PROCEDURE — 2500000003 HC RX 250 WO HCPCS: Performed by: RADIOLOGY

## 2021-04-07 PROCEDURE — 2709999900 CT GUIDED NEEDLE PLACEMENT

## 2021-04-07 PROCEDURE — 7100000010 HC PHASE II RECOVERY - FIRST 15 MIN

## 2021-04-07 PROCEDURE — 20225 BONE BIOPSY TROCAR/NDL DEEP: CPT | Performed by: RADIOLOGY

## 2021-04-07 PROCEDURE — 85610 PROTHROMBIN TIME: CPT

## 2021-04-07 RX ORDER — MEPERIDINE HYDROCHLORIDE 25 MG/ML
12.5 INJECTION INTRAMUSCULAR; INTRAVENOUS; SUBCUTANEOUS EVERY 5 MIN PRN
Status: DISCONTINUED | OUTPATIENT
Start: 2021-04-07 | End: 2021-04-10 | Stop reason: HOSPADM

## 2021-04-07 RX ORDER — OXYCODONE HYDROCHLORIDE AND ACETAMINOPHEN 5; 325 MG/1; MG/1
1 TABLET ORAL EVERY 6 HOURS PRN
Status: DISCONTINUED | OUTPATIENT
Start: 2021-04-07 | End: 2021-04-10 | Stop reason: HOSPADM

## 2021-04-07 RX ORDER — FENTANYL CITRATE 50 UG/ML
INJECTION, SOLUTION INTRAMUSCULAR; INTRAVENOUS PRN
Status: DISCONTINUED | OUTPATIENT
Start: 2021-04-07 | End: 2021-04-07 | Stop reason: SDUPTHER

## 2021-04-07 RX ORDER — HYDRALAZINE HYDROCHLORIDE 20 MG/ML
5 INJECTION INTRAMUSCULAR; INTRAVENOUS EVERY 10 MIN PRN
Status: DISCONTINUED | OUTPATIENT
Start: 2021-04-07 | End: 2021-04-10 | Stop reason: HOSPADM

## 2021-04-07 RX ORDER — PROPOFOL 10 MG/ML
INJECTION, EMULSION INTRAVENOUS CONTINUOUS PRN
Status: DISCONTINUED | OUTPATIENT
Start: 2021-04-07 | End: 2021-04-07 | Stop reason: SDUPTHER

## 2021-04-07 RX ORDER — MIDAZOLAM HYDROCHLORIDE 1 MG/ML
INJECTION INTRAMUSCULAR; INTRAVENOUS PRN
Status: DISCONTINUED | OUTPATIENT
Start: 2021-04-07 | End: 2021-04-07 | Stop reason: SDUPTHER

## 2021-04-07 RX ORDER — LIDOCAINE HYDROCHLORIDE 20 MG/ML
INJECTION, SOLUTION INFILTRATION; PERINEURAL
Status: COMPLETED | OUTPATIENT
Start: 2021-04-07 | End: 2021-04-07

## 2021-04-07 RX ORDER — LABETALOL HYDROCHLORIDE 5 MG/ML
5 INJECTION, SOLUTION INTRAVENOUS EVERY 10 MIN PRN
Status: DISCONTINUED | OUTPATIENT
Start: 2021-04-07 | End: 2021-04-10 | Stop reason: HOSPADM

## 2021-04-07 RX ORDER — PROMETHAZINE HYDROCHLORIDE 25 MG/ML
6.25 INJECTION, SOLUTION INTRAMUSCULAR; INTRAVENOUS
Status: ACTIVE | OUTPATIENT
Start: 2021-04-07 | End: 2021-04-07

## 2021-04-07 RX ORDER — SODIUM CHLORIDE 9 MG/ML
INJECTION, SOLUTION INTRAVENOUS CONTINUOUS PRN
Status: DISCONTINUED | OUTPATIENT
Start: 2021-04-07 | End: 2021-04-07 | Stop reason: SDUPTHER

## 2021-04-07 RX ADMIN — FENTANYL CITRATE 50 MCG: 50 INJECTION, SOLUTION INTRAMUSCULAR; INTRAVENOUS at 13:30

## 2021-04-07 RX ADMIN — SODIUM CHLORIDE: 9 INJECTION, SOLUTION INTRAVENOUS at 13:10

## 2021-04-07 RX ADMIN — MIDAZOLAM 2 MG: 1 INJECTION INTRAMUSCULAR; INTRAVENOUS at 13:11

## 2021-04-07 RX ADMIN — LIDOCAINE HYDROCHLORIDE 8 ML: 20 INJECTION, SOLUTION INFILTRATION; PERINEURAL at 13:59

## 2021-04-07 RX ADMIN — FENTANYL CITRATE 50 MCG: 50 INJECTION, SOLUTION INTRAMUSCULAR; INTRAVENOUS at 13:45

## 2021-04-07 RX ADMIN — PROPOFOL 60 MCG/KG/MIN: 10 INJECTION, EMULSION INTRAVENOUS at 13:30

## 2021-04-07 NOTE — ANESTHESIA POSTPROCEDURE EVALUATION
Department of Anesthesiology  Postprocedure Note    Patient: Mia Coon  MRN: [id number]  YOB: 1950  Date of evaluation: 4/7/2021  Time:  5:49 PM     Procedure Summary     Date: 04/07/21 Room / Location: [address] Scan; [address] [place] Procedures; [address] Radiology    Anesthesia Start: 1310 Anesthesia Stop: 4997    Procedures:       CT GUIDED NEEDLE PLACEMENT      CT BIOPSY PERCUTANEOUS DEEP BONE Diagnosis:       Malignant neoplasm of upper-inner quadrant of right breast in female, estrogen receptor positive ([address]      (LT Iliac Bone BX)      (LT Iliac Bone BX)    Scheduled Providers: [facility] General Radiologist Responsible Provider: Teresita Koehler MD    Anesthesia Type: MAC ASA Status: 3          Anesthesia Type: MAC    Heather Phase I:      Heather Phase II:      Last vitals: Reviewed and per EMR flowsheets.        Anesthesia Post Evaluation    Patient location during evaluation: PACU  Patient participation: complete - patient participated  Level of consciousness: awake and alert  Airway patency: patent  Nausea & Vomiting: no nausea and no vomiting  Complications: no  Cardiovascular status: hemodynamically stable  Respiratory status: acceptable  Hydration status: euvolemic

## 2021-04-07 NOTE — BRIEF OP NOTE
Brief Postoperative Note    Farooq Cartwright  YOB: 1950  [id number]    Pre-operative Diagnosis: mass    Post-operative Diagnosis: Same    Procedure: biopsy    Estimated Blood Loss: < 10 cc    Surgeon: Chiquita DERAS     Complications: none    Specimens obtained: Yes, biopsy of mass    Findings: biopsy of a mass      Chiquita Santiago II   4/7/2021 12:14 PM

## 2021-04-07 NOTE — ANESTHESIA PRE PROCEDURE
mouth every 6 hours as needed for Pain      verapamil (CALAN SR) 180 MG extended release tablet Take 1 tablet by mouth daily 90 tablet 1    topiramate (TOPAMAX) 50 MG tablet Take 1 tablet by mouth 2 times daily 180 tablet 1    warfarin (COUMADIN) 6 MG tablet Indications: monday, wednesday, friday 3 mg, all other days are 6 mg 1/2 tab on M W F and 1 tab all other days of the week OR AS DIRECTED 30 tablet 5    escitalopram (LEXAPRO) 10 MG tablet Take 1 tablet by mouth every evening 90 tablet 1    ammonium lactate (LAC-HYDRIN) 12 % lotion Apply topically twice daily 400 g 2    calcium carbonate-vitamin D (CALTRATE 600+D) 600-400 MG-UNIT TABS per tab Take 1 tablet by mouth daily      Alpha-Lipoic Acid 100 MG CAPS Take by mouth      b complex vitamins capsule Take 1 capsule by mouth daily      anastrozole (ARIMIDEX) 1 MG tablet Take 1 mg by mouth daily       furosemide (LASIX) 20 MG tablet Take 20 mg by mouth daily        Current Facility-Administered Medications   Medication Dose Route Frequency Provider Last Rate Last Admin    perflutren lipid microspheres (DEFINITY) injection 1.65 mg  1.5 mL Intravenous ONCE PRN Kavita Flores MD           Allergies:     Allergies   Allergen Reactions    Diltiazem Hcl Other (See Comments)     Other reaction(s): weak    Cardizem [Diltiazem] Palpitations    Cefuroxime Axetil Nausea And Vomiting, Other (See Comments) and Palpitations    Claritin [Loratadine] Palpitations    Erythromycin Nausea And Vomiting    Morphine Nausea And Vomiting    Mucinex [Guaifenesin Er] Palpitations    Propoxyphene Other (See Comments) and Nausea And Vomiting     DIZZY  Other reaction(s): Free Text    Zyrtec [Cetirizine] Nausea And Vomiting and Other (See Comments)     WEAK       Problem List:    Patient Active Problem List   Diagnosis Code    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, right (HCC) C50.911, C77.3    Bone lesion M89.9       Past Medical History:        Diagnosis Date    Breast cancer ([address] 04/2017    Chronic atrial fibrillation (HCC)     Fibroadenosis of breast     Headache     Hyperlipidemia     Hypertension        Past Surgical History:        Procedure Laterality Date    [facility]    BREAST LUMPECTOMY Right 04/26/2017    COLONOSCOPY      EYE SURGERY      SUDHIR LENSE IMPLANTS    OTHER SURGICAL HISTORY      port removal    TONSILLECTOMY      TUNNELED VENOUS PORT PLACEMENT  05/26/2017    Dr. Nirav Dan       Social History:    Social History     Tobacco Use    Smoking status: Never Smoker    Smokeless tobacco: Never Used   Substance Use Topics    Alcohol use: No     Comment: occasionally pop                                Counseling given: Not Answered      Vital Signs (Current): There were no vitals filed for this visit.                                            BP Readings from Last 3 Encounters:   04/01/21 138/80   03/19/21 (!) 152/72   03/18/21 (!) 140/80       NPO Status:                                                                                 BMI:   Wt Readings from Last 3 Encounters:   04/01/21 182 lb (82.6 kg)   03/19/21 178 lb 4 oz (80.9 kg)   03/18/21 179 lb (81.2 kg)     There is no height or weight on file to calculate BMI.    CBC:   Lab Results   Component Value Date    WBC 6.1 02/19/2021    RBC 4.27 02/19/2021    HGB 13.0 02/19/2021    HCT 40.1 02/19/2021    MCV 93.9 02/19/2021    RDW 12.9 02/19/2021     02/19/2021       CMP:   Lab Results Component Value Date     02/19/2021    K 3.7 02/19/2021     02/19/2021    CO2 28 02/19/2021    BUN 17 02/19/2021    CREATININE 1.1 02/19/2021    GFRAA 60 02/19/2021    LABGLOM 49 02/19/2021    GLUCOSE 93 02/19/2021    PROT 7.1 02/19/2021    CALCIUM 9.8 02/19/2021    BILITOT 0.5 02/19/2021    ALKPHOS 74 02/19/2021    AST 18 02/19/2021    ALT 14 02/19/2021       POC Tests: No results for input(s): POCGLU, POCNA, POCK, POCCL, POCBUN, POCHEMO, POCHCT in the last 72 hours. Coags:   Lab Results   Component Value Date    PROTIME 27.5 04/01/2021    INR 2.3 04/01/2021    INR 3.3 02/19/2021    APTT 29.8 02/28/2018       HCG (If Applicable): No results found for: PREGTESTUR, PREGSERUM, HCG, HCGQUANT     ABGs: No results found for: PHART, PO2ART, FUW7YDN, TPZ9AHF, BEART, F4UMUMYT     Type & Screen (If Applicable):  No results found for: LABABO, LABRH    Drug/Infectious Status (If Applicable):  No results found for: HIV, HEPCAB    COVID-19 Screening (If Applicable):   Lab Results   Component Value Date    COVID19 Not Detected 03/31/2021           Anesthesia Evaluation  Patient summary reviewed no history of anesthetic complications:   Airway: Mallampati: I  TM distance: >3 FB   Neck ROM: full  Mouth opening: > = 3 FB Dental:          Pulmonary:Negative Pulmonary ROS breath sounds clear to auscultation                             Cardiovascular:  Exercise tolerance: good (>4 METS),   (+) hypertension:, valvular problems/murmurs (moderate MR, mild AI): MR and AI, dysrhythmias: SVT and atrial flutter, CHF: diastolic,       ECG reviewed  Rhythm: regular  Rate: normal  Echocardiogram reviewed               ROS comment: Echo 6/2020:  Summary   The left ventricle is mildly dilated. Normal left ventricle wall thickness. Ejection fraction is visually estimated at 60%. There is doppler evidence of stage II diastolic dysfunction. Normal right ventricular size and function. The left atrium is mildly dilated. Moderate mitral regurgitation   Mild aortic regurgitation. Trace to mild tricuspid regurgitation. Fat pad versus small effusion cannot be ruled out. EKG 2/2021: Sinus bradycardia     Neuro/Psych:   (+) headaches: migraine headaches, psychiatric history (depression):            GI/Hepatic/Renal: Neg GI/Hepatic/Renal ROS            Endo/Other:    (+) blood dyscrasia: anticoagulation therapy:., malignancy/cancer (breast CA  - Right). ROS comment: Bone lesion pending Biopsy 4/7/2021 Abdominal:           Vascular: negative vascular ROS. Anesthesia Plan      MAC     ASA 3       Induction: intravenous. Anesthetic plan and risks discussed with patient.                       Ke Robles MD   4/7/2021

## 2021-04-07 NOTE — H&P
, Rfl:     Current Facility-Administered Medications:     meperidine (DEMEROL) injection 12.5 mg, 12.5 mg, Intravenous, Q5 Min PRN, Leticia He MD    promethazine (PHENERGAN) injection 6.25 mg, 6.25 mg, Intravenous, Once PRN, Rosio Roche MD    labetalol (NORMODYNE;TRANDATE) injection 5 mg, 5 mg, Intravenous, Q10 Min PRN, Rosio Roche MD    hydrALAZINE (APRESOLINE) injection 5 mg, 5 mg, Intravenous, Q10 Min PRN, Rosio Roche MD    HYDROmorphone (DILAUDID) injection 0.25 mg, 0.25 mg, Intravenous, Q5 Min PRN, Leticia He MD    HYDROmorphone (DILAUDID) injection 0.5 mg, 0.5 mg, Intravenous, Q5 Min PRN, Rosio Roche MD    oxyCODONE-acetaminophen (PERCOCET) 5-325 MG per tablet 1 tablet, 1 tablet, Oral, Q6H PRN, Rosio Roche MD    perflutren lipid microspheres (DEFINITY) injection 1.65 mg, 1.5 mL, Intravenous, ONCE PRN, Bertrum Kanner, MD    Allergies   Allergen Reactions    Diltiazem Hcl Other (See Comments)     Other reaction(s): weak    Cardizem [Diltiazem] Palpitations    Cefuroxime Axetil Nausea And Vomiting, Other (See Comments) and Palpitations    Claritin [Loratadine] Palpitations    Erythromycin Nausea And Vomiting    Morphine Nausea And Vomiting    Mucinex [Guaifenesin Er] Palpitations    Propoxyphene Other (See Comments) and Nausea And Vomiting     DIZZY  Other reaction(s): Free Text    Zyrtec [Cetirizine] Nausea And Vomiting and Other (See Comments)     WEAK       Past Medical History:   Diagnosis Date    [facility] ([address] 04/2017    Chronic atrial fibrillation ([address]     Fibroadenosis of breast     Headache     Hyperlipidemia     Hypertension        Past Surgical History:   Procedure Laterality Date    [facility]    BREAST LUMPECTOMY Right 04/26/2017    COLONOSCOPY      EYE SURGERY      SUDHIR LENSE IMPLANTS    OTHER SURGICAL HISTORY      port removal    TONSILLECTOMY      TUNNELED VENOUS PORT PLACEMENT  05/26/2017    Dr. Marica Galeazzi       Family History   Problem Relation Age of Onset    Cancer Maternal Cousin 80        lung    Heart Disease Mother     Other Mother         [place] Palsy    Other Father         gall bladder problem, hip fx; AAA rupture    Coronary Art Dis Father         MI       Social History     Socioeconomic History    Marital status:      Spouse name: Sherri Billing Number of children: Not on file    Years of education: Not on file    Highest education level: Not on file   Occupational History    Occupation: retired      Comment: Retired 4 th    Social Needs    Financial resource strain: Not on file    Food insecurity     Worry: Not on file     Inability: Not on file   [employer] needs     Medical: Not on file     Non-medical: Not on file   Tobacco Use    Smoking status: Never Smoker    Smokeless tobacco: Never Used   Substance and Sexual Activity    Alcohol use: No     Comment: occasionally pop    Drug use: No    Sexual activity: Not Currently     Partners: Male   Lifestyle    Physical activity     Days per week: Not on file     Minutes per session: Not on file    Stress: Not on file   Relationships    Social connections     Talks on phone: Not on file     Gets together: Not on file     Attends [employer] service: Not on file     Active member of club or organization: Not on file     Attends meetings of clubs or organizations: Not on file     Relationship status: Not on file    Intimate partner violence     Fear of current or ex partner: Not on file     Emotionally abused: Not on file     Physically abused: Not on file     Forced sexual activity: Not on file   Other Topics Concern    Not on file   Social History Narrative    Drinks occasional Pepsi/Coke       ROS: Non-contributory other than as noted above    PHYSICAL EXAM:      Heart and Lungs:  demonstrate no contraindications to proceed    DATA:  CBC:

## 2021-04-07 NOTE — PROGRESS NOTES
Pt was monitored post sedation times 45 min. , she was given fluids, small snack tolerating well. Upon discharge pt was awake and alert, oriented times 4. Family at the bedside.

## 2021-04-07 NOTE — PROGRESS NOTES
Patient tolerated procedure well. Specimen taken to the lab, [id number], Delivered to the lab and recorded   Report to [facility] for recovery.

## 2021-04-12 ENCOUNTER — HOSPITAL ENCOUNTER (OUTPATIENT)
Dept: RADIATION ONCOLOGY | Age: 71
Discharge: HOME | End: 2021-04-12
Attending: RADIOLOGY
Payer: MEDICARE

## 2021-04-12 PROCEDURE — 77417 THER RADIOLOGY PORT IMAGE(S): CPT | Performed by: RADIOLOGY

## 2021-04-12 PROCEDURE — 77412 RADIATION TX DELIVERY LVL 3: CPT | Performed by: RADIOLOGY

## 2021-04-13 ENCOUNTER — HOSPITAL ENCOUNTER (OUTPATIENT)
Dept: RADIATION ONCOLOGY | Age: 71
Discharge: HOME | End: 2021-04-13
Attending: RADIOLOGY
Payer: MEDICARE

## 2021-04-13 VITALS
SYSTOLIC BLOOD PRESSURE: 154 MMHG | WEIGHT: 178.13 LBS | RESPIRATION RATE: 18 BRPM | BODY MASS INDEX: 26.3 KG/M2 | OXYGEN SATURATION: 98 % | HEART RATE: 60 BPM | DIASTOLIC BLOOD PRESSURE: 68 MMHG | TEMPERATURE: 97.3 F

## 2021-04-13 DIAGNOSIS — C79.51 BONE METASTASIS (HCC): Primary | ICD-10-CM

## 2021-04-13 PROCEDURE — 99999 PR OFFICE/OUTPT VISIT,PROCEDURE ONLY: CPT | Performed by: RADIOLOGY

## 2021-04-13 PROCEDURE — 77412 RADIATION TX DELIVERY LVL 3: CPT | Performed by: RADIOLOGY

## 2021-04-13 ASSESSMENT — PAIN DESCRIPTION - PAIN TYPE: TYPE: ACUTE PAIN

## 2021-04-13 ASSESSMENT — PAIN SCALES - GENERAL: PAINLEVEL_OUTOF10: 8

## 2021-04-13 ASSESSMENT — PAIN DESCRIPTION - ORIENTATION: ORIENTATION: LEFT

## 2021-04-13 NOTE — PROGRESS NOTES
Jean Carlos Byrnes  2021  Wt Readings from Last 3 Encounters:   21 178 lb 2 oz (80.8 kg)   21 180 lb (81.6 kg)   21 182 lb (82.6 kg)     Body mass index is 26.3 kg/m². Treatment Area:Left iliac crest    Patient was seen today for weekly visit. Comfort Alteration  KPS:90%  Fatigue: None      Nutritional Alteration  Anorexia: No   Nausea: No   Vomiting: No     Elimination Alterations  Constipation: no  Diarrhea:  no  Bowel Incontinence: No  Urinary Incontinence: No    Skin Alteration   Sensation:na    Radiation Dermatitis:  na      Emotional  Coping: effective    Sexuality Alteration  na    Injury, potential bleeding or infection: na        Lab Results   Component Value Date    WBC 5.8 2021     2021       BP (!) 158/74   Pulse 60   Temp 97.3 °F (36.3 °C) (Temporal)   Resp 18   Wt 178 lb 2 oz (80.8 kg)   SpO2 98%   BMI 26.30 kg/m²   BP within normal range? no   -if no, manually recheck in 5-10 min  NEW BP readin/68  BP within normal range? no   -if no, notify attending provider for further instruction      Assessment/Plan:  Completed 2/10 fractions; 600/3000 cGy. BP elevated, denies headache.     Francoise Edwards

## 2021-04-13 NOTE — PROGRESS NOTES
DEPARTMENT OF RADIATION ONCOLOGY   ON TREATMENT VISIT       4/13/2021      NAME:  Yodit Candelario    YOB: 1950      Diagnosis:  1. Bone metastasis ([address]        SUBJECTIVE:   Yodit Candelario status post 600 cGy/2 fractions to left iliac crest.  Pain is stable. Unfortunately, left cardiac breast biopsy came back positive for metastatic breast cancer. I shared the news with her. She has an appointment with her medical oncologist later this week. Physical Examination:       Wt Readings from Last 3 Encounters:   04/13/21 178 lb 2 oz (80.8 kg)   04/07/21 180 lb (81.6 kg)   04/01/21 182 lb (82.6 kg)       Labs:  Lab Results   Component Value Date    WBC 5.8 04/07/2021    RBC 4.32 04/07/2021    HGB 13.0 04/07/2021    HCT 39.7 04/07/2021    MCV 91.9 04/07/2021    MCH 30.1 04/07/2021    MCHC 32.7 04/07/2021    RDW 12.5 04/07/2021     04/07/2021    MPV 9.9 04/07/2021            ASSESSMENT/PLAN: Questions answered  Continue treatment as planned      Seth Ayala M.D.   Radiation Oncologist  [place]: [phone]   [place]and[place] Tovar: [phone] Dec: [phone]   Chandler Tovar: [phone])

## 2021-04-14 ENCOUNTER — TELEPHONE (OUTPATIENT)
Dept: RADIATION ONCOLOGY | Age: 71
End: 2021-04-14

## 2021-04-14 ENCOUNTER — TELEPHONE (OUTPATIENT)
Dept: FAMILY MEDICINE CLINIC | Age: 71
End: 2021-04-14

## 2021-04-14 ENCOUNTER — PROCEDURE VISIT (OUTPATIENT)
Dept: PODIATRY | Age: 71
End: 2021-04-14
Payer: MEDICARE

## 2021-04-14 ENCOUNTER — HOSPITAL ENCOUNTER (OUTPATIENT)
Dept: RADIATION ONCOLOGY | Age: 71
Discharge: HOME | End: 2021-04-14
Attending: RADIOLOGY
Payer: MEDICARE

## 2021-04-14 DIAGNOSIS — M79.672 PAIN IN LEFT FOOT: ICD-10-CM

## 2021-04-14 DIAGNOSIS — B35.1 TINEA UNGUIUM: Primary | ICD-10-CM

## 2021-04-14 DIAGNOSIS — L84 CORNS AND CALLOSITIES: ICD-10-CM

## 2021-04-14 DIAGNOSIS — Z79.01 ENCOUNTER FOR CURRENT LONG-TERM USE OF ANTICOAGULANTS: ICD-10-CM

## 2021-04-14 DIAGNOSIS — M72.2 PLANTAR FASCIITIS: ICD-10-CM

## 2021-04-14 PROCEDURE — 29580 STRAPPING UNNA BOOT: CPT | Performed by: PODIATRIST

## 2021-04-14 PROCEDURE — 11721 DEBRIDE NAIL 6 OR MORE: CPT | Performed by: PODIATRIST

## 2021-04-14 PROCEDURE — 77412 RADIATION TX DELIVERY LVL 3: CPT | Performed by: RADIOLOGY

## 2021-04-14 NOTE — TELEPHONE ENCOUNTER
This Rad/Onc RN called patient's PCP Dr. Moustapha Swenson. Re:  BP elevated on /74 with a repeat of 154/68 patient asymptomatic denies headache, spoke to nurse Rosa Odom.

## 2021-04-14 NOTE — TELEPHONE ENCOUNTER
[address] [facility]       [phone]        Inessa Alford was in to see them today for treatment and had an elevated blood pressure. It was 158/74 on arrival.  When she left the office, it was 154/68. She was feeling fine and was going home. Just JAYNE.

## 2021-04-14 NOTE — PROGRESS NOTES
Mayelin Hobbs is here today for nail care. her PCP is Damita Runner, DO last OV was 21 . C/o left foot pain. Mayelin Hobbs : 1950 Sex: female  Age: 72 y.o. Patient was referred by Damita Runner, DO    CC:    Follow-up painful toenails 1-5 right and left    HPI:   Follow-up painful toenails 1-5 right and left  Continues warfarin long-term. No calf pain. Does have left heel pain present for the past several days. Presents with her family members today. Does have history of plantar fasciitis in the past on the left heel. Denies recent injury or trauma. No calf pain.         ROS:  Const: Denies constitutional symptoms  Musculo: Denies symptoms other than stated above  Skin: Denies symptoms other than stated above       Current Outpatient Medications:     propafenone (RYTHMOL) 225 MG tablet, Take 1 tablet by mouth three times daily, Disp: 270 tablet, Rfl: 1    acetaminophen (TYLENOL) 500 MG tablet, Take 1,000 mg by mouth every 6 hours as needed for Pain, Disp: , Rfl:     verapamil (CALAN SR) 180 MG extended release tablet, Take 1 tablet by mouth daily, Disp: 90 tablet, Rfl: 1    topiramate (TOPAMAX) 50 MG tablet, Take 1 tablet by mouth 2 times daily, Disp: 180 tablet, Rfl: 1    warfarin (COUMADIN) 6 MG tablet, Indications: monday, wednesday, friday 3 mg, all other days are 6 mg 1/2 tab on M W  and 1 tab all other days of the week OR AS DIRECTED, Disp: 30 tablet, Rfl: 5    escitalopram (LEXAPRO) 10 MG tablet, Take 1 tablet by mouth every evening, Disp: 90 tablet, Rfl: 1    ammonium lactate (LAC-HYDRIN) 12 % lotion, Apply topically twice daily, Disp: 400 g, Rfl: 2    calcium carbonate-vitamin D (CALTRATE 600+D) 600-400 MG-UNIT TABS per tab, Take 1 tablet by mouth daily, Disp: , Rfl:     Alpha-Lipoic Acid 100 MG CAPS, Take by mouth, Disp: , Rfl:     b complex vitamins capsule, Take 1 capsule by mouth daily, Disp: , Rfl:     anastrozole (ARIMIDEX) 1 MG tablet, Take 1 mg by mouth daily , Disp: , Rfl:     furosemide (LASIX) 20 MG tablet, Take 20 mg by mouth daily , Disp: , Rfl:   Allergies   Allergen Reactions    Diltiazem Hcl Other (See Comments)     Other reaction(s): weak    Cardizem [Diltiazem] Palpitations    Cefuroxime Axetil Nausea And Vomiting, Other (See Comments) and Palpitations    Claritin [Loratadine] Palpitations    Erythromycin Nausea And Vomiting    Morphine Nausea And Vomiting    Mucinex [Guaifenesin Er] Palpitations    Propoxyphene Other (See Comments) and Nausea And Vomiting     DIZZY  Other reaction(s): Free Text    Zyrtec [Cetirizine] Nausea And Vomiting and Other (See Comments)     WEAK       Past Medical History:   Diagnosis Date    [facility] ([address] 04/2017    Chronic atrial fibrillation (HCC)     Fibroadenosis of breast     Headache     Hyperlipidemia     Hypertension            There were no vitals filed for this visit. Work History/Social History: Retired  [address] [employer]    Focused Lower Extremity Physical Exam:    Neurovascular examination:    Dorsalis Pedis palpable bilateral.  Posterior tibialis palpable bilateral.    Capillary Refill Time:  Immediate return  Hair growth:  Symmetrical and bilateral   Skin:  Not atrophic  Edema: No edema bilateral feet or ankles. Neurologic:  Light touch intact bilateral.      Musculoskeletal/ Orthopedic examination:    Equinis: Absent bilateral  Dorsiflexion, plantarflexion, inversion, eversion bilateral 5 out of 5 muscle strength  Wiggling toes  Negative Homans    Flexible contracted hammertoe second third and fourth digit right foot  Tenderness palpation medial and central band left plantar fascial.  Negative calcaneal squeeze test.      Dermatology examination:    No open skin lesions or abrasions bilateral lower extremity. Toenails 1-5 right and left foot thickened, elongated, mycotic, dystrophic with subungual debris. Assessment and Plan:   Brigitte Vasquez was seen today for callouses and nail problem. Diagnoses and all orders for this visit:    Tinea unguium    Corns and callosities    Encounter for current long-term use of anticoagulants    Plantar fasciitis    Pain in left foot          Follow-up foot ankle exam    Does have clinical presentation of plantar fasciitis. Has responded to them boot and Ace bandage in the past.    Does continue long-term anticoagulant therapy    Manual debridement with standard technique toenails 1 through 5 right and left in thickness and length. Patient tolerated procedure well. An unna boot  compressive wrap was applied to the left lower extremity. It's purpose is to  decrease  the amount of edema present, and to allow proper healing of the soft tissues. The patient was instructed to keep the unna boot clean, dry and intact until the next follow up visit. The patient was instructed to look for signs of redness, irritation, blistering and pain. If these or any other symptoms were to develop, they were advised to contact the office immediately for reevaluation. Unna boot and Ace bandage for edema control. Keep clean dry intact next 3-5 days. Any increasing calf pain removed sooner to go directly to the emergency room. Any recurrence of heel pain come in sooner. Follow-up 2 months foot ankle exam.    No follow-ups on file. Seen By:  Demetris Stout DPM      Document was created using voice recognition software. Note was reviewed, however may contain grammatical errors.

## 2021-04-15 ENCOUNTER — HOSPITAL ENCOUNTER (OUTPATIENT)
Dept: RADIATION ONCOLOGY | Age: 71
Discharge: HOME | End: 2021-04-15
Attending: RADIOLOGY
Payer: MEDICARE

## 2021-04-15 PROCEDURE — 77412 RADIATION TX DELIVERY LVL 3: CPT | Performed by: RADIOLOGY

## 2021-04-16 ENCOUNTER — OFFICE VISIT (OUTPATIENT)
Dept: FAMILY MEDICINE CLINIC | Age: 71
End: 2021-04-16
Payer: MEDICARE

## 2021-04-16 ENCOUNTER — TELEPHONE (OUTPATIENT)
Dept: RADIATION ONCOLOGY | Age: 71
End: 2021-04-16

## 2021-04-16 ENCOUNTER — HOSPITAL ENCOUNTER (OUTPATIENT)
Dept: RADIATION ONCOLOGY | Age: 71
Discharge: HOME | End: 2021-04-16
Attending: RADIOLOGY
Payer: MEDICARE

## 2021-04-16 VITALS
HEIGHT: 69 IN | WEIGHT: 175 LBS | SYSTOLIC BLOOD PRESSURE: 132 MMHG | TEMPERATURE: 97.2 F | BODY MASS INDEX: 25.92 KG/M2 | OXYGEN SATURATION: 97 % | HEART RATE: 55 BPM | RESPIRATION RATE: 18 BRPM | DIASTOLIC BLOOD PRESSURE: 58 MMHG

## 2021-04-16 DIAGNOSIS — I10 ESSENTIAL HYPERTENSION: Primary | ICD-10-CM

## 2021-04-16 DIAGNOSIS — I48.20 CHRONIC ATRIAL FIBRILLATION (HCC): ICD-10-CM

## 2021-04-16 DIAGNOSIS — C79.51 BONE METASTASIS (HCC): ICD-10-CM

## 2021-04-16 LAB
INTERNATIONAL NORMALIZATION RATIO, POC: 2.4
PROTHROMBIN TIME, POC: 28.3

## 2021-04-16 PROCEDURE — 77336 RADIATION PHYSICS CONSULT: CPT | Performed by: RADIOLOGY

## 2021-04-16 PROCEDURE — 99213 OFFICE O/P EST LOW 20 MIN: CPT | Performed by: FAMILY MEDICINE

## 2021-04-16 PROCEDURE — 77412 RADIATION TX DELIVERY LVL 3: CPT | Performed by: RADIOLOGY

## 2021-04-16 PROCEDURE — 85610 PROTHROMBIN TIME: CPT | Performed by: FAMILY MEDICINE

## 2021-04-16 PROCEDURE — 77427 RADIATION TX MANAGEMENT X5: CPT | Performed by: RADIOLOGY

## 2021-04-16 ASSESSMENT — ENCOUNTER SYMPTOMS
EYE PAIN: 0
ABDOMINAL PAIN: 0
SORE THROAT: 0
VOMITING: 0
SHORTNESS OF BREATH: 0
NAUSEA: 0
BACK PAIN: 0
SINUS PAIN: 0
TROUBLE SWALLOWING: 0
PHOTOPHOBIA: 0
EYE REDNESS: 0
COUGH: 0
CHEST TIGHTNESS: 0
ALLERGIC/IMMUNOLOGIC NEGATIVE: 1
BLOOD IN STOOL: 0
EYE DISCHARGE: 0
DIARRHEA: 0

## 2021-04-16 NOTE — PROGRESS NOTES
21  Paul Neumann : 1950 Sex: female  Age: 72 y.o. Assessment and Plan: John Lu was seen today for hypertension. Diagnoses and all orders for this visit:    Essential hypertension  Pressure well controlled with current medical regimen. Chronic atrial fibrillation (HCC)  -     POCT INR  Pro time is therapeutic she is to continue current warfarin dose. Bone metastasis ([address]  Iliac crest biopsy showed metastatic breast cancer. Will be seeing oncology on Monday for any treatment changes. Has 5 more courses of radiation therapy for pain relief      Return in about 4 weeks (around 2021). Chief Complaint   Patient presents with    Hypertension       Presents for recheck INR. Reading 2.4 today, therapeutic. She also had her bone biopsy left iliac crest done 10 days ago. Pathology report returned metastatic breast cancer. And showed no other area else on her body lighting up than that iliac crest.  She has an appointment with oncology on Monday for discussion of results and for any ranges in her treatment. Continues with radiation that area, has 5 more treatments. Review of Systems   Constitutional: Negative for appetite change, fatigue and unexpected weight change. HENT: Negative for congestion, ear pain, hearing loss, sinus pain, sore throat and trouble swallowing. Eyes: Negative for photophobia, pain, discharge and redness. Respiratory: Negative for cough, chest tightness and shortness of breath. Cardiovascular: Negative for chest pain, palpitations and leg swelling. Gastrointestinal: Negative for abdominal pain, blood in stool, diarrhea, nausea and vomiting. Endocrine: Negative. Genitourinary: Negative for dysuria, flank pain, frequency and hematuria. Musculoskeletal: Negative for arthralgias, back pain, joint swelling and myalgias. Skin: Negative. Allergic/Immunologic: Negative.     Neurological: Negative for dizziness, seizures, syncope, weakness, Nausea And Vomiting and Other (See Comments)     WEAK       Past Medical History:   Diagnosis Date    Breast cancer ([address] 04/2017    Chronic atrial fibrillation (HCC)     Fibroadenosis of breast     Headache     Hyperlipidemia     Hypertension      Past Surgical History:   Procedure Laterality Date    [facility]    BREAST LUMPECTOMY Right 04/26/2017    COLONOSCOPY      CT BIOPSY PERCUTANEOUS DEEP BONE  4/7/2021    CT BIOPSY PERCUTANEOUS DEEP BONE 4/7/2021 Maico Frances MD [facility]    EYE SURGERY      SUDHIR LENSE IMPLANTS    OTHER SURGICAL HISTORY      port removal    TONSILLECTOMY      TUNNELED VENOUS PORT PLACEMENT  05/26/2017    Dr. Iraj Rasheed     Family History   Problem Relation Age of Onset    Cancer Maternal Cousin 80        lung    Heart Disease Mother     Other Mother         [place] Palsy    Other Father         gall bladder problem, hip fx; AAA rupture    Coronary Art Dis Father         MI     Social History     Socioeconomic History    Marital status:      Spouse name: Abiodun Carvalho Number of children: Not on file    Years of education: Not on file    Highest education level: Not on file   Occupational History    Occupation: retired      Comment: Retired 4 th    Social Needs    Financial resource strain: Not on file    Food insecurity     Worry: Not on file     Inability: Not on file   [employer] needs     Medical: Not on file     Non-medical: Not on file   Tobacco Use    Smoking status: Never Smoker    Smokeless tobacco: Never Used   Substance and Sexual Activity    Alcohol use: No     Comment: occasionally pop    Drug use: No    Sexual activity: Not Currently     Partners: Male   Lifestyle    Physical activity     Days per week: Not on file     Minutes per session: Not on file    Stress: Not on file   Relationships    Social connections     Talks on phone: Not on file     Gets together: Not on file     Attends Sabianist service: Not on file     Active member of club or organization: Not on file     Attends meetings of clubs or organizations: Not on file     Relationship status: Not on file    Intimate partner violence     Fear of current or ex partner: Not on file     Emotionally abused: Not on file     Physically abused: Not on file     Forced sexual activity: Not on file   Other Topics Concern    Not on file   Social History Narrative    Drinks occasional Pepsi/Coke       Vitals:    04/16/21 1006   BP: (!) 132/58   Pulse: 55   Resp: 18   Temp: 97.2 °F (36.2 °C)   SpO2: 97%   Weight: 175 lb (79.4 kg)   Height: 5' 9\" (1.753 m)       Physical Exam  Vitals signs and nursing note reviewed. Constitutional:       Appearance: She is well-developed. HENT:      Head: Atraumatic. Right Ear: External ear normal.      Left Ear: External ear normal.      Nose: Nose normal.      Mouth/Throat:      Pharynx: No oropharyngeal exudate. Eyes:      Conjunctiva/sclera: Conjunctivae normal.      Pupils: Pupils are equal, round, and reactive to light. Neck:      Musculoskeletal: Normal range of motion and neck supple. Thyroid: No thyromegaly. Trachea: No tracheal deviation. Cardiovascular:      Rate and Rhythm: Normal rate and regular rhythm. Heart sounds: No murmur. No friction rub. No gallop. Pulmonary:      Effort: Pulmonary effort is normal. No respiratory distress. Breath sounds: Normal breath sounds. Abdominal:      General: Bowel sounds are normal.      Palpations: Abdomen is soft. Musculoskeletal: Normal range of motion. General: No tenderness or deformity. Lymphadenopathy:      Cervical: No cervical adenopathy. Skin:     General: Skin is warm and dry. Capillary Refill: Capillary refill takes less than 2 seconds. Findings: No rash. Neurological:      Mental Status: She is alert and oriented to person, place, and time. Sensory: No sensory deficit.       Motor: No

## 2021-04-16 NOTE — TELEPHONE ENCOUNTER
SW met with pt today after her daily radiation treatment to introduce self and role. She is currently being treated for hstory of right breast cancer, stage IIB, now with left iliac crest painful lytic lesion. She has received 5/10 treatments today. Pt relays she was supposed to have further follow up with her medical oncologist but her appointment had to be rescheduled. Pt identifies positive support system through 2 daughters and 5 grandchildren. Daughter and grandchild present today. Daughters have been assisting pt since biopsy as pt's  has a health condition that requires care as well. Support provided today as well as discussion and role of practice. Community resources reviewed including survivorship pairing program. No current psychosocial needs at this time, but pt encouraged to contact SW should any additional assistance be needed.  Dandre Cabrales, GRICELDA, FROILAN-S, OSW-C  Oncology Social Worker

## 2021-04-19 ENCOUNTER — HOSPITAL ENCOUNTER (OUTPATIENT)
Dept: RADIATION ONCOLOGY | Age: 71
Discharge: HOME | End: 2021-04-19
Attending: RADIOLOGY
Payer: MEDICARE

## 2021-04-19 PROCEDURE — 77412 RADIATION TX DELIVERY LVL 3: CPT | Performed by: RADIOLOGY

## 2021-04-19 PROCEDURE — 77417 THER RADIOLOGY PORT IMAGE(S): CPT | Performed by: RADIOLOGY

## 2021-04-20 ENCOUNTER — HOSPITAL ENCOUNTER (OUTPATIENT)
Dept: RADIATION ONCOLOGY | Age: 71
Discharge: HOME | End: 2021-04-20
Attending: RADIOLOGY
Payer: MEDICARE

## 2021-04-20 VITALS
TEMPERATURE: 97.5 F | SYSTOLIC BLOOD PRESSURE: 138 MMHG | OXYGEN SATURATION: 98 % | WEIGHT: 179 LBS | HEART RATE: 59 BPM | DIASTOLIC BLOOD PRESSURE: 70 MMHG | BODY MASS INDEX: 26.43 KG/M2 | RESPIRATION RATE: 18 BRPM

## 2021-04-20 DIAGNOSIS — C79.51 BONE METASTASIS (HCC): Primary | ICD-10-CM

## 2021-04-20 PROCEDURE — 77412 RADIATION TX DELIVERY LVL 3: CPT | Performed by: RADIOLOGY

## 2021-04-20 ASSESSMENT — PAIN DESCRIPTION - ORIENTATION: ORIENTATION: LEFT;LOWER

## 2021-04-20 ASSESSMENT — PAIN DESCRIPTION - FREQUENCY: FREQUENCY: INTERMITTENT

## 2021-04-20 ASSESSMENT — PAIN DESCRIPTION - DESCRIPTORS: DESCRIPTORS: OTHER (COMMENT)

## 2021-04-20 NOTE — PROGRESS NOTES
DEPARTMENT OF RADIATION ONCOLOGY   ON TREATMENT VISIT       4/20/2021      NAME:  Mayelin Hobbs    YOB: 1950      Diagnosis:  1. Bone metastasis ([address]        SUBJECTIVE:   Mayelin Liner status post 2100 cGy/7 fractions the left iliac crest metastatic breast tumor. Pain is much improved. No other issues. Physical Examination:       Wt Readings from Last 3 Encounters:   04/20/21 179 lb (81.2 kg)   04/16/21 175 lb (79.4 kg)   04/13/21 178 lb 2 oz (80.8 kg)       Labs:  Lab Results   Component Value Date    WBC 5.8 04/07/2021    RBC 4.32 04/07/2021    HGB 13.0 04/07/2021    HCT 39.7 04/07/2021    MCV 91.9 04/07/2021    MCH 30.1 04/07/2021    MCHC 32.7 04/07/2021    RDW 12.5 04/07/2021     04/07/2021    MPV 9.9 04/07/2021            ASSESSMENT/PLAN: Patient will be getting Ibrance, bisphosphonate,? Faslodex through [facility]. Questions answered    Continue treatment as planned      David Hooks M.D.   Radiation Oncologist  [place]: [phone]   [place]: [phone]: [phone]   [place]: [phone])

## 2021-04-20 NOTE — PROGRESS NOTES
Soraidaeric Mar  4/20/2021  Wt Readings from Last 3 Encounters:   04/20/21 179 lb (81.2 kg)   04/16/21 175 lb (79.4 kg)   04/13/21 178 lb 2 oz (80.8 kg)     Body mass index is 26.43 kg/m². Treatment Area:Left iliac    Patient was seen today for weekly visit. Comfort Alteration  KPS:80%  Fatigue: Mild    Mucous Membrane Alteration  Drainage: No  Drainage Odor: No  Vaginal Bleeding: No    Nutritional Alteration  Anorexia: No  Nausea: No  Vomiting: No     Elimination Alterations  Constipation: Yes/ a little  Diarrhea:  no  Urinary Frequency/Urgency: No  Urinary Retention: No  Dysuria: No  Urinary Incontinence: No  Proctitis: No    Skin Alteration   Sensation:na    Radiation Dermatitis:  na    Emotional  Coping: effective    Sexuality Alteration  na    Injury, potential bleeding or infection: na    Lab Results   Component Value Date    WBC 5.8 04/07/2021     04/07/2021         /70   Pulse 59   Temp 97.5 °F (36.4 °C) (Temporal)   Resp 18   Wt 179 lb (81.2 kg)   SpO2 98%   BMI 26.43 kg/m²   BP within normal range? yes       Assessment/Plan:  Completed 7/10 fractions; 2100/3000 cGy, states pain to left hip is a lot better, no more limping when walking.       Jagdish Cheung

## 2021-04-21 ENCOUNTER — HOSPITAL ENCOUNTER (OUTPATIENT)
Dept: RADIATION ONCOLOGY | Age: 71
Discharge: HOME | End: 2021-04-21
Attending: RADIOLOGY
Payer: MEDICARE

## 2021-04-21 PROCEDURE — 77412 RADIATION TX DELIVERY LVL 3: CPT | Performed by: RADIOLOGY

## 2021-04-22 ENCOUNTER — HOSPITAL ENCOUNTER (OUTPATIENT)
Dept: RADIATION ONCOLOGY | Age: 71
Discharge: HOME | End: 2021-04-22
Attending: RADIOLOGY
Payer: MEDICARE

## 2021-04-22 PROCEDURE — 77412 RADIATION TX DELIVERY LVL 3: CPT | Performed by: RADIOLOGY

## 2021-04-23 ENCOUNTER — HOSPITAL ENCOUNTER (OUTPATIENT)
Dept: RADIATION ONCOLOGY | Age: 71
Discharge: HOME | End: 2021-04-23
Attending: RADIOLOGY
Payer: MEDICARE

## 2021-04-23 PROCEDURE — 77336 RADIATION PHYSICS CONSULT: CPT | Performed by: RADIOLOGY

## 2021-04-23 PROCEDURE — 77412 RADIATION TX DELIVERY LVL 3: CPT | Performed by: RADIOLOGY

## 2021-04-23 PROCEDURE — 77427 RADIATION TX MANAGEMENT X5: CPT | Performed by: RADIOLOGY

## 2021-04-23 NOTE — PROGRESS NOTES
Mckenzie Espinoza  4/23/2021  7:55 AM          Current Outpatient Medications   Medication Sig Dispense Refill    propafenone (RYTHMOL) 225 MG tablet Take 1 tablet by mouth three times daily 270 tablet 1    acetaminophen (TYLENOL) 500 MG tablet Take 1,000 mg by mouth every 6 hours as needed for Pain      verapamil (CALAN SR) 180 MG extended release tablet Take 1 tablet by mouth daily 90 tablet 1    topiramate (TOPAMAX) 50 MG tablet Take 1 tablet by mouth 2 times daily 180 tablet 1    warfarin (COUMADIN) 6 MG tablet Indications: monday, wednesday, friday 3 mg, all other days are 6 mg 1/2 tab on M W F and 1 tab all other days of the week OR AS DIRECTED 30 tablet 5    escitalopram (LEXAPRO) 10 MG tablet Take 1 tablet by mouth every evening 90 tablet 1    ammonium lactate (LAC-HYDRIN) 12 % lotion Apply topically twice daily 400 g 2    calcium carbonate-vitamin D (CALTRATE 600+D) 600-400 MG-UNIT TABS per tab Take 1 tablet by mouth daily      Alpha-Lipoic Acid 100 MG CAPS Take by mouth      b complex vitamins capsule Take 1 capsule by mouth daily      anastrozole (ARIMIDEX) 1 MG tablet Take 1 mg by mouth daily       furosemide (LASIX) 20 MG tablet Take 20 mg by mouth daily        Current Facility-Administered Medications   Medication Dose Route Frequency Provider Last Rate Last Admin    perflutren lipid microspheres (DEFINITY) injection 1.65 mg  1.5 mL Intravenous ONCE PRN Kisha Jenkins MD              This is an up-to-date medication list.    Please take this list to your next care provider, and discard any previous medication lists.

## 2021-05-05 DIAGNOSIS — Z01.419 ENCOUNTER FOR ANNUAL ROUTINE GYNECOLOGICAL EXAMINATION: ICD-10-CM

## 2021-05-17 ENCOUNTER — OFFICE VISIT (OUTPATIENT)
Dept: FAMILY MEDICINE CLINIC | Age: 71
End: 2021-05-17
Payer: MEDICARE

## 2021-05-17 VITALS
RESPIRATION RATE: 17 BRPM | SYSTOLIC BLOOD PRESSURE: 130 MMHG | OXYGEN SATURATION: 98 % | HEIGHT: 67 IN | TEMPERATURE: 97.7 F | HEART RATE: 61 BPM | WEIGHT: 179.6 LBS | DIASTOLIC BLOOD PRESSURE: 80 MMHG | BODY MASS INDEX: 28.19 KG/M2

## 2021-05-17 DIAGNOSIS — I48.20 CHRONIC ATRIAL FIBRILLATION (HCC): ICD-10-CM

## 2021-05-17 DIAGNOSIS — I10 ESSENTIAL HYPERTENSION: Primary | ICD-10-CM

## 2021-05-17 LAB
INTERNATIONAL NORMALIZATION RATIO, POC: 2.7
PROTHROMBIN TIME, POC: 32.4

## 2021-05-17 PROCEDURE — 85610 PROTHROMBIN TIME: CPT | Performed by: FAMILY MEDICINE

## 2021-05-17 PROCEDURE — 99213 OFFICE O/P EST LOW 20 MIN: CPT | Performed by: FAMILY MEDICINE

## 2021-05-17 RX ORDER — PALBOCICLIB 125 MG/1
125 TABLET, FILM COATED ORAL DAILY
COMMUNITY
End: 2022-07-11

## 2021-05-17 SDOH — ECONOMIC STABILITY: FOOD INSECURITY: WITHIN THE PAST 12 MONTHS, YOU WORRIED THAT YOUR FOOD WOULD RUN OUT BEFORE YOU GOT MONEY TO BUY MORE.: NEVER TRUE

## 2021-05-17 ASSESSMENT — ENCOUNTER SYMPTOMS
SHORTNESS OF BREATH: 0
EYE PAIN: 0
SINUS PAIN: 0
ABDOMINAL PAIN: 0
EYE REDNESS: 0
SORE THROAT: 0
TROUBLE SWALLOWING: 0
EYE DISCHARGE: 0
ALLERGIC/IMMUNOLOGIC NEGATIVE: 1
BLOOD IN STOOL: 0
CHEST TIGHTNESS: 0
VOMITING: 0
DIARRHEA: 0
NAUSEA: 0
COUGH: 0
PHOTOPHOBIA: 0
BACK PAIN: 0

## 2021-05-17 ASSESSMENT — SOCIAL DETERMINANTS OF HEALTH (SDOH): HOW HARD IS IT FOR YOU TO PAY FOR THE VERY BASICS LIKE FOOD, HOUSING, MEDICAL CARE, AND HEATING?: NOT HARD AT ALL

## 2021-05-17 NOTE — PROGRESS NOTES
21  Taylor Cost : 1950 Sex: female  Age: 72 y.o. Assessment and Plan: [facility] was seen today for hypertension and ear problem. Diagnoses and all orders for this visit:    Essential hypertension  Blood pressure well controlled on current medication. Chronic atrial fibrillation (HCC)  -     POCT INR  To maintain current warfarin schedule. Return in about 3 months (around 2021). Chief Complaint   Patient presents with    Hypertension    Ear Problem     pt states that left hear is hard of hearing, my have wax       Patient here for recheck pro time INR is 2.7, therapeutic. C/ Cally 29 is adjusted her chemotherapy metastatic breast cancer completed a course of radiation to her pelvic crest pain is much less. Review of Systems   Constitutional: Negative for appetite change, fatigue and unexpected weight change. HENT: Negative for congestion, ear pain, hearing loss, sinus pain, sore throat and trouble swallowing. Eyes: Negative for photophobia, pain, discharge and redness. Respiratory: Negative for cough, chest tightness and shortness of breath. Cardiovascular: Negative for chest pain, palpitations and leg swelling. Gastrointestinal: Negative for abdominal pain, blood in stool, diarrhea, nausea and vomiting. Endocrine: Negative. Genitourinary: Negative for dysuria, flank pain, frequency and hematuria. Musculoskeletal: Negative for arthralgias, back pain, joint swelling and myalgias. Skin: Negative. Allergic/Immunologic: Negative. Neurological: Negative for dizziness, seizures, syncope, weakness, light-headedness, numbness and headaches. Hematological: Negative for adenopathy. Does not bruise/bleed easily. Psychiatric/Behavioral: Negative.           Current Outpatient Medications:     palbociclib (IBRANCE) 125 MG tablet, Take 125 mg by mouth daily, Disp: , Rfl:     fulvestrant (FASLODEX) 250 MG/5ML SOLN IM injection, Inject 500 mg into the muscle once, Disp: , Rfl:     denosumab (XGEVA) 120 MG/1.7ML SOLN SC injection, Inject 120 mg into the skin once, Disp: , Rfl:     propafenone (RYTHMOL) 225 MG tablet, Take 1 tablet by mouth three times daily, Disp: 270 tablet, Rfl: 1    acetaminophen (TYLENOL) 500 MG tablet, Take 1,000 mg by mouth every 6 hours as needed for Pain, Disp: , Rfl:     verapamil (CALAN SR) 180 MG extended release tablet, Take 1 tablet by mouth daily, Disp: 90 tablet, Rfl: 1    topiramate (TOPAMAX) 50 MG tablet, Take 1 tablet by mouth 2 times daily, Disp: 180 tablet, Rfl: 1    warfarin (COUMADIN) 6 MG tablet, Indications: monday, wednesday, friday 3 mg, all other days are 6 mg 1/2 tab on M W F and 1 tab all other days of the week OR AS DIRECTED, Disp: 30 tablet, Rfl: 5    escitalopram (LEXAPRO) 10 MG tablet, Take 1 tablet by mouth every evening, Disp: 90 tablet, Rfl: 1    ammonium lactate (LAC-HYDRIN) 12 % lotion, Apply topically twice daily, Disp: 400 g, Rfl: 2    calcium carbonate-vitamin D (CALTRATE 600+D) 600-400 MG-UNIT TABS per tab, Take 1 tablet by mouth daily, Disp: , Rfl:     Alpha-Lipoic Acid 100 MG CAPS, Take by mouth, Disp: , Rfl:     b complex vitamins capsule, Take 1 capsule by mouth daily, Disp: , Rfl:     anastrozole (ARIMIDEX) 1 MG tablet, Take 1 mg by mouth daily , Disp: , Rfl:     furosemide (LASIX) 20 MG tablet, Take 20 mg by mouth daily , Disp: , Rfl:   Allergies   Allergen Reactions    Diltiazem Hcl Other (See Comments)     Other reaction(s): weak    Cardizem [Diltiazem] Palpitations    Cefuroxime Axetil Nausea And Vomiting, Other (See Comments) and Palpitations    Claritin [Loratadine] Palpitations    Erythromycin Nausea And Vomiting    Morphine Nausea And Vomiting    Mucinex [Guaifenesin Er] Palpitations    Propoxyphene Other (See Comments) and Nausea And Vomiting     DIZZY  Other reaction(s): Free Text    Zyrtec [Cetirizine] Nausea And Vomiting and Other (See Comments)     WEAK       Past Medical History:   Diagnosis Date    [facility] [address] 02/2021    Breast cancer ([address] 04/2017    Chronic atrial fibrillation (HCC)     Fibroadenosis of breast     Headache     Hyperlipidemia     Hypertension      Past Surgical History:   Procedure Laterality Date    [facility]    BONE BIOPSY  04/2021    BREAST LUMPECTOMY Right 04/26/2017    COLONOSCOPY      CT BIOPSY PERCUTANEOUS DEEP BONE  04/07/2021    CT BIOPSY PERCUTANEOUS DEEP BONE 4/7/2021 Jordan Ortega MD [facility]    EYE SURGERY      SUDHIR LENSE IMPLANTS    OTHER SURGICAL HISTORY      port removal    TONSILLECTOMY      TUNNELED VENOUS PORT PLACEMENT  05/26/2017    Dr. David Kendall     Family History   Problem Relation Age of Onset    Cancer Maternal Cousin 80        lung    Heart Disease Mother     Other Mother         [place] Palsy    Other Father         gall bladder problem, hip fx; AAA rupture    Coronary Art Dis Father         MI     Social History     Socioeconomic History    Marital status:      Spouse name: Feng Vieyra Number of children: Not on file    Years of education: Not on file    Highest education level: Not on file   Occupational History    Occupation: retired      Comment: Retired 4 th    Tobacco Use    Smoking status: Never Smoker    Smokeless tobacco: Never Used   Vaping Use    Vaping Use: Never assessed   Substance and Sexual Activity    Alcohol use: No     Comment: occasionally pop    Drug use: No    Sexual activity: Not Currently     Partners: Male   Other Topics Concern    Not on file   Social History Narrative    Drinks occasional Pepsi/Coke     Social Determinants of Health     Financial Resource Strain: Low Risk     Difficulty of Paying Living Expenses: Not hard at all   Food Insecurity: No Food Insecurity    Worried About [address] [employer] in the Last Year: Never true    [employer] of Food in the Last Year: Never true   Transportation Needs:  Lack of Transportation (Medical):  Lack of Transportation (Non-Medical):    Physical Activity:     Days of Exercise per Week:     Minutes of Exercise per Session:    Stress:     Feeling of Stress :    Social Connections:     Frequency of Communication with Friends and Family:     Frequency of Social Gatherings with Friends and Family:     Attends [employer] Services:     Active Member of Clubs or Organizations:     Attends Club or Organization Meetings:     Marital Status:    Intimate Partner Violence:     Fear of Current or Ex-Partner:     Emotionally Abused:     Physically Abused:     Sexually Abused:        Vitals:    05/17/21 1131   BP: 130/80   Pulse: 61   Resp: 17   Temp: 97.7 °F (36.5 °C)   TempSrc: Temporal   SpO2: 98%   Weight: 179 lb 9.6 oz (81.5 kg)   Height: 5' 7\" (1.702 m)       Physical Exam  Vitals and nursing note reviewed. Constitutional:       Appearance: She is well-developed. HENT:      Head: Atraumatic. Right Ear: External ear normal.      Left Ear: External ear normal.      Ears:      Comments: Minimal wax left ear canal.     Nose: Nose normal.      Mouth/Throat:      Pharynx: No oropharyngeal exudate. Eyes:      Conjunctiva/sclera: Conjunctivae normal.      Pupils: Pupils are equal, round, and reactive to light. Neck:      Thyroid: No thyromegaly. Trachea: No tracheal deviation. Cardiovascular:      Rate and Rhythm: Normal rate and regular rhythm. Heart sounds: No murmur heard. No friction rub. No gallop. Pulmonary:      Effort: Pulmonary effort is normal. No respiratory distress. Breath sounds: Normal breath sounds. Abdominal:      General: Bowel sounds are normal.      Palpations: Abdomen is soft. Musculoskeletal:         General: No tenderness or deformity. Normal range of motion. Cervical back: Normal range of motion and neck supple. Lymphadenopathy:      Cervical: No cervical adenopathy.    Skin:     General: Skin is warm and dry. Capillary Refill: Capillary refill takes less than 2 seconds. Findings: No rash. Neurological:      Mental Status: She is alert and oriented to person, place, and time. Sensory: No sensory deficit. Motor: No abnormal muscle tone.       Coordination: Coordination normal.      Deep Tendon Reflexes: Reflexes normal.             Seen By:  Sienna Gutiérrez DO

## 2021-06-08 ENCOUNTER — HOSPITAL ENCOUNTER (OUTPATIENT)
Dept: RADIATION ONCOLOGY | Age: 71
Discharge: HOME | End: 2021-06-08
Attending: RADIOLOGY
Payer: MEDICARE

## 2021-06-08 VITALS
BODY MASS INDEX: 27.88 KG/M2 | OXYGEN SATURATION: 98 % | RESPIRATION RATE: 18 BRPM | TEMPERATURE: 97.7 F | HEART RATE: 60 BPM | WEIGHT: 178 LBS | DIASTOLIC BLOOD PRESSURE: 68 MMHG | SYSTOLIC BLOOD PRESSURE: 136 MMHG

## 2021-06-08 DIAGNOSIS — C79.51 BONE METASTASIS (HCC): Primary | ICD-10-CM

## 2021-06-08 PROCEDURE — 99999 PR OFFICE/OUTPT VISIT,PROCEDURE ONLY: CPT | Performed by: NURSE PRACTITIONER

## 2021-06-08 NOTE — PROGRESS NOTES
member or staff  3. Provide assistance as indicated for ambulation activities  4. Reorient confused/cognitively impaired patient  5. Call-light/bell within patient's reach  6. Chair/bed in low position, stretcher/bed with siderails up except when performing patient care activities  7. Educate patient/family/caregiver on falls prevention  8. Falls risk precaution (Yellow sticker Level III) placed on patient chart           MALNUTRITION RISK SCREENING ASSESSMENT    6/8/2021   Patient: Marion Wood  Sex:  female    Instructions:  Assess the patient and enter the appropriate indicators that are present for nutrition risk identification. Total the numbers entered and assign a risk score. Follow the appropriate action for total score listed below. Assessment   Date  6/8/2021     1. Have you lost weight without trying? 0- No     2. Have you been eating poorly because of a decreased appetite? 0- No   3. Do you have a diagnosis of head and neck cancer? 0- No                                                                                    TOTAL 0          Score of 0-1: No action  Score 2 or greater:  · For Non-Diabetic Patient: Recommend adding Ensure Complete 2 x daily and provide patient with Ensure wellness bag with coupons  · For Diabetic Patient: Recommend adding Glucerna Shake 2 x daily and provide patient with Glucerna Wellness bag with coupons  · Route to the dietitian via [place] Rupinder is a very pleasant 72years old female, she is here today for a follow up after completed palliative radiation therapy to left iliac crest from 4/12/21 to 4/23/21. She is alert and oriented x 4, denies pain, ambulatory without any assistance. She states she is doing well, no skin issues. She states the pain from the left hip area is resolved with RT. She follows with Dr Valery Thompson oncology at [facility], she was there today for blood draw.   No other complaints, updated Karey Wagoner, Sidra Winn RN

## 2021-06-08 NOTE — PROGRESS NOTES
RADIATION ONCOLOGY  6 week follow up       6/8/2021      NAME:  Best Renteria    YOB: 1950    Diagnosis:  Bone metastasis ([address]    Subjective: On 04/23/2021, Best Renteria completed 3000 cGy in 10 fractions directed to the left iliac crest.     Seen today in 6 week post XRT completion symptom management check. The patient denies skin complaints to treatment site skin. The patient reports left iliac pain (pretx) resolved post Tx completion. No fevers, chills, nausea, vomiting or diarrhea. No change in appetite. Patient is following with Dr. Gulshan Mckeon for medical oncology. Pain: No pain complaints. Past medical, surgical, social and family histories reviewed and updated as indicated.     ALLERGIES:  Diltiazem hcl, Cardizem [diltiazem], Cefuroxime axetil, Claritin [loratadine], Erythromycin, Morphine, Mucinex [guaifenesin er], Propoxyphene, and Zyrtec [cetirizine]       Current Outpatient Medications   Medication Sig Dispense Refill    palbociclib (IBRANCE) 125 MG tablet Take 125 mg by mouth daily      fulvestrant (FASLODEX) 250 MG/5ML SOLN IM injection Inject 500 mg into the muscle once      denosumab (XGEVA) 120 MG/1.7ML SOLN SC injection Inject 120 mg into the skin once      propafenone (RYTHMOL) 225 MG tablet Take 1 tablet by mouth three times daily 270 tablet 1    acetaminophen (TYLENOL) 500 MG tablet Take 1,000 mg by mouth every 6 hours as needed for Pain      verapamil (CALAN SR) 180 MG extended release tablet Take 1 tablet by mouth daily 90 tablet 1    topiramate (TOPAMAX) 50 MG tablet Take 1 tablet by mouth 2 times daily 180 tablet 1    warfarin (COUMADIN) 6 MG tablet Indications: monday, wednesday, friday 3 mg, all other days are 6 mg 1/2 tab on M W F and 1 tab all other days of the week OR AS DIRECTED 30 tablet 5    escitalopram (LEXAPRO) 10 MG tablet Take 1 tablet by mouth every evening 90 tablet 1    ammonium lactate (LAC-HYDRIN) 12 % lotion Apply topically twice daily 400 g 2    calcium carbonate-vitamin D (CALTRATE 600+D) 600-400 MG-UNIT TABS per tab Take 1 tablet by mouth daily      Alpha-Lipoic Acid 100 MG CAPS Take by mouth      b complex vitamins capsule Take 1 capsule by mouth daily      furosemide (LASIX) 20 MG tablet Take 20 mg by mouth daily        No current facility-administered medications for this encounter. Physical Examination:   Vitals:    06/08/21 1138   Temp: 97.7 °F (36.5 °C)       Wt Readings from Last 3 Encounters:   05/17/21 179 lb 9.6 oz (81.5 kg)   05/04/21 180 lb (81.6 kg)   04/20/21 179 lb (81.2 kg)       Alert and fully ambulatory. Pleasant and conversant. Irradiated skin shows no changes. ASSESSMENT/PLAN:     Bone metastasis. Post palliative XRT completion 04/23/2021. Pain resolved. No skin complaints. I discussed follow up plans with Mayelin Hobbs. See Radiation oncology on as needed only basis. Continue Oncology treatment and follow-up with primary Medical Oncology Dr. Tristin Ortiz. Mayelin Hobbs is to follow up with other physicians/ APPs involved in their care as directed (including but not limited to Medical Oncology, Breast Surgery/ Clinic and Primary Care). The patient was given our contact number in the event that if at any time they change their mind and would like to return to the clinic to see either myself or one of the Radiation Oncologists, they can simply call us and we would be happy to see them sooner. Thank you for involving us in the management of this extremely pleasant patient. More than 15 min was in direct contact with pt coordinating/giving care. >50% of the visit was spent in counseling the pt on the following: Follow up care    The nurses notes were reviewed and incorporated into this assessment and plan. Questions answered to apparent satisfaction.       Roshni Cochran, MSN, APRN, CNP  Certified Nurse Practitioner for [address] [place] [address],[facility] Floor: P: [address]: [phone]   [address] [facility] [place]: [phone] / F: [phone]

## 2021-06-10 ASSESSMENT — ENCOUNTER SYMPTOMS
DIARRHEA: 0
CHOKING: 0
SORE THROAT: 0
EYE DISCHARGE: 0
NAUSEA: 0
SINUS PRESSURE: 0
ABDOMINAL DISTENTION: 0
COUGH: 0
BACK PAIN: 0
SHORTNESS OF BREATH: 0
CHEST TIGHTNESS: 0
BLOOD IN STOOL: 0
VOMITING: 0
STRIDOR: 0
EYE PAIN: 0
CONSTIPATION: 0
SINUS PAIN: 0
VOICE CHANGE: 0
WHEEZING: 0
APNEA: 0
RHINORRHEA: 0
COLOR CHANGE: 0
EYE ITCHING: 0
ROS SKIN COMMENTS: DENIES BREAST SKIN CHANGES, ARM SWELLING, OR PALPABLE AXILLARY OR SUPRACLAVICULAR ADENOPATHY.
TROUBLE SWALLOWING: 0
ABDOMINAL PAIN: 0

## 2021-06-10 NOTE — PROGRESS NOTES
Cefuroxime Axetil Nausea And Vomiting, Other (See Comments) and Palpitations    Claritin [Loratadine] Palpitations    Erythromycin Nausea And Vomiting    Morphine Nausea And Vomiting    Mucinex [Guaifenesin Er] Palpitations    Propoxyphene Other (See Comments) and Nausea And Vomiting     DIZZY  Other reaction(s): Free Text    Zyrtec [Cetirizine] Nausea And Vomiting and Other (See Comments)     WEAK       Review of Systems   Constitutional: Negative for activity change, appetite change, chills, fatigue, fever and unexpected weight change. Doing well. She still cares for her  who is non-ambulatory. She reports her tumor markers were elevated at her medical oncology office; she underwent restaging scans on 10/15/2020. HENT: Negative. Negative for congestion, postnasal drip, rhinorrhea, sinus pressure, sinus pain, sore throat, trouble swallowing and voice change. Eyes: Negative for pain, discharge, itching and visual disturbance. Respiratory: Negative for apnea, cough, choking, chest tightness, shortness of breath, wheezing and stridor. Cardiovascular: Negative for chest pain, palpitations and leg swelling. Trace ankle edema bilaterally. Gastrointestinal: Negative for abdominal distention, abdominal pain, blood in stool, constipation, diarrhea, nausea and vomiting. Endocrine: Negative for cold intolerance and heat intolerance. Genitourinary: Negative for difficulty urinating, dysuria, frequency and hematuria. Musculoskeletal: Negative for arthralgias, back pain, gait problem, joint swelling, myalgias, neck pain and neck stiffness. Intermittent back and hip discomfort; likely r/t lifting and caring for her . It is stable. Skin: Negative for color change, pallor and rash. Denies breast skin changes, arm swelling, or palpable axillary or supraclavicular adenopathy. Allergic/Immunologic: Negative for environmental allergies and food allergies. Neurological: Positive for numbness (CIPN remains subjectively stable. ). Negative for dizziness, seizures, syncope, speech difficulty, weakness, light-headedness and headaches. CIPN of fingers and bottoms of her feet bilaterally improved compared to prior. Hematological: Negative for adenopathy. Does not bruise/bleed easily. Psychiatric/Behavioral: Negative for agitation, confusion and decreased concentration. The patient is not nervous/anxious. Pleasant and conversant. Objective:   Physical Exam  Vitals and nursing note reviewed. Constitutional:       General: She is not in acute distress. Appearance: She is well-developed. She is not diaphoretic. Comments: ECOG remains stable at 0. HENT:      Head: Normocephalic and atraumatic. Eyes:      General: No scleral icterus. Right eye: No discharge. Left eye: No discharge. Conjunctiva/sclera: Conjunctivae normal.   Neck:      Thyroid: No thyromegaly. Vascular: No JVD. Trachea: No tracheal deviation. Cardiovascular:      Rate and Rhythm: Normal rate and regular rhythm. Heart sounds: Normal heart sounds. No murmur heard. No friction rub. No gallop. Pulmonary:      Effort: Pulmonary effort is normal. No respiratory distress. Breath sounds: Normal breath sounds. No stridor. No wheezing or rales. Chest:      Chest wall: No tenderness. Breasts: Breasts are asymmetrical.         Right: Inverted nipple present. Left: No inverted nipple (Cleft left nipple). Comments: Overall, her breast exam is stable and without clinically suspicious findings. Abdominal:      General: Bowel sounds are normal. There is no distension. Palpations: Abdomen is soft. Tenderness: There is no abdominal tenderness. There is no rebound. Musculoskeletal:         General: No tenderness or deformity. Normal range of motion.       Cervical back: Normal range of motion and neck chemotherapy per Dr. Marli Rondon with [facility].completed on 11/09/2017.    -Post-operative RT:  Radiation completed 01/29/2018.    -CIPN:  Subjectively stable. -Bilateral Mammogram 04/29/2019:  Asymmetry in CC view of left breast.  Additional imaging recommended.  -Left breast US, finding in question on mammogram is not seen on US. MRI recommended. -Dexa Bone Density 10/14/2019:  Normal bone density LS; Osteopenia of bilateral femoral necks; no statistically significant change. On Ca+/Vit D.    -MRI bilateral breast on 05/21/2019:  Benign with no evidence of malignancy.  -Digital bilateral screening mammogram 05/27/2020 @ [facility]:  Negative. -Reports recent tumor markers were mildly elevated at medical oncology office. -10/15/2020 Restaging scans (CT of the chest, abdomen and pelvis, and bone scan) with no convincing evidence of metastatic disease. On CT of the abdomen and pelvis, there was mention of a tiny locule of gas in the urinary bladder. She has had no recent bladder instrumentation. In light of her recurrent urinary tract infections, her reported bladder and uterine prolapse, will refer her to urology for further evaluation and recommendations. Her  follows with Dr. Salvador Raymond and she requests referral to same.  -February 2021 developed left hip pain.  -03/12/2021 PET CT noted intense uptake left iliac crest.  -04/07/2020 CT-guided left iliac wing bone biopsy: Metastatic carcinoma consistent with breast primary. -04/03/2021 completed radiation therapy in 10 fractions to left iliac crest.  -05/10/2021 medical oncology: On Faslodex and to start Xgeva. On Ibrance.    -06/22/2021 bilateral screening mammogram:  -06/22/2021 clinical follow-up she is tolerating Ibrance fairly well. Does c/o fatigue and decreased appetitive. Left hip pain improved post RT. Plan:       1. Continue monthly breast self examination. Bring any changes to your physician's attention.   2. Continue healthy diet and exercise routinely as tolerated. 3. Avoid alcohol or limit alcohol intake to < 3 drinks per week. 4. Continue cancer therapy as per Medical Oncology. 5. Mammogram May 2022 (not ordered). 6. Continue follow up with Medical Oncology and Primary Care. 7. RTC 1 year; continue follow up with medical oncology for cancer treatment. During today's visit, face-to-face time 15 minutes, greater than 50% in counseling education and coordination of care. All questions were answered to her apparent satisfaction, and she is agreeable to the plan as outlined above. Mayuri Bedoya, RN, MSN, APRN-CNP, [address]  Advanced Oncology Certified Nurse Practitioner  Department of Breast Surgery  [facility]/  [facility] in collaboration with Dr. Allan Rankin.  Jared/Dr. Nirav Balderas     6/22/2021

## 2021-06-16 ENCOUNTER — HOSPITAL ENCOUNTER (OUTPATIENT)
Age: 71
Discharge: HOME | End: 2021-06-18
Payer: MEDICARE

## 2021-06-16 ENCOUNTER — PROCEDURE VISIT (OUTPATIENT)
Dept: PODIATRY | Age: 71
End: 2021-06-16
Payer: MEDICARE

## 2021-06-16 ENCOUNTER — HOSPITAL ENCOUNTER (OUTPATIENT)
Dept: GENERAL RADIOLOGY | Age: 71
Discharge: HOME | End: 2021-06-18
Payer: MEDICARE

## 2021-06-16 VITALS — BODY MASS INDEX: 27.94 KG/M2 | HEIGHT: 67 IN | WEIGHT: 178 LBS

## 2021-06-16 DIAGNOSIS — M72.2 PLANTAR FASCIITIS: ICD-10-CM

## 2021-06-16 DIAGNOSIS — R52 PAIN: ICD-10-CM

## 2021-06-16 DIAGNOSIS — L84 CORNS AND CALLOSITIES: ICD-10-CM

## 2021-06-16 DIAGNOSIS — B35.1 TINEA UNGUIUM: Primary | ICD-10-CM

## 2021-06-16 DIAGNOSIS — Z79.01 ENCOUNTER FOR CURRENT LONG-TERM USE OF ANTICOAGULANTS: ICD-10-CM

## 2021-06-16 DIAGNOSIS — M20.41 HAMMER TOE OF RIGHT FOOT: ICD-10-CM

## 2021-06-16 PROCEDURE — 11721 DEBRIDE NAIL 6 OR MORE: CPT | Performed by: PODIATRIST

## 2021-06-16 PROCEDURE — 73502 X-RAY EXAM HIP UNI 2-3 VIEWS: CPT

## 2021-06-16 NOTE — PROGRESS NOTES
Justin Cedeño is here today for nail care. her PCP is Slava Da Silva DO last OV was 21. Justin Cedeño : 1950 Sex: female  Age: 72 y.o. Patient was referred by Slava Da Silva DO    CC:    Follow-up painful toenails 1-5 right and left    HPI:   Follow-up painful toenails 1-5 right and left    Continues Coumadin long-term anticoagulant therapy. No calf pain no additional pedal complaints.       ROS:  Const: Denies constitutional symptoms  Musculo: Denies symptoms other than stated above  Skin: Denies symptoms other than stated above       Current Outpatient Medications:     palbociclib (IBRANCE) 125 MG tablet, Take 125 mg by mouth daily, Disp: , Rfl:     fulvestrant (FASLODEX) 250 MG/5ML SOLN IM injection, Inject 500 mg into the muscle once, Disp: , Rfl:     denosumab (XGEVA) 120 MG/1.7ML SOLN SC injection, Inject 120 mg into the skin once, Disp: , Rfl:     propafenone (RYTHMOL) 225 MG tablet, Take 1 tablet by mouth three times daily, Disp: 270 tablet, Rfl: 1    acetaminophen (TYLENOL) 500 MG tablet, Take 1,000 mg by mouth every 6 hours as needed for Pain, Disp: , Rfl:     verapamil (CALAN SR) 180 MG extended release tablet, Take 1 tablet by mouth daily, Disp: 90 tablet, Rfl: 1    topiramate (TOPAMAX) 50 MG tablet, Take 1 tablet by mouth 2 times daily, Disp: 180 tablet, Rfl: 1    warfarin (COUMADIN) 6 MG tablet, Indications: monday, wednesday, friday 3 mg, all other days are 6 mg 1/2 tab on M W  and 1 tab all other days of the week OR AS DIRECTED, Disp: 30 tablet, Rfl: 5    escitalopram (LEXAPRO) 10 MG tablet, Take 1 tablet by mouth every evening, Disp: 90 tablet, Rfl: 1    ammonium lactate (LAC-HYDRIN) 12 % lotion, Apply topically twice daily, Disp: 400 g, Rfl: 2    calcium carbonate-vitamin D (CALTRATE 600+D) 600-400 MG-UNIT TABS per tab, Take 1 tablet by mouth daily, Disp: , Rfl:     Alpha-Lipoic Acid 100 MG CAPS, Take by mouth, Disp: , Rfl:     b complex vitamins capsule, Take 1 capsule by mouth daily, Disp: , Rfl:     furosemide (LASIX) 20 MG tablet, Take 20 mg by mouth daily , Disp: , Rfl:   Allergies   Allergen Reactions    Diltiazem Hcl Other (See Comments)     Other reaction(s): weak    Cardizem [Diltiazem] Palpitations    Cefuroxime Axetil Nausea And Vomiting, Other (See Comments) and Palpitations    Claritin [Loratadine] Palpitations    Erythromycin Nausea And Vomiting    Morphine Nausea And Vomiting    Mucinex [Guaifenesin Er] Palpitations    Propoxyphene Other (See Comments) and Nausea And Vomiting     DIZZY  Other reaction(s): Free Text    Zyrtec [Cetirizine] Nausea And Vomiting and Other (See Comments)     WEAK       Past Medical History:   Diagnosis Date    Bone cancer ([facility] [address] 02/2021    Breast cancer ([facility] 04/2017    Chronic atrial fibrillation (HCC)     Fibroadenosis of breast     Headache     Hyperlipidemia     Hypertension            Vitals:    06/16/21 1022   Weight: 178 lb (80.7 kg)   Height: 5' 7\" (1.702 m)       Work History/Social History: Retired  [employer]    Focused Lower Extremity Physical Exam:    Neurovascular examination:    Dorsalis Pedis palpable bilateral.  Posterior tibialis palpable bilateral.    Capillary Refill Time:  Immediate return  Hair growth:  Symmetrical and bilateral   Skin:  Not atrophic  Edema: No edema bilateral feet or ankles. Neurologic:  Light touch intact bilateral.      Musculoskeletal/ Orthopedic examination:    Equinis: Absent bilateral  Dorsiflexion, plantarflexion, inversion, eversion bilateral 5 out of 5 muscle strength  Wiggling toes  Negative Homans    Flexible contracted hammertoe second third and fourth digit right foot  No significant tenderness to palpation plantar fascial bilateral.    Dermatology examination:    Toenails 1-5 right and left foot thickened, elongated, mycotic, dystrophic with subungual debris. No open skin lesions or abrasions. Assessment and Plan:   Sy Riversjigna was seen today for callouses and nail problem. Diagnoses and all orders for this visit:    Tinea unguium    Corns and callosities    Encounter for current long-term use of anticoagulants    Plantar fasciitis    Hammer toe of right foot      Follow-up foot ankle exam  Formal debridement toenails 1 through 5 bilateral.  Tolerated procedure well. Importance of avoiding barefoot. Does continue long-term anticoagulant therapy with Coumadin. We will follow-up in 2 months. Return in about 2 months (around 8/16/2021). Seen By:  Arina Valladares DPM      Document was created using voice recognition software. Note was reviewed, however may contain grammatical errors.

## 2021-06-18 ENCOUNTER — TELEPHONE (OUTPATIENT)
Dept: BREAST CENTER | Age: 71
End: 2021-06-18

## 2021-06-22 ENCOUNTER — HOSPITAL ENCOUNTER (OUTPATIENT)
Dept: GENERAL RADIOLOGY | Age: 71
Discharge: HOME | End: 2021-06-24
Payer: MEDICARE

## 2021-06-22 ENCOUNTER — OFFICE VISIT (OUTPATIENT)
Dept: BREAST CENTER | Age: 71
End: 2021-06-22
Payer: MEDICARE

## 2021-06-22 VITALS
SYSTOLIC BLOOD PRESSURE: 148 MMHG | OXYGEN SATURATION: 98 % | HEIGHT: 67 IN | BODY MASS INDEX: 27.62 KG/M2 | WEIGHT: 176 LBS | TEMPERATURE: 97.2 F | HEART RATE: 58 BPM | RESPIRATION RATE: 16 BRPM | DIASTOLIC BLOOD PRESSURE: 64 MMHG

## 2021-06-22 DIAGNOSIS — Z85.3 PERSONAL HISTORY OF BREAST CANCER: ICD-10-CM

## 2021-06-22 DIAGNOSIS — Z12.31 ENCOUNTER FOR SCREENING MAMMOGRAM FOR MALIGNANT NEOPLASM OF BREAST: ICD-10-CM

## 2021-06-22 PROCEDURE — 99213 OFFICE O/P EST LOW 20 MIN: CPT | Performed by: NURSE PRACTITIONER

## 2021-06-22 PROCEDURE — 77067 SCR MAMMO BI INCL CAD: CPT

## 2021-06-22 PROCEDURE — 99212 OFFICE O/P EST SF 10 MIN: CPT | Performed by: NURSE PRACTITIONER

## 2021-06-23 ENCOUNTER — OFFICE VISIT (OUTPATIENT)
Dept: FAMILY MEDICINE CLINIC | Age: 71
End: 2021-06-23
Payer: MEDICARE

## 2021-06-23 VITALS
OXYGEN SATURATION: 97 % | BODY MASS INDEX: 27.78 KG/M2 | WEIGHT: 177 LBS | HEIGHT: 67 IN | TEMPERATURE: 96.9 F | RESPIRATION RATE: 16 BRPM | HEART RATE: 68 BPM | DIASTOLIC BLOOD PRESSURE: 76 MMHG | SYSTOLIC BLOOD PRESSURE: 120 MMHG

## 2021-06-23 DIAGNOSIS — I48.20 CHRONIC ATRIAL FIBRILLATION (HCC): ICD-10-CM

## 2021-06-23 LAB
INTERNATIONAL NORMALIZATION RATIO, POC: 1.4
PROTHROMBIN TIME, POC: 16.3

## 2021-06-23 PROCEDURE — 99213 OFFICE O/P EST LOW 20 MIN: CPT | Performed by: FAMILY MEDICINE

## 2021-06-23 PROCEDURE — 85610 PROTHROMBIN TIME: CPT | Performed by: FAMILY MEDICINE

## 2021-06-23 ASSESSMENT — ENCOUNTER SYMPTOMS
VOMITING: 0
SHORTNESS OF BREATH: 0
TROUBLE SWALLOWING: 0
DIARRHEA: 0
EYE PAIN: 0
EYE REDNESS: 0
BLOOD IN STOOL: 0
ABDOMINAL PAIN: 0
COUGH: 0
BACK PAIN: 0
CHEST TIGHTNESS: 0
EYE DISCHARGE: 0
SINUS PAIN: 0
SORE THROAT: 0
ALLERGIC/IMMUNOLOGIC NEGATIVE: 1
NAUSEA: 0
PHOTOPHOBIA: 0

## 2021-06-23 NOTE — PROGRESS NOTES
21  Keene Curling : 1950 Sex: female  Age: 72 y.o. Assessment and Plan: Tosha Dee was seen today for other and hypertension. Diagnoses and all orders for this visit:    Chronic atrial fibrillation (HCC)  -     POCT INR  Adjust warfarin to 6 mg daily, recheck INR in 2 weeks. Return in about 2 weeks (around 2021). Chief Complaint   Patient presents with    Other     inr recheck     Hypertension       Patient presents for pro time, INR 1.4 today. Is undergoing chemotherapy right now may be affecting her lab results. She denies any dietary changes, skipped doses, she has been taking her medication as prescribed      Review of Systems   Constitutional: Negative for appetite change, fatigue and unexpected weight change. HENT: Negative for congestion, ear pain, hearing loss, sinus pain, sore throat and trouble swallowing. Eyes: Negative for photophobia, pain, discharge and redness. Respiratory: Negative for cough, chest tightness and shortness of breath. Cardiovascular: Negative for chest pain, palpitations and leg swelling. Gastrointestinal: Negative for abdominal pain, blood in stool, diarrhea, nausea and vomiting. Endocrine: Negative. Genitourinary: Negative for dysuria, flank pain, frequency and hematuria. Musculoskeletal: Negative for arthralgias, back pain, joint swelling and myalgias. Skin: Negative. Allergic/Immunologic: Negative. Neurological: Negative for dizziness, seizures, syncope, weakness, light-headedness, numbness and headaches. Hematological: Negative for adenopathy. Does not bruise/bleed easily. Psychiatric/Behavioral: Negative.           Current Outpatient Medications:     palbociclib (IBRANCE) 125 MG tablet, Take 125 mg by mouth daily, Disp: , Rfl:     fulvestrant (FASLODEX) 250 MG/5ML SOLN IM injection, Inject 500 mg into the muscle once, Disp: , Rfl:     denosumab (XGEVA) 120 MG/1.7ML SOLN SC injection, Inject 120 mg into the skin once, Disp: , Rfl:     propafenone (RYTHMOL) 225 MG tablet, Take 1 tablet by mouth three times daily, Disp: 270 tablet, Rfl: 1    acetaminophen (TYLENOL) 500 MG tablet, Take 1,000 mg by mouth every 6 hours as needed for Pain, Disp: , Rfl:     verapamil (CALAN SR) 180 MG extended release tablet, Take 1 tablet by mouth daily, Disp: 90 tablet, Rfl: 1    topiramate (TOPAMAX) 50 MG tablet, Take 1 tablet by mouth 2 times daily, Disp: 180 tablet, Rfl: 1    warfarin (COUMADIN) 6 MG tablet, Indications: monday, wednesday, friday 3 mg, all other days are 6 mg 1/2 tab on M W F and 1 tab all other days of the week OR AS DIRECTED, Disp: 30 tablet, Rfl: 5    escitalopram (LEXAPRO) 10 MG tablet, Take 1 tablet by mouth every evening, Disp: 90 tablet, Rfl: 1    ammonium lactate (LAC-HYDRIN) 12 % lotion, Apply topically twice daily, Disp: 400 g, Rfl: 2    calcium carbonate-vitamin D (CALTRATE 600+D) 600-400 MG-UNIT TABS per tab, Take 1 tablet by mouth daily, Disp: , Rfl:     Alpha-Lipoic Acid 100 MG CAPS, Take by mouth, Disp: , Rfl:     b complex vitamins capsule, Take 1 capsule by mouth daily, Disp: , Rfl:     furosemide (LASIX) 20 MG tablet, Take 20 mg by mouth daily , Disp: , Rfl:   Allergies   Allergen Reactions    Diltiazem Hcl Other (See Comments)     Other reaction(s): weak    Cardizem [Diltiazem] Palpitations    Cefuroxime Axetil Nausea And Vomiting, Other (See Comments) and Palpitations    Claritin [Loratadine] Palpitations    Erythromycin Nausea And Vomiting    Morphine Nausea And Vomiting    Mucinex [Guaifenesin Er] Palpitations    Propoxyphene Other (See Comments) and Nausea And Vomiting     DIZZY  Other reaction(s): Free Text    Zyrtec [Cetirizine] Nausea And Vomiting and Other (See Comments)     WEAK       Past Medical History:   Diagnosis Date    [facility] ([facility] [address] 02/2021    [facility]2017    Chronic atrial fibrillation (HCC)     Fibroadenosis of breast     Headache     History of therapeutic radiation     Hyperlipidemia     Hypertension      Past Surgical History:   Procedure Laterality Date    [facility]GERY  1999    [facility]    BONE BIOPSY  04/2021    BREAST LUMPECTOMY Right 04/26/2017    COLONOSCOPY      CT BIOPSY PERCUTANEOUS DEEP BONE  04/07/2021    CT BIOPSY PERCUTANEOUS DEEP BONE 4/7/2021 Precious Toro MD [facility]    EYE SURGERY      SUDHIR LENSE IMPLANTS    OTHER SURGICAL HISTORY      port removal    TONSILLECTOMY      TUNNELED VENOUS PORT PLACEMENT  05/26/2017    Dr. Eagle Patel     Family History   Problem Relation Age of Onset    Cancer Maternal Cousin 80        lung    Heart Disease Mother     Other Mother         [place] Palsy    Other Father         gall bladder problem, hip fx; AAA rupture    Coronary Art Dis Father         MI     Social History     Socioeconomic History    Marital status:      Spouse name: Kacy Rodriguez Number of children: Not on file    Years of education: Not on file    Highest education level: Not on file   Occupational History    Occupation: retired      Comment: Retired 4 th    Tobacco Use    Smoking status: Never Smoker    Smokeless tobacco: Never Used   Vaping Use    Vaping Use: Never assessed   Substance and Sexual Activity    Alcohol use: No     Comment: occasionally pop    Drug use: No    Sexual activity: Not Currently     Partners: Male   Other Topics Concern    Not on file   Social History Narrative    Drinks occasional Pepsi/Coke     Social Determinants of Health     Financial Resource Strain: Low Risk     Difficulty of Paying Living Expenses: Not hard at all   Food Insecurity: No Food Insecurity    Worried About [address] in the Last Year: Never true    [facility] in the Last Year: Never true   Transportation Needs:     Lack of Transportation (Medical):      Lack of Transportation (Non-Medical):    Physical Activity:     Days of Exercise per Week:     Minutes of Exercise per Session:    Stress:     Feeling of Stress :    Social Connections:     Frequency of Communication with Friends and Family:     Frequency of Social Gatherings with Friends and Family:     Attends Druze Services:     Active Member of Clubs or Organizations:     Attends Club or Organization Meetings:     Marital Status:    Intimate Partner Violence:     Fear of Current or Ex-Partner:     Emotionally Abused:     Physically Abused:     Sexually Abused:        Vitals:    06/23/21 1128   BP: 120/76   Pulse: 68   Resp: 16   Temp: 96.9 °F (36.1 °C)   TempSrc: Temporal   SpO2: 97%   Weight: 177 lb (80.3 kg)   Height: 5' 7\" (1.702 m)       Physical Exam  Vitals and nursing note reviewed. Constitutional:       Appearance: She is well-developed. HENT:      Head: Atraumatic. Right Ear: External ear normal.      Left Ear: External ear normal.      Nose: Nose normal.      Mouth/Throat:      Pharynx: No oropharyngeal exudate. Eyes:      Conjunctiva/sclera: Conjunctivae normal.      Pupils: Pupils are equal, round, and reactive to light. Neck:      Thyroid: No thyromegaly. Trachea: No tracheal deviation. Cardiovascular:      Rate and Rhythm: Normal rate and regular rhythm. Heart sounds: No murmur heard. No friction rub. No gallop. Pulmonary:      Effort: Pulmonary effort is normal. No respiratory distress. Breath sounds: Normal breath sounds. Abdominal:      General: Bowel sounds are normal.      Palpations: Abdomen is soft. Musculoskeletal:         General: No tenderness or deformity. Normal range of motion. Cervical back: Normal range of motion and neck supple. Lymphadenopathy:      Cervical: No cervical adenopathy. Skin:     General: Skin is warm and dry. Capillary Refill: Capillary refill takes less than 2 seconds. Findings: No rash. Neurological:      Mental Status: She is alert and oriented to person, place, and time.       Sensory: No sensory deficit. Motor: No abnormal muscle tone.       Coordination: Coordination normal.      Deep Tendon Reflexes: Reflexes normal.             Seen By:  Timur Barton DO

## 2021-07-02 ENCOUNTER — OFFICE VISIT (OUTPATIENT)
Dept: PODIATRY | Age: 71
End: 2021-07-02
Payer: MEDICARE

## 2021-07-02 VITALS — BODY MASS INDEX: 27.78 KG/M2 | HEIGHT: 67 IN | WEIGHT: 177 LBS

## 2021-07-02 DIAGNOSIS — M79.672 PAIN OF LEFT HEEL: Primary | ICD-10-CM

## 2021-07-02 DIAGNOSIS — R60.0 LOCALIZED EDEMA: ICD-10-CM

## 2021-07-02 DIAGNOSIS — M72.2 PLANTAR FASCIITIS: ICD-10-CM

## 2021-07-02 DIAGNOSIS — Z79.01 ENCOUNTER FOR CURRENT LONG-TERM USE OF ANTICOAGULANTS: ICD-10-CM

## 2021-07-02 PROCEDURE — 99213 OFFICE O/P EST LOW 20 MIN: CPT | Performed by: PODIATRIST

## 2021-07-02 PROCEDURE — 29580 STRAPPING UNNA BOOT: CPT | Performed by: PODIATRIST

## 2021-07-02 NOTE — PROGRESS NOTES
Patient in office with c/o left heel pain. States pain started last night. Denies any injury. Xrays obtained prior to aptKiya Hernandes : 1950 Sex: female  Age: 72 y.o. Patient was referred by Alison Mcfarlane DO    CC:    Left heel pain    HPI:   Presents today left heel pain. Started yesterday. States she did have some tenderness when she woke up no recent injury or trauma. No calf pain. Does have history of long-term anticoagulant therapy with Coumadin. Did have radiographs today. Does have history of plantar fasciitis in the left. No recent change in shoe gear.     ROS:  Const: Denies constitutional symptoms  Musculo: Denies symptoms other than stated above  Skin: Denies symptoms other than stated above       Current Outpatient Medications:     escitalopram (LEXAPRO) 10 MG tablet, Take 1 tablet by mouth every evening, Disp: 90 tablet, Rfl: 1    palbociclib (IBRANCE) 125 MG tablet, Take 125 mg by mouth daily, Disp: , Rfl:     fulvestrant (FASLODEX) 250 MG/5ML SOLN IM injection, Inject 500 mg into the muscle once, Disp: , Rfl:     denosumab (XGEVA) 120 MG/1.7ML SOLN SC injection, Inject 120 mg into the skin once, Disp: , Rfl:     propafenone (RYTHMOL) 225 MG tablet, Take 1 tablet by mouth three times daily, Disp: 270 tablet, Rfl: 1    acetaminophen (TYLENOL) 500 MG tablet, Take 1,000 mg by mouth every 6 hours as needed for Pain, Disp: , Rfl:     verapamil (CALAN SR) 180 MG extended release tablet, Take 1 tablet by mouth daily, Disp: 90 tablet, Rfl: 1    topiramate (TOPAMAX) 50 MG tablet, Take 1 tablet by mouth 2 times daily, Disp: 180 tablet, Rfl: 1    warfarin (COUMADIN) 6 MG tablet, Indications: monday, wednesday, friday 3 mg, all other days are 6 mg 1/2 tab on  and 1 tab all other days of the week OR AS DIRECTED, Disp: 30 tablet, Rfl: 5    ammonium lactate (LAC-HYDRIN) 12 % lotion, Apply topically twice daily, Disp: 400 g, Rfl: 2    calcium carbonate-vitamin D (CALTRATE 600+D) 600-400 MG-UNIT TABS per tab, Take 1 tablet by mouth daily, Disp: , Rfl:     Alpha-Lipoic Acid 100 MG CAPS, Take by mouth, Disp: , Rfl:     b complex vitamins capsule, Take 1 capsule by mouth daily, Disp: , Rfl:     furosemide (LASIX) 20 MG tablet, Take 20 mg by mouth daily , Disp: , Rfl:   Allergies   Allergen Reactions    Diltiazem Hcl Other (See Comments)     Other reaction(s): weak    Cardizem [Diltiazem] Palpitations    Cefuroxime Axetil Nausea And Vomiting, Other (See Comments) and Palpitations    Claritin [Loratadine] Palpitations    Erythromycin Nausea And Vomiting    Morphine Nausea And Vomiting    Mucinex [Guaifenesin Er] Palpitations    Propoxyphene Other (See Comments) and Nausea And Vomiting     DIZZY  Other reaction(s): Free Text    Zyrtec [Cetirizine] Nausea And Vomiting and Other (See Comments)     WEAK       Past Medical History:   Diagnosis Date    [facility] cancer ([facility] 02/2021    Breast cancer ([facility] 04/2017    [facility]     Fibroadenosis of breast     Headache     History of therapeutic radiation     Hyperlipidemia     Hypertension            Vitals:    07/02/21 1010   Weight: 177 lb (80.3 kg)   Height: 5' 7\" (1.702 m)       Work History/Social History: Retired  [employer]    Focused Lower Extremity Physical Exam:    Neurovascular examination:    Dorsalis Pedis palpable bilateral.  Posterior tibialis palpable bilateral.    Capillary Refill Time:  Immediate return  Hair growth:  Symmetrical and bilateral   Skin:  Not atrophic  Edema: No edema bilateral feet or ankles.   Neurologic:  Light touch intact bilateral.      Musculoskeletal/ Orthopedic examination:    Equinis: Absent bilateral  Dorsiflexion, plantarflexion, inversion, eversion bilateral 5 out of 5 muscle strength  Wiggling toes  Negative Homans  Mild tenderness medial band left plantar fascial.  Negative calcaneal squeeze test.        Dermatology examination:    Toenails 1-5 right and left foot thickened, elongated, mycotic, dystrophic with subungual debris. No open skin lesions or abrasions. Assessment and Plan: Cristobal Petty was seen today for foot pain. Diagnoses and all orders for this visit:    Pain of left heel  -     XR FOOT LEFT (MIN 3 VIEWS); Future    Encounter for current long-term use of anticoagulants    Plantar fasciitis    Localized edema      Seen today left foot pain. Radiographs reviewed no acute fracture dislocation noted. An unna boot  compressive wrap was applied to the left lower extremity. It's purpose is to  decrease  the amount of edema present, and to allow proper healing of the soft tissues. The patient was instructed to keep the unna boot clean, dry and intact until the next follow up visit. The patient was instructed to look for signs of redness, irritation, blistering and pain. If these or any other symptoms were to develop, they were advised to contact the office immediately for reevaluation. Unna boot and Ace bandage for edema control. Keep clean dry intact next 3-5 days. Any increasing calf pain removed sooner to go directly to the emergency room. I will follow-up 1 month to monitor progression. Importance of U-shaped offloading padding. Avoid barefoot. Return in about 1 month (around 8/2/2021). Seen By:  Peyman Winn DPM      Document was created using voice recognition software. Note was reviewed, however may contain grammatical errors.

## 2021-07-07 ENCOUNTER — OFFICE VISIT (OUTPATIENT)
Dept: PODIATRY | Age: 71
End: 2021-07-07
Payer: MEDICARE

## 2021-07-07 ENCOUNTER — OFFICE VISIT (OUTPATIENT)
Dept: FAMILY MEDICINE CLINIC | Age: 71
End: 2021-07-07
Payer: MEDICARE

## 2021-07-07 VITALS
OXYGEN SATURATION: 97 % | DIASTOLIC BLOOD PRESSURE: 78 MMHG | HEART RATE: 68 BPM | WEIGHT: 177.2 LBS | BODY MASS INDEX: 27.81 KG/M2 | HEIGHT: 67 IN | RESPIRATION RATE: 16 BRPM | TEMPERATURE: 96.9 F | SYSTOLIC BLOOD PRESSURE: 130 MMHG

## 2021-07-07 DIAGNOSIS — F06.4 ANXIETY DISORDER DUE TO GENERAL MEDICAL CONDITION: ICD-10-CM

## 2021-07-07 DIAGNOSIS — M79.672 PAIN OF LEFT HEEL: ICD-10-CM

## 2021-07-07 DIAGNOSIS — M25.472 EDEMA OF LEFT ANKLE: Primary | ICD-10-CM

## 2021-07-07 DIAGNOSIS — M25.572 ACUTE LEFT ANKLE PAIN: ICD-10-CM

## 2021-07-07 DIAGNOSIS — I48.20 CHRONIC ATRIAL FIBRILLATION (HCC): ICD-10-CM

## 2021-07-07 DIAGNOSIS — M76.62 ACHILLES TENDINITIS, LEFT LEG: ICD-10-CM

## 2021-07-07 DIAGNOSIS — Z79.01 ENCOUNTER FOR CURRENT LONG-TERM USE OF ANTICOAGULANTS: ICD-10-CM

## 2021-07-07 LAB
INTERNATIONAL NORMALIZATION RATIO, POC: 3.5
PROTHROMBIN TIME, POC: 41.6

## 2021-07-07 PROCEDURE — 85610 PROTHROMBIN TIME: CPT | Performed by: FAMILY MEDICINE

## 2021-07-07 PROCEDURE — 99213 OFFICE O/P EST LOW 20 MIN: CPT | Performed by: FAMILY MEDICINE

## 2021-07-07 PROCEDURE — 99213 OFFICE O/P EST LOW 20 MIN: CPT | Performed by: PODIATRIST

## 2021-07-07 PROCEDURE — 29580 STRAPPING UNNA BOOT: CPT | Performed by: PODIATRIST

## 2021-07-07 RX ORDER — ESCITALOPRAM OXALATE 10 MG/1
10 TABLET ORAL EVERY EVENING
Qty: 90 TABLET | Refills: 1 | Status: SHIPPED
Start: 2021-07-07 | End: 2021-12-27 | Stop reason: SDUPTHER

## 2021-07-07 ASSESSMENT — ENCOUNTER SYMPTOMS
TROUBLE SWALLOWING: 0
NAUSEA: 0
ALLERGIC/IMMUNOLOGIC NEGATIVE: 1
CHEST TIGHTNESS: 0
BACK PAIN: 0
DIARRHEA: 0
PHOTOPHOBIA: 0
EYE REDNESS: 0
BLOOD IN STOOL: 0
ABDOMINAL PAIN: 0
COUGH: 0
VOMITING: 0
SORE THROAT: 0
EYE DISCHARGE: 0
SHORTNESS OF BREATH: 0
EYE PAIN: 0
SINUS PAIN: 0

## 2021-07-07 NOTE — PROGRESS NOTES
Patient in office with c/o foot pain. Denies any injury. Currently using a crutch. Juan Olivas : 1950 Sex: female  Age: 72 y.o. Patient was referred by Pascual Devries DO    CC:    Left heel pain    HPI:   Follow-up left foot pain. No significant pain left foot today does have tenderness on the back of the left heel. No recent injury or trauma. Does state tenderness on the back of the left Achilles has been worse over the past few days. Once again no current calf pain. No significant heel pain. No additional pedal complaints this time.           ROS:  Const: Denies constitutional symptoms  Musculo: Denies symptoms other than stated above  Skin: Denies symptoms other than stated above       Current Outpatient Medications:     escitalopram (LEXAPRO) 10 MG tablet, Take 1 tablet by mouth every evening, Disp: 90 tablet, Rfl: 1    palbociclib (IBRANCE) 125 MG tablet, Take 125 mg by mouth daily, Disp: , Rfl:     fulvestrant (FASLODEX) 250 MG/5ML SOLN IM injection, Inject 500 mg into the muscle once, Disp: , Rfl:     denosumab (XGEVA) 120 MG/1.7ML SOLN SC injection, Inject 120 mg into the skin once, Disp: , Rfl:     propafenone (RYTHMOL) 225 MG tablet, Take 1 tablet by mouth three times daily, Disp: 270 tablet, Rfl: 1    acetaminophen (TYLENOL) 500 MG tablet, Take 1,000 mg by mouth every 6 hours as needed for Pain, Disp: , Rfl:     verapamil (CALAN SR) 180 MG extended release tablet, Take 1 tablet by mouth daily, Disp: 90 tablet, Rfl: 1    topiramate (TOPAMAX) 50 MG tablet, Take 1 tablet by mouth 2 times daily, Disp: 180 tablet, Rfl: 1    warfarin (COUMADIN) 6 MG tablet, Indications: monday, wednesday, friday 3 mg, all other days are 6 mg 1/2 tab on M W  and 1 tab all other days of the week OR AS DIRECTED, Disp: 30 tablet, Rfl: 5    ammonium lactate (LAC-HYDRIN) 12 % lotion, Apply topically twice daily, Disp: 400 g, Rfl: 2    calcium carbonate-vitamin D (CALTRATE 600+D) 600-400 MG-UNIT wounds. Assessment and Plan: Lilo Angel was seen today for foot pain. Diagnoses and all orders for this visit:    Edema of left ankle  -     XR ANKLE LEFT (MIN 3 VIEWS); Future    Acute left ankle pain    Encounter for current long-term use of anticoagulants    Achilles tendinitis, left leg      Ranulfo Ray seen today follow-up left heel pain. No heel pain today. Clinically does have insertional Achilles tendinitis. Any increase calf pain go emergency room. An unna boot  compressive wrap was applied to the left lower extremity. It's purpose is to  decrease  the amount of edema present, and to allow proper healing of the soft tissues. The patient was instructed to keep the unna boot clean, dry and intact until the next follow up visit. The patient was instructed to look for signs of redness, irritation, blistering and pain. If these or any other symptoms were to develop, they were advised to contact the office immediately for reevaluation. Unna boot and Ace bandage for edema control. Keep clean dry intact next 3-5 days. Any increasing calf pain removed sooner to go directly to the emergency room. I did recommend heel cups to offload. I did recommend offloading walking shoe or boot during the day. Remove at night. I will follow-up 1 week to monitor progression. Return in about 1 week (around 7/14/2021). Seen By:  Abhi Self DPM      Document was created using voice recognition software. Note was reviewed, however may contain grammatical errors.

## 2021-07-07 NOTE — PROGRESS NOTES
Take 125 mg by mouth daily, Disp: , Rfl:     fulvestrant (FASLODEX) 250 MG/5ML SOLN IM injection, Inject 500 mg into the muscle once, Disp: , Rfl:     denosumab (XGEVA) 120 MG/1.7ML SOLN SC injection, Inject 120 mg into the skin once, Disp: , Rfl:     propafenone (RYTHMOL) 225 MG tablet, Take 1 tablet by mouth three times daily, Disp: 270 tablet, Rfl: 1    acetaminophen (TYLENOL) 500 MG tablet, Take 1,000 mg by mouth every 6 hours as needed for Pain, Disp: , Rfl:     verapamil (CALAN SR) 180 MG extended release tablet, Take 1 tablet by mouth daily, Disp: 90 tablet, Rfl: 1    topiramate (TOPAMAX) 50 MG tablet, Take 1 tablet by mouth 2 times daily, Disp: 180 tablet, Rfl: 1    warfarin (COUMADIN) 6 MG tablet, Indications: monday, wednesday, friday 3 mg, all other days are 6 mg 1/2 tab on M W F and 1 tab all other days of the week OR AS DIRECTED, Disp: 30 tablet, Rfl: 5    ammonium lactate (LAC-HYDRIN) 12 % lotion, Apply topically twice daily, Disp: 400 g, Rfl: 2    calcium carbonate-vitamin D (CALTRATE 600+D) 600-400 MG-UNIT TABS per tab, Take 1 tablet by mouth daily, Disp: , Rfl:     Alpha-Lipoic Acid 100 MG CAPS, Take by mouth, Disp: , Rfl:     b complex vitamins capsule, Take 1 capsule by mouth daily, Disp: , Rfl:     furosemide (LASIX) 20 MG tablet, Take 20 mg by mouth daily , Disp: , Rfl:   Allergies   Allergen Reactions    Diltiazem Hcl Other (See Comments)     Other reaction(s): weak    Cardizem [Diltiazem] Palpitations    Cefuroxime Axetil Nausea And Vomiting, Other (See Comments) and Palpitations    Claritin [Loratadine] Palpitations    Erythromycin Nausea And Vomiting    Morphine Nausea And Vomiting    Mucinex [Guaifenesin Er] Palpitations    Propoxyphene Other (See Comments) and Nausea And Vomiting     DIZZY  Other reaction(s): Free Text    Zyrtec [Cetirizine] Nausea And Vomiting and Other (See Comments)     WEAK       Past Medical History:   Diagnosis Date    [facility]  [address] 04/2017    Chronic atrial fibrillation (HCC)     Fibroadenosis of breast     Headache     History of therapeutic radiation     Hyperlipidemia     Hypertension      Past Surgical History:   Procedure Laterality Date    [facility]GERY  1999    [facility]    BONE BIOPSY  04/2021    BREAST LUMPECTOMY Right 04/26/2017    COLONOSCOPY      CT BIOPSY PERCUTANEOUS DEEP BONE  04/07/2021    CT BIOPSY PERCUTANEOUS DEEP BONE 4/7/2021 Ibrahima Oropeza MD [facility]    EYE SURGERY      SUDHIR LENSE IMPLANTS    OTHER SURGICAL HISTORY      port removal    TONSILLECTOMY      TUNNELED VENOUS PORT PLACEMENT  05/26/2017    Dr. Octavio Woodard     Family History   Problem Relation Age of Onset    Cancer Maternal Cousin 80        lung    Heart Disease Mother     Other Mother         Pegram Palsy    Other Father         gall bladder problem, hip fx; AAA rupture    Coronary Art Dis Father         MI     Social History     Socioeconomic History    Marital status:      Spouse name: Cash Cummins Number of children: Not on file    Years of education: Not on file    Highest education level: Not on file   Occupational History    Occupation: retired      Comment: Retired 4 th    Tobacco Use    Smoking status: Never Smoker    Smokeless tobacco: Never Used   Vaping Use    Vaping Use: Never assessed   Substance and Sexual Activity    Alcohol use: No     Comment: occasionally pop    Drug use: No    Sexual activity: Not Currently     Partners: Male   Other Topics Concern    Not on file   Social History Narrative    Drinks occasional Pepsi/Coke     Social Determinants of Health     Financial Resource Strain: Low Risk     Difficulty of Paying Living Expenses: Not hard at all   Food Insecurity: No Food Insecurity    Worried About [address] in the Last Year: Never true    [address] in the Last Year: Never true   Transportation Needs:     Lack of Transportation (Medical):  Lack of Transportation (Non-Medical):    Physical Activity:     Days of Exercise per Week:     Minutes of Exercise per Session:    Stress:     Feeling of Stress :    Social Connections:     Frequency of Communication with Friends and Family:     Frequency of Social Gatherings with Friends and Family:     Attends Zoroastrian Services:     Active Member of Clubs or Organizations:     Attends Club or Organization Meetings:     Marital Status:    Intimate Partner Violence:     Fear of Current or Ex-Partner:     Emotionally Abused:     Physically Abused:     Sexually Abused:        Vitals:    07/07/21 1146   BP: 130/78   Pulse: 68   Resp: 16   Temp: 96.9 °F (36.1 °C)   TempSrc: Temporal   SpO2: 97%   Weight: 177 lb 3.2 oz (80.4 kg)   Height: 5' 7\" (1.702 m)       Physical Exam  Vitals and nursing note reviewed. Constitutional:       Appearance: She is well-developed. HENT:      Head: Atraumatic. Right Ear: External ear normal.      Left Ear: External ear normal.      Nose: Nose normal.      Mouth/Throat:      Pharynx: No oropharyngeal exudate. Eyes:      Conjunctiva/sclera: Conjunctivae normal.      Pupils: Pupils are equal, round, and reactive to light. Neck:      Thyroid: No thyromegaly. Trachea: No tracheal deviation. Cardiovascular:      Rate and Rhythm: Normal rate and regular rhythm. Heart sounds: No murmur heard. No friction rub. No gallop. Pulmonary:      Effort: Pulmonary effort is normal. No respiratory distress. Breath sounds: Normal breath sounds. Abdominal:      General: Bowel sounds are normal.      Palpations: Abdomen is soft. Musculoskeletal:         General: Swelling and tenderness present. No deformity. Normal range of motion. Cervical back: Normal range of motion and neck supple. Left lower leg: Edema present. Comments: Pain, stiffness, decreased range of motion, left lower leg at ankle.    Lymphadenopathy:      Cervical: No cervical adenopathy. Skin:     General: Skin is warm and dry. Capillary Refill: Capillary refill takes less than 2 seconds. Findings: No rash. Neurological:      Mental Status: She is alert and oriented to person, place, and time. Sensory: No sensory deficit. Motor: No abnormal muscle tone.       Coordination: Coordination normal.      Deep Tendon Reflexes: Reflexes normal.             Seen By:  Anca Flood DO

## 2021-07-14 ENCOUNTER — OFFICE VISIT (OUTPATIENT)
Dept: PODIATRY | Age: 71
End: 2021-07-14
Payer: MEDICARE

## 2021-07-14 VITALS — BODY MASS INDEX: 27 KG/M2 | HEIGHT: 67 IN | WEIGHT: 172 LBS

## 2021-07-14 DIAGNOSIS — M25.572 ACUTE LEFT ANKLE PAIN: ICD-10-CM

## 2021-07-14 DIAGNOSIS — Z79.01 ENCOUNTER FOR CURRENT LONG-TERM USE OF ANTICOAGULANTS: ICD-10-CM

## 2021-07-14 DIAGNOSIS — M76.62 ACHILLES TENDINITIS, LEFT LEG: ICD-10-CM

## 2021-07-14 DIAGNOSIS — M25.472 EDEMA OF LEFT ANKLE: Primary | ICD-10-CM

## 2021-07-14 PROCEDURE — 99213 OFFICE O/P EST LOW 20 MIN: CPT | Performed by: PODIATRIST

## 2021-07-14 NOTE — PROGRESS NOTES
Patient here for edema left ankle. Patient does wear the boot daily, but is wearing tennis shoes today. Still has some swelling, soreness, but improvement. Williams Jean : 1950 Sex: female  Age: 72 y.o. Patient was referred by Fausto Degroot DO    CC:    Follow-up left and left ankle swelling      HPI:   Presents today swelling left foot and left ankle. No calf pain no open wounds. Denies any pain of her left ankle today. Was wearing the walking boot to wear his regular shoes today. Pleased with progression. Continues long-term anticoagulant therapy with Coumadin. No additional pedal complaints.         ROS:  Const: Denies constitutional symptoms  Musculo: Denies symptoms other than stated above  Skin: Denies symptoms other than stated above       Current Outpatient Medications:     escitalopram (LEXAPRO) 10 MG tablet, Take 1 tablet by mouth every evening, Disp: 90 tablet, Rfl: 1    palbociclib (IBRANCE) 125 MG tablet, Take 125 mg by mouth daily, Disp: , Rfl:     fulvestrant (FASLODEX) 250 MG/5ML SOLN IM injection, Inject 500 mg into the muscle once, Disp: , Rfl:     denosumab (XGEVA) 120 MG/1.7ML SOLN SC injection, Inject 120 mg into the skin once, Disp: , Rfl:     propafenone (RYTHMOL) 225 MG tablet, Take 1 tablet by mouth three times daily, Disp: 270 tablet, Rfl: 1    acetaminophen (TYLENOL) 500 MG tablet, Take 1,000 mg by mouth every 6 hours as needed for Pain, Disp: , Rfl:     verapamil (CALAN SR) 180 MG extended release tablet, Take 1 tablet by mouth daily, Disp: 90 tablet, Rfl: 1    topiramate (TOPAMAX) 50 MG tablet, Take 1 tablet by mouth 2 times daily, Disp: 180 tablet, Rfl: 1    warfarin (COUMADIN) 6 MG tablet, Indications: monday, wednesday, friday 3 mg, all other days are 6 mg 1/2 tab on M W  and 1 tab all other days of the week OR AS DIRECTED, Disp: 30 tablet, Rfl: 5    ammonium lactate (LAC-HYDRIN) 12 % lotion, Apply topically twice daily, Disp: 400 g, Rfl: 2   calcium carbonate-vitamin D (CALTRATE 600+D) 600-400 MG-UNIT TABS per tab, Take 1 tablet by mouth daily, Disp: , Rfl:     Alpha-Lipoic Acid 100 MG CAPS, Take by mouth, Disp: , Rfl:     b complex vitamins capsule, Take 1 capsule by mouth daily, Disp: , Rfl:     furosemide (LASIX) 20 MG tablet, Take 20 mg by mouth daily , Disp: , Rfl:   Allergies   Allergen Reactions    Diltiazem Hcl Other (See Comments)     Other reaction(s): weak    Cardizem [Diltiazem] Palpitations    Cefuroxime Axetil Nausea And Vomiting, Other (See Comments) and Palpitations    Claritin [Loratadine] Palpitations    Erythromycin Nausea And Vomiting    Morphine Nausea And Vomiting    Mucinex [Guaifenesin Er] Palpitations    Propoxyphene Other (See Comments) and Nausea And Vomiting     DIZZY  Other reaction(s): Free Text    Zyrtec [Cetirizine] Nausea And Vomiting and Other (See Comments)     WEAK       Past Medical History:   Diagnosis Date    [facility] cancer ([facility] 02/2021    Breast cancer ([facility] 04/2017    [facility]     Fibroadenosis of breast     Headache     History of therapeutic radiation     Hyperlipidemia     Hypertension            Vitals:    07/14/21 1303   Weight: 172 lb (78 kg)   Height: 5' 7\" (1.702 m)       Work History/Social History: Retired  [employer]    Focused Lower Extremity Physical Exam:    Neurovascular examination:    Dorsalis Pedis palpable bilateral.  Posterior tibialis palpable bilateral.    Capillary Refill Time:  Immediate return  Hair growth:  Symmetrical and bilateral   Skin:  Not atrophic  Edema: No significant edema bilateral lower legs  Neurologic:  Light touch intact bilateral.      Musculoskeletal/ Orthopedic examination:    Equinis: Absent bilateral  Dorsiflexion, plantarflexion, inversion, eversion bilateral 5 out of 5 muscle strength  Wiggling toes  Negative Homans. Negative Mobley. No pain insertion or midsubstance Achilles tendon.   No pain plantar fascia. Dermatology examination:    No significant edema bilateral lower legs. Assessment and Plan: Evelia Quijano was seen today for follow-up. Diagnoses and all orders for this visit:    Edema of left ankle    Acute left ankle pain    Encounter for current long-term use of anticoagulants    Achilles tendinitis, left leg      Follow-up left heel pain and left foot pain    Progressing well. No pain today. Any increased calf pain or foot pain go emergency room. Follow-up 1 month for regular scheduled foot ankle exam.    Return in about 1 month (around 8/14/2021). Seen By:  Golden Perez DPM      Document was created using voice recognition software. Note was reviewed, however may contain grammatical errors.

## 2021-07-22 ENCOUNTER — OFFICE VISIT (OUTPATIENT)
Dept: FAMILY MEDICINE CLINIC | Age: 71
End: 2021-07-22
Payer: MEDICARE

## 2021-07-22 VITALS
TEMPERATURE: 98.6 F | HEART RATE: 88 BPM | SYSTOLIC BLOOD PRESSURE: 122 MMHG | DIASTOLIC BLOOD PRESSURE: 76 MMHG | OXYGEN SATURATION: 98 % | BODY MASS INDEX: 27.57 KG/M2 | WEIGHT: 176 LBS

## 2021-07-22 DIAGNOSIS — I48.20 CHRONIC ATRIAL FIBRILLATION (HCC): ICD-10-CM

## 2021-07-22 DIAGNOSIS — M79.672 LEFT FOOT PAIN: ICD-10-CM

## 2021-07-22 DIAGNOSIS — I10 ESSENTIAL HYPERTENSION: Primary | ICD-10-CM

## 2021-07-22 LAB — INTERNATIONAL NORMALIZATION RATIO, POC: 2.4

## 2021-07-22 PROCEDURE — 85610 PROTHROMBIN TIME: CPT | Performed by: FAMILY MEDICINE

## 2021-07-22 PROCEDURE — 99213 OFFICE O/P EST LOW 20 MIN: CPT | Performed by: FAMILY MEDICINE

## 2021-07-22 ASSESSMENT — ENCOUNTER SYMPTOMS
SINUS PAIN: 0
PHOTOPHOBIA: 0
BACK PAIN: 0
BLOOD IN STOOL: 0
CHEST TIGHTNESS: 0
EYE REDNESS: 0
SHORTNESS OF BREATH: 0
TROUBLE SWALLOWING: 0
NAUSEA: 0
DIARRHEA: 0
COUGH: 0
SORE THROAT: 0
ABDOMINAL PAIN: 0
VOMITING: 0
EYE PAIN: 0
EYE DISCHARGE: 0
ALLERGIC/IMMUNOLOGIC NEGATIVE: 1

## 2021-07-22 NOTE — PROGRESS NOTES
21  Tona Hernandes : 1950 Sex: female  Age: 72 y.o. Assessment and Plan: Desiree Adames was seen today for foot pain and office visit for anticoagulation management. Diagnoses and all orders for this visit:    Essential hypertension  Pressure well controlled with current medications. Chronic atrial fibrillation (HCC)  -     POCT INR  INR today 2.4, recheck in 2 months. Left foot pain  Follow with podiatry for continuing treatment. Return in about 2 months (around 2021). Chief Complaint   Patient presents with   [facility] Office Visit for Anticoagulation Management       Patient here for recheck pro time. INR today is 2.4. She denies dosage skipping or bleeding, bruising, dietary change. Is also complaining of a left foot pain, Dr. Maury Garrison is treating her for this. Review of Systems   Constitutional: Negative for appetite change, fatigue and unexpected weight change. HENT: Negative for congestion, ear pain, hearing loss, sinus pain, sore throat and trouble swallowing. Eyes: Negative for photophobia, pain, discharge and redness. Respiratory: Negative for cough, chest tightness and shortness of breath. Cardiovascular: Negative for chest pain, palpitations and leg swelling. Gastrointestinal: Negative for abdominal pain, blood in stool, diarrhea, nausea and vomiting. Endocrine: Negative. Genitourinary: Negative for dysuria, flank pain, frequency and hematuria. Musculoskeletal: Positive for arthralgias. Negative for back pain, joint swelling and myalgias. Left foot   Skin: Negative. Allergic/Immunologic: Negative. Neurological: Negative for dizziness, seizures, syncope, weakness, light-headedness, numbness and headaches. Hematological: Negative for adenopathy. Does not bruise/bleed easily. Psychiatric/Behavioral: Negative.           Current Outpatient Medications:     escitalopram (LEXAPRO) 10 MG tablet, Take 1 tablet by mouth every evening, Disp: 90 tablet, Rfl: 1    palbociclib (IBRANCE) 125 MG tablet, Take 125 mg by mouth daily, Disp: , Rfl:     fulvestrant (FASLODEX) 250 MG/5ML SOLN IM injection, Inject 500 mg into the muscle once, Disp: , Rfl:     denosumab (XGEVA) 120 MG/1.7ML SOLN SC injection, Inject 120 mg into the skin once, Disp: , Rfl:     propafenone (RYTHMOL) 225 MG tablet, Take 1 tablet by mouth three times daily, Disp: 270 tablet, Rfl: 1    acetaminophen (TYLENOL) 500 MG tablet, Take 1,000 mg by mouth every 6 hours as needed for Pain, Disp: , Rfl:     verapamil (CALAN SR) 180 MG extended release tablet, Take 1 tablet by mouth daily, Disp: 90 tablet, Rfl: 1    topiramate (TOPAMAX) 50 MG tablet, Take 1 tablet by mouth 2 times daily, Disp: 180 tablet, Rfl: 1    warfarin (COUMADIN) 6 MG tablet, Indications: monday, wednesday, friday 3 mg, all other days are 6 mg 1/2 tab on M W F and 1 tab all other days of the week OR AS DIRECTED, Disp: 30 tablet, Rfl: 5    ammonium lactate (LAC-HYDRIN) 12 % lotion, Apply topically twice daily, Disp: 400 g, Rfl: 2    calcium carbonate-vitamin D (CALTRATE 600+D) 600-400 MG-UNIT TABS per tab, Take 1 tablet by mouth daily, Disp: , Rfl:     Alpha-Lipoic Acid 100 MG CAPS, Take by mouth, Disp: , Rfl:     b complex vitamins capsule, Take 1 capsule by mouth daily, Disp: , Rfl:     furosemide (LASIX) 20 MG tablet, Take 20 mg by mouth daily , Disp: , Rfl:   Allergies   Allergen Reactions    Diltiazem Hcl Other (See Comments)     Other reaction(s): weak    Cardizem [Diltiazem] Palpitations    Cefuroxime Axetil Nausea And Vomiting, Other (See Comments) and Palpitations    Claritin [Loratadine] Palpitations    Erythromycin Nausea And Vomiting    Morphine Nausea And Vomiting    Mucinex [Guaifenesin Er] Palpitations    Propoxyphene Other (See Comments) and Nausea And Vomiting     DIZZY  Other reaction(s): Free Text    Zyrtec [Cetirizine] Nausea And Vomiting and Other (See Comments)     WEAK Past Medical History:   Diagnosis Date    [address] 02/2021    Breast cancer ([address] 04/2017    Chronic atrial fibrillation (HCC)     Fibroadenosis of breast     Headache     History of therapeutic radiation     Hyperlipidemia     Hypertension      Past Surgical History:   Procedure Laterality Date    [facility]  [facility]    BONE BIOPSY  04/2021    BREAST LUMPECTOMY Right 04/26/2017    COLONOSCOPY      CT BIOPSY PERCUTANEOUS DEEP BONE  04/07/2021    CT BIOPSY PERCUTANEOUS DEEP BONE 4/7/2021 Kina Weber MD [facility]    EYE SURGERY      SUDHIR LENSE IMPLANTS    OTHER SURGICAL HISTORY      port removal    TONSILLECTOMY      TUNNELED VENOUS PORT PLACEMENT  05/26/2017    Dr. Shashank Bell     Family History   Problem Relation Age of Onset    Cancer Maternal Cousin 80        lung    Heart Disease Mother     Other Mother         [place] Palsy    Other Father         gall bladder problem, hip fx; AAA rupture    Coronary Art Dis Father         MI     Social History     Socioeconomic History    Marital status:      Spouse name: Herman Cheadle Number of children: Not on file    Years of education: Not on file    Highest education level: Not on file   Occupational History    Occupation: retired      Comment: Retired 4 th    Tobacco Use    Smoking status: Never Smoker    Smokeless tobacco: Never Used   Vaping Use    Vaping Use: Never assessed   Substance and Sexual Activity    Alcohol use: No     Comment: occasionally pop    Drug use: No    Sexual activity: Not Currently     Partners: Male   Other Topics Concern    Not on file   Social History Narrative    Drinks occasional Pepsi/Coke     Social Determinants of Health     Financial Resource Strain: Low Risk     Difficulty of Paying Living Expenses: Not hard at all   Food Insecurity: No Food Insecurity    Worried About [address] [employer] in the Last Year: Never true    [address] in the Last Year: Never true   Transportation Needs:     Lack of Transportation (Medical):  Lack of Transportation (Non-Medical):    Physical Activity:     Days of Exercise per Week:     Minutes of Exercise per Session:    Stress:     Feeling of Stress :    Social Connections:     Frequency of Communication with Friends and Family:     Frequency of Social Gatherings with Friends and Family:     Attends Jainism Services:     Active Member of Clubs or Organizations:     Attends Club or Organization Meetings:     Marital Status:    Intimate Partner Violence:     Fear of Current or Ex-Partner:     Emotionally Abused:     Physically Abused:     Sexually Abused:        Vitals:    07/22/21 0932   BP: 122/76   Pulse: 88   Temp: 98.6 °F (37 °C)   SpO2: 98%   Weight: 176 lb (79.8 kg)       Physical Exam  Vitals and nursing note reviewed. Constitutional:       Appearance: She is well-developed. HENT:      Head: Atraumatic. Right Ear: External ear normal.      Left Ear: External ear normal.      Nose: Nose normal.      Mouth/Throat:      Pharynx: No oropharyngeal exudate. Eyes:      Conjunctiva/sclera: Conjunctivae normal.      Pupils: Pupils are equal, round, and reactive to light. Neck:      Thyroid: No thyromegaly. Trachea: No tracheal deviation. Cardiovascular:      Rate and Rhythm: Normal rate and regular rhythm. Heart sounds: No murmur heard. No friction rub. No gallop. Pulmonary:      Effort: Pulmonary effort is normal. No respiratory distress. Breath sounds: Normal breath sounds. Abdominal:      General: Bowel sounds are normal.      Palpations: Abdomen is soft. Musculoskeletal:         General: Tenderness present. No deformity. Normal range of motion. Cervical back: Normal range of motion and neck supple. Comments: Left foot   Lymphadenopathy:      Cervical: No cervical adenopathy. Skin:     General: Skin is warm and dry.       Capillary Refill: Capillary refill takes less than 2 seconds. Findings: No rash. Neurological:      Mental Status: She is alert and oriented to person, place, and time. Sensory: No sensory deficit. Motor: No abnormal muscle tone.       Coordination: Coordination normal.      Deep Tendon Reflexes: Reflexes normal.             Seen By:  Amina Shrestha DO

## 2021-07-25 ENCOUNTER — HOSPITAL ENCOUNTER (OUTPATIENT)
Dept: MRI IMAGING | Age: 71
Discharge: HOME | End: 2021-07-27
Payer: MEDICARE

## 2021-07-25 DIAGNOSIS — R52 PAIN: ICD-10-CM

## 2021-07-25 DIAGNOSIS — C79.9 METASTATIC MALIGNANT NEOPLASM, UNSPECIFIED SITE (HCC): ICD-10-CM

## 2021-07-25 PROCEDURE — 6360000004 HC RX CONTRAST MEDICATION: Performed by: RADIOLOGY

## 2021-07-25 PROCEDURE — 72197 MRI PELVIS W/O & W/DYE: CPT

## 2021-07-25 PROCEDURE — A9577 INJ MULTIHANCE: HCPCS | Performed by: RADIOLOGY

## 2021-07-25 RX ADMIN — GADOBENATE DIMEGLUMINE 16 ML: 529 INJECTION, SOLUTION INTRAVENOUS at 09:36

## 2021-07-27 DIAGNOSIS — G43.009 MIGRAINE WITHOUT AURA, NOT REFRACTORY: ICD-10-CM

## 2021-07-27 RX ORDER — TOPIRAMATE 50 MG/1
50 TABLET, FILM COATED ORAL 2 TIMES DAILY
Qty: 180 TABLET | Refills: 1 | Status: SHIPPED
Start: 2021-07-27 | End: 2022-01-19 | Stop reason: SDUPTHER

## 2021-07-27 NOTE — TELEPHONE ENCOUNTER
Last Appointment:  7/22/2021  Future Appointments   Date Time Provider Comfort Benjamin   8/18/2021  2:00 PM Satinder Garsia DPM [facility]   9/22/2021 10:30 AM [place] Jacquelyn Marcelo DO [facility]   10/29/2021  9:00 AM Naomi Negro DO [place] [facility]   5/10/2022 10:30 AM Krystina Kaur MD [facility]   6/28/2022  1:30 PM Catherine Rm [facility]N - [facility]NP [facility]

## 2021-08-18 ENCOUNTER — OFFICE VISIT (OUTPATIENT)
Dept: PODIATRY | Age: 71
End: 2021-08-18
Payer: MEDICARE

## 2021-08-18 VITALS — BODY MASS INDEX: 27.62 KG/M2 | WEIGHT: 176 LBS | HEIGHT: 67 IN

## 2021-08-18 DIAGNOSIS — M72.2 PLANTAR FASCIITIS: ICD-10-CM

## 2021-08-18 DIAGNOSIS — G60.8 HEREDITARY SENSORY NEUROPATHY: ICD-10-CM

## 2021-08-18 DIAGNOSIS — B35.1 TINEA UNGUIUM: ICD-10-CM

## 2021-08-18 DIAGNOSIS — Z79.01 ENCOUNTER FOR CURRENT LONG-TERM USE OF ANTICOAGULANTS: Primary | ICD-10-CM

## 2021-08-18 DIAGNOSIS — L84 CORNS AND CALLOSITIES: ICD-10-CM

## 2021-08-18 PROCEDURE — 11721 DEBRIDE NAIL 6 OR MORE: CPT | Performed by: PODIATRIST

## 2021-08-18 NOTE — PROGRESS NOTES
Maria Fernanda Gore is here today for nail care. her PCP is Arya Damon DO last OV was 21. Maria Fernanda Gore : 1950 Sex: female  Age: 72 y.o. Patient was referred by Arya Damon DO    CC:    Follow-up foot and ankle exam  Follow-up long-term anticoagulant therapy    HPI:   Follow-up foot and ankle exam  Follow-up long-term anticoagulant therapy    No calf pain today. No left heel pain. Pleased with progression. Does present today with her . Continues warfarin long-term anticoagulant therapy. No additional pedal complaints today.       ROS:  Const: Denies constitutional symptoms  Musculo: Denies symptoms other than stated above  Skin: Denies symptoms other than stated above       Current Outpatient Medications:     topiramate (TOPAMAX) 50 MG tablet, Take 1 tablet by mouth 2 times daily, Disp: 180 tablet, Rfl: 1    escitalopram (LEXAPRO) 10 MG tablet, Take 1 tablet by mouth every evening, Disp: 90 tablet, Rfl: 1    palbociclib (IBRANCE) 125 MG tablet, Take 125 mg by mouth daily, Disp: , Rfl:     fulvestrant (FASLODEX) 250 MG/5ML SOLN IM injection, Inject 500 mg into the muscle once, Disp: , Rfl:     denosumab (XGEVA) 120 MG/1.7ML SOLN SC injection, Inject 120 mg into the skin once, Disp: , Rfl:     propafenone (RYTHMOL) 225 MG tablet, Take 1 tablet by mouth three times daily, Disp: 270 tablet, Rfl: 1    acetaminophen (TYLENOL) 500 MG tablet, Take 1,000 mg by mouth every 6 hours as needed for Pain, Disp: , Rfl:     verapamil (CALAN SR) 180 MG extended release tablet, Take 1 tablet by mouth daily, Disp: 90 tablet, Rfl: 1    warfarin (COUMADIN) 6 MG tablet, Indications: monday, wednesday, friday 3 mg, all other days are 6 mg 1/2 tab on M W  and 1 tab all other days of the week OR AS DIRECTED, Disp: 30 tablet, Rfl: 5    ammonium lactate (LAC-HYDRIN) 12 % lotion, Apply topically twice daily, Disp: 400 g, Rfl: 2    calcium carbonate-vitamin D (CALTRATE 600+D) 600-400 MG-UNIT TABS per tab, Take 1 tablet by mouth daily, Disp: , Rfl:     Alpha-Lipoic Acid 100 MG CAPS, Take by mouth, Disp: , Rfl:     b complex vitamins capsule, Take 1 capsule by mouth daily, Disp: , Rfl:     furosemide (LASIX) 20 MG tablet, Take 20 mg by mouth daily , Disp: , Rfl:   Allergies   Allergen Reactions    Diltiazem Hcl Other (See Comments)     Other reaction(s): weak    Cardizem [Diltiazem] Palpitations    Cefuroxime Axetil Nausea And Vomiting, Other (See Comments) and Palpitations    Claritin [Loratadine] Palpitations    Erythromycin Nausea And Vomiting    Morphine Nausea And Vomiting    Mucinex [Guaifenesin Er] Palpitations    Propoxyphene Other (See Comments) and Nausea And Vomiting     DIZZY  Other reaction(s): Free Text    Zyrtec [Cetirizine] Nausea And Vomiting and Other (See Comments)     WEAK       Past Medical History:   Diagnosis Date    [facility]    Breast cancer ([facility] 04/2017    Chronic atrial fibrillation ([facility]     Fibroadenosis of breast     Headache     History of therapeutic radiation     Hyperlipidemia     Hypertension            Vitals:    08/18/21 1355   Weight: 176 lb (79.8 kg)   Height: 5' 7\" (1.702 m)       Work History/Social History: Retired  [employer]    Focused Lower Extremity Physical Exam:    Neurovascular examination:    Dorsalis Pedis palpable bilateral.  Posterior tibialis palpable bilateral.    Capillary Refill Time:  Immediate return  Hair growth:  Symmetrical and bilateral   Skin:  Not atrophic  Edema: No significant edema bilateral lower legs  Neurologic:  Light touch intact bilateral.      Musculoskeletal/ Orthopedic examination:    Equinis: Absent bilateral  Dorsiflexion, plantarflexion, inversion, eversion bilateral 5 out of 5 muscle strength  Wiggling toes  Negative Homans. Negative Mobley. No pain insertion or midsubstance Achilles tendon. No pain plantar fascia.         Dermatology examination:    No significant edema bilateral lower legs. No erythema  Toenails 1-5 right and left thickened, elongated, dystrophic, mycotic with subungual debris. Webspaces 1-4 bilateral clean, dry and intact. No open skin lesions or abrasions. No hyperkeratotic tissue noted. Assessment and Plan: hospitals was seen today for nail problem and callouses. Diagnoses and all orders for this visit:    Encounter for current long-term use of anticoagulants    Tinea unguium    Plantar fasciitis    Corns and callosities    Hereditary sensory neuropathy      Follow-up foot ankle exam  Continues long-term anticoagulant therapy with warfarin. Presents today with . Manual debridement with standard technique toenails 1 through 5 right and left in thickness and length. Patient tolerated procedure well. No left foot or calf pain today. Any recurrence of left heel pain come in sooner. We will follow-up 2 months monitor progression. Return in about 2 months (around 10/18/2021). Seen By:  Andres Segura DPM      Document was created using voice recognition software. Note was reviewed, however may contain grammatical errors.

## 2021-08-19 LAB
ALBUMIN SERPL-MCNC: 4 G/DL
ALP BLD-CCNC: 50 U/L
ALT SERPL-CCNC: 10 U/L
ANION GAP SERPL CALCULATED.3IONS-SCNC: 1.9 MMOL/L
AST SERPL-CCNC: 13 U/L
BASOPHILS ABSOLUTE: 29 /ΜL
BASOPHILS RELATIVE PERCENT: 1.7 %
BILIRUB SERPL-MCNC: 0.5 MG/DL (ref 0.1–1.4)
BUN BLDV-MCNC: 18 MG/DL
CALCIUM SERPL-MCNC: 8.4 MG/DL
CHLORIDE BLD-SCNC: 107 MMOL/L
CO2: 28 MMOL/L
CREAT SERPL-MCNC: 1.11 MG/DL
EOSINOPHILS ABSOLUTE: 20 /ΜL
EOSINOPHILS RELATIVE PERCENT: 1.2 %
GFR CALCULATED: 51
GLUCOSE BLD-MCNC: 68 MG/DL
HCT VFR BLD CALC: 33.2 % (ref 36–46)
HEMOGLOBIN: 11 G/DL (ref 12–16)
LYMPHOCYTES ABSOLUTE: 751 /ΜL
LYMPHOCYTES RELATIVE PERCENT: 44.2 %
MCH RBC QN AUTO: 32 PG
MCHC RBC AUTO-ENTMCNC: 33.1 G/DL
MCV RBC AUTO: 96.5 FL
MONOCYTES ABSOLUTE: 257 /ΜL
MONOCYTES RELATIVE PERCENT: 15.1 %
NEUTROPHILS ABSOLUTE: 643 /ΜL
NEUTROPHILS RELATIVE PERCENT: 37.8 %
PLATELET # BLD: 74 K/ΜL
PMV BLD AUTO: 8.1 FL
POTASSIUM SERPL-SCNC: 3.9 MMOL/L
RBC # BLD: 3.44 10^6/ΜL
SODIUM BLD-SCNC: 140 MMOL/L
TOTAL PROTEIN: 6.1
WBC # BLD: 1.7 10^3/ML

## 2021-08-23 DIAGNOSIS — I10 ESSENTIAL HYPERTENSION: ICD-10-CM

## 2021-08-23 DIAGNOSIS — I48.20 CHRONIC ATRIAL FIBRILLATION (HCC): ICD-10-CM

## 2021-08-23 RX ORDER — WARFARIN SODIUM 6 MG/1
TABLET ORAL
Qty: 30 TABLET | Refills: 5 | Status: SHIPPED
Start: 2021-08-23 | End: 2022-02-24 | Stop reason: SDUPTHER

## 2021-08-23 NOTE — TELEPHONE ENCOUNTER
Name of Medication(s) Requested:  See pended list     Pharmacy Requested:   [facility]    Medication(s) pended? [x] Yes  [] No    Last Appointment:  7/22/2021    Future appts:  Future Appointments   Date Time Provider Comfort Benjamin   9/22/2021 10:30 AM [place] Juan Vogel, DO [facility]   10/20/2021  1:00 PM Francine Robles DPM Col Podiatry [facility]   10/29/2021  9:00 AM Eugenie Ratliff DO [place] [facility]   5/10/2022 10:30 AM John Suárez MD [facility]   6/28/2022  1:30 PM [address], APRN - CNP [facility]          Does patient need call back?   [] Yes  [x] No

## 2021-09-27 ENCOUNTER — OFFICE VISIT (OUTPATIENT)
Dept: FAMILY MEDICINE CLINIC | Age: 71
End: 2021-09-27
Payer: MEDICARE

## 2021-09-27 VITALS
DIASTOLIC BLOOD PRESSURE: 58 MMHG | RESPIRATION RATE: 16 BRPM | BODY MASS INDEX: 27.6 KG/M2 | WEIGHT: 176.2 LBS | OXYGEN SATURATION: 97 % | HEART RATE: 65 BPM | TEMPERATURE: 97.9 F | SYSTOLIC BLOOD PRESSURE: 132 MMHG

## 2021-09-27 DIAGNOSIS — M77.8 RIGHT WRIST TENDINITIS: Primary | ICD-10-CM

## 2021-09-27 DIAGNOSIS — I48.20 CHRONIC ATRIAL FIBRILLATION (HCC): ICD-10-CM

## 2021-09-27 DIAGNOSIS — Z23 NEED FOR VACCINATION: ICD-10-CM

## 2021-09-27 LAB
INTERNATIONAL NORMALIZATION RATIO, POC: 2.3
PROTHROMBIN TIME, POC: 27.6

## 2021-09-27 PROCEDURE — 90694 VACC AIIV4 NO PRSRV 0.5ML IM: CPT | Performed by: FAMILY MEDICINE

## 2021-09-27 PROCEDURE — 85610 PROTHROMBIN TIME: CPT | Performed by: FAMILY MEDICINE

## 2021-09-27 PROCEDURE — 99213 OFFICE O/P EST LOW 20 MIN: CPT | Performed by: FAMILY MEDICINE

## 2021-09-27 PROCEDURE — G0008 ADMIN INFLUENZA VIRUS VAC: HCPCS | Performed by: FAMILY MEDICINE

## 2021-09-27 RX ORDER — PROPAFENONE HYDROCHLORIDE 225 MG/1
225 TABLET, FILM COATED ORAL 3 TIMES DAILY
Qty: 270 TABLET | Refills: 1 | Status: SHIPPED
Start: 2021-09-27 | End: 2022-04-08 | Stop reason: SDUPTHER

## 2021-09-27 ASSESSMENT — ENCOUNTER SYMPTOMS
VOMITING: 0
ABDOMINAL PAIN: 0
BACK PAIN: 0
SINUS PAIN: 0
CHEST TIGHTNESS: 0
BLOOD IN STOOL: 0
SHORTNESS OF BREATH: 0
COUGH: 0
ALLERGIC/IMMUNOLOGIC NEGATIVE: 1
EYE PAIN: 0
EYE DISCHARGE: 0
TROUBLE SWALLOWING: 0
PHOTOPHOBIA: 0
NAUSEA: 0
EYE REDNESS: 0
DIARRHEA: 0
SORE THROAT: 0

## 2021-09-27 NOTE — PROGRESS NOTES
21  Stephany Colon : 1950 Sex: female  Age: 72 y.o. Assessment and Plan: Chelsi Hebert was seen today for atrial fibrillation. Diagnoses and all orders for this visit:    Right wrist tendinitis  -     XR WRIST RIGHT (MIN 3 VIEWS); Future  Wrap, ice or heat, check in 5 to 7 days if not improved. Chronic atrial fibrillation (HCC)  -     POCT INR  -     propafenone (RYTHMOL) 225 MG tablet; Take 1 tablet by mouth three times daily    Need for vaccination  -     INFLUENZA, QUADV, ADJUVANTED, 65 YRS =, IM, PF, PREFILL SYR, 0.5ML (FLUAD)        Return in about 2 months (around 2021). Chief Complaint   Patient presents with    Atrial Fibrillation       Patient here for recheck pro time. INR, 2.3, denies bleeding, bruising, missed doses, dietary changes. Is also complaining of right wrist pain, woke up with this this morning. She is taking her  to the lymphedema clinic twice weekly and needs help him move around, he weighs over 300 pounds. She denies trauma, I feel this is more overuse. Review of Systems   Constitutional: Negative for appetite change, fatigue and unexpected weight change. HENT: Negative for congestion, ear pain, hearing loss, sinus pain, sore throat and trouble swallowing. Eyes: Negative for photophobia, pain, discharge and redness. Respiratory: Negative for cough, chest tightness and shortness of breath. Cardiovascular: Negative for chest pain, palpitations and leg swelling. Gastrointestinal: Negative for abdominal pain, blood in stool, diarrhea, nausea and vomiting. Endocrine: Negative. Genitourinary: Negative for dysuria, flank pain, frequency and hematuria. Musculoskeletal: Positive for arthralgias, joint swelling and myalgias. Negative for back pain. Right wrist   Skin: Negative. Allergic/Immunologic: Negative. Neurological: Negative for dizziness, seizures, syncope, weakness, light-headedness, numbness and headaches. [facility] Hospital Joseph Arroyo MD  Primary Care Provider: Deneen White [address] [address] [phone]                 Patient Instructions:      Diet clear liquid     Notify your  health care provider if you experience any of the following:  temperature >100.4     Notify your health care provider if you experience any of the following:  persistent nausea and vomiting or diarrhea     Notify your health care provider if you experience any of the following:  severe uncontrolled pain     No dressing needed     Activity as tolerated       Significant Diagnostic Studies:     Pending Diagnostic Studies:     None         Medications:  Reconciled Home Medications:      Medication List      START taking these medications    celecoxib 100 MG capsule  Commonly known as:  CeleBREX  Take 1 capsule (100 mg total) by mouth 2 (two) times daily as needed for Pain (chest pain unrelieved by hydrocodone).     diazePAM 2 MG tablet  Commonly known as:  VALIUM  Take 1 tablet (2 mg total) by mouth every 6 (six) hours as needed for Anxiety (muscle spasm).     HYDROcodone-acetaminophen 7.5-325 mg per tablet  Commonly known as:  NORCO  Take 1 tablet by mouth every 4 (four) hours as needed for Pain.     ondansetron 4 MG Tbdl  Commonly known as:  ZOFRAN-ODT  Take 1 tablet (4 mg total) by mouth every 6 (six) hours as needed.        CONTINUE taking these medications    AMITIZA 24 MCG Cap  Generic drug:  lubiprostone  Take 24 mcg by mouth 2 (two) times daily.     amitriptyline 150 MG Tab  Commonly known as:  ELAVIL  Take 150 mg by mouth nightly.     busPIRone 10 MG tablet  Commonly known as:  BUSPAR  Take 2 tablets (20 mg total) by mouth 3 (three) times daily. Take 2 tabs (20mg) TID     lisinopril-hydrochlorothiazide 10-12.5 mg per tablet  Commonly known as:  PRINZIDE,ZESTORETIC  Take 1 tablet by mouth once daily.     LYRICA 150 MG capsule  Generic drug:  pregabalin  Take 150 mg by mouth 2 (two) times daily.     multivitamin per tablet  Commonly known as:  THERAGRAN  Take 1 tablet by mouth once daily.     pantoprazole 40 MG tablet  Commonly known as:  PROTONIX  Take 40 mg by mouth once daily.     rizatriptan 5 MG  Mucinex [Guaifenesin Er] Palpitations    Propoxyphene Other (See Comments) and Nausea And Vomiting     DIZZY  Other reaction(s): Free Text    Zyrtec [Cetirizine] Nausea And Vomiting and Other (See Comments)     WEAK       Past Medical History:   Diagnosis Date    Bone cancer ([facility] [address] 02/2021    Breast cancer ([facility] [address] 04/2017    Chronic atrial fibrillation (HCC)     Fibroadenosis of breast     Headache     History of therapeutic radiation     Hyperlipidemia     Hypertension      Past Surgical History:   Procedure Laterality Date    [facility]GERY  [facility]    BONE BIOPSY  04/2021    BREAST LUMPECTOMY Right 04/26/2017    COLONOSCOPY      CT BIOPSY PERCUTANEOUS DEEP BONE  04/07/2021    CT BIOPSY PERCUTANEOUS DEEP BONE 4/7/2021 Jose A Cardenas MD [facility]    EYE SURGERY      SUDHIR LENSE IMPLANTS    OTHER SURGICAL HISTORY      port removal    TONSILLECTOMY      TUNNELED VENOUS PORT PLACEMENT  05/26/2017    Dr. Markie Lopez     Family History   Problem Relation Age of Onset    Cancer Maternal Cousin 80        lung    Heart Disease Mother     Other Mother         [place] Palsy    Other Father         gall bladder problem, hip fx; AAA rupture    Coronary Art Dis Father         MI     Social History     Socioeconomic History    Marital status:      Spouse name: Gómez Canchola Number of children: Not on file    Years of education: Not on file    Highest education level: Not on file   Occupational History    Occupation: retired      Comment: Retired 4 th    Tobacco Use    Smoking status: Never Smoker    Smokeless tobacco: Never Used   Vaping Use    Vaping Use: Never assessed   Substance and Sexual Activity    Alcohol use: No     Comment: occasionally pop    Drug use: No    Sexual activity: Not Currently     Partners: Male   Other Topics Concern    Not on file   Social History Narrative    Drinks occasional Pepsi/Coke     Social Determinants of Health     Financial [facility]   Resource Strain: Low Risk     Difficulty of Paying Living Expenses: Not hard at all   Food Insecurity: No Food Insecurity    Worried About Running Out of Food in the Last Year: Never true    Stacia of Food in the Last Year: Never true   Transportation Needs:     Lack of Transportation (Medical):  Lack of Transportation (Non-Medical):    Physical Activity:     Days of Exercise per Week:     Minutes of Exercise per Session:    Stress:     Feeling of Stress :    Social Connections:     Frequency of Communication with Friends and Family:     Frequency of Social Gatherings with Friends and Family:     Attends Yarsanism Services:     Active Member of Clubs or Organizations:     Attends Club or Organization Meetings:     Marital Status:    Intimate Partner Violence:     Fear of Current or Ex-Partner:     Emotionally Abused:     Physically Abused:     Sexually Abused:        Vitals:    09/27/21 1112   BP: (!) 132/58   Pulse: 65   Resp: 16   Temp: 97.9 °F (36.6 °C)   TempSrc: Temporal   SpO2: 97%   Weight: 176 lb 3.2 oz (79.9 kg)       Physical Exam  Vitals and nursing note reviewed. Constitutional:       Appearance: She is well-developed. HENT:      Head: Atraumatic. Right Ear: External ear normal.      Left Ear: External ear normal.      Nose: Nose normal.      Mouth/Throat:      Pharynx: No oropharyngeal exudate. Eyes:      Conjunctiva/sclera: Conjunctivae normal.      Pupils: Pupils are equal, round, and reactive to light. Neck:      Thyroid: No thyromegaly. Trachea: No tracheal deviation. Cardiovascular:      Rate and Rhythm: Normal rate and regular rhythm. Heart sounds: No murmur heard. No friction rub. No gallop. Pulmonary:      Effort: Pulmonary effort is normal. No respiratory distress. Breath sounds: Normal breath sounds. Abdominal:      General: Bowel sounds are normal.      Palpations: Abdomen is soft.    Musculoskeletal:         General: No tenderness or

## 2021-10-20 ENCOUNTER — PROCEDURE VISIT (OUTPATIENT)
Dept: PODIATRY | Age: 71
End: 2021-10-20
Payer: MEDICARE

## 2021-10-20 VITALS — BODY MASS INDEX: 27.62 KG/M2 | HEIGHT: 67 IN | WEIGHT: 176 LBS

## 2021-10-20 DIAGNOSIS — G60.8 HEREDITARY SENSORY NEUROPATHY: ICD-10-CM

## 2021-10-20 DIAGNOSIS — Z79.01 ENCOUNTER FOR CURRENT LONG-TERM USE OF ANTICOAGULANTS: ICD-10-CM

## 2021-10-20 DIAGNOSIS — B35.1 TINEA UNGUIUM: Primary | ICD-10-CM

## 2021-10-20 DIAGNOSIS — L84 CORNS AND CALLOSITIES: ICD-10-CM

## 2021-10-20 PROCEDURE — 11721 DEBRIDE NAIL 6 OR MORE: CPT | Performed by: PODIATRIST

## 2021-10-20 NOTE — PROGRESS NOTES
Hilda Anthony is here today for nail care. her PCP is Vini Maria DO last OV was 21. Hilda Anthony : 1950 Sex: female  Age: 80 y.o. Patient was referred by Vini Maria DO    CC:    Follow-up foot ankle exam    HPI:   Taryn Davis presents today follow-up foot ankle exam.  Continues long-term anticoagulant therapy of Coumadin. Does present through her . No open wounds. Denies any Achilles tendon pain either foot or ankle today. Progressing well. No foot or ankle pain today. No calf pain.     ROS:  Const: Denies constitutional symptoms  Musculo: Denies symptoms other than stated above  Skin: Denies symptoms other than stated above       Current Outpatient Medications:     propafenone (RYTHMOL) 225 MG tablet, Take 1 tablet by mouth three times daily, Disp: 270 tablet, Rfl: 1    warfarin (COUMADIN) 6 MG tablet, Indications: monday, wednesday, friday 3 mg, all other days are 6 mg 1/2 tab on  and 1 tab all other days of the week OR AS DIRECTED, Disp: 30 tablet, Rfl: 5    verapamil (CALAN SR) 180 MG extended release tablet, Take 1 tablet by mouth daily, Disp: 90 tablet, Rfl: 1    topiramate (TOPAMAX) 50 MG tablet, Take 1 tablet by mouth 2 times daily, Disp: 180 tablet, Rfl: 1    escitalopram (LEXAPRO) 10 MG tablet, Take 1 tablet by mouth every evening, Disp: 90 tablet, Rfl: 1    palbociclib (IBRANCE) 125 MG tablet, Take 125 mg by mouth daily, Disp: , Rfl:     fulvestrant (FASLODEX) 250 MG/5ML SOLN IM injection, Inject 500 mg into the muscle once, Disp: , Rfl:     denosumab (XGEVA) 120 MG/1.7ML SOLN SC injection, Inject 120 mg into the skin once, Disp: , Rfl:     acetaminophen (TYLENOL) 500 MG tablet, Take 1,000 mg by mouth every 6 hours as needed for Pain, Disp: , Rfl:     ammonium lactate (LAC-HYDRIN) 12 % lotion, Apply topically twice daily, Disp: 400 g, Rfl: 2    calcium carbonate-vitamin D (CALTRATE 600+D) 600-400 MG-UNIT TABS per tab, Take 1 tablet by mouth daily, Disp: , Rfl:     Alpha-Lipoic Acid 100 MG CAPS, Take by mouth, Disp: , Rfl:     b complex vitamins capsule, Take 1 capsule by mouth daily, Disp: , Rfl:     furosemide (LASIX) 20 MG tablet, Take 20 mg by mouth daily , Disp: , Rfl:   Allergies   Allergen Reactions    Diltiazem Hcl Other (See Comments)     Other reaction(s): weak    Cardizem [Diltiazem] Palpitations    Cefuroxime Axetil Nausea And Vomiting, Other (See Comments) and Palpitations    Claritin [Loratadine] Palpitations    Erythromycin Nausea And Vomiting    Morphine Nausea And Vomiting    Mucinex [Guaifenesin Er] Palpitations    Propoxyphene Other (See Comments) and Nausea And Vomiting     DIZZY  Other reaction(s): Free Text    Zyrtec [Cetirizine] Nausea And Vomiting and Other (See Comments)     WEAK       Past Medical History:   Diagnosis Date    Bone cancer ([facility] [address] 02/2021    Breast cancer ([facility] [address] 04/2017    Chronic atrial fibrillation ([facility] [address]     Fibroadenosis of breast     Headache     History of therapeutic radiation     Hyperlipidemia     Hypertension            Vitals:    10/20/21 1252   Weight: 176 lb (79.8 kg)   Height: 5' 7\" (1.702 m)       Work History/Social History: Retired  [employer]    Focused Lower Extremity Physical Exam:    Neurovascular examination:    Dorsalis Pedis palpable bilateral.  Posterior tibialis palpable bilateral.    Capillary Refill Time:  Immediate return  Hair growth:  Symmetrical and bilateral   Skin:  Not atrophic  Edema: No significant edema bilateral lower legs  Neurologic:  Light touch intact bilateral.      Musculoskeletal/ Orthopedic examination:    Equinis: Absent bilateral  Dorsiflexion, plantarflexion, inversion, eversion bilateral 5 out of 5 muscle strength  Wiggling toes  Negative Mobley. Negative Homans.   No pain insertion or mid substance Achilles tendon bilateral.  No pain bilateral plantar fascial.          Dermatology examination:    Toenails 1-5 right and left thickened, elongated, dystrophic, mycotic with subungual debris. Webspaces 1-4 bilateral clean, dry and intact. No hyperkeratotic tissue noted. No open wounds. No erythema. Assessment and Plan: Yevgeniy Ramos was seen today for callouses and nail problem. Diagnoses and all orders for this visit:    Tinea unguium    Encounter for current long-term use of anticoagulants    Corns and callosities    Hereditary sensory neuropathy      Follow-up foot ankle exam    Continues Coumadin long-term anticoagulant therapy. I did perform debridement all toenails 1 through 5 right and left in thickness and length. Tolerated procedure well. Avoid barefoot. Lotion daily both feet. Previously did have Achilles tendinitis no Achilles tendon pain today progressing well from foot ankle standpoint. I will follow-up 2 months. Return in about 2 months (around 12/20/2021). Seen By:  Skyler Arredondo DPM      Document was created using voice recognition software. Note was reviewed, however may contain grammatical errors.

## 2021-10-29 ENCOUNTER — PROCEDURE VISIT (OUTPATIENT)
Dept: AUDIOLOGY | Age: 71
End: 2021-10-29
Payer: MEDICARE

## 2021-10-29 ENCOUNTER — OFFICE VISIT (OUTPATIENT)
Dept: ENT CLINIC | Age: 71
End: 2021-10-29
Payer: MEDICARE

## 2021-10-29 VITALS
BODY MASS INDEX: 26.68 KG/M2 | OXYGEN SATURATION: 98 % | HEIGHT: 67 IN | WEIGHT: 170 LBS | DIASTOLIC BLOOD PRESSURE: 57 MMHG | SYSTOLIC BLOOD PRESSURE: 135 MMHG | TEMPERATURE: 97.2 F | HEART RATE: 56 BPM

## 2021-10-29 DIAGNOSIS — H90.3 SENSORINEURAL HEARING LOSS (SNHL) OF BOTH EARS: Primary | ICD-10-CM

## 2021-10-29 PROCEDURE — 92567 TYMPANOMETRY: CPT | Performed by: AUDIOLOGIST

## 2021-10-29 PROCEDURE — 99213 OFFICE O/P EST LOW 20 MIN: CPT | Performed by: OTOLARYNGOLOGY

## 2021-10-29 PROCEDURE — 92557 COMPREHENSIVE HEARING TEST: CPT | Performed by: AUDIOLOGIST

## 2021-10-29 NOTE — PROGRESS NOTES
Subjective:      Patient ID:  Luz Mercedes is a 80 y.o. female. HPI:    Luz Mercedes presents for recheck today for hearing loss. There are not hearing changes noted by the patient. There are not new medical issues. Patient's medications, allergies, past medical, surgical, social and family histories were reviewed and updated as appropriate. Review of Systems   Constitutional: Negative. Negative for appetite change, chills and fever. HENT: Positive for hearing loss. Negative for ear discharge, ear pain and tinnitus. Eyes: Negative. Negative for pain, discharge and visual disturbance. Respiratory: Negative. Negative for apnea, chest tightness and shortness of breath. Cardiovascular: Negative. Negative for chest pain, palpitations and leg swelling. Gastrointestinal: Negative. Negative for abdominal pain, diarrhea and vomiting. Endocrine: Negative for cold intolerance, heat intolerance and polydipsia. Genitourinary: Negative. Negative for dysuria, flank pain and hematuria. Musculoskeletal: Negative. Negative for arthralgias, gait problem and neck pain. Skin: Negative. Negative for color change, pallor and rash. Allergic/Immunologic: Negative for environmental allergies, food allergies and immunocompromised state. Neurological: Negative. Negative for dizziness, numbness and headaches. Hematological: Negative for adenopathy. Psychiatric/Behavioral: Negative. Negative for behavioral problems and hallucinations. All other systems reviewed and are negative. Objective:   Physical Exam  Constitutional:       General: She is not in acute distress. Appearance: Normal appearance. HENT:      Head: Normocephalic and atraumatic. Right Ear: Tympanic membrane, ear canal and external ear normal. There is impacted cerumen. Tympanic membrane is not retracted.       Left Ear: Tympanic membrane, ear canal and external ear normal.      Nose: Nose normal. Mouth/Throat:      Mouth: Mucous membranes are moist.      Pharynx: No oropharyngeal exudate. Eyes:      Extraocular Movements: Extraocular movements intact. Pupils: Pupils are equal, round, and reactive to light. Cardiovascular:      Rate and Rhythm: Normal rate and regular rhythm. Pulses: Normal pulses. Heart sounds: Normal heart sounds. Pulmonary:      Effort: Pulmonary effort is normal.      Breath sounds: Normal breath sounds. Abdominal:      General: Abdomen is flat. Musculoskeletal:         General: Normal range of motion. Cervical back: Normal range of motion and neck supple. Skin:     General: Skin is warm and dry. Neurological:      General: No focal deficit present. Mental Status: She is alert and oriented to person, place, and time. Psychiatric:         Mood and Affect: Mood normal.         Behavior: Behavior normal.           Audiogram -  Media Information          Document Information    Audiology   audio   10/29/2021   Attached To: Office Visit on 10/29/21 with Fan Childers DO   Source Information    [facility]                   Assessment:       Diagnosis Orders   1. Sensorineural hearing loss (SNHL) of both ears  SUZETTE Jimenez, Audiology, [place]              Plan:      - Audiogram performed in the office today and reviewed with patient essentially unchanged from last year   - Repeat audio in one year. Return earlier to clinic if any issues arise. Follow up in one year    Electronically signed by Anthony Sosa DO on 10/29/2021 at 10:13 AM                     Sindi Krishna  1950    I have discussed the case, including pertinent history and exam findings with the resident. I have seen and examined the patient and the key elements of the encounter have been performed by me.  I agree with the assessment, plan and orders as documented by the  resident              Remainder of medical problems as per  resident note. Patient seen and examined. Agree with above exam, assessment and plan.       Electronically signed by Dorothy Mcmanus DO on 11/2/21 at 1:30 PM EDT

## 2021-11-02 ASSESSMENT — ENCOUNTER SYMPTOMS
DIARRHEA: 0
APNEA: 0
VOMITING: 0
CHEST TIGHTNESS: 0
EYE PAIN: 0
ABDOMINAL PAIN: 0
EYE DISCHARGE: 0
COLOR CHANGE: 0
GASTROINTESTINAL NEGATIVE: 1
EYES NEGATIVE: 1
SHORTNESS OF BREATH: 0
RESPIRATORY NEGATIVE: 1

## 2021-11-29 ENCOUNTER — OFFICE VISIT (OUTPATIENT)
Dept: FAMILY MEDICINE CLINIC | Age: 71
End: 2021-11-29
Payer: MEDICARE

## 2021-11-29 VITALS
DIASTOLIC BLOOD PRESSURE: 58 MMHG | HEART RATE: 72 BPM | RESPIRATION RATE: 16 BRPM | TEMPERATURE: 97.2 F | SYSTOLIC BLOOD PRESSURE: 130 MMHG | OXYGEN SATURATION: 99 % | WEIGHT: 172 LBS | BODY MASS INDEX: 27 KG/M2 | HEIGHT: 67 IN

## 2021-11-29 DIAGNOSIS — I48.91 ATRIAL FIBRILLATION, UNSPECIFIED TYPE (HCC): Primary | ICD-10-CM

## 2021-11-29 LAB
INTERNATIONAL NORMALIZATION RATIO, POC: 2.6
PROTHROMBIN TIME, POC: 31.5

## 2021-11-29 PROCEDURE — 99213 OFFICE O/P EST LOW 20 MIN: CPT | Performed by: FAMILY MEDICINE

## 2021-11-29 PROCEDURE — 85610 PROTHROMBIN TIME: CPT | Performed by: FAMILY MEDICINE

## 2021-11-29 ASSESSMENT — ENCOUNTER SYMPTOMS
PHOTOPHOBIA: 0
SHORTNESS OF BREATH: 0
ALLERGIC/IMMUNOLOGIC NEGATIVE: 1
VOMITING: 0
NAUSEA: 0
EYE REDNESS: 0
TROUBLE SWALLOWING: 0
EYE PAIN: 0
DIARRHEA: 0
SINUS PAIN: 0
BACK PAIN: 0
COUGH: 0
EYE DISCHARGE: 0
ABDOMINAL PAIN: 0
CHEST TIGHTNESS: 0
BLOOD IN STOOL: 0
SORE THROAT: 0

## 2021-11-29 NOTE — PROGRESS NOTES
21  Chuckie Gomez : 1950 Sex: female  Age: 80 y.o. Assessment and Plan: Agustín Mcintosh was seen today for atrial fibrillation. Diagnoses and all orders for this visit:    Atrial fibrillation, unspecified type ([address]  -     POCT INR; Standing  -     POCT INR  -     Cancel: POCT INR    Recheck in 4 weeks pro time. Continue current schedule. Return in about 4 weeks (around 2021). Chief Complaint   Patient presents with    Atrial Fibrillation       Patient presents for recheck pro time, INR 2.6. She denies bleeding, bruising, dietary changes, and skipped doses. Denies any other voiced complaints      Review of Systems   Constitutional: Negative for appetite change, fatigue and unexpected weight change. HENT: Negative for congestion, ear pain, hearing loss, sinus pain, sore throat and trouble swallowing. Eyes: Negative for photophobia, pain, discharge and redness. Respiratory: Negative for cough, chest tightness and shortness of breath. Cardiovascular: Negative for chest pain, palpitations and leg swelling. Gastrointestinal: Negative for abdominal pain, blood in stool, diarrhea, nausea and vomiting. Endocrine: Negative. Genitourinary: Negative for dysuria, flank pain, frequency and hematuria. Musculoskeletal: Negative for arthralgias, back pain, joint swelling and myalgias. Skin: Negative. Allergic/Immunologic: Negative. Neurological: Negative for dizziness, seizures, syncope, weakness, light-headedness, numbness and headaches. Hematological: Negative for adenopathy. Does not bruise/bleed easily. Psychiatric/Behavioral: Negative.           Current Outpatient Medications:     propafenone (RYTHMOL) 225 MG tablet, Take 1 tablet by mouth three times daily, Disp: 270 tablet, Rfl: 1    warfarin (COUMADIN) 6 MG tablet, Indications: monday, wednesday, friday 3 mg, all other days are 6 mg 1/2 tab on M W  and 1 tab all other days of the week OR AS DIRECTED, Disp: 30 tablet, Rfl: 5    verapamil (CALAN SR) 180 MG extended release tablet, Take 1 tablet by mouth daily, Disp: 90 tablet, Rfl: 1    topiramate (TOPAMAX) 50 MG tablet, Take 1 tablet by mouth 2 times daily, Disp: 180 tablet, Rfl: 1    escitalopram (LEXAPRO) 10 MG tablet, Take 1 tablet by mouth every evening, Disp: 90 tablet, Rfl: 1    palbociclib (IBRANCE) 125 MG tablet, Take 125 mg by mouth daily, Disp: , Rfl:     fulvestrant (FASLODEX) 250 MG/5ML SOLN IM injection, Inject 500 mg into the muscle once, Disp: , Rfl:     denosumab (XGEVA) 120 MG/1.7ML SOLN SC injection, Inject 120 mg into the skin once, Disp: , Rfl:     acetaminophen (TYLENOL) 500 MG tablet, Take 1,000 mg by mouth every 6 hours as needed for Pain, Disp: , Rfl:     ammonium lactate (LAC-HYDRIN) 12 % lotion, Apply topically twice daily, Disp: 400 g, Rfl: 2    calcium carbonate-vitamin D (CALTRATE 600+D) 600-400 MG-UNIT TABS per tab, Take 1 tablet by mouth daily, Disp: , Rfl:     Alpha-Lipoic Acid 100 MG CAPS, Take by mouth, Disp: , Rfl:     b complex vitamins capsule, Take 1 capsule by mouth daily, Disp: , Rfl:     furosemide (LASIX) 20 MG tablet, Take 20 mg by mouth daily , Disp: , Rfl:   Allergies   Allergen Reactions    Diltiazem Hcl Other (See Comments)     Other reaction(s): weak    Cardizem [Diltiazem] Palpitations    Cefuroxime Axetil Nausea And Vomiting, Other (See Comments) and Palpitations    Claritin [Loratadine] Palpitations    Erythromycin Nausea And Vomiting    Morphine Nausea And Vomiting    Mucinex [Guaifenesin Er] Palpitations    Propoxyphene Other (See Comments) and Nausea And Vomiting     DIZZY  Other reaction(s): Free Text    Zyrtec [Cetirizine] Nausea And Vomiting and Other (See Comments)     WEAK       Past Medical History:   Diagnosis Date    [address] 02/2021    [facility]    Chronic atrial fibrillation (HCC)     Fibroadenosis of breast     Headache     History of therapeutic radiation     Hyperlipidemia     Hypertension      Past Surgical History:   Procedure Laterality Date    [facility]    BONE BIOPSY  04/2021    BREAST LUMPECTOMY Right 04/26/2017    COLONOSCOPY      CT BIOPSY PERCUTANEOUS DEEP BONE  04/07/2021    CT BIOPSY PERCUTANEOUS DEEP BONE 4/7/2021 Odalis Mascorro MD [facility]    EYE SURGERY      SUDHIR LENSE IMPLANTS    OTHER SURGICAL HISTORY      port removal    TONSILLECTOMY      TUNNELED VENOUS PORT PLACEMENT  05/26/2017    Dr. Dionne Pedraza     Family History   Problem Relation Age of Onset    Cancer Maternal Cousin 80        lung    Heart Disease Mother     Other Mother         [place] Palsy    Other Father         gall bladder problem, hip fx; AAA rupture    Coronary Art Dis Father         MI     Social History     Socioeconomic History    Marital status:      Spouse name: Donita Mancera Number of children: Not on file    Years of education: Not on file    Highest education level: Not on file   Occupational History    Occupation: retired      Comment: Retired 4 th    Tobacco Use    Smoking status: Never Smoker    Smokeless tobacco: Never Used   Vaping Use    Vaping Use: Not on file   Substance and Sexual Activity    Alcohol use: No     Comment: occasionally pop    Drug use: No    Sexual activity: Not Currently     Partners: Male   Other Topics Concern    Not on file   Social History Narrative    Drinks occasional Pepsi/Coke     Social Determinants of Health     Financial Resource Strain: Low Risk     Difficulty of Paying Living Expenses: Not hard at all   Food Insecurity: No Food Insecurity    Worried About [address] in the Last Year: Never true    [facility] in the Last Year: Never true   Transportation Needs:     Lack of Transportation (Medical): Not on file    Lack of Transportation (Non-Medical):  Not on file   Physical Activity:     Days of Exercise per Week: Not on file    Minutes of Exercise per Session: Not on file   Stress:     Feeling of Stress : Not on file   Social Connections:     Frequency of Communication with Friends and Family: Not on file    Frequency of Social Gatherings with Friends and Family: Not on file    Attends Rastafarian Services: Not on file    Active Member of Clubs or Organizations: Not on file    Attends Club or Organization Meetings: Not on file    Marital Status: Not on file   Intimate Partner Violence:     Fear of Current or Ex-Partner: Not on file    Emotionally Abused: Not on file    Physically Abused: Not on file    Sexually Abused: Not on file   Housing Stability:     Unable to Pay for Housing in the Last Year: Not on file    Number of [place] in the Last Year: Not on file    Unstable Housing in the Last Year: Not on file       Vitals:    11/29/21 1118   BP: (!) 130/58   Pulse: 72   Resp: 16   Temp: 97.2 °F (36.2 °C)   TempSrc: Temporal   SpO2: 99%   Weight: 172 lb (78 kg)   Height: 5' 7\" (1.702 m)       Physical Exam  Vitals and nursing note reviewed. Constitutional:       Appearance: She is well-developed. HENT:      Head: Atraumatic. Right Ear: External ear normal.      Left Ear: External ear normal.      Nose: Nose normal.      Mouth/Throat:      Pharynx: No oropharyngeal exudate. Eyes:      Conjunctiva/sclera: Conjunctivae normal.      Pupils: Pupils are equal, round, and reactive to light. Neck:      Thyroid: No thyromegaly. Trachea: No tracheal deviation. Cardiovascular:      Rate and Rhythm: Normal rate and regular rhythm. Heart sounds: No murmur heard. No friction rub. No gallop. Pulmonary:      Effort: Pulmonary effort is normal. No respiratory distress. Breath sounds: Normal breath sounds. Abdominal:      General: Bowel sounds are normal.      Palpations: Abdomen is soft. Musculoskeletal:         General: No tenderness or deformity. Normal range of motion.       Cervical back: Normal range of motion and neck supple. Lymphadenopathy:      Cervical: No cervical adenopathy. Skin:     General: Skin is warm and dry. Capillary Refill: Capillary refill takes less than 2 seconds. Findings: No rash. Neurological:      Mental Status: She is alert and oriented to person, place, and time. Sensory: No sensory deficit. Motor: No abnormal muscle tone.       Coordination: Coordination normal.      Deep Tendon Reflexes: Reflexes normal.             Seen By:  Rachel Montalvo DO

## 2021-12-09 ENCOUNTER — OFFICE VISIT (OUTPATIENT)
Dept: FAMILY MEDICINE CLINIC | Age: 71
End: 2021-12-09
Payer: MEDICARE

## 2021-12-09 VITALS
DIASTOLIC BLOOD PRESSURE: 70 MMHG | SYSTOLIC BLOOD PRESSURE: 140 MMHG | HEART RATE: 70 BPM | OXYGEN SATURATION: 97 % | TEMPERATURE: 97.8 F

## 2021-12-09 DIAGNOSIS — J01.40 ACUTE NON-RECURRENT PANSINUSITIS: Primary | ICD-10-CM

## 2021-12-09 PROCEDURE — 99213 OFFICE O/P EST LOW 20 MIN: CPT | Performed by: PHYSICIAN ASSISTANT

## 2021-12-09 RX ORDER — AMOXICILLIN 875 MG/1
875 TABLET, COATED ORAL 2 TIMES DAILY
Qty: 20 TABLET | Refills: 0 | Status: SHIPPED | OUTPATIENT
Start: 2021-12-09 | End: 2021-12-19

## 2021-12-09 NOTE — PROGRESS NOTES
Chief Complaint       Sinusitis (x 3-4 days), Headache (rhinorrhea, left sided), and Otalgia (nothing OTC)      History of Present Illness   Source of history provided by:  patient. Gonzalez Martinez is a 80 y.o. old female presenting to the walk in clinic for evaluation of sinus pressure, nasal congestion, sinus headaches, rhinorrhea, and left-sided otalgia for the past 3 to 4 days. Patient reports a history of recurrent sinusitis and states the symptoms feel the same. She has not tried taking anything over-the-counter for symptomatic relief. Denies any fever, chills, loss of taste or smell, CP, dyspnea, LE edema, abdominal pain, vomiting, rash, or lethargy. Denies any hx of asthma, COPD, or tobacco use. Patient denies recent sick exposures. Patient has been vaccinated for COVID-19. Of note, patient is currently anticoagulated on warfarin. ROS    Unless otherwise stated in this report or unable to obtain because of the patient's clinical or mental status as evidenced by the medical record, this patients's positive and negative responses for Review of Systems, constitutional, psych, eyes, ENT, cardiovascular, respiratory, gastrointestinal, neurological, genitourinary, musculoskeletal, integument systems and systems related to the presenting problem are either stated in the preceding or were not pertinent or were negative for the symptoms and/or complaints related to the medical problem. Past Medical History:  has a past medical history of Bone cancer ([address] Breast cancer ([address] Chronic atrial fibrillation ([address] Fibroadenosis of breast, Headache, History of therapeutic radiation, Hyperlipidemia, and Hypertension. Past Surgical History:  has a past surgical history that includes Atrial ablation surgery (1999); Colonoscopy; Tonsillectomy; eye surgery; Breast lumpectomy (Right, 04/26/2017);  Tunneled venous port placement (05/26/2017); other surgical history; CT BIOPSY DEEP BONE PERCUTANEOUS (04/07/2021); and bone biopsy (04/2021). Social History:  reports that she has never smoked. She has never used smokeless tobacco. She reports that she does not drink alcohol and does not use drugs. Family History: family history includes Cancer (age of onset: 80) in her maternal cousin; Coronary Art Dis in her father; Heart Disease in her mother; Other in her father and mother. Allergies: Diltiazem hcl, Cardizem [diltiazem], Cefuroxime axetil, Claritin [loratadine], Erythromycin, Morphine, Mucinex [guaifenesin er], Propoxyphene, and Zyrtec [cetirizine]    Physical Exam         VS:  BP (!) 140/70   Pulse 70   Temp 97.8 °F (36.6 °C)   SpO2 97%    Oxygen Saturation Interpretation: Normal.    Constitutional:  Alert, development consistent with age. NAD. Head:  NC/NT. Airway patent. Moderate TTP noted over the bilateral frontal, ethmoid, and maxillary sinuses. TMs are translucent bilaterally without any erythema or perforation noted. Mouth: Posterior pharynx with mild erythema and clear postnasal drip. No tonsillar hypertrophy or exudate. Neck:  Normal ROM. Supple. No anterior cervical adenopathy noted. Lungs: CTAB without wheezes, rales, or rhonchi. CV:  Regular rate and rhythm, normal heart sounds, without pathological murmurs, ectopy, gallops, or rubs. Skin:  Normal turgor. Warm, dry, without visible rash. Lymphatic: No lymphangitis or adenopathy noted. Neurological:  Oriented. Motor functions intact. Lab / Imaging Results   (All laboratory and radiology results have been personally reviewed by myself)  Labs:  No results found for this visit on 12/09/21. Imaging: All Radiology results interpreted by Radiologist unless otherwise noted. Assessment / Plan     Impression(s): Yana Wilkins was seen today for sinusitis, headache and otalgia. Diagnoses and all orders for this visit:    Acute non-recurrent pansinusitis  -     amoxicillin (AMOXIL) 875 MG tablet;  Take 1 tablet by mouth 2 times daily for 10 days      Disposition:  Disposition: Discharge to home. COVID-19 testing deferred per patient request.  She states that this feels like a typical sinus infection to her. She is also fully vaccinated and denies any known sick exposures. Prescription written for amoxicillin, side effects discussed. Increase fluids and rest.  Additional symptomatic relief discussed including Tylenol prn pain/fever. Schedule virtual f/u with PCP in 7-10 days if symptoms persist. ED sooner if symptoms worsen or change. ED immediately with high or refractory fever, progressive SOB, dyspnea, CP, calf pain/swelling, shaking chills, vomiting, abdominal pain, lethargy, flank pain, or decreased urinary output. Pt verbalizes understanding and is in agreement with plan of care. All questions answered. Moraima Beal PA-C    **This report was transcribed using voice recognition software. Every effort was made to ensure accuracy; however, inadvertent computerized transcription errors may be present.

## 2021-12-21 ENCOUNTER — HOSPITAL ENCOUNTER (OUTPATIENT)
Dept: CT IMAGING | Age: 71
Discharge: HOME | End: 2021-12-23
Payer: MEDICARE

## 2021-12-21 ENCOUNTER — HOSPITAL ENCOUNTER (OUTPATIENT)
Dept: NUCLEAR MEDICINE | Age: 71
Discharge: HOME | End: 2021-12-21
Payer: MEDICARE

## 2021-12-21 DIAGNOSIS — C79.51 BONE METASTASIS (HCC): ICD-10-CM

## 2021-12-21 DIAGNOSIS — C50.211 MALIGNANT NEOPLASM OF UPPER-INNER QUADRANT OF RIGHT FEMALE BREAST, UNSPECIFIED ESTROGEN RECEPTOR STATUS (HCC): ICD-10-CM

## 2021-12-21 DIAGNOSIS — C79.51 BONE METASTASES (HCC): ICD-10-CM

## 2021-12-21 PROCEDURE — 74177 CT ABD & PELVIS W/CONTRAST: CPT

## 2021-12-21 PROCEDURE — 6360000004 HC RX CONTRAST MEDICATION: Performed by: STUDENT IN AN ORGANIZED HEALTH CARE EDUCATION/TRAINING PROGRAM

## 2021-12-21 PROCEDURE — 3430000000 HC RX DIAGNOSTIC RADIOPHARMACEUTICAL: Performed by: RADIOLOGY

## 2021-12-21 PROCEDURE — A9503 TC99M MEDRONATE: HCPCS | Performed by: RADIOLOGY

## 2021-12-21 PROCEDURE — 71260 CT THORAX DX C+: CPT

## 2021-12-21 PROCEDURE — 78306 BONE IMAGING WHOLE BODY: CPT

## 2021-12-21 RX ORDER — TC 99M MEDRONATE 20 MG/10ML
25 INJECTION, POWDER, LYOPHILIZED, FOR SOLUTION INTRAVENOUS
Status: COMPLETED | OUTPATIENT
Start: 2021-12-21 | End: 2021-12-21

## 2021-12-21 RX ADMIN — IOPAMIDOL 100 ML: 755 INJECTION, SOLUTION INTRAVENOUS at 09:22

## 2021-12-21 RX ADMIN — TC 99M MEDRONATE 25 MILLICURIE: 20 INJECTION, POWDER, LYOPHILIZED, FOR SOLUTION INTRAVENOUS at 07:58

## 2021-12-21 RX ADMIN — IOHEXOL 50 ML: 240 INJECTION, SOLUTION INTRATHECAL; INTRAVASCULAR; INTRAVENOUS; ORAL at 09:22

## 2021-12-22 ENCOUNTER — PROCEDURE VISIT (OUTPATIENT)
Dept: PODIATRY | Age: 71
End: 2021-12-22
Payer: MEDICARE

## 2021-12-22 VITALS — HEIGHT: 67 IN | WEIGHT: 175 LBS | BODY MASS INDEX: 27.47 KG/M2

## 2021-12-22 DIAGNOSIS — Z79.01 ENCOUNTER FOR CURRENT LONG-TERM USE OF ANTICOAGULANTS: ICD-10-CM

## 2021-12-22 DIAGNOSIS — G60.8 HEREDITARY SENSORY NEUROPATHY: ICD-10-CM

## 2021-12-22 DIAGNOSIS — B35.1 TINEA UNGUIUM: Primary | ICD-10-CM

## 2021-12-22 PROCEDURE — 11721 DEBRIDE NAIL 6 OR MORE: CPT | Performed by: PODIATRIST

## 2021-12-22 NOTE — PROGRESS NOTES
Joya Amaro is here today for nail care. her PCP is Shamika Monreal DO last OV was 11/29/2021. Patient was referred by Shamika Monreal DO    CC:    Follow-up foot ankle exam  Follow-up long-term anticoagulant therapy Coumadin    HPI:   Presents today follow-up foot and ankle exam.  Continues Coumadin long-term anticoagulant therapy. No open wounds today. Does present today with her . Denies calf pain. No Achilles pain. No recent injury. Pleased with overall progression. Does have elongated toenails today.     ROS:  Const: Denies constitutional symptoms  Musculo: Denies symptoms other than stated above  Skin: Denies symptoms other than stated above       Current Outpatient Medications:     propafenone (RYTHMOL) 225 MG tablet, Take 1 tablet by mouth three times daily, Disp: 270 tablet, Rfl: 1    warfarin (COUMADIN) 6 MG tablet, Indications: monday, wednesday, friday 3 mg, all other days are 6 mg 1/2 tab on M W F and 1 tab all other days of the week OR AS DIRECTED, Disp: 30 tablet, Rfl: 5    verapamil (CALAN SR) 180 MG extended release tablet, Take 1 tablet by mouth daily, Disp: 90 tablet, Rfl: 1    topiramate (TOPAMAX) 50 MG tablet, Take 1 tablet by mouth 2 times daily, Disp: 180 tablet, Rfl: 1    escitalopram (LEXAPRO) 10 MG tablet, Take 1 tablet by mouth every evening, Disp: 90 tablet, Rfl: 1    palbociclib (IBRANCE) 125 MG tablet, Take 125 mg by mouth daily, Disp: , Rfl:     fulvestrant (FASLODEX) 250 MG/5ML SOLN IM injection, Inject 500 mg into the muscle once, Disp: , Rfl:     denosumab (XGEVA) 120 MG/1.7ML SOLN SC injection, Inject 120 mg into the skin once, Disp: , Rfl:     acetaminophen (TYLENOL) 500 MG tablet, Take 1,000 mg by mouth every 6 hours as needed for Pain, Disp: , Rfl:     ammonium lactate (LAC-HYDRIN) 12 % lotion, Apply topically twice daily, Disp: 400 g, Rfl: 2    calcium carbonate-vitamin D (CALTRATE 600+D) 600-400 MG-UNIT TABS per tab, Take 1 tablet by mouth daily, Disp: , Rfl:     Alpha-Lipoic Acid 100 MG CAPS, Take by mouth, Disp: , Rfl:     b complex vitamins capsule, Take 1 capsule by mouth daily, Disp: , Rfl:     furosemide (LASIX) 20 MG tablet, Take 20 mg by mouth daily , Disp: , Rfl:   Allergies   Allergen Reactions    Diltiazem Hcl Other (See Comments)     Other reaction(s): weak    Cardizem [Diltiazem] Palpitations    Cefuroxime Axetil Nausea And Vomiting, Other (See Comments) and Palpitations    Claritin [Loratadine] Palpitations    Erythromycin Nausea And Vomiting    Morphine Nausea And Vomiting    Mucinex [Guaifenesin Er] Palpitations    Propoxyphene Other (See Comments) and Nausea And Vomiting     DIZZY  Other reaction(s): Free Text    Zyrtec [Cetirizine] Nausea And Vomiting and Other (See Comments)     WEAK       Past Medical History:   Diagnosis Date    [facility]    Breast cancer ([facility] 04/2017    [facility]     Fibroadenosis of breast     Headache     History of therapeutic radiation     Hyperlipidemia     Hypertension            Vitals:    12/22/21 1244   Weight: 175 lb (79.4 kg)   Height: 5' 7\" (1.702 m)       Work History/Social History: Retired  [employer]    Focused Lower Extremity Physical Exam:    Neurovascular examination:    Dorsalis Pedis palpable bilateral.  Posterior tibialis palpable bilateral.    Capillary Refill Time:  Immediate return  Hair growth:  Symmetrical and bilateral   Skin:  Not atrophic  Edema: No significant edema bilateral lower legs  Neurologic:  Light touch diminished bilateral.      Musculoskeletal/ Orthopedic examination:    Equinis: Absent bilateral  Dorsiflexion, plantarflexion, inversion, eversion bilateral 5 out of 5 muscle strength  Wiggling toes  No pain Achilles tendon bilateral.  Negative Homans bilateral      Dermatology examination:    Toenails 1-5 right and left thickened, elongated, dystrophic, mycotic with subungual debris.   Webspaces 1-4 bilateral clean, dry and intact. No open skin lesions or abrasions. Assessment and Plan: Kyaw Wilson was seen today for callouses and nail problem. Diagnoses and all orders for this visit:    Tinea unguium    Encounter for current long-term use of anticoagulants    Hereditary sensory neuropathy      Follow-up foot ankle exam  Continues Coumadin long-term anticoagulant therapy. Manual debridement with standard technique toenails 1 through 5 right and left in thickness and length. Patient tolerated procedure well. Continue lotion on bottom both feet daily. Avoid barefoot. Follow-up 2 months. Return in about 2 months (around 2/22/2022). Seen By:  Aditi Saenz DPM      Document was created using voice recognition software. Note was reviewed, however may contain grammatical errors.

## 2021-12-27 ENCOUNTER — HOSPITAL ENCOUNTER (OUTPATIENT)
Dept: MAMMOGRAPHY | Age: 71
Discharge: HOME | End: 2021-12-29
Payer: MEDICARE

## 2021-12-27 ENCOUNTER — OFFICE VISIT (OUTPATIENT)
Dept: FAMILY MEDICINE CLINIC | Age: 71
End: 2021-12-27
Payer: MEDICARE

## 2021-12-27 VITALS
DIASTOLIC BLOOD PRESSURE: 72 MMHG | HEIGHT: 67 IN | TEMPERATURE: 97.9 F | RESPIRATION RATE: 18 BRPM | OXYGEN SATURATION: 97 % | SYSTOLIC BLOOD PRESSURE: 120 MMHG | BODY MASS INDEX: 27.31 KG/M2 | HEART RATE: 68 BPM | WEIGHT: 174 LBS

## 2021-12-27 DIAGNOSIS — I48.91 ATRIAL FIBRILLATION, UNSPECIFIED TYPE (HCC): ICD-10-CM

## 2021-12-27 DIAGNOSIS — M85.88 OSTEOPENIA OF OTHER SITE: ICD-10-CM

## 2021-12-27 DIAGNOSIS — F06.4 ANXIETY DISORDER DUE TO GENERAL MEDICAL CONDITION: ICD-10-CM

## 2021-12-27 DIAGNOSIS — Z86.59 H/O: DEPRESSION: Primary | ICD-10-CM

## 2021-12-27 LAB
INTERNATIONAL NORMALIZATION RATIO, POC: 2.7
PROTHROMBIN TIME, POC: 32.8

## 2021-12-27 PROCEDURE — 85610 PROTHROMBIN TIME: CPT | Performed by: FAMILY MEDICINE

## 2021-12-27 PROCEDURE — 77080 DXA BONE DENSITY AXIAL: CPT

## 2021-12-27 PROCEDURE — 99213 OFFICE O/P EST LOW 20 MIN: CPT | Performed by: FAMILY MEDICINE

## 2021-12-27 RX ORDER — ESCITALOPRAM OXALATE 10 MG/1
10 TABLET ORAL EVERY EVENING
Qty: 90 TABLET | Refills: 1 | Status: SHIPPED
Start: 2021-12-27 | End: 2022-06-22 | Stop reason: SDUPTHER

## 2021-12-27 ASSESSMENT — ENCOUNTER SYMPTOMS
DIARRHEA: 0
EYE DISCHARGE: 0
COUGH: 0
TROUBLE SWALLOWING: 0
ABDOMINAL PAIN: 0
SINUS PAIN: 0
PHOTOPHOBIA: 0
NAUSEA: 0
BACK PAIN: 0
EYE REDNESS: 0
SHORTNESS OF BREATH: 0
CHEST TIGHTNESS: 0
EYE PAIN: 0
SORE THROAT: 0
BLOOD IN STOOL: 0
ALLERGIC/IMMUNOLOGIC NEGATIVE: 1
VOMITING: 0

## 2021-12-27 NOTE — PROGRESS NOTES
21  Grecia Escobar : 1950 Sex: female  Age: 80 y.o. Assessment and Plan: Yfn Valdez was seen today for coagulation disorder and pharyngitis. Diagnoses and all orders for this visit:    H/O: depression    Atrial fibrillation, unspecified type ([facility] [address]  -     POCT INR    Anxiety disorder due to general medical condition  -     escitalopram (LEXAPRO) 10 MG tablet; Take 1 tablet by mouth every evening        No follow-ups on file. Chief Complaint   Patient presents with    Coagulation Disorder    Pharyngitis     x 10 days swallow a lot of mucus        Patient returns for recheck pro time, INR, 2.7. She denies bleeding, bruising, dietary changes, dosage changes. He denies any other complaints. Does need a refill on her Lexapro. Review of Systems   Constitutional: Negative for appetite change, fatigue and unexpected weight change. HENT: Negative for congestion, ear pain, hearing loss, sinus pain, sore throat and trouble swallowing. Eyes: Negative for photophobia, pain, discharge and redness. Respiratory: Negative for cough, chest tightness and shortness of breath. Cardiovascular: Negative for chest pain, palpitations and leg swelling. Gastrointestinal: Negative for abdominal pain, blood in stool, diarrhea, nausea and vomiting. Endocrine: Negative. Genitourinary: Negative for dysuria, flank pain, frequency and hematuria. Musculoskeletal: Negative for arthralgias, back pain, joint swelling and myalgias. Skin: Negative. Allergic/Immunologic: Negative. Neurological: Negative for dizziness, seizures, syncope, weakness, light-headedness, numbness and headaches. Hematological: Negative for adenopathy. Does not bruise/bleed easily. Psychiatric/Behavioral: Negative.           Current Outpatient Medications:     escitalopram (LEXAPRO) 10 MG tablet, Take 1 tablet by mouth every evening, Disp: 90 tablet, Rfl: 1    propafenone (RYTHMOL) 225 MG tablet, Take 1 tablet by mouth three times daily, Disp: 270 tablet, Rfl: 1    warfarin (COUMADIN) 6 MG tablet, Indications: monday, wednesday, friday 3 mg, all other days are 6 mg 1/2 tab on M W F and 1 tab all other days of the week OR AS DIRECTED, Disp: 30 tablet, Rfl: 5    verapamil (CALAN SR) 180 MG extended release tablet, Take 1 tablet by mouth daily, Disp: 90 tablet, Rfl: 1    topiramate (TOPAMAX) 50 MG tablet, Take 1 tablet by mouth 2 times daily, Disp: 180 tablet, Rfl: 1    palbociclib (IBRANCE) 125 MG tablet, Take 125 mg by mouth daily, Disp: , Rfl:     fulvestrant (FASLODEX) 250 MG/5ML SOLN IM injection, Inject 500 mg into the muscle once, Disp: , Rfl:     denosumab (XGEVA) 120 MG/1.7ML SOLN SC injection, Inject 120 mg into the skin once, Disp: , Rfl:     acetaminophen (TYLENOL) 500 MG tablet, Take 1,000 mg by mouth every 6 hours as needed for Pain, Disp: , Rfl:     ammonium lactate (LAC-HYDRIN) 12 % lotion, Apply topically twice daily, Disp: 400 g, Rfl: 2    calcium carbonate-vitamin D (CALTRATE 600+D) 600-400 MG-UNIT TABS per tab, Take 1 tablet by mouth daily, Disp: , Rfl:     Alpha-Lipoic Acid 100 MG CAPS, Take by mouth, Disp: , Rfl:     b complex vitamins capsule, Take 1 capsule by mouth daily, Disp: , Rfl:     furosemide (LASIX) 20 MG tablet, Take 20 mg by mouth daily , Disp: , Rfl:   Allergies   Allergen Reactions    Diltiazem Hcl Other (See Comments)     Other reaction(s): weak    Cardizem [Diltiazem] Palpitations    Cefuroxime Axetil Nausea And Vomiting, Other (See Comments) and Palpitations    Claritin [Loratadine] Palpitations    Erythromycin Nausea And Vomiting    Morphine Nausea And Vomiting    Mucinex [Guaifenesin Er] Palpitations    Propoxyphene Other (See Comments) and Nausea And Vomiting     DIZZY  Other reaction(s): Free Text    Zyrtec [Cetirizine] Nausea And Vomiting and Other (See Comments)     WEAK       Past Medical History:   Diagnosis Date    [facility] 02[facility]2021    Breast cancer ([facility] 04/2017    Chronic atrial fibrillation (HCC)     Fibroadenosis of breast     Headache     History of therapeutic radiation     Hyperlipidemia     Hypertension      Past Surgical History:   Procedure Laterality Date    [facility]GERY  1999    [facility]    BONE BIOPSY  04/2021    BREAST LUMPECTOMY Right 04/26/2017    COLONOSCOPY      CT BIOPSY PERCUTANEOUS DEEP BONE  04/07/2021    CT BIOPSY PERCUTANEOUS DEEP BONE 4/7/2021 Navarro Ramirez MD [facility]    EYE SURGERY      SUDHIR LENSE IMPLANTS    OTHER SURGICAL HISTORY      port removal    TONSILLECTOMY      TUNNELED VENOUS PORT PLACEMENT  05/26/2017    Dr. Marie Arauz     Family History   Problem Relation Age of Onset    Cancer Maternal Cousin 80        lung    Heart Disease Mother     Other Mother         [place] Palsy    Other Father         gall bladder problem, hip fx; AAA rupture    Coronary Art Dis Father         MI     Social History     Socioeconomic History    Marital status:      Spouse name: Minor Hence Number of children: Not on file    Years of education: Not on file    Highest education level: Not on file   Occupational History    Occupation: retired      Comment: Retired 4 th    Tobacco Use    Smoking status: Never Smoker    Smokeless tobacco: Never Used   Vaping Use    Vaping Use: Not on file   Substance and Sexual Activity    Alcohol use: No     Comment: occasionally pop    Drug use: No    Sexual activity: Not Currently     Partners: Male   Other Topics Concern    Not on file   Social History Narrative    Drinks occasional Pepsi/Coke     Social Determinants of Health     Financial Resource Strain: Low Risk     Difficulty of Paying Living Expenses: Not hard at all   Food Insecurity: No Food Insecurity    Worried About [address] in the Last Year: Never true    [address] in the Last Year: Never true   Transportation Needs:     Lack of Transportation (Medical):  Not on file    Lack of Transportation (Non-Medical): Not on file   Physical Activity:     Days of Exercise per Week: Not on file    Minutes of Exercise per Session: Not on file   Stress:     Feeling of Stress : Not on file   Social Connections:     Frequency of Communication with Friends and Family: Not on file    Frequency of Social Gatherings with Friends and Family: Not on file    Attends Zoroastrianism Services: Not on file    Active Member of [address] [place] [address] or Organizations: Not on file    Attends Club or Organization Meetings: Not on file    Marital Status: Not on file   Intimate Partner Violence:     Fear of Current or Ex-Partner: Not on file    Emotionally Abused: Not on file    Physically Abused: Not on file    Sexually Abused: Not on file   Housing Stability:     Unable to Pay for Housing in the Last Year: Not on file    Number of [place] in the Last Year: Not on file    Unstable Housing in the Last Year: Not on file       Vitals:    12/27/21 1122   BP: 120/72   Pulse: 68   Resp: 18   Temp: 97.9 °F (36.6 °C)   TempSrc: Temporal   SpO2: 97%   Weight: 174 lb (78.9 kg)   Height: 5' 7\" (1.702 m)       Physical Exam  Vitals and nursing note reviewed. Constitutional:       Appearance: She is well-developed. HENT:      Head: Atraumatic. Right Ear: External ear normal.      Left Ear: External ear normal.      Nose: Nose normal.      Mouth/Throat:      Pharynx: No oropharyngeal exudate. Eyes:      Conjunctiva/sclera: Conjunctivae normal.      Pupils: Pupils are equal, round, and reactive to light. Neck:      Thyroid: No thyromegaly. Trachea: No tracheal deviation. Cardiovascular:      Rate and Rhythm: Normal rate and regular rhythm. Heart sounds: No murmur heard. No friction rub. No gallop. Pulmonary:      Effort: Pulmonary effort is normal. No respiratory distress. Breath sounds: Normal breath sounds. Abdominal:      General: Bowel sounds are normal.      Palpations: Abdomen is soft. Musculoskeletal:         General: No tenderness or deformity. Normal range of motion. Cervical back: Normal range of motion and neck supple. Lymphadenopathy:      Cervical: No cervical adenopathy. Skin:     General: Skin is warm and dry. Capillary Refill: Capillary refill takes less than 2 seconds. Findings: No rash. Neurological:      Mental Status: She is alert and oriented to person, place, and time. Sensory: No sensory deficit. Motor: No abnormal muscle tone.       Coordination: Coordination normal.      Deep Tendon Reflexes: Reflexes normal.             Seen By:  Jeb Hernandez DO

## 2022-01-19 DIAGNOSIS — G43.009 MIGRAINE WITHOUT AURA, NOT REFRACTORY: ICD-10-CM

## 2022-01-19 RX ORDER — TOPIRAMATE 50 MG/1
50 TABLET, FILM COATED ORAL 2 TIMES DAILY
Qty: 180 TABLET | Refills: 1 | Status: SHIPPED
Start: 2022-01-19 | End: 2022-06-22 | Stop reason: SDUPTHER

## 2022-01-19 NOTE — TELEPHONE ENCOUNTER
Patient came into the [facility] office today stating she needed a refill. She said she tried to call [facility] but couldn't get through so instead of driving there she just stopped here since it was closer. She is in need of a refill on her Topiramate.

## 2022-02-11 ENCOUNTER — OFFICE VISIT (OUTPATIENT)
Dept: FAMILY MEDICINE CLINIC | Age: 72
End: 2022-02-11
Payer: MEDICARE

## 2022-02-11 VITALS
OXYGEN SATURATION: 97 % | RESPIRATION RATE: 16 BRPM | HEIGHT: 67 IN | SYSTOLIC BLOOD PRESSURE: 125 MMHG | DIASTOLIC BLOOD PRESSURE: 78 MMHG | HEART RATE: 62 BPM | TEMPERATURE: 97 F | BODY MASS INDEX: 27.15 KG/M2 | WEIGHT: 173 LBS

## 2022-02-11 DIAGNOSIS — J01.41 ACUTE RECURRENT PANSINUSITIS: ICD-10-CM

## 2022-02-11 DIAGNOSIS — R05.9 COUGH: Primary | ICD-10-CM

## 2022-02-11 DIAGNOSIS — I48.91 ATRIAL FIBRILLATION, UNSPECIFIED TYPE (HCC): ICD-10-CM

## 2022-02-11 DIAGNOSIS — C79.51 BONE METASTASIS (HCC): ICD-10-CM

## 2022-02-11 DIAGNOSIS — C50.919 INVASIVE CARCINOMA OF BREAST (HCC): ICD-10-CM

## 2022-02-11 LAB
Lab: NORMAL
QC PASS/FAIL: NORMAL
SARS-COV-2 RDRP RESP QL NAA+PROBE: NEGATIVE

## 2022-02-11 PROCEDURE — 3288F FALL RISK ASSESSMENT DOCD: CPT | Performed by: FAMILY MEDICINE

## 2022-02-11 PROCEDURE — 99213 OFFICE O/P EST LOW 20 MIN: CPT | Performed by: FAMILY MEDICINE

## 2022-02-11 PROCEDURE — 87635 SARS-COV-2 COVID-19 AMP PRB: CPT | Performed by: FAMILY MEDICINE

## 2022-02-11 RX ORDER — PREDNISONE 10 MG/1
TABLET ORAL
Qty: 18 TABLET | Refills: 0 | Status: SHIPPED | OUTPATIENT
Start: 2022-02-11 | End: 2022-02-20

## 2022-02-11 RX ORDER — AMOXICILLIN 500 MG/1
500 CAPSULE ORAL 3 TIMES DAILY
Qty: 30 CAPSULE | Refills: 0 | Status: SHIPPED | OUTPATIENT
Start: 2022-02-11 | End: 2022-02-21

## 2022-02-11 ASSESSMENT — ENCOUNTER SYMPTOMS
EYE PAIN: 0
ALLERGIC/IMMUNOLOGIC NEGATIVE: 1
COUGH: 1
NAUSEA: 0
SINUS PAIN: 0
SINUS PRESSURE: 1
BLOOD IN STOOL: 0
CHEST TIGHTNESS: 0
SORE THROAT: 1
VOMITING: 0
ABDOMINAL PAIN: 0
EYE REDNESS: 0
SHORTNESS OF BREATH: 0
PHOTOPHOBIA: 0
EYE DISCHARGE: 0
DIARRHEA: 0
TROUBLE SWALLOWING: 0
BACK PAIN: 0

## 2022-02-11 ASSESSMENT — PATIENT HEALTH QUESTIONNAIRE - PHQ9
SUM OF ALL RESPONSES TO PHQ QUESTIONS 1-9: 0
SUM OF ALL RESPONSES TO PHQ QUESTIONS 1-9: 0
SUM OF ALL RESPONSES TO PHQ9 QUESTIONS 1 & 2: 0
SUM OF ALL RESPONSES TO PHQ QUESTIONS 1-9: 0
2. FEELING DOWN, DEPRESSED OR HOPELESS: 0
1. LITTLE INTEREST OR PLEASURE IN DOING THINGS: 0
SUM OF ALL RESPONSES TO PHQ QUESTIONS 1-9: 0

## 2022-02-11 NOTE — PROGRESS NOTES
22  Laly Lyn : 1950 Sex: female  Age: 80 y.o. Assessment and Plan: Rachel Osborn was seen today for sinus problem, nasal congestion, epistaxis and eye drainage. Diagnoses and all orders for this visit:    Cough  -     POCT COVID-19 Rapid, NAAT  -     predniSONE (DELTASONE) 10 MG tablet; Take 3 tablets by mouth daily for 3 days, THEN 2 tablets daily for 3 days, THEN 1 tablet daily for 3 days. Acute recurrent pansinusitis  -     amoxicillin (AMOXIL) 500 MG capsule; Take 1 capsule by mouth 3 times daily for 10 days  -     predniSONE (DELTASONE) 10 MG tablet; Take 3 tablets by mouth daily for 3 days, THEN 2 tablets daily for 3 days, THEN 1 tablet daily for 3 days. Bone metastasis ([address]  In [place] to breast cancer, oncology managing. Atrial fibrillation, unspecified type ([address]  This is been stable, no chest pain, palpitations, shortness of breath. Invasive carcinoma of breast ([address]  Being managed by oncology. MDM: This patient's rapid PCR was negative for Covid. She has a sinus infection, will treat with amoxicillin which she is tolerated before without problems and a prednisone taper. Symptomatic treatment including Tylenol, fluids, rest, Mucinex as needed. If her complaints do not improve, or worsen in any way, she is to present back to the office. Return 3 to 5 days if not improved. .    Chief Complaint   Patient presents with    Sinus Problem     onset for a few months     Nasal Congestion    Epistaxis    Eye Drainage       Congestion, pressure, drainage, facial tenderness, mild headache, no nosebleed, cough, onset 7 days ago. Denies fever, chills, diaphoresis, nausea, vomiting, decreased oral intake. Denies other GI or  complaints. OTC treatments minimally effective. Review of Systems   Constitutional: Negative for appetite change, fatigue and unexpected weight change. HENT: Positive for congestion, nosebleeds, postnasal drip, sinus pressure and sore throat. Negative for ear pain, hearing loss, sinus pain and trouble swallowing. Eyes: Negative for photophobia, pain, discharge and redness. Respiratory: Positive for cough. Negative for chest tightness and shortness of breath. Cardiovascular: Negative for chest pain, palpitations and leg swelling. Gastrointestinal: Negative for abdominal pain, blood in stool, diarrhea, nausea and vomiting. Endocrine: Negative. Genitourinary: Negative for dysuria, flank pain, frequency and hematuria. Musculoskeletal: Negative for arthralgias, back pain, joint swelling and myalgias. Skin: Negative. Allergic/Immunologic: Negative. Neurological: Negative for dizziness, seizures, syncope, weakness, light-headedness, numbness and headaches. Hematological: Negative for adenopathy. Does not bruise/bleed easily. Psychiatric/Behavioral: Negative. Current Outpatient Medications:     amoxicillin (AMOXIL) 500 MG capsule, Take 1 capsule by mouth 3 times daily for 10 days, Disp: 30 capsule, Rfl: 0    predniSONE (DELTASONE) 10 MG tablet, Take 3 tablets by mouth daily for 3 days, THEN 2 tablets daily for 3 days, THEN 1 tablet daily for 3 days. , Disp: 18 tablet, Rfl: 0    topiramate (TOPAMAX) 50 MG tablet, Take 1 tablet by mouth 2 times daily, Disp: 180 tablet, Rfl: 1    escitalopram (LEXAPRO) 10 MG tablet, Take 1 tablet by mouth every evening, Disp: 90 tablet, Rfl: 1    propafenone (RYTHMOL) 225 MG tablet, Take 1 tablet by mouth three times daily, Disp: 270 tablet, Rfl: 1    warfarin (COUMADIN) 6 MG tablet, Indications: monday, wednesday, friday 3 mg, all other days are 6 mg 1/2 tab on M W F and 1 tab all other days of the week OR AS DIRECTED, Disp: 30 tablet, Rfl: 5    verapamil (CALAN SR) 180 MG extended release tablet, Take 1 tablet by mouth daily, Disp: 90 tablet, Rfl: 1    palbociclib (IBRANCE) 125 MG tablet, Take 125 mg by mouth daily, Disp: , Rfl:     fulvestrant (FASLODEX) 250 MG/5ML SOLN IM injection,  Fear of Current or Ex-Partner: Not on file    Emotionally Abused: Not on file    Physically Abused: Not on file    Sexually Abused: Not on file   Housing Stability:     Unable to Pay for Housing in the Last Year: Not on file    Number of Places Lived in the Last Year: Not on file    Unstable Housing in the Last Year: Not on file       Vitals:    02/11/22 1537   BP: 125/78   Pulse: 62   Resp: 16   Temp: 97 °F (36.1 °C)   TempSrc: Temporal   SpO2: 97%   Weight: 173 lb (78.5 kg)   Height: 5' 7\" (1.702 m)       Physical Exam  Vitals and nursing note reviewed. Constitutional:       Appearance: She is well-developed. HENT:      Head: Atraumatic. Right Ear: External ear normal.      Left Ear: External ear normal.      Nose: Congestion present. Mouth/Throat:      Pharynx: Posterior oropharyngeal erythema present. No oropharyngeal exudate. Eyes:      Conjunctiva/sclera: Conjunctivae normal.      Pupils: Pupils are equal, round, and reactive to light. Neck:      Thyroid: No thyromegaly. Trachea: No tracheal deviation. Cardiovascular:      Rate and Rhythm: Normal rate and regular rhythm. Heart sounds: No murmur heard. No friction rub. No gallop. Pulmonary:      Effort: Pulmonary effort is normal. No respiratory distress. Breath sounds: Normal breath sounds. Abdominal:      General: Bowel sounds are normal.      Palpations: Abdomen is soft. Musculoskeletal:         General: No tenderness or deformity. Normal range of motion. Cervical back: Normal range of motion and neck supple. Lymphadenopathy:      Cervical: No cervical adenopathy. Skin:     General: Skin is warm and dry. Capillary Refill: Capillary refill takes less than 2 seconds. Findings: No rash. Neurological:      Mental Status: She is alert and oriented to person, place, and time. Sensory: No sensory deficit. Motor: No abnormal muscle tone.       Coordination: Coordination normal.

## 2022-02-23 ENCOUNTER — PROCEDURE VISIT (OUTPATIENT)
Dept: PODIATRY | Age: 72
End: 2022-02-23
Payer: MEDICARE

## 2022-02-23 VITALS — WEIGHT: 173 LBS | BODY MASS INDEX: 27.15 KG/M2 | HEIGHT: 67 IN

## 2022-02-23 DIAGNOSIS — Z79.01 ENCOUNTER FOR CURRENT LONG-TERM USE OF ANTICOAGULANTS: ICD-10-CM

## 2022-02-23 DIAGNOSIS — L84 CORNS AND CALLOSITIES: ICD-10-CM

## 2022-02-23 DIAGNOSIS — B35.1 TINEA UNGUIUM: Primary | ICD-10-CM

## 2022-02-23 DIAGNOSIS — G60.8 HEREDITARY SENSORY NEUROPATHY: ICD-10-CM

## 2022-02-23 PROCEDURE — 11721 DEBRIDE NAIL 6 OR MORE: CPT | Performed by: PODIATRIST

## 2022-02-23 NOTE — PROGRESS NOTES
Thien Calderon is here today for nail care. her PCP is Estela Heath DO last OV was 02/11/2022. CC:    Follow-up foot and ankle exam.      HPI:   Wanda Holliday presents today for follow-up foot and ankle exam.  Denies any ankle pain or ankle swelling. No heel pain. Continues long-term anticoagulant therapy with Coumadin. Presents with her . No additional pedal complaints.     ROS:  Const: Denies constitutional symptoms  Musculo: Denies symptoms other than stated above  Skin: Denies symptoms other than stated above       Current Outpatient Medications:     topiramate (TOPAMAX) 50 MG tablet, Take 1 tablet by mouth 2 times daily, Disp: 180 tablet, Rfl: 1    escitalopram (LEXAPRO) 10 MG tablet, Take 1 tablet by mouth every evening, Disp: 90 tablet, Rfl: 1    propafenone (RYTHMOL) 225 MG tablet, Take 1 tablet by mouth three times daily, Disp: 270 tablet, Rfl: 1    warfarin (COUMADIN) 6 MG tablet, Indications: monday, wednesday, friday 3 mg, all other days are 6 mg 1/2 tab on M W F and 1 tab all other days of the week OR AS DIRECTED, Disp: 30 tablet, Rfl: 5    verapamil (CALAN SR) 180 MG extended release tablet, Take 1 tablet by mouth daily, Disp: 90 tablet, Rfl: 1    palbociclib (IBRANCE) 125 MG tablet, Take 125 mg by mouth daily, Disp: , Rfl:     fulvestrant (FASLODEX) 250 MG/5ML SOLN IM injection, Inject 500 mg into the muscle once, Disp: , Rfl:     denosumab (XGEVA) 120 MG/1.7ML SOLN SC injection, Inject 120 mg into the skin once, Disp: , Rfl:     acetaminophen (TYLENOL) 500 MG tablet, Take 1,000 mg by mouth every 6 hours as needed for Pain, Disp: , Rfl:     ammonium lactate (LAC-HYDRIN) 12 % lotion, Apply topically twice daily, Disp: 400 g, Rfl: 2    calcium carbonate-vitamin D (CALTRATE 600+D) 600-400 MG-UNIT TABS per tab, Take 1 tablet by mouth daily, Disp: , Rfl:     Alpha-Lipoic Acid 100 MG CAPS, Take by mouth, Disp: , Rfl:     b complex vitamins capsule, Take 1 capsule by mouth daily, Disp: , Rfl:     furosemide (LASIX) 20 MG tablet, Take 20 mg by mouth daily , Disp: , Rfl:   Allergies   Allergen Reactions    Diltiazem Hcl Other (See Comments)     Other reaction(s): weak    Cardizem [Diltiazem] Palpitations    Cefuroxime Axetil Nausea And Vomiting, Other (See Comments) and Palpitations    Claritin [Loratadine] Palpitations    Erythromycin Nausea And Vomiting    Morphine Nausea And Vomiting    Mucinex [Guaifenesin Er] Palpitations    Propoxyphene Other (See Comments) and Nausea And Vomiting     DIZZY  Other reaction(s): Free Text    Zyrtec [Cetirizine] Nausea And Vomiting and Other (See Comments)     WEAK       Past Medical History:   Diagnosis Date    [facility] cancer ([facility] [address] 02/2021    [facility] ([facility] 04/2017    Chronic atrial fibrillation (HCC)     Fibroadenosis of breast     Headache     History of therapeutic radiation     Hyperlipidemia     Hypertension            Vitals:    02/23/22 1249   Weight: 173 lb (78.5 kg)   Height: 5' 7\" (1.702 m)       Work History/Social History: Retired  [employer]    Focused Lower Extremity Physical Exam:    Neurovascular examination:    Dorsalis Pedis palpable bilateral.  Posterior tibialis palpable bilateral.    Capillary Refill Time:  Immediate return  Hair growth:  Symmetrical and bilateral   Skin:  Not atrophic  Edema: No significant edema bilateral lower legs  Neurologic:  Light touch diminished bilateral.      Musculoskeletal/ Orthopedic examination:    Equinis: Absent bilateral  Dorsiflexion, plantarflexion, inversion, eversion bilateral 5 out of 5 muscle strength  Wiggling toes  No pain foot ankle. No pain Achilles tendon bilateral.      Dermatology examination:    Toenails 1-5 right and left thickened, elongated, dystrophic, mycotic with subungual debris. Webspaces 1-4 bilateral clean, dry and intact. No significant hyperkeratotic tissue noted today. No open skin lesions or abrasions.       Assessment and Plan:  Katie Cancer was seen today for callouses and nail problem. Diagnoses and all orders for this visit:    Tinea unguium    Corns and callosities    Hereditary sensory neuropathy    Encounter for current long-term use of anticoagulants      Follow-up foot ankle exam  Continues Coumadin long-term anticoagulant therapy. Manual debridement with standard technique toenails 1 through 5 right and left in thickness and length. Patient tolerated procedure well. Avoid barefoot. Continue lotion daily. We will follow-up in 3 months. Return in about 2 months (around 4/23/2022). Seen By:  Wally Sandoval DPM      Document was created using voice recognition software. Note was reviewed, however may contain grammatical errors.

## 2022-02-24 ENCOUNTER — OFFICE VISIT (OUTPATIENT)
Dept: FAMILY MEDICINE CLINIC | Age: 72
End: 2022-02-24
Payer: MEDICARE

## 2022-02-24 VITALS
HEART RATE: 60 BPM | HEIGHT: 67 IN | DIASTOLIC BLOOD PRESSURE: 60 MMHG | TEMPERATURE: 97 F | RESPIRATION RATE: 16 BRPM | OXYGEN SATURATION: 98 % | WEIGHT: 172 LBS | SYSTOLIC BLOOD PRESSURE: 130 MMHG | BODY MASS INDEX: 27 KG/M2

## 2022-02-24 VITALS
DIASTOLIC BLOOD PRESSURE: 60 MMHG | RESPIRATION RATE: 16 BRPM | BODY MASS INDEX: 27 KG/M2 | HEART RATE: 60 BPM | SYSTOLIC BLOOD PRESSURE: 130 MMHG | TEMPERATURE: 97 F | WEIGHT: 172 LBS | OXYGEN SATURATION: 98 % | HEIGHT: 67 IN

## 2022-02-24 DIAGNOSIS — I10 ESSENTIAL HYPERTENSION: ICD-10-CM

## 2022-02-24 DIAGNOSIS — Z00.00 MEDICARE ANNUAL WELLNESS VISIT, SUBSEQUENT: Primary | ICD-10-CM

## 2022-02-24 DIAGNOSIS — I48.91 ATRIAL FIBRILLATION, UNSPECIFIED TYPE (HCC): ICD-10-CM

## 2022-02-24 DIAGNOSIS — I48.20 CHRONIC ATRIAL FIBRILLATION (HCC): ICD-10-CM

## 2022-02-24 LAB
INTERNATIONAL NORMALIZATION RATIO, POC: 1.8
PROTHROMBIN TIME, POC: 22

## 2022-02-24 PROCEDURE — 85610 PROTHROMBIN TIME: CPT | Performed by: FAMILY MEDICINE

## 2022-02-24 PROCEDURE — G0439 PPPS, SUBSEQ VISIT: HCPCS | Performed by: FAMILY MEDICINE

## 2022-02-24 PROCEDURE — 99213 OFFICE O/P EST LOW 20 MIN: CPT | Performed by: FAMILY MEDICINE

## 2022-02-24 RX ORDER — WARFARIN SODIUM 6 MG/1
TABLET ORAL
Qty: 30 TABLET | Refills: 5 | Status: SHIPPED
Start: 2022-02-24 | End: 2022-10-21 | Stop reason: SDUPTHER

## 2022-02-24 ASSESSMENT — ENCOUNTER SYMPTOMS
SINUS PAIN: 0
EYE DISCHARGE: 0
EYE PAIN: 0
CHEST TIGHTNESS: 0
EYE REDNESS: 0
ALLERGIC/IMMUNOLOGIC NEGATIVE: 1
BACK PAIN: 0
NAUSEA: 0
DIARRHEA: 0
BLOOD IN STOOL: 0
SORE THROAT: 0
ABDOMINAL PAIN: 0
VOMITING: 0
COUGH: 0
SHORTNESS OF BREATH: 0
TROUBLE SWALLOWING: 0
PHOTOPHOBIA: 0

## 2022-02-24 ASSESSMENT — PATIENT HEALTH QUESTIONNAIRE - PHQ9
2. FEELING DOWN, DEPRESSED OR HOPELESS: 0
SUM OF ALL RESPONSES TO PHQ QUESTIONS 1-9: 0
1. LITTLE INTEREST OR PLEASURE IN DOING THINGS: 0
SUM OF ALL RESPONSES TO PHQ QUESTIONS 1-9: 0
SUM OF ALL RESPONSES TO PHQ9 QUESTIONS 1 & 2: 0

## 2022-02-24 ASSESSMENT — LIFESTYLE VARIABLES: HOW OFTEN DO YOU HAVE A DRINK CONTAINING ALCOHOL: NEVER

## 2022-02-24 NOTE — PATIENT INSTRUCTIONS
Personalized Preventive Plan for April Lou - 2/24/2022  Medicare offers a range of preventive health benefits. Some of the tests and screenings are paid in full while other may be subject to a deductible, co-insurance, and/or copay. Some of these benefits include a comprehensive review of your medical history including lifestyle, illnesses that may run in your family, and various assessments and screenings as appropriate. After reviewing your medical record and screening and assessments performed today your provider may have ordered immunizations, labs, imaging, and/or referrals for you. A list of these orders (if applicable) as well as your Preventive Care list are included within your After Visit Summary for your review. Other Preventive Recommendations:    · A preventive eye exam performed by an eye specialist is recommended every 1-2 years to screen for glaucoma; cataracts, macular degeneration, and other eye disorders. · A preventive dental visit is recommended every 6 months. · Try to get at least 150 minutes of exercise per week or 10,000 steps per day on a pedometer . · Order or download the FREE \"Exercise & Physical Activity: Your Everyday Guide\" from [employer]. Call 2-245.434.3718 or search [employer] online. · You need 6163-2218 mg of calcium and 8174-5825 IU of vitamin D per day. It is possible to meet your calcium requirement with diet alone, but a vitamin D supplement is usually necessary to meet this goal.  · When exposed to the sun, use a sunscreen that protects against both UVA and UVB radiation with an SPF of 30 or greater. Reapply every 2 to 3 hours or after sweating, drying off with a towel, or swimming. · Always wear a seat belt when traveling in a car. Always wear a helmet when riding a bicycle or motorcycle.

## 2022-02-24 NOTE — PROGRESS NOTES
22  Christiano Guthrie : 1950 Sex: female  Age: 80 y.o. Assessment and Plan: Elle Batres was seen today for atrial fibrillation. Diagnoses and all orders for this visit:    Essential hypertension  -     verapamil (CALAN SR) 180 MG extended release tablet; Take 1 tablet by mouth daily    Chronic atrial fibrillation (HCC)  -     warfarin (COUMADIN) 6 MG tablet; Indications: monday, wednesday, friday 3 mg, all other days are 6 mg 1/2 tab on  and 1 tab all other days of the week OR AS DIRECTED    Atrial fibrillation, unspecified type ([facility]  -     POCT INR    MDM: Adjust warfarin to 7.5 mg today only, then received home regular schedule V milligrams daily. Needs to follow-up in 2 weeks for another pro time INR. Sinus infection which she was treated for at last visit is resolving she is to continue with Flonase nasal spray and Claritin as needed. No follow-ups on file. Chief Complaint   Patient presents with    Atrial Fibrillation       The patient is here for blood pressure check. Patient has been taking their medications as prescribed. No recent changes to their medications. No headache or visual disturbances. No dizziness or tinnitus. No chest pain, palpitations, or dyspnea. No nausea and vomiting. No numbness, tingling and or weakness to the extremities. No fevers or chills. She is also here for recheck pro time. NR today subtherapeutic at 1.8. Had dental surgery but that was done in early January. Will adjust warfarin to bring her back into therapeutic range. Review of Systems   Constitutional: Negative for appetite change, fatigue and unexpected weight change. HENT: Negative for congestion, ear pain, hearing loss, sinus pain, sore throat and trouble swallowing. Eyes: Negative for photophobia, pain, discharge and redness. Respiratory: Negative for cough, chest tightness and shortness of breath. Cardiovascular: Negative for chest pain, palpitations and leg swelling. Gastrointestinal: Negative for abdominal pain, blood in stool, diarrhea, nausea and vomiting. Endocrine: Negative. Genitourinary: Negative for dysuria, flank pain, frequency and hematuria. Musculoskeletal: Negative for arthralgias, back pain, joint swelling and myalgias. Skin: Negative. Allergic/Immunologic: Negative. Neurological: Negative for dizziness, seizures, syncope, weakness, light-headedness, numbness and headaches. Hematological: Negative for adenopathy. Does not bruise/bleed easily. Psychiatric/Behavioral: Negative.           Current Outpatient Medications:     topiramate (TOPAMAX) 50 MG tablet, Take 1 tablet by mouth 2 times daily, Disp: 180 tablet, Rfl: 1    escitalopram (LEXAPRO) 10 MG tablet, Take 1 tablet by mouth every evening, Disp: 90 tablet, Rfl: 1    propafenone (RYTHMOL) 225 MG tablet, Take 1 tablet by mouth three times daily, Disp: 270 tablet, Rfl: 1    palbociclib (IBRANCE) 125 MG tablet, Take 125 mg by mouth daily, Disp: , Rfl:     fulvestrant (FASLODEX) 250 MG/5ML SOLN IM injection, Inject 500 mg into the muscle once, Disp: , Rfl:     denosumab (XGEVA) 120 MG/1.7ML SOLN SC injection, Inject 120 mg into the skin once, Disp: , Rfl:     ammonium lactate (LAC-HYDRIN) 12 % lotion, Apply topically twice daily, Disp: 400 g, Rfl: 2    calcium carbonate-vitamin D (CALTRATE 600+D) 600-400 MG-UNIT TABS per tab, Take 1 tablet by mouth daily, Disp: , Rfl:     Alpha-Lipoic Acid 100 MG CAPS, Take by mouth, Disp: , Rfl:     b complex vitamins capsule, Take 1 capsule by mouth daily, Disp: , Rfl:     verapamil (CALAN SR) 180 MG extended release tablet, Take 1 tablet by mouth daily, Disp: 90 tablet, Rfl: 1    warfarin (COUMADIN) 6 MG tablet, Indications: monday, wednesday, friday 3 mg, all other days are 6 mg 1/2 tab on M W F and 1 tab all other days of the week OR AS DIRECTED, Disp: 30 tablet, Rfl: 5  Allergies   Allergen Reactions    Diltiazem Hcl Other (See Comments) Other reaction(s): weak    Cardizem [Diltiazem] Palpitations    Cefuroxime Axetil Nausea And Vomiting, Other (See Comments) and Palpitations    Claritin [Loratadine] Palpitations    Erythromycin Nausea And Vomiting    Morphine Nausea And Vomiting    Mucinex [Guaifenesin Er] Palpitations    Propoxyphene Other (See Comments) and Nausea And Vomiting     DIZZY  Other reaction(s): Free Text    Zyrtec [Cetirizine] Nausea And Vomiting and Other (See Comments)     WEAK       Past Medical History:   Diagnosis Date    Bone cancer ([address] 02/2021    Breast cancer ([address] 04/2017    Chronic atrial fibrillation (HCC)     Fibroadenosis of breast     Headache     History of therapeutic radiation     Hyperlipidemia     Hypertension      Past Surgical History:   Procedure Laterality Date    [facility]  [facility]    BONE BIOPSY  04/2021    BREAST LUMPECTOMY Right 04/26/2017    COLONOSCOPY      CT BIOPSY PERCUTANEOUS DEEP BONE  04/07/2021    CT BIOPSY PERCUTANEOUS DEEP BONE 4/7/2021 Cameron Villaseñor MD [facility]    EYE SURGERY      SUDHIR LENSE IMPLANTS    OTHER SURGICAL HISTORY      port removal    TONSILLECTOMY      TUNNELED VENOUS PORT PLACEMENT  05/26/2017    Dr. Schroeder Letters     Family History   Problem Relation Age of Onset    Cancer Maternal Cousin 80        lung    Heart Disease Mother     Other Mother         [place] Palsy    Other Father         gall bladder problem, hip fx; AAA rupture    Coronary Art Dis Father         MI     Social History     Socioeconomic History    Marital status:      Spouse name: Kait Raymond Number of children: Not on file    Years of education: Not on file    Highest education level: Not on file   Occupational History    Occupation: retired      Comment: Retired 4 th    Tobacco Use    Smoking status: Never Smoker    Smokeless tobacco: Never Used   Vaping Use    Vaping Use: Not on file   Substance and Sexual Activity    Alcohol use: No     Comment: occasionally pop    Drug use: No    Sexual activity: Not Currently     Partners: Male   Other Topics Concern    Not on file   Social History Narrative    Drinks occasional Pepsi/Coke     Social Determinants of Health     Financial Resource Strain: Low Risk     Difficulty of Paying Living Expenses: Not hard at all   Food Insecurity: No Food Insecurity    Worried About Running Out of Food in the Last Year: Never true    [place] of Food in the Last Year: Never true   Transportation Needs:     Lack of Transportation (Medical): Not on file    Lack of Transportation (Non-Medical): Not on file   Physical Activity:     Days of Exercise per Week: Not on file    Minutes of Exercise per Session: Not on file   Stress:     Feeling of Stress : Not on file   Social Connections:     Frequency of Communication with Friends and Family: Not on file    Frequency of Social Gatherings with Friends and Family: Not on file    Attends Mor[employer] Services: Not on file    Active Member of [address] [place] [address] or Organizations: Not on file    Attends Club or Organization Meetings: Not on file    Marital Status: Not on file   Intimate Partner Violence:     Fear of Current or Ex-Partner: Not on file    Emotionally Abused: Not on file    Physically Abused: Not on file    Sexually Abused: Not on file   Housing Stability:     Unable to Pay for Housing in the Last Year: Not on file    Number of [place] in the Last Year: Not on file    Unstable Housing in the Last Year: Not on file       Vitals:    02/24/22 1043   BP: 130/60   Pulse: 60   Resp: 16   Temp: 97 °F (36.1 °C)   TempSrc: Temporal   SpO2: 98%   Weight: 172 lb (78 kg)   Height: 5' 7\" (1.702 m)       Physical Exam  Vitals and nursing note reviewed. Constitutional:       Appearance: She is well-developed. HENT:      Head: Atraumatic.       Right Ear: External ear normal.      Left Ear: External ear normal.      Nose: Nose normal.      Mouth/Throat: Pharynx: No oropharyngeal exudate. Eyes:      Conjunctiva/sclera: Conjunctivae normal.      Pupils: Pupils are equal, round, and reactive to light. Neck:      Thyroid: No thyromegaly. Trachea: No tracheal deviation. Cardiovascular:      Rate and Rhythm: Normal rate and regular rhythm. Heart sounds: No murmur heard. No friction rub. No gallop. Pulmonary:      Effort: Pulmonary effort is normal. No respiratory distress. Breath sounds: Normal breath sounds. Abdominal:      General: Bowel sounds are normal.      Palpations: Abdomen is soft. Musculoskeletal:         General: No tenderness or deformity. Normal range of motion. Cervical back: Normal range of motion and neck supple. Lymphadenopathy:      Cervical: No cervical adenopathy. Skin:     General: Skin is warm and dry. Capillary Refill: Capillary refill takes less than 2 seconds. Findings: No rash. Neurological:      Mental Status: She is alert and oriented to person, place, and time. Sensory: No sensory deficit. Motor: No abnormal muscle tone.       Coordination: Coordination normal.      Deep Tendon Reflexes: Reflexes normal.             Seen By:  Luh Yu DO

## 2022-02-24 NOTE — PROGRESS NOTES
Medicare Annual Wellness Visit    Shawn Gandhi is here for Medicare [address] was seen today for medicare awv. Diagnoses and all orders for this visit:    Medicare annual wellness visit, subsequent  1 year       Recommendations for Preventive Services Due: see orders and patient instructions/AVS.  Recommended screening schedule for the next 5-10 years is provided to the patient in written form: see Patient Instructions/AVS.     Return for Medicare Annual Wellness Visit in 1 year. Subjective       Patient's complete Health Risk Assessment and screening values have been reviewed and are found in Flowsheets. The following problems were reviewed today and where indicated follow up appointments were made and/or referrals ordered. Positive Risk Factor Screenings with Interventions:                  No Positive Risk Factors identified today. Objective   Vitals:    02/24/22 1101   BP: 130/60   Pulse: 60   Resp: 16   Temp: 97 °F (36.1 °C)   TempSrc: Temporal   SpO2: 98%   Weight: 172 lb (78 kg)   Height: 5' 7\" (1.702 m)      Body mass index is 26.94 kg/m². Allergies   Allergen Reactions    Diltiazem Hcl Other (See Comments)     Other reaction(s): weak    Cardizem [Diltiazem] Palpitations    Cefuroxime Axetil Nausea And Vomiting, Other (See Comments) and Palpitations    Claritin [Loratadine] Palpitations    Erythromycin Nausea And Vomiting    Morphine Nausea And Vomiting    Mucinex [Guaifenesin Er] Palpitations    Propoxyphene Other (See Comments) and Nausea And Vomiting     DIZZY  Other reaction(s): Free Text    Zyrtec [Cetirizine] Nausea And Vomiting and Other (See Comments)     WEAK     Prior to Visit Medications    Medication Sig Taking?  Authorizing Provider   topiramate (TOPAMAX) 50 MG tablet Take 1 tablet by mouth 2 times daily Yes Duke Ayala,    escitalopram (LEXAPRO) 10 MG tablet Take 1 tablet by mouth every evening Yes Sofy Cope, DO propafenone (RYTHMOL) 225 MG tablet Take 1 tablet by mouth three times daily Yes [place] JILLIAN Ayala DO   palbociclib (IBRANCE) 125 MG tablet Take 125 mg by mouth daily Yes Historical Provider, MD   fulvestrant (FASLODEX) 250 MG/5ML SOLN IM injection Inject 500 mg into the muscle once Yes Historical Provider, MD   denosumab (XGEVA) 120 MG/1.7ML SOLN SC injection Inject 120 mg into the skin once Yes Historical Provider, MD   ammonium lactate (LAC-HYDRIN) 12 % lotion Apply topically twice daily Yes Dary Zhu DPM   calcium carbonate-vitamin D (CALTRATE 600+D) 600-400 MG-UNIT TABS per tab Take 1 tablet by mouth daily Yes Historical Provider, MD   Alpha-Lipoic Acid 100 MG CAPS Take by mouth Yes Historical Provider, MD   b complex vitamins capsule Take 1 capsule by mouth daily Yes Historical Provider, MD   verapamil (CALAN SR) 180 MG extended release tablet Take 1 tablet by mouth daily  [place] JILLIAN Ayala DO   warfarin (COUMADIN) 6 MG tablet Indications: monday, wednesday, friday 3 mg, all other days are 6 mg 1/2 tab on M W F and 1 tab all other days of the week OR AS DIRECTED  [place] JILLIAN Ayala DO       CareTeam (Including outside providers/suppliers regularly involved in providing care):   Patient Care Team:  Marianna Gross DO as PCP - General (Family Medicine)  Marianna Gross DO as PCP - [facility]  Tomas Joy MD as Consulting Physician (Cardiology)  Dary Zhu DPM as Consulting Physician (Podiatry)  Laura Menchaca MD (Ophthalmology)  Je Luis MD as Consulting Physician (Hematology and Oncology)  Cinthia Bennett MD as Surgeon (General Surgery)  Je Luis MD as Consulting Physician (Hematology and Oncology)    Reviewed and updated this visit:  Tobacco  Allergies  Meds  Problems  Med Hx  Surg Hx  Soc Hx  Fam Hx                   Cardiovascular Disease Risk Counseling: Assessed the patient's risk to develop cardiovascular disease and reviewed main risk factors. Reviewed steps to reduce disease risk including:   · Quitting tobacco use, reducing amount smoked, or not starting the habit  · Making healthy food choices  · Being physically active and gradualy increasing activity levels   · Reduce weight and determine a healthy BMI goal  · Monitor blood pressure and treat if higher than 140/90 mmHg  · Maintain blood total cholesterol levels under 5 mmol/l or 190 mg/dl  · Maintain LDL cholesterol levels under 3.0 mmol/l or 115 mg/dl   · Control blood glucose levels  · Consider taking aspirin (75 mg daily), once blood pressure is controlled   Provided a follow up plan.   Time spent (minutes): 5 min

## 2022-03-11 ENCOUNTER — OFFICE VISIT (OUTPATIENT)
Dept: FAMILY MEDICINE CLINIC | Age: 72
End: 2022-03-11
Payer: MEDICARE

## 2022-03-11 VITALS
RESPIRATION RATE: 16 BRPM | TEMPERATURE: 97.7 F | HEIGHT: 67 IN | BODY MASS INDEX: 27.15 KG/M2 | OXYGEN SATURATION: 98 % | SYSTOLIC BLOOD PRESSURE: 130 MMHG | DIASTOLIC BLOOD PRESSURE: 60 MMHG | HEART RATE: 64 BPM | WEIGHT: 173 LBS

## 2022-03-11 DIAGNOSIS — I10 ESSENTIAL HYPERTENSION: Primary | ICD-10-CM

## 2022-03-11 DIAGNOSIS — C79.51 BONE METASTASIS (HCC): ICD-10-CM

## 2022-03-11 DIAGNOSIS — I48.91 ATRIAL FIBRILLATION, UNSPECIFIED TYPE (HCC): ICD-10-CM

## 2022-03-11 LAB
INTERNATIONAL NORMALIZATION RATIO, POC: 2.4
PROTHROMBIN TIME, POC: 28.9

## 2022-03-11 PROCEDURE — 85610 PROTHROMBIN TIME: CPT | Performed by: FAMILY MEDICINE

## 2022-03-11 PROCEDURE — 99213 OFFICE O/P EST LOW 20 MIN: CPT | Performed by: FAMILY MEDICINE

## 2022-03-11 ASSESSMENT — ENCOUNTER SYMPTOMS
DIARRHEA: 0
CHEST TIGHTNESS: 0
SINUS PAIN: 0
NAUSEA: 0
PHOTOPHOBIA: 0
EYE PAIN: 0
ABDOMINAL PAIN: 0
BACK PAIN: 0
COUGH: 0
ALLERGIC/IMMUNOLOGIC NEGATIVE: 1
TROUBLE SWALLOWING: 0
SHORTNESS OF BREATH: 0
EYE REDNESS: 0
EYE DISCHARGE: 0
SORE THROAT: 0
BLOOD IN STOOL: 0
VOMITING: 0

## 2022-03-11 NOTE — PROGRESS NOTES
3/11/22  Diane Whitney : 1950 Sex: female  Age: 80 y.o. Assessment and Plan:  Diagnoses and all orders for this visit:    Essential hypertension  Blood pressure well controlled on current medications. Atrial fibrillation, unspecified type ([address]  -     POCT INR  Maintain current warfarin schedule. Bone metastasis [facility] managing her metastatic breast cancer. Return in about 4 weeks (around 2022). No chief complaint on file. Patient returns for recheck pro time. INR today 2.4, denies being, bruising, missed doses, dietary changes. She also denies any other voiced complaints. Review of Systems   Constitutional: Negative for appetite change, fatigue and unexpected weight change. HENT: Negative for congestion, ear pain, hearing loss, sinus pain, sore throat and trouble swallowing. Eyes: Negative for photophobia, pain, discharge and redness. Respiratory: Negative for cough, chest tightness and shortness of breath. Cardiovascular: Negative for chest pain, palpitations and leg swelling. Gastrointestinal: Negative for abdominal pain, blood in stool, diarrhea, nausea and vomiting. Endocrine: Negative. Genitourinary: Negative for dysuria, flank pain, frequency and hematuria. Musculoskeletal: Negative for arthralgias, back pain, joint swelling and myalgias. Skin: Negative. Allergic/Immunologic: Negative. Neurological: Negative for dizziness, seizures, syncope, weakness, light-headedness, numbness and headaches. Hematological: Negative for adenopathy. Does not bruise/bleed easily. Psychiatric/Behavioral: Negative.           Current Outpatient Medications:     verapamil (CALAN SR) 180 MG extended release tablet, Take 1 tablet by mouth daily, Disp: 90 tablet, Rfl: 1    warfarin (COUMADIN) 6 MG tablet, Indications: monday, wednesday, friday 3 mg, all other days are 6 mg 1/2 tab on M W  and 1 tab all other days of the week OR AS DIRECTED, Disp: 30 tablet, Rfl: 5    topiramate (TOPAMAX) 50 MG tablet, Take 1 tablet by mouth 2 times daily, Disp: 180 tablet, Rfl: 1    escitalopram (LEXAPRO) 10 MG tablet, Take 1 tablet by mouth every evening, Disp: 90 tablet, Rfl: 1    propafenone (RYTHMOL) 225 MG tablet, Take 1 tablet by mouth three times daily, Disp: 270 tablet, Rfl: 1    palbociclib (IBRANCE) 125 MG tablet, Take 125 mg by mouth daily, Disp: , Rfl:     fulvestrant (FASLODEX) 250 MG/5ML SOLN IM injection, Inject 500 mg into the muscle once, Disp: , Rfl:     denosumab (XGEVA) 120 MG/1.7ML SOLN SC injection, Inject 120 mg into the skin once, Disp: , Rfl:     ammonium lactate (LAC-HYDRIN) 12 % lotion, Apply topically twice daily, Disp: 400 g, Rfl: 2    calcium carbonate-vitamin D (CALTRATE 600+D) 600-400 MG-UNIT TABS per tab, Take 1 tablet by mouth daily, Disp: , Rfl:     Alpha-Lipoic Acid 100 MG CAPS, Take by mouth, Disp: , Rfl:     b complex vitamins capsule, Take 1 capsule by mouth daily, Disp: , Rfl:   Allergies   Allergen Reactions    Diltiazem Hcl Other (See Comments)     Other reaction(s): weak    Cardizem [Diltiazem] Palpitations    Cefuroxime Axetil Nausea And Vomiting, Other (See Comments) and Palpitations    Claritin [Loratadine] Palpitations    Erythromycin Nausea And Vomiting    Morphine Nausea And Vomiting    Mucinex [Guaifenesin Er] Palpitations    Propoxyphene Other (See Comments) and Nausea And Vomiting     DIZZY  Other reaction(s): Free Text    Zyrtec [Cetirizine] Nausea And Vomiting and Other (See Comments)     WEAK       Past Medical History:   Diagnosis Date    Bone cancer ([address] 02/2021    Breast cancer ([address] 04/2017    Chronic atrial fibrillation (HCC)     Fibroadenosis of breast     Headache     History of therapeutic radiation     Hyperlipidemia     Hypertension      Past Surgical History:   Procedure Laterality Date   [facility]    BONE BIOPSY  04/2021    BREAST LUMPECTOMY Right 04/26/2017    COLONOSCOPY      CT BIOPSY PERCUTANEOUS DEEP BONE  04/07/2021    CT BIOPSY PERCUTANEOUS DEEP BONE 4/7/2021 Tomasita Aschoff, MD [facility]    EYE SURGERY      SUDHIR LENSE IMPLANTS    OTHER SURGICAL HISTORY      port removal    TONSILLECTOMY      TUNNELED VENOUS PORT PLACEMENT  05/26/2017    Dr. Marica Galeazzi     Family History   Problem Relation Age of Onset    Cancer Maternal Cousin 80        lung    Heart Disease Mother     Other Mother         [place] Palsy    Other Father         gall bladder problem, hip fx; AAA rupture    Coronary Art Dis Father         MI     Social History     Socioeconomic History    Marital status:      Spouse name: Sherri Ocampo Number of children: Not on file    Years of education: Not on file    Highest education level: Not on file   Occupational History    Occupation: retired      Comment: Retired 4 th    Tobacco Use    Smoking status: Never Smoker    Smokeless tobacco: Never Used   Vaping Use    Vaping Use: Not on file   Substance and Sexual Activity    Alcohol use: No     Comment: occasionally pop    Drug use: No    Sexual activity: Not Currently     Partners: Male   Other Topics Concern    Not on file   Social History Narrative    Drinks occasional Pepsi/Coke     Social Determinants of Health     Financial Resource Strain: Low Risk     Difficulty of Paying Living Expenses: Not hard at all   Food Insecurity: No Food Insecurity    Worried About [address] in the Last Year: Never true    [facility] in the Last Year: Never true   Transportation Needs:     Lack of Transportation (Medical): Not on file    Lack of Transportation (Non-Medical):  Not on file   Physical Activity: Insufficiently Active    Days of Exercise per Week: 5 days    Minutes of Exercise per Session: 20 min   Stress:     Feeling of Stress : Not on file   Social Connections:     Frequency of Communication with Friends and Family: Not on file  Frequency of Social Gatherings with Friends and Family: Not on file    Attends Voodoo Services: Not on file    Active Member of Clubs or Organizations: Not on file    Attends Club or Organization Meetings: Not on file    Marital Status: Not on file   Intimate Partner Violence:     Fear of Current or Ex-Partner: Not on file    Emotionally Abused: Not on file    Physically Abused: Not on file    Sexually Abused: Not on file   Housing Stability:     Unable to Pay for Housing in the Last Year: Not on file    Number of [place] in the Last Year: Not on file    Unstable Housing in the Last Year: Not on file       Vitals:    03/11/22 1111   BP: 130/60   Pulse: 64   Resp: 16   Temp: 97.7 °F (36.5 °C)   TempSrc: Temporal   SpO2: 98%   Weight: 173 lb (78.5 kg)   Height: 5' 7\" (1.702 m)       Physical Exam  Vitals and nursing note reviewed. Constitutional:       Appearance: She is well-developed. HENT:      Head: Atraumatic. Right Ear: External ear normal.      Left Ear: External ear normal.      Nose: Nose normal.      Mouth/Throat:      Pharynx: No oropharyngeal exudate. Eyes:      Conjunctiva/sclera: Conjunctivae normal.      Pupils: Pupils are equal, round, and reactive to light. Neck:      Thyroid: No thyromegaly. Trachea: No tracheal deviation. Cardiovascular:      Rate and Rhythm: Normal rate and regular rhythm. Heart sounds: No murmur heard. No friction rub. No gallop. Pulmonary:      Effort: Pulmonary effort is normal. No respiratory distress. Breath sounds: Normal breath sounds. Abdominal:      General: Bowel sounds are normal.      Palpations: Abdomen is soft. Musculoskeletal:         General: No tenderness or deformity. Normal range of motion. Cervical back: Normal range of motion and neck supple. Lymphadenopathy:      Cervical: No cervical adenopathy. Skin:     General: Skin is warm and dry.       Capillary Refill: Capillary refill takes less than 2 seconds. Findings: No rash. Neurological:      Mental Status: She is alert and oriented to person, place, and time. Sensory: No sensory deficit. Motor: No abnormal muscle tone.       Coordination: Coordination normal.      Deep Tendon Reflexes: Reflexes normal.             Seen By:  Andrew Barlow,

## 2022-04-08 ENCOUNTER — OFFICE VISIT (OUTPATIENT)
Dept: FAMILY MEDICINE CLINIC | Age: 72
End: 2022-04-08
Payer: MEDICARE

## 2022-04-08 VITALS
WEIGHT: 178.6 LBS | DIASTOLIC BLOOD PRESSURE: 70 MMHG | BODY MASS INDEX: 28.03 KG/M2 | OXYGEN SATURATION: 98 % | TEMPERATURE: 96.9 F | HEART RATE: 59 BPM | SYSTOLIC BLOOD PRESSURE: 138 MMHG | HEIGHT: 67 IN

## 2022-04-08 DIAGNOSIS — I48.91 ATRIAL FIBRILLATION, UNSPECIFIED TYPE (HCC): ICD-10-CM

## 2022-04-08 LAB
INTERNATIONAL NORMALIZATION RATIO, POC: 1.9
PROTHROMBIN TIME, POC: 22.9

## 2022-04-08 PROCEDURE — 99213 OFFICE O/P EST LOW 20 MIN: CPT | Performed by: FAMILY MEDICINE

## 2022-04-08 PROCEDURE — 85610 PROTHROMBIN TIME: CPT | Performed by: FAMILY MEDICINE

## 2022-04-08 RX ORDER — PROPAFENONE HYDROCHLORIDE 225 MG/1
225 TABLET, FILM COATED ORAL 3 TIMES DAILY
Qty: 270 TABLET | Refills: 1 | Status: SHIPPED
Start: 2022-04-08 | End: 2022-10-21 | Stop reason: SDUPTHER

## 2022-04-08 ASSESSMENT — ENCOUNTER SYMPTOMS
BLOOD IN STOOL: 0
TROUBLE SWALLOWING: 0
SHORTNESS OF BREATH: 0
BACK PAIN: 0
PHOTOPHOBIA: 0
ABDOMINAL PAIN: 0
ALLERGIC/IMMUNOLOGIC NEGATIVE: 1
DIARRHEA: 0
CHEST TIGHTNESS: 0
EYE PAIN: 0
VOMITING: 0
SINUS PAIN: 0
EYE REDNESS: 0
SORE THROAT: 0
NAUSEA: 0
EYE DISCHARGE: 0
COUGH: 0

## 2022-04-08 NOTE — PROGRESS NOTES
22  Junito Adan : 1950 Sex: female  Age: 80 y.o. Assessment and Plan: Lilo Angel was seen today for atrial fibrillation. Diagnoses and all orders for this visit:    Atrial fibrillation, unspecified type ([address]  -     propafenone (RYTHMOL) 225 MG tablet; Take 1 tablet by mouth three times daily  -     POCT INR    She is to take 6 mg of warfarin today instead of 3. She is then to resume her regular schedule starting tomorrow. Recheck pro time in 3 weeks. Return in about 3 weeks (around 2022). Chief Complaint   Patient presents with    Atrial Fibrillation       Patient returns for recheck pro time. INR today 1.9, she denies bleeding, bruising, dosage change, but she has been eating more greens than usual.  Denies any other complaints. Review of Systems   Constitutional: Negative for appetite change, fatigue and unexpected weight change. HENT: Negative for congestion, ear pain, hearing loss, sinus pain, sore throat and trouble swallowing. Eyes: Negative for photophobia, pain, discharge and redness. Respiratory: Negative for cough, chest tightness and shortness of breath. Cardiovascular: Negative for chest pain, palpitations and leg swelling. Gastrointestinal: Negative for abdominal pain, blood in stool, diarrhea, nausea and vomiting. Endocrine: Negative. Genitourinary: Negative for dysuria, flank pain, frequency and hematuria. Musculoskeletal: Negative for arthralgias, back pain, joint swelling and myalgias. Skin: Negative. Allergic/Immunologic: Negative. Neurological: Negative for dizziness, seizures, syncope, weakness, light-headedness, numbness and headaches. Hematological: Negative for adenopathy. Does not bruise/bleed easily. Psychiatric/Behavioral: Negative.           Current Outpatient Medications:     propafenone (RYTHMOL) 225 MG tablet, Take 1 tablet by mouth three times daily, Disp: 270 tablet, Rfl: 1    verapamil (CALAN SR) 180 MG extended release tablet, Take 1 tablet by mouth daily, Disp: 90 tablet, Rfl: 1    warfarin (COUMADIN) 6 MG tablet, Indications: monday, wednesday, friday 3 mg, all other days are 6 mg 1/2 tab on M W F and 1 tab all other days of the week OR AS DIRECTED, Disp: 30 tablet, Rfl: 5    topiramate (TOPAMAX) 50 MG tablet, Take 1 tablet by mouth 2 times daily, Disp: 180 tablet, Rfl: 1    escitalopram (LEXAPRO) 10 MG tablet, Take 1 tablet by mouth every evening, Disp: 90 tablet, Rfl: 1    palbociclib (IBRANCE) 125 MG tablet, Take 125 mg by mouth daily, Disp: , Rfl:     fulvestrant (FASLODEX) 250 MG/5ML SOLN IM injection, Inject 500 mg into the muscle once, Disp: , Rfl:     denosumab (XGEVA) 120 MG/1.7ML SOLN SC injection, Inject 120 mg into the skin once, Disp: , Rfl:     ammonium lactate (LAC-HYDRIN) 12 % lotion, Apply topically twice daily, Disp: 400 g, Rfl: 2    calcium carbonate-vitamin D (CALTRATE 600+D) 600-400 MG-UNIT TABS per tab, Take 1 tablet by mouth daily, Disp: , Rfl:     Alpha-Lipoic Acid 100 MG CAPS, Take by mouth, Disp: , Rfl:     b complex vitamins capsule, Take 1 capsule by mouth daily, Disp: , Rfl:   Allergies   Allergen Reactions    Diltiazem Hcl Other (See Comments)     Other reaction(s): weak    Cardizem [Diltiazem] Palpitations    Cefuroxime Axetil Nausea And Vomiting, Other (See Comments) and Palpitations    Claritin [Loratadine] Palpitations    Erythromycin Nausea And Vomiting    Morphine Nausea And Vomiting    Mucinex [Guaifenesin Er] Palpitations    Propoxyphene Other (See Comments) and Nausea And Vomiting     DIZZY  Other reaction(s): Free Text    Zyrtec [Cetirizine] Nausea And Vomiting and Other (See Comments)     WEAK       Past Medical History:   Diagnosis Date    Bone cancer ([facility] [address] 02/2021    [facility]    Chronic atrial fibrillation (HCC)     Fibroadenosis of breast     Headache     History of therapeutic radiation     Hyperlipidemia     Hypertension Past Surgical History:   Procedure Laterality Date    [facility]GERY  1999    [facility]    BONE BIOPSY  04/2021    BREAST LUMPECTOMY Right 04/26/2017    COLONOSCOPY      CT BIOPSY PERCUTANEOUS DEEP BONE  04/07/2021    CT BIOPSY PERCUTANEOUS DEEP BONE 4/7/2021 Blaine Kanner, MD [facility]    EYE SURGERY      SUDHIR LENSE IMPLANTS    OTHER SURGICAL HISTORY      port removal    TONSILLECTOMY      TUNNELED VENOUS PORT PLACEMENT  05/26/2017    Dr. Tip Lutz     Family History   Problem Relation Age of Onset    Cancer Maternal Cousin 80        lung    Heart Disease Mother     Other Mother         [place] Palsy    Other Father         gall bladder problem, hip fx; AAA rupture    Coronary Art Dis Father         MI     Social History     Socioeconomic History    Marital status:      Spouse name: Grecia Escudero Number of children: Not on file    Years of education: Not on file    Highest education level: Not on file   Occupational History    Occupation: retired      Comment: Retired 4 th    Tobacco Use    Smoking status: Never Smoker    Smokeless tobacco: Never Used   Vaping Use    Vaping Use: Not on file   Substance and Sexual Activity    Alcohol use: No     Comment: occasionally pop    Drug use: No    Sexual activity: Not Currently     Partners: Male   Other Topics Concern    Not on file   Social History Narrative    Drinks occasional Pepsi/Coke     Social Determinants of Health     Financial Resource Strain: Low Risk     Difficulty of Paying Living Expenses: Not hard at all   Food Insecurity: No Food Insecurity    Worried About [address] in the Last Year: Never true    [address] in the Last Year: Never true   Transportation Needs:     Lack of Transportation (Medical): Not on file    Lack of Transportation (Non-Medical):  Not on file   Physical Activity: Insufficiently Active    Days of Exercise per Week: 5 days    Minutes of Exercise per Session: Normal range of motion and neck supple. Lymphadenopathy:      Cervical: No cervical adenopathy. Skin:     General: Skin is warm and dry. Capillary Refill: Capillary refill takes less than 2 seconds. Findings: No rash. Neurological:      Mental Status: She is alert and oriented to person, place, and time. Sensory: No sensory deficit. Motor: No abnormal muscle tone.       Coordination: Coordination normal.      Deep Tendon Reflexes: Reflexes normal.             Seen By:  Michelle Espinosa DO

## 2022-04-19 DIAGNOSIS — Z12.31 VISIT FOR SCREENING MAMMOGRAM: Primary | ICD-10-CM

## 2022-04-23 ENCOUNTER — OFFICE VISIT (OUTPATIENT)
Dept: FAMILY MEDICINE CLINIC | Age: 72
End: 2022-04-23
Payer: MEDICARE

## 2022-04-23 VITALS
HEIGHT: 67 IN | SYSTOLIC BLOOD PRESSURE: 140 MMHG | BODY MASS INDEX: 27.91 KG/M2 | WEIGHT: 177.8 LBS | HEART RATE: 78 BPM | OXYGEN SATURATION: 98 % | TEMPERATURE: 98.6 F | DIASTOLIC BLOOD PRESSURE: 80 MMHG

## 2022-04-23 DIAGNOSIS — B96.89 ACUTE BACTERIAL SINUSITIS: Primary | ICD-10-CM

## 2022-04-23 DIAGNOSIS — J01.90 ACUTE BACTERIAL SINUSITIS: Primary | ICD-10-CM

## 2022-04-23 DIAGNOSIS — N18.31 STAGE 3A CHRONIC KIDNEY DISEASE (HCC): ICD-10-CM

## 2022-04-23 PROBLEM — N18.30 CHRONIC RENAL DISEASE, STAGE III (HCC): Status: ACTIVE | Noted: 2022-04-23

## 2022-04-23 PROCEDURE — 99213 OFFICE O/P EST LOW 20 MIN: CPT | Performed by: FAMILY MEDICINE

## 2022-04-23 RX ORDER — GUAIFENESIN 600 MG/1
600 TABLET, EXTENDED RELEASE ORAL 2 TIMES DAILY
Qty: 30 TABLET | Refills: 0 | Status: SHIPPED | OUTPATIENT
Start: 2022-04-23 | End: 2022-05-08

## 2022-04-23 RX ORDER — LEVOFLOXACIN 500 MG/1
500 TABLET, FILM COATED ORAL DAILY
Qty: 10 TABLET | Refills: 0 | Status: SHIPPED | OUTPATIENT
Start: 2022-04-23 | End: 2022-05-03

## 2022-04-23 NOTE — PROGRESS NOTES
OFFICE NOTE    22  Name: Pawel Harmon  :1950   Sex:female   Age:71 y.o. SUBJECTIVE  Chief Complaint   Patient presents with    Headache    Pharyngitis    Otalgia    Sinus Problem     has stage 4 cancer and is currently being treated. HPI Is on [place] for breast cancer metastatic to bone. Review of Systems   Constitutional: Positive for fatigue. Negative for appetite change, fever and unexpected weight change. HENT: Positive for congestion, postnasal drip, sinus pressure and sore throat. Negative for ear pain. Eyes: Negative. Negative for photophobia, redness and visual disturbance. Respiratory: Negative for cough, chest tightness, shortness of breath and wheezing. Cardiovascular: Negative for chest pain and palpitations. Gastrointestinal: Negative for abdominal pain, blood in stool, constipation, diarrhea and vomiting. Endocrine: Negative for cold intolerance, polydipsia and polyuria. Genitourinary: Negative for dysuria, hematuria and urgency. Musculoskeletal: Negative for arthralgias and gait problem. Skin: Negative for rash and wound. Allergic/Immunologic: Negative for environmental allergies and food allergies. Neurological: Negative for dizziness, tremors, weakness and headaches. Hematological: Negative for adenopathy. Does not bruise/bleed easily. Psychiatric/Behavioral: Negative for behavioral problems, confusion, dysphoric mood and sleep disturbance. All other systems reviewed and are negative.            Current Outpatient Medications:     levoFLOXacin (LEVAQUIN) 500 MG tablet, Take 1 tablet by mouth daily for 10 days, Disp: 10 tablet, Rfl: 0    guaiFENesin (MUCINEX) 600 MG extended release tablet, Take 1 tablet by mouth 2 times daily for 15 days, Disp: 30 tablet, Rfl: 0    propafenone (RYTHMOL) 225 MG tablet, Take 1 tablet by mouth three times daily, Disp: 270 tablet, Rfl: 1    verapamil (CALAN SR) 180 MG extended release tablet, Take 1 tablet by mouth daily, Disp: 90 tablet, Rfl: 1    warfarin (COUMADIN) 6 MG tablet, Indications: monday, wednesday, friday 3 mg, all other days are 6 mg 1/2 tab on M W F and 1 tab all other days of the week OR AS DIRECTED, Disp: 30 tablet, Rfl: 5    topiramate (TOPAMAX) 50 MG tablet, Take 1 tablet by mouth 2 times daily, Disp: 180 tablet, Rfl: 1    escitalopram (LEXAPRO) 10 MG tablet, Take 1 tablet by mouth every evening, Disp: 90 tablet, Rfl: 1    palbociclib (IBRANCE) 125 MG tablet, Take 125 mg by mouth daily, Disp: , Rfl:     fulvestrant (FASLODEX) 250 MG/5ML SOLN IM injection, Inject 500 mg into the muscle once, Disp: , Rfl:     denosumab (XGEVA) 120 MG/1.7ML SOLN SC injection, Inject 120 mg into the skin once, Disp: , Rfl:     ammonium lactate (LAC-HYDRIN) 12 % lotion, Apply topically twice daily, Disp: 400 g, Rfl: 2    calcium carbonate-vitamin D (CALTRATE 600+D) 600-400 MG-UNIT TABS per tab, Take 1 tablet by mouth daily, Disp: , Rfl:     Alpha-Lipoic Acid 100 MG CAPS, Take by mouth, Disp: , Rfl:     b complex vitamins capsule, Take 1 capsule by mouth daily, Disp: , Rfl:   Allergies   Allergen Reactions    Diltiazem Hcl Other (See Comments)     Other reaction(s): weak    Cardizem [Diltiazem] Palpitations    Cefuroxime Axetil Nausea And Vomiting, Other (See Comments) and Palpitations    Claritin [Loratadine] Palpitations    Erythromycin Nausea And Vomiting    Morphine Nausea And Vomiting    Mucinex [Guaifenesin Er] Palpitations    Propoxyphene Other (See Comments) and Nausea And Vomiting     DIZZY  Other reaction(s): Free Text    Zyrtec [Cetirizine] Nausea And Vomiting and Other (See Comments)     WEAK       Past Medical History:   Diagnosis Date    [facility] [address] 02/2021    [facility]    Chronic atrial fibrillation (HCC)     Fibroadenosis of breast     Headache     History of therapeutic radiation     Hyperlipidemia     Hypertension      Past Surgical History:   Procedure Laterality Date    [facility]    BONE BIOPSY  04/2021    BREAST LUMPECTOMY Right 04/26/2017    COLONOSCOPY      CT BIOPSY PERCUTANEOUS DEEP BONE  04/07/2021    CT BIOPSY PERCUTANEOUS DEEP BONE 4/7/2021 Kiara Palumbo MD [facility]    EYE SURGERY      SUDHIR LENSE IMPLANTS    OTHER SURGICAL HISTORY      port removal    TONSILLECTOMY      TUNNELED VENOUS PORT PLACEMENT  05/26/2017    Dr. Brooklynn Youngblood     Family History   Problem Relation Age of Onset    Cancer Maternal Cousin 80        lung    Heart Disease Mother     Other Mother         [place] Palsy    Other Father         gall bladder problem, hip fx; AAA rupture    Coronary Art Dis Father         MI     Social History     Tobacco History     Smoking Status  Never Smoker    Smokeless Tobacco Use  Never Used          Alcohol History     Alcohol Use Status  No Comment  occasionally pop          Drug Use     Drug Use Status  No          Sexual Activity     Sexually Active  Not Currently Partners  Male                OBJECTIVE  Vitals:    04/23/22 1036   BP: (!) 140/80   Pulse: 78   Temp: 98.6 °F (37 °C)   SpO2: 98%   Weight: 177 lb 12.8 oz (80.6 kg)   Height: 5' 7\" (1.702 m)        Body mass index is 27.85 kg/m². No orders of the defined types were placed in this encounter. EXAM   Physical Exam  Constitutional:       Appearance: Normal appearance. She is normal weight. HENT:      Right Ear: Tympanic membrane and external ear normal.      Left Ear: Tympanic membrane and external ear normal.      Nose: Congestion and rhinorrhea present. Mouth/Throat:      Pharynx: Oropharynx is clear. Posterior oropharyngeal erythema present. Eyes:      General: No scleral icterus. Conjunctiva/sclera: Conjunctivae normal.   Cardiovascular:      Rate and Rhythm: Normal rate and regular rhythm. Heart sounds: No murmur heard.       Pulmonary:      Effort: Pulmonary effort is normal. Breath sounds: Normal breath sounds. No wheezing or rhonchi. Abdominal:      General: Bowel sounds are normal.      Palpations: There is no mass. Tenderness: There is no abdominal tenderness. Skin:     Coloration: Skin is not jaundiced. Findings: No bruising or rash. Neurological:      General: No focal deficit present. Mental Status: She is alert. Psychiatric:         Mood and Affect: Mood normal.         Behavior: Behavior normal.           [facility] [place] [facility] seen today for headache, pharyngitis, otalgia and sinus problem. Diagnoses and all orders for this visit:    Acute bacterial sinusitis  -     levoFLOXacin (LEVAQUIN) 500 MG tablet; Take 1 tablet by mouth daily for 10 days  -     guaiFENesin (MUCINEX) 600 MG extended release tablet; Take 1 tablet by mouth 2 times daily for 15 days    Stage 3a chronic kidney disease ([facility] [address]    Breast cancer, metastatic to bone. Immunocompromised. Warning signs discussed    No follow-ups on file.     Electronically signed by Barbara Howard MD on 4/23/22 at 11:05 AM EDT

## 2022-04-24 ASSESSMENT — ENCOUNTER SYMPTOMS
WHEEZING: 0
DIARRHEA: 0
COUGH: 0
CHEST TIGHTNESS: 0
BLOOD IN STOOL: 0
VOMITING: 0
SORE THROAT: 1
PHOTOPHOBIA: 0
EYE REDNESS: 0
SHORTNESS OF BREATH: 0
CONSTIPATION: 0
EYES NEGATIVE: 1
ABDOMINAL PAIN: 0
SINUS PRESSURE: 1

## 2022-04-27 ENCOUNTER — PROCEDURE VISIT (OUTPATIENT)
Dept: PODIATRY | Age: 72
End: 2022-04-27
Payer: MEDICARE

## 2022-04-27 ENCOUNTER — OFFICE VISIT (OUTPATIENT)
Dept: FAMILY MEDICINE CLINIC | Age: 72
End: 2022-04-27
Payer: MEDICARE

## 2022-04-27 VITALS
SYSTOLIC BLOOD PRESSURE: 138 MMHG | DIASTOLIC BLOOD PRESSURE: 62 MMHG | WEIGHT: 175 LBS | RESPIRATION RATE: 16 BRPM | HEIGHT: 67 IN | TEMPERATURE: 97.5 F | BODY MASS INDEX: 27.47 KG/M2 | HEART RATE: 56 BPM | OXYGEN SATURATION: 99 %

## 2022-04-27 VITALS — HEIGHT: 67 IN | WEIGHT: 177 LBS | BODY MASS INDEX: 27.78 KG/M2

## 2022-04-27 DIAGNOSIS — B35.1 TINEA UNGUIUM: Primary | ICD-10-CM

## 2022-04-27 DIAGNOSIS — I48.91 ATRIAL FIBRILLATION, UNSPECIFIED TYPE (HCC): ICD-10-CM

## 2022-04-27 DIAGNOSIS — G60.8 HEREDITARY SENSORY NEUROPATHY: ICD-10-CM

## 2022-04-27 DIAGNOSIS — J02.8 ACUTE PHARYNGITIS DUE TO OTHER SPECIFIED ORGANISMS: Primary | ICD-10-CM

## 2022-04-27 DIAGNOSIS — Z79.01 ENCOUNTER FOR CURRENT LONG-TERM USE OF ANTICOAGULANTS: ICD-10-CM

## 2022-04-27 DIAGNOSIS — L84 CORNS AND CALLOSITIES: ICD-10-CM

## 2022-04-27 LAB
INTERNATIONAL NORMALIZATION RATIO, POC: 1.5
PROTHROMBIN TIME, POC: 17.6

## 2022-04-27 PROCEDURE — 99213 OFFICE O/P EST LOW 20 MIN: CPT | Performed by: FAMILY MEDICINE

## 2022-04-27 PROCEDURE — 11721 DEBRIDE NAIL 6 OR MORE: CPT | Performed by: PODIATRIST

## 2022-04-27 PROCEDURE — 85610 PROTHROMBIN TIME: CPT | Performed by: FAMILY MEDICINE

## 2022-04-27 ASSESSMENT — ENCOUNTER SYMPTOMS
NAUSEA: 0
TROUBLE SWALLOWING: 0
BACK PAIN: 0
EYE PAIN: 0
SHORTNESS OF BREATH: 0
COUGH: 0
EYE DISCHARGE: 0
EYE REDNESS: 0
VOMITING: 0
PHOTOPHOBIA: 0
ABDOMINAL PAIN: 0
SORE THROAT: 1
SINUS PAIN: 0
BLOOD IN STOOL: 0
ALLERGIC/IMMUNOLOGIC NEGATIVE: 1
CHEST TIGHTNESS: 0
DIARRHEA: 0

## 2022-04-27 NOTE — PROGRESS NOTES
Gregory Grigsby is here today for nail care. her PCP is Yaw Molina DO last OV was 04/23/2022. CC:    Follow-up foot and ankle exam.      HPI:   Follow-up foot ankle exam  Continues anticoagulant therapy. No calf pain. No open wounds. Does present with her . No additional pedal complaints today.       ROS:  Const: Denies constitutional symptoms  Musculo: Denies symptoms other than stated above  Skin: Denies symptoms other than stated above       Current Outpatient Medications:     levoFLOXacin (LEVAQUIN) 500 MG tablet, Take 1 tablet by mouth daily for 10 days, Disp: 10 tablet, Rfl: 0    guaiFENesin (MUCINEX) 600 MG extended release tablet, Take 1 tablet by mouth 2 times daily for 15 days, Disp: 30 tablet, Rfl: 0    propafenone (RYTHMOL) 225 MG tablet, Take 1 tablet by mouth three times daily, Disp: 270 tablet, Rfl: 1    verapamil (CALAN SR) 180 MG extended release tablet, Take 1 tablet by mouth daily, Disp: 90 tablet, Rfl: 1    warfarin (COUMADIN) 6 MG tablet, Indications: monday, wednesday, friday 3 mg, all other days are 6 mg 1/2 tab on M W F and 1 tab all other days of the week OR AS DIRECTED, Disp: 30 tablet, Rfl: 5    topiramate (TOPAMAX) 50 MG tablet, Take 1 tablet by mouth 2 times daily, Disp: 180 tablet, Rfl: 1    escitalopram (LEXAPRO) 10 MG tablet, Take 1 tablet by mouth every evening, Disp: 90 tablet, Rfl: 1    palbociclib (IBRANCE) 125 MG tablet, Take 125 mg by mouth daily, Disp: , Rfl:     fulvestrant (FASLODEX) 250 MG/5ML SOLN IM injection, Inject 500 mg into the muscle once, Disp: , Rfl:     denosumab (XGEVA) 120 MG/1.7ML SOLN SC injection, Inject 120 mg into the skin once, Disp: , Rfl:     ammonium lactate (LAC-HYDRIN) 12 % lotion, Apply topically twice daily, Disp: 400 g, Rfl: 2    calcium carbonate-vitamin D (CALTRATE 600+D) 600-400 MG-UNIT TABS per tab, Take 1 tablet by mouth daily, Disp: , Rfl:     Alpha-Lipoic Acid 100 MG CAPS, Take by mouth, Disp: , Rfl:     b complex vitamins capsule, Take 1 capsule by mouth daily, Disp: , Rfl:   Allergies   Allergen Reactions    Diltiazem Hcl Other (See Comments)     Other reaction(s): weak    Cardizem [Diltiazem] Palpitations    Cefuroxime Axetil Nausea And Vomiting, Other (See Comments) and Palpitations    Claritin [Loratadine] Palpitations    Erythromycin Nausea And Vomiting    Morphine Nausea And Vomiting    Mucinex [Guaifenesin Er] Palpitations    Propoxyphene Other (See Comments) and Nausea And Vomiting     DIZZY  Other reaction(s): Free Text    Zyrtec [Cetirizine] Nausea And Vomiting and Other (See Comments)     WEAK       Past Medical History:   Diagnosis Date    [facility] [address] 02/2021    [facility]    Chronic atrial fibrillation (HCC)     Fibroadenosis of breast     Headache     History of therapeutic radiation     Hyperlipidemia     Hypertension            Vitals:    04/27/22 1305   Weight: 177 lb (80.3 kg)   Height: 5' 7\" (1.702 m)       Work History/Social History: Retired  [employer]    Focused Lower Extremity Physical Exam:    Neurovascular examination:    Dorsalis Pedis palpable bilateral.  Posterior tibialis palpable bilateral.    Capillary Refill Time:  Immediate return  Hair growth:  Symmetrical and bilateral   Skin:  Not atrophic  Edema: No significant edema bilateral lower legs  Neurologic:  Light touch diminished bilateral.      Musculoskeletal/ Orthopedic examination:    Equinis: Absent bilateral  Dorsiflexion, plantarflexion, inversion, eversion bilateral 5 out of 5 muscle strength  Wiggling toes. No pain overlying Achilles tendon bilateral.  Negative calcaneal squeeze test bilateral heel. Dermatology examination:    Toenails 1-5 right and left thickened, elongated, dystrophic, mycotic with subungual debris. Webspaces 1-4 bilateral clean, dry and intact. No open skin lesions or abrasions. No erythema. Assessment and Plan:   Shea Vanegas was seen today for callouses and nail problem. Diagnoses and all orders for this visit:    Tinea unguium    Corns and callosities    Hereditary sensory neuropathy    Encounter for current long-term use of anticoagulants      Follow-up foot and ankle exam  Continues Coumadin long-term anticoagulant therapy  Manual debridement with standard technique toenails 1 through 5 right and left in thickness and length. Patient tolerated procedure well. Does present today with her . I will follow-up in office 2 months        Return in about 2 months (around 6/27/2022). Seen By:  Kevin Luna DPM      Document was created using voice recognition software. Note was reviewed, however may contain grammatical errors.

## 2022-04-27 NOTE — PROGRESS NOTES
22  Heather Fuentes : 1950 Sex: female  Age: 80 y.o. Assessment and Plan: Mesha Mathur was seen today for atrial fibrillation. Diagnoses and all orders for this visit:    Acute pharyngitis due to other specified organisms  She is to continue and finish her antibiotic. Call if not improving. Atrial fibrillation, unspecified type ([address]  -     POCT INR  Increase warfarin to 6 mg daily while on Levaquin, then resume regular schedule after antibiotic completed. Recheck Pro time in 2 weeks. Return in about 2 weeks (around 2022). Chief Complaint   Patient presents with    Atrial Fibrillation       Patient here for recheck pro time. [facility] for acute ph[facility]itis. She is on Levaquin currently. Pro time today is 1.5. She denies bleeding, bruising, dosage change, dietary change. She is currently taking 6 mg warfarin 5 days a week 3 mg twice a week. Review of Systems   Constitutional: Negative for appetite change, fatigue and unexpected weight change. HENT: Positive for congestion and sore throat. Negative for ear pain, hearing loss, sinus pain and trouble swallowing. Eyes: Negative for photophobia, pain, discharge and redness. Respiratory: Negative for cough, chest tightness and shortness of breath. Cardiovascular: Negative for chest pain, palpitations and leg swelling. Gastrointestinal: Negative for abdominal pain, blood in stool, diarrhea, nausea and vomiting. Endocrine: Negative. Genitourinary: Negative for dysuria, flank pain, frequency and hematuria. Musculoskeletal: Negative for arthralgias, back pain, joint swelling and myalgias. Skin: Negative. Allergic/Immunologic: Negative. Neurological: Negative for dizziness, seizures, syncope, weakness, light-headedness, numbness and headaches. Hematological: Negative for adenopathy. Does not bruise/bleed easily. Psychiatric/Behavioral: Negative.           Current Outpatient Medications:     levoFLOXacin (LEVAQUIN) 500 MG tablet, Take 1 tablet by mouth daily for 10 days, Disp: 10 tablet, Rfl: 0    guaiFENesin (MUCINEX) 600 MG extended release tablet, Take 1 tablet by mouth 2 times daily for 15 days, Disp: 30 tablet, Rfl: 0    propafenone (RYTHMOL) 225 MG tablet, Take 1 tablet by mouth three times daily, Disp: 270 tablet, Rfl: 1    verapamil (CALAN SR) 180 MG extended release tablet, Take 1 tablet by mouth daily, Disp: 90 tablet, Rfl: 1    warfarin (COUMADIN) 6 MG tablet, Indications: monday, wednesday, friday 3 mg, all other days are 6 mg 1/2 tab on M W F and 1 tab all other days of the week OR AS DIRECTED, Disp: 30 tablet, Rfl: 5    topiramate (TOPAMAX) 50 MG tablet, Take 1 tablet by mouth 2 times daily, Disp: 180 tablet, Rfl: 1    escitalopram (LEXAPRO) 10 MG tablet, Take 1 tablet by mouth every evening, Disp: 90 tablet, Rfl: 1    palbociclib (IBRANCE) 125 MG tablet, Take 125 mg by mouth daily, Disp: , Rfl:     fulvestrant (FASLODEX) 250 MG/5ML SOLN IM injection, Inject 500 mg into the muscle once, Disp: , Rfl:     denosumab (XGEVA) 120 MG/1.7ML SOLN SC injection, Inject 120 mg into the skin once, Disp: , Rfl:     ammonium lactate (LAC-HYDRIN) 12 % lotion, Apply topically twice daily, Disp: 400 g, Rfl: 2    calcium carbonate-vitamin D (CALTRATE 600+D) 600-400 MG-UNIT TABS per tab, Take 1 tablet by mouth daily, Disp: , Rfl:     Alpha-Lipoic Acid 100 MG CAPS, Take by mouth, Disp: , Rfl:     b complex vitamins capsule, Take 1 capsule by mouth daily, Disp: , Rfl:   Allergies   Allergen Reactions    Diltiazem Hcl Other (See Comments)     Other reaction(s): weak    Cardizem [Diltiazem] Palpitations    Cefuroxime Axetil Nausea And Vomiting, Other (See Comments) and Palpitations    Claritin [Loratadine] Palpitations    Erythromycin Nausea And Vomiting    Morphine Nausea And Vomiting    Mucinex [Guaifenesin Er] Palpitations    Propoxyphene Other (See Comments) and Nausea And Vomiting     DIZZY  Other reaction(s): Free Text    Zyrtec [Cetirizine] Nausea And Vomiting and Other (See Comments)     WEAK       Past Medical History:   Diagnosis Date    [address] 02/2021    Breast cancer ([facility] 04/2017    Chronic atrial fibrillation (HCC)     Fibroadenosis of breast     Headache     History of therapeutic radiation     Hyperlipidemia     Hypertension      Past Surgical History:   Procedure Laterality Date    [facility]ERY  [facility]    BONE BIOPSY  04/2021    BREAST LUMPECTOMY Right 04/26/2017    COLONOSCOPY      CT BIOPSY PERCUTANEOUS DEEP BONE  04/07/2021    CT BIOPSY PERCUTANEOUS DEEP BONE 4/7/2021 Ibrahima Oropeza MD [facility]    EYE SURGERY      SUDHIR LENSE IMPLANTS    OTHER SURGICAL HISTORY      port removal    TONSILLECTOMY      TUNNELED VENOUS PORT PLACEMENT  05/26/2017    Dr. Octavio Woodard     Family History   Problem Relation Age of Onset    Cancer Maternal Cousin 80        lung    Heart Disease Mother     Other Mother         [place] Palsy    Other Father         gall bladder problem, hip fx; AAA rupture    Coronary Art Dis Father         MI     Social History     Socioeconomic History    Marital status:      Spouse name: Uday Souza Number of children: Not on file    Years of education: Not on file    Highest education level: Not on file   Occupational History    Occupation: retired      Comment: Retired 4 th    Tobacco Use    Smoking status: Never Smoker    Smokeless tobacco: Never Used   Vaping Use    Vaping Use: Not on file   Substance and Sexual Activity    Alcohol use: No     Comment: occasionally pop    Drug use: No    Sexual activity: Not Currently     Partners: Male   Other Topics Concern    Not on file   Social History Narrative    Drinks occasional Pepsi/Coke     Social Determinants of Health     Financial Resource Strain: Low Risk     Difficulty of Paying Living Expenses: Not hard at all Food Insecurity: No Food Insecurity    Worried About Running Out of Food in the Last Year: Never true    Stacia of Food in the Last Year: Never true   Transportation Needs:     Lack of Transportation (Medical): Not on file    Lack of Transportation (Non-Medical): Not on file   Physical Activity: Insufficiently Active    Days of Exercise per Week: 5 days    Minutes of Exercise per Session: 20 min   Stress:     Feeling of Stress : Not on file   Social Connections:     Frequency of Communication with Friends and Family: Not on file    Frequency of Social Gatherings with Friends and Family: Not on file    Attends Zoroastrianism Services: Not on file    Active Member of Clubs or Organizations: Not on file    Attends Club or Organization Meetings: Not on file    Marital Status: Not on file   Intimate Partner Violence:     Fear of Current or Ex-Partner: Not on file    Emotionally Abused: Not on file    Physically Abused: Not on file    Sexually Abused: Not on file   Housing Stability:     Unable to Pay for Housing in the Last Year: Not on file    Number of [place] in the Last Year: Not on file    Unstable Housing in the Last Year: Not on file       Vitals:    04/27/22 1345   BP: 138/62   Pulse: 56   Resp: 16   Temp: 97.5 °F (36.4 °C)   TempSrc: Temporal   SpO2: 99%   Weight: 175 lb (79.4 kg)   Height: 5' 7\" (1.702 m)       Physical Exam  Vitals and nursing note reviewed. Constitutional:       Appearance: She is well-developed. HENT:      Head: Atraumatic. Right Ear: External ear normal. There is impacted cerumen. Left Ear: External ear normal.      Nose: Congestion present. Mouth/Throat:      Pharynx: Posterior oropharyngeal erythema present. No oropharyngeal exudate. Eyes:      Conjunctiva/sclera: Conjunctivae normal.      Pupils: Pupils are equal, round, and reactive to light. Neck:      Thyroid: No thyromegaly. Trachea: No tracheal deviation.    Cardiovascular:      Rate and Rhythm: Normal rate and regular rhythm. Heart sounds: No murmur heard. No friction rub. No gallop. Pulmonary:      Effort: Pulmonary effort is normal. No respiratory distress. Breath sounds: Normal breath sounds. Abdominal:      General: Bowel sounds are normal.      Palpations: Abdomen is soft. Musculoskeletal:         General: No tenderness or deformity. Normal range of motion. Cervical back: Normal range of motion and neck supple. Lymphadenopathy:      Cervical: No cervical adenopathy. Skin:     General: Skin is warm and dry. Capillary Refill: Capillary refill takes less than 2 seconds. Findings: No rash. Neurological:      Mental Status: She is alert and oriented to person, place, and time. Sensory: No sensory deficit. Motor: No abnormal muscle tone.       Coordination: Coordination normal.      Deep Tendon Reflexes: Reflexes normal.             Seen By:  Ritika Smith DO

## 2022-05-13 ENCOUNTER — OFFICE VISIT (OUTPATIENT)
Dept: FAMILY MEDICINE CLINIC | Age: 72
End: 2022-05-13
Payer: MEDICARE

## 2022-05-13 VITALS
HEART RATE: 60 BPM | TEMPERATURE: 97.1 F | BODY MASS INDEX: 27.78 KG/M2 | DIASTOLIC BLOOD PRESSURE: 60 MMHG | OXYGEN SATURATION: 96 % | WEIGHT: 177 LBS | HEIGHT: 67 IN | SYSTOLIC BLOOD PRESSURE: 138 MMHG | RESPIRATION RATE: 16 BRPM

## 2022-05-13 DIAGNOSIS — J02.8 ACUTE PHARYNGITIS DUE TO OTHER SPECIFIED ORGANISMS: Primary | ICD-10-CM

## 2022-05-13 DIAGNOSIS — I48.91 ATRIAL FIBRILLATION, UNSPECIFIED TYPE (HCC): ICD-10-CM

## 2022-05-13 LAB
INTERNATIONAL NORMALIZATION RATIO, POC: 2
PROTHROMBIN TIME, POC: 24.4

## 2022-05-13 PROCEDURE — 85610 PROTHROMBIN TIME: CPT | Performed by: FAMILY MEDICINE

## 2022-05-13 PROCEDURE — 99213 OFFICE O/P EST LOW 20 MIN: CPT | Performed by: FAMILY MEDICINE

## 2022-05-13 RX ORDER — CIPROFLOXACIN 500 MG/1
500 TABLET, FILM COATED ORAL 2 TIMES DAILY
Qty: 14 TABLET | Refills: 0 | Status: SHIPPED | OUTPATIENT
Start: 2022-05-13 | End: 2022-05-20

## 2022-05-13 ASSESSMENT — ENCOUNTER SYMPTOMS
SINUS PRESSURE: 1
EYE REDNESS: 0
TROUBLE SWALLOWING: 0
CHEST TIGHTNESS: 0
ABDOMINAL PAIN: 0
BLOOD IN STOOL: 0
DIARRHEA: 0
BACK PAIN: 0
COUGH: 0
PHOTOPHOBIA: 0
ALLERGIC/IMMUNOLOGIC NEGATIVE: 1
EYE DISCHARGE: 0
VOMITING: 0
SINUS PAIN: 0
EYE PAIN: 0
NAUSEA: 0
SORE THROAT: 0
SHORTNESS OF BREATH: 0

## 2022-05-13 NOTE — PROGRESS NOTES
22  Lolita Mason : 1950 Sex: female  Age: 80 y.o. Assessment and Plan: Evelia Quijano was seen today for atrial fibrillation and pharyngitis. Diagnoses and all orders for this visit:    Acute pharyngitis due to other specified organisms  -     ciprofloxacin (CIPRO) 500 MG tablet; Take 1 tablet by mouth 2 times daily for 7 days    Atrial fibrillation, unspecified type ([address]  -     POCT INR    Will treat empirically for sinusitis with a week worth of Cipro. Should she prove, or complaints worsen in any way, should recheck at the office. Return in about 4 weeks (around 6/10/2022). Chief Complaint   Patient presents with    Atrial Fibrillation    Pharyngitis       Patient here for recheck pro time. INR today 2.0. Denies bleeding, bruising, dosage change, dietary changes. She continues with sinus drainage and congestion. She continues to complain of sore throat, congestion, sinus drainage. Denies fever, chills, nausea, vomiting. Review of Systems   Constitutional: Negative for appetite change, fatigue and unexpected weight change. HENT: Positive for congestion, postnasal drip and sinus pressure. Negative for ear pain, hearing loss, sinus pain, sore throat and trouble swallowing. Eyes: Negative for photophobia, pain, discharge and redness. Respiratory: Negative for cough, chest tightness and shortness of breath. Cardiovascular: Negative for chest pain, palpitations and leg swelling. Gastrointestinal: Negative for abdominal pain, blood in stool, diarrhea, nausea and vomiting. Endocrine: Negative. Genitourinary: Negative for dysuria, flank pain, frequency and hematuria. Musculoskeletal: Negative for arthralgias, back pain, joint swelling and myalgias. Skin: Negative. Allergic/Immunologic: Negative. Neurological: Negative for dizziness, seizures, syncope, weakness, light-headedness, numbness and headaches. Hematological: Negative for adenopathy.  Does not bruise/bleed easily. Psychiatric/Behavioral: Negative.           Current Outpatient Medications:     ciprofloxacin (CIPRO) 500 MG tablet, Take 1 tablet by mouth 2 times daily for 7 days, Disp: 14 tablet, Rfl: 0    IBRANCE 100 MG tablet, , Disp: , Rfl:     propafenone (RYTHMOL) 225 MG tablet, Take 1 tablet by mouth three times daily, Disp: 270 tablet, Rfl: 1    verapamil (CALAN SR) 180 MG extended release tablet, Take 1 tablet by mouth daily, Disp: 90 tablet, Rfl: 1    warfarin (COUMADIN) 6 MG tablet, Indications: monday, wednesday, friday 3 mg, all other days are 6 mg 1/2 tab on M W F and 1 tab all other days of the week OR AS DIRECTED, Disp: 30 tablet, Rfl: 5    topiramate (TOPAMAX) 50 MG tablet, Take 1 tablet by mouth 2 times daily, Disp: 180 tablet, Rfl: 1    escitalopram (LEXAPRO) 10 MG tablet, Take 1 tablet by mouth every evening, Disp: 90 tablet, Rfl: 1    palbociclib (IBRANCE) 125 MG tablet, Take 125 mg by mouth daily, Disp: , Rfl:     fulvestrant (FASLODEX) 250 MG/5ML SOLN IM injection, Inject 500 mg into the muscle once, Disp: , Rfl:     denosumab (XGEVA) 120 MG/1.7ML SOLN SC injection, Inject 120 mg into the skin once, Disp: , Rfl:     ammonium lactate (LAC-HYDRIN) 12 % lotion, Apply topically twice daily, Disp: 400 g, Rfl: 2    calcium carbonate-vitamin D (CALTRATE 600+D) 600-400 MG-UNIT TABS per tab, Take 1 tablet by mouth daily, Disp: , Rfl:     Alpha-Lipoic Acid 100 MG CAPS, Take by mouth, Disp: , Rfl:     b complex vitamins capsule, Take 1 capsule by mouth daily, Disp: , Rfl:   Allergies   Allergen Reactions    Diltiazem Hcl Other (See Comments)     Other reaction(s): weak    Cardizem [Diltiazem] Palpitations    Cefuroxime Axetil Nausea And Vomiting, Other (See Comments) and Palpitations    Claritin [Loratadine] Palpitations    Erythromycin Nausea And Vomiting    Morphine Nausea And Vomiting    Mucinex [Guaifenesin Er] Palpitations    Propoxyphene Other (See Comments) and Nausea And Vomiting     DIZZY  Other reaction(s): Free Text    Zyrtec [Cetirizine] Nausea And Vomiting and Other (See Comments)     WEAK       Past Medical History:   Diagnosis Date    [address] 02/2021    Breast cancer ([facility] 04/2017    Chronic atrial fibrillation (HCC)     Fibroadenosis of breast     Headache     History of therapeutic radiation     Hyperlipidemia     Hypertension      Past Surgical History:   Procedure Laterality Date    [facility]GERY  1999    [facility]    BONE BIOPSY  04/2021    BREAST LUMPECTOMY Right 04/26/2017    COLONOSCOPY      CT BIOPSY PERCUTANEOUS DEEP BONE  04/07/2021    CT BIOPSY PERCUTANEOUS DEEP BONE 4/7/2021 Connie Shah MD [facility]    EYE SURGERY      SUDHIR LENSE IMPLANTS    OTHER SURGICAL HISTORY      port removal    TONSILLECTOMY      TUNNELED VENOUS PORT PLACEMENT  05/26/2017    Dr. Guillermo Congress     Family History   Problem Relation Age of Onset    Cancer Maternal Cousin 80        lung    Heart Disease Mother     Other Mother         [place] Palsy    Other Father         gall bladder problem, hip fx; AAA rupture    Coronary Art Dis Father         MI     Social History     Socioeconomic History    Marital status:      Spouse name: Jacquie Zhu Number of children: Not on file    Years of education: Not on file    Highest education level: Not on file   Occupational History    Occupation: retired      Comment: Retired 4 th    Tobacco Use    Smoking status: Never Smoker    Smokeless tobacco: Never Used   Vaping Use    Vaping Use: Not on file   Substance and Sexual Activity    Alcohol use: No     Comment: occasionally pop    Drug use: No    Sexual activity: Not Currently     Partners: Male   Other Topics Concern    Not on file   Social History Narrative    Drinks occasional Pepsi/Coke     Social Determinants of Health     Financial Resource Strain: Low Risk     Difficulty of Paying Living Expenses: Not hard at all Food Insecurity: No Food Insecurity    Worried About Running Out of Food in the Last Year: Never true    Stacia of Food in the Last Year: Never true   Transportation Needs:     Lack of Transportation (Medical): Not on file    Lack of Transportation (Non-Medical):  Not on file   Physical Activity: Insufficiently Active    Days of Exercise per Week: 5 days    Minutes of Exercise per Session: 20 min   Stress:     Feeling of Stress : Not on file   Social Connections:     Frequency of Communication with Friends and Family: Not on file    Frequency of Social Gatherings with Friends and Family: Not on file    Attends Yazidism Services: Not on file    Active Member of Clubs or Organizations: Not on file    Attends Club or Organization Meetings: Not on file    Marital Status: Not on file   Intimate Partner Violence:     Fear of Current or Ex-Partner: Not on file    Emotionally Abused: Not on file    Physically Abused: Not on file    Sexually Abused: Not on file   Housing Stability:     Unable to Pay for Housing in the Last Year: Not on file    Number of [place] in the Last Year: Not on file    Unstable Housing in the Last Year: Not on file       Vitals:    05/13/22 1204   BP: 138/60   Pulse: 60   Resp: 16   Temp: 97.1 °F (36.2 °C)   TempSrc: Temporal   SpO2: 96%   Weight: 177 lb (80.3 kg)   Height: 5' 7\" (1.702 m)       Physical Exam        Seen By:  Liz Everett DO

## 2022-06-22 ENCOUNTER — OFFICE VISIT (OUTPATIENT)
Dept: FAMILY MEDICINE CLINIC | Age: 72
End: 2022-06-22
Payer: MEDICARE

## 2022-06-22 VITALS
BODY MASS INDEX: 27.62 KG/M2 | TEMPERATURE: 97.6 F | HEART RATE: 60 BPM | OXYGEN SATURATION: 97 % | HEIGHT: 67 IN | WEIGHT: 176 LBS | RESPIRATION RATE: 16 BRPM | SYSTOLIC BLOOD PRESSURE: 130 MMHG | DIASTOLIC BLOOD PRESSURE: 66 MMHG

## 2022-06-22 DIAGNOSIS — I48.91 ATRIAL FIBRILLATION, UNSPECIFIED TYPE (HCC): ICD-10-CM

## 2022-06-22 DIAGNOSIS — F06.4 ANXIETY DISORDER DUE TO GENERAL MEDICAL CONDITION: ICD-10-CM

## 2022-06-22 DIAGNOSIS — N18.31 STAGE 3A CHRONIC KIDNEY DISEASE (HCC): ICD-10-CM

## 2022-06-22 DIAGNOSIS — G43.009 MIGRAINE WITHOUT AURA, NOT REFRACTORY: ICD-10-CM

## 2022-06-22 LAB
INTERNATIONAL NORMALIZATION RATIO, POC: 1.8
PROTHROMBIN TIME, POC: 22.1

## 2022-06-22 PROCEDURE — 85610 PROTHROMBIN TIME: CPT | Performed by: FAMILY MEDICINE

## 2022-06-22 PROCEDURE — 99213 OFFICE O/P EST LOW 20 MIN: CPT | Performed by: FAMILY MEDICINE

## 2022-06-22 PROCEDURE — 1123F ACP DISCUSS/DSCN MKR DOCD: CPT | Performed by: FAMILY MEDICINE

## 2022-06-22 RX ORDER — ESCITALOPRAM OXALATE 10 MG/1
10 TABLET ORAL EVERY EVENING
Qty: 90 TABLET | Refills: 1 | Status: SHIPPED
Start: 2022-06-22 | End: 2022-07-06 | Stop reason: SDUPTHER

## 2022-06-22 RX ORDER — TOPIRAMATE 50 MG/1
50 TABLET, FILM COATED ORAL 2 TIMES DAILY
Qty: 180 TABLET | Refills: 1 | Status: SHIPPED
Start: 2022-06-22 | End: 2022-07-24 | Stop reason: SDUPTHER

## 2022-06-22 SDOH — ECONOMIC STABILITY: FOOD INSECURITY: WITHIN THE PAST 12 MONTHS, THE FOOD YOU BOUGHT JUST DIDN'T LAST AND YOU DIDN'T HAVE MONEY TO GET MORE.: PATIENT DECLINED

## 2022-06-22 SDOH — ECONOMIC STABILITY: FOOD INSECURITY: WITHIN THE PAST 12 MONTHS, YOU WORRIED THAT YOUR FOOD WOULD RUN OUT BEFORE YOU GOT MONEY TO BUY MORE.: PATIENT DECLINED

## 2022-06-22 ASSESSMENT — ENCOUNTER SYMPTOMS
ALLERGIC/IMMUNOLOGIC NEGATIVE: 1
TROUBLE SWALLOWING: 0
BLOOD IN STOOL: 0
COUGH: 0
PHOTOPHOBIA: 0
DIARRHEA: 0
SHORTNESS OF BREATH: 0
VOMITING: 0
SINUS PAIN: 0
EYE DISCHARGE: 0
ABDOMINAL PAIN: 0
CHEST TIGHTNESS: 0
SORE THROAT: 0
NAUSEA: 0
EYE REDNESS: 0
EYE PAIN: 0
BACK PAIN: 0

## 2022-06-22 ASSESSMENT — SOCIAL DETERMINANTS OF HEALTH (SDOH): HOW HARD IS IT FOR YOU TO PAY FOR THE VERY BASICS LIKE FOOD, HOUSING, MEDICAL CARE, AND HEATING?: NOT HARD AT ALL

## 2022-06-22 NOTE — PROGRESS NOTES
22  Heydi Orlando : 1950 Sex: female  Age: 80 y.o. Assessment and Plan: Cristobal Petty was seen today for atrial fibrillation. Diagnoses and all orders for this visit:    Cristobal Petty was seen today for atrial fibrillation. Diagnoses and all orders for this visit:    Anxiety disorder due to general medical condition  -     escitalopram (LEXAPRO) 10 MG tablet; Take 1 tablet by mouth every evening    Migraine without aura, not refractory  -     topiramate (TOPAMAX) 50 MG tablet; Take 1 tablet by mouth 2 times daily    Atrial fibrillation, unspecified type (HCC)  -     POCT INR  Adjust warfarin to 6 mg 6 days a week, 3 mg 1 day a week. Stage 3a chronic kidney disease ([facility] [address]  This is stable on most recent lab work done by [facility]. Return in about 2 weeks (around 2022). Chief Complaint   Patient presents with    Atrial Fibrillation       Patient returns for recheck pro time. INR today is 1.8. She just had a chemotherapy this week. Denies bleeding, bruising, dietary changes, dosage change. Current warfarin dose is 6 mg 5 days a week 3 mg 2 days a week. Review of Systems   Constitutional: Negative for appetite change, fatigue and unexpected weight change. HENT: Negative for congestion, ear pain, hearing loss, sinus pain, sore throat and trouble swallowing. Eyes: Negative for photophobia, pain, discharge and redness. Respiratory: Negative for cough, chest tightness and shortness of breath. Cardiovascular: Negative for chest pain, palpitations and leg swelling. Gastrointestinal: Negative for abdominal pain, blood in stool, diarrhea, nausea and vomiting. Endocrine: Negative. Genitourinary: Negative for dysuria, flank pain, frequency and hematuria. Musculoskeletal: Negative for arthralgias, back pain, joint swelling and myalgias. Skin: Negative. Allergic/Immunologic: Negative.     Neurological: Negative for dizziness, seizures, syncope, weakness, light-headedness, numbness and headaches. Hematological: Negative for adenopathy. Does not bruise/bleed easily. Psychiatric/Behavioral: Negative.           Current Outpatient Medications:     escitalopram (LEXAPRO) 10 MG tablet, Take 1 tablet by mouth every evening, Disp: 90 tablet, Rfl: 1    topiramate (TOPAMAX) 50 MG tablet, Take 1 tablet by mouth 2 times daily, Disp: 180 tablet, Rfl: 1    IBRANCE 100 MG tablet, , Disp: , Rfl:     propafenone (RYTHMOL) 225 MG tablet, Take 1 tablet by mouth three times daily, Disp: 270 tablet, Rfl: 1    verapamil (CALAN SR) 180 MG extended release tablet, Take 1 tablet by mouth daily, Disp: 90 tablet, Rfl: 1    warfarin (COUMADIN) 6 MG tablet, Indications: monday, wednesday, friday 3 mg, all other days are 6 mg 1/2 tab on M W F and 1 tab all other days of the week OR AS DIRECTED, Disp: 30 tablet, Rfl: 5    palbociclib (IBRANCE) 125 MG tablet, Take 125 mg by mouth daily, Disp: , Rfl:     fulvestrant (FASLODEX) 250 MG/5ML SOLN IM injection, Inject 500 mg into the muscle once, Disp: , Rfl:     denosumab (XGEVA) 120 MG/1.7ML SOLN SC injection, Inject 120 mg into the skin once, Disp: , Rfl:     ammonium lactate (LAC-HYDRIN) 12 % lotion, Apply topically twice daily, Disp: 400 g, Rfl: 2    calcium carbonate-vitamin D (CALTRATE 600+D) 600-400 MG-UNIT TABS per tab, Take 1 tablet by mouth daily, Disp: , Rfl:     Alpha-Lipoic Acid 100 MG CAPS, Take by mouth, Disp: , Rfl:     b complex vitamins capsule, Take 1 capsule by mouth daily, Disp: , Rfl:   Allergies   Allergen Reactions    Diltiazem Hcl Other (See Comments)     Other reaction(s): weak    Cardizem [Diltiazem] Palpitations    Cefuroxime Axetil Nausea And Vomiting, Other (See Comments) and Palpitations    Claritin [Loratadine] Palpitations    Erythromycin Nausea And Vomiting    Morphine Nausea And Vomiting    Mucinex [Guaifenesin Er] Palpitations    Propoxyphene Other (See Comments) and Nausea And Vomiting     DIZZY  Other reaction(s): Free Text    Zyrtec [Cetirizine] Nausea And Vomiting and Other (See Comments)     WEAK       Past Medical History:   Diagnosis Date    Bone cancer ([facility] [address] 02/2021    Breast cancer ([facility] [address] 04/2017    Chronic atrial fibrillation (HCC)     Fibroadenosis of breast     Headache     History of therapeutic radiation     Hyperlipidemia     Hypertension      Past Surgical History:   Procedure Laterality Date    [facility]GERY  1999    [facility]    BONE BIOPSY  04/2021    BREAST LUMPECTOMY Right 04/26/2017    COLONOSCOPY      CT BIOPSY PERCUTANEOUS DEEP BONE  04/07/2021    CT BIOPSY PERCUTANEOUS DEEP BONE 4/7/2021 Suly Otto MD [facility]    EYE SURGERY      SUDHIR LENSE IMPLANTS    OTHER SURGICAL HISTORY      port removal    TONSILLECTOMY      TUNNELED VENOUS PORT PLACEMENT  05/26/2017    Dr. Rossy Martin     Family History   Problem Relation Age of Onset    Cancer Maternal Cousin 80        lung    Heart Disease Mother     Other Mother         [place] Palsy    Other Father         gall bladder problem, hip fx; AAA rupture    Coronary Art Dis Father         MI     Social History     Socioeconomic History    Marital status:      Spouse name: Vale North Number of children: Not on file    Years of education: Not on file    Highest education level: Not on file   Occupational History    Occupation: retired      Comment: Retired 4 th    Tobacco Use    Smoking status: Never Smoker    Smokeless tobacco: Never Used   Vaping Use    Vaping Use: Not on file   Substance and Sexual Activity    Alcohol use: No     Comment: occasionally pop    Drug use: No    Sexual activity: Not Currently     Partners: Male   Other Topics Concern    Not on file   Social History Narrative    Drinks occasional Pepsi/Coke     Social Determinants of Health     Financial Resource Strain: Low Risk     Difficulty of Paying Living Expenses: Not hard at all   Food Insecurity: Unknown    Worried About [facility] in the Last Year: Patient refused   [address] in the Last Year: Patient refused   Transportation Needs:     Lack of Transportation (Medical): Not on file    Lack of Transportation (Non-Medical): Not on file   Physical Activity: Insufficiently Active    Days of Exercise per Week: 5 days    Minutes of Exercise per Session: 20 min   Stress:     Feeling of Stress : Not on file   Social Connections:     Frequency of Communication with Friends and Family: Not on file    Frequency of Social Gatherings with Friends and Family: Not on file    Attends Confucianism Services: Not on file    Active Member of Clubs or Organizations: Not on file    Attends Club or Organization Meetings: Not on file    Marital Status: Not on file   Intimate Partner Violence:     Fear of Current or Ex-Partner: Not on file    Emotionally Abused: Not on file    Physically Abused: Not on file    Sexually Abused: Not on file   Housing Stability:     Unable to Pay for Housing in the Last Year: Not on file    Number of [place] in the Last Year: Not on file    Unstable Housing in the Last Year: Not on file       Vitals:    06/22/22 1150   BP: 130/66   Pulse: 60   Resp: 16   Temp: 97.6 °F (36.4 °C)   TempSrc: Temporal   SpO2: 97%   Weight: 176 lb (79.8 kg)   Height: 5' 7\" (1.702 m)       Physical Exam  Vitals and nursing note reviewed. Constitutional:       Appearance: She is well-developed. HENT:      Head: Atraumatic. Right Ear: External ear normal.      Left Ear: External ear normal.      Nose: Nose normal.      Mouth/Throat:      Pharynx: No oropharyngeal exudate. Eyes:      Conjunctiva/sclera: Conjunctivae normal.      Pupils: Pupils are equal, round, and reactive to light. Neck:      Thyroid: No thyromegaly. Trachea: No tracheal deviation. Cardiovascular:      Rate and Rhythm: Normal rate and regular rhythm. Heart sounds: No murmur heard. No friction rub.  No gallop. Pulmonary:      Effort: Pulmonary effort is normal. No respiratory distress. Breath sounds: Normal breath sounds. Abdominal:      General: Bowel sounds are normal.      Palpations: Abdomen is soft. Musculoskeletal:         General: No tenderness or deformity. Normal range of motion. Cervical back: Normal range of motion and neck supple. Lymphadenopathy:      Cervical: No cervical adenopathy. Skin:     General: Skin is warm and dry. Capillary Refill: Capillary refill takes less than 2 seconds. Findings: No rash. Neurological:      Mental Status: She is alert and oriented to person, place, and time. Sensory: No sensory deficit. Motor: No abnormal muscle tone.       Coordination: Coordination normal.      Deep Tendon Reflexes: Reflexes normal.             Seen By:  Fernanod Cohen DO

## 2022-06-29 ENCOUNTER — PROCEDURE VISIT (OUTPATIENT)
Dept: PODIATRY | Age: 72
End: 2022-06-29
Payer: MEDICARE

## 2022-06-29 VITALS — WEIGHT: 176 LBS | HEIGHT: 67 IN | BODY MASS INDEX: 27.62 KG/M2

## 2022-06-29 DIAGNOSIS — G60.8 HEREDITARY SENSORY NEUROPATHY: ICD-10-CM

## 2022-06-29 DIAGNOSIS — B35.3 TINEA PEDIS OF BOTH FEET: ICD-10-CM

## 2022-06-29 DIAGNOSIS — L84 CORNS AND CALLOSITIES: ICD-10-CM

## 2022-06-29 DIAGNOSIS — B35.1 TINEA UNGUIUM: Primary | ICD-10-CM

## 2022-06-29 DIAGNOSIS — Z79.01 ENCOUNTER FOR CURRENT LONG-TERM USE OF ANTICOAGULANTS: ICD-10-CM

## 2022-06-29 PROCEDURE — 11721 DEBRIDE NAIL 6 OR MORE: CPT | Performed by: PODIATRIST

## 2022-06-29 PROCEDURE — 11056 PARNG/CUTG B9 HYPRKR LES 2-4: CPT | Performed by: PODIATRIST

## 2022-06-29 RX ORDER — AMMONIUM LACTATE 12 G/100G
LOTION TOPICAL
Qty: 400 G | Refills: 2 | Status: SHIPPED | OUTPATIENT
Start: 2022-06-29

## 2022-06-29 RX ORDER — PRENATAL VIT 91/IRON/FOLIC/DHA 28-975-200
COMBINATION PACKAGE (EA) ORAL
Qty: 42 G | Refills: 2 | Status: SHIPPED | OUTPATIENT
Start: 2022-06-29

## 2022-06-29 NOTE — PROGRESS NOTES
Barbara Bob is here today for nail care. her PCP is Polina Waters DO last OV was 06/22/2022. CC:    Follow-up foot and ankle exam.    HPI:   Follow-up foot ankle exam  Continues Coumadin. No foot or ankle pain. No recent injury. Does present with her . Does have some dryness on the bottom most of the right foot. ROS:  Const: Denies constitutional symptoms  Musculo: Denies symptoms other than stated above  Skin: Denies symptoms other than stated above       Current Outpatient Medications:     terbinafine (LAMISIL) 1 % cream, Apply affected area topically 2 times daily. , Disp: 42 g, Rfl: 2    ammonium lactate (LAC-HYDRIN) 12 % lotion, Apply topically twice daily, Disp: 400 g, Rfl: 2    escitalopram (LEXAPRO) 10 MG tablet, Take 1 tablet by mouth every evening, Disp: 90 tablet, Rfl: 1    topiramate (TOPAMAX) 50 MG tablet, Take 1 tablet by mouth 2 times daily, Disp: 180 tablet, Rfl: 1    IBRANCE 100 MG tablet, , Disp: , Rfl:     propafenone (RYTHMOL) 225 MG tablet, Take 1 tablet by mouth three times daily, Disp: 270 tablet, Rfl: 1    verapamil (CALAN SR) 180 MG extended release tablet, Take 1 tablet by mouth daily, Disp: 90 tablet, Rfl: 1    warfarin (COUMADIN) 6 MG tablet, Indications: monday, wednesday, friday 3 mg, all other days are 6 mg 1/2 tab on M W F and 1 tab all other days of the week OR AS DIRECTED, Disp: 30 tablet, Rfl: 5    palbociclib (IBRANCE) 125 MG tablet, Take 125 mg by mouth daily, Disp: , Rfl:     fulvestrant (FASLODEX) 250 MG/5ML SOLN IM injection, Inject 500 mg into the muscle once, Disp: , Rfl:     denosumab (XGEVA) 120 MG/1.7ML SOLN SC injection, Inject 120 mg into the skin once, Disp: , Rfl:     ammonium lactate (LAC-HYDRIN) 12 % lotion, Apply topically twice daily, Disp: 400 g, Rfl: 2    calcium carbonate-vitamin D (CALTRATE 600+D) 600-400 MG-UNIT TABS per tab, Take 1 tablet by mouth daily, Disp: , Rfl:     Alpha-Lipoic Acid 100 MG CAPS, Take by mouth, Disp: , Rfl:     b complex vitamins capsule, Take 1 capsule by mouth daily, Disp: , Rfl:   Allergies   Allergen Reactions    Diltiazem Hcl Other (See Comments)     Other reaction(s): weak    Cardizem [Diltiazem] Palpitations    Cefuroxime Axetil Nausea And Vomiting, Other (See Comments) and Palpitations    Claritin [Loratadine] Palpitations    Erythromycin Nausea And Vomiting    Morphine Nausea And Vomiting    Mucinex [Guaifenesin Er] Palpitations    Propoxyphene Other (See Comments) and Nausea And Vomiting     DIZZY  Other reaction(s): Free Text    Zyrtec [Cetirizine] Nausea And Vomiting and Other (See Comments)     WEAK       Past Medical History:   Diagnosis Date    Bone cancer ([facility] [address] 02/2021    Breast cancer ([facility] 04/2017    Chronic atrial fibrillation (HCC)     Fibroadenosis of breast     Headache     History of therapeutic radiation     Hyperlipidemia     Hypertension            Vitals:    06/29/22 1326   Weight: 176 lb (79.8 kg)   Height: 5' 7\" (1.702 m)       Work History/Social History: Retired  [employer]    Focused Lower Extremity Physical Exam:    Neurovascular examination:    Dorsalis Pedis palpable bilateral.  Posterior tibialis palpable bilateral.    Capillary Refill Time:  Immediate return  Hair growth:  Symmetrical and bilateral   Skin:  Not atrophic  Edema: No significant edema bilateral lower legs  Neurologic:  Light touch diminished bilateral.      Musculoskeletal/ Orthopedic examination:    Equinis: Absent bilateral  Dorsiflexion, plantarflexion, inversion, eversion bilateral 5 out of 5 muscle strength  Wiggling toes. No pain Achilles tendon. Negative calcaneal squeeze test bilateral.  Negative [place]      Dermatology examination:    Toenails 1-5 right and left thickened, elongated, dystrophic, mycotic with subungual debris. Webspaces 1-4 bilateral clean, dry and intact. Hyperkeratotic tissue fifth metatarsal bilateral.  No open wounds.     Assessment and

## 2022-07-05 ASSESSMENT — ENCOUNTER SYMPTOMS
CHEST TIGHTNESS: 0
BACK PAIN: 0
APNEA: 0
CONSTIPATION: 0
SHORTNESS OF BREATH: 0
COUGH: 0
DIARRHEA: 0

## 2022-07-05 NOTE — PROGRESS NOTES
Subjective:  Stage IV carcinoma of the right breast: ER/NY positive HER-2/marcos negative disease.  -Adjuvant chemotherapy per Dr. Shannon Byers with AC-T.completed on 11/09/2017.    -Post-operative RT:  Radiation completed 01/29/2018.   -ET:  Arimidex Feb 2018    This note was copied forward from the last encounter. Essential components for this patient record were reviewed and verified on this visit including:  recent hospitalizations, recent imaging, PMH, PSH, FH, SOC HX, Allergies, and Medications were reviewed and updated as appropriate. In addition, the assessment and plan were copied from prior office note and updated accordingly. Patient ID: Ashley John is a 80 y.o. female. LUZMA Silva presents for follow-up. She initially was found to have a stage II ER/NY positive, HER2/marcos negative infiltrating ductal carcinoma of the right breast.  The mass was identified on self-exam and located in the upper inner quadrant of the right breast.  Mass measured 2.1 cm on imaging and was located at the 1 o'clock position of the right breast.    She underwent a right breast needle localized lumpectomy, right axillary sentinel lymph node excision on April 26, 2017. Pathological evaluation demonstrated:    A. Right breast, upper quadrant: Invasive mammary carcinoma of no special type (ductal, not otherwise specified), (grade 1) involving the anterior margin of excision. Tumor size: 2.2cm    B. Right breast, anterior inferior margin: Positive for small focus of invasive mammary carcinoma of no special type (ductal, not otherwise specified).  Final margin free of carcinoma.    C. [place] lymph node #1: Negative for metastatic carcinoma on H&E and pankeratin stained sections.    D. [place] lymph node #2: Positive for metastatic carcinoma on H&E and pankeratin stained sections.    E. [place] lymph node #3: Positive for metastatic carcinoma on H&E and pankeratin stained sections.    F.  [place] lymph node #4: Negative for metastatic carcinoma on H&E and pankeratin stained sections. Lymph-Vascular Invasion  -present  Pathologic Staging  -pT2 (sn)pN1a  Ancillary Studies  (on [id number])   Estrogen receptor: 93%    Progesterone receptor:  77%    HER-2/marcos protein expression: Negative, 1+       11/9/2017 completed adjuvant chemotherapy with AC-T per medical oncology, Dr. Kae Monroe     01/29/2018 completed adjuvant radiation therapy. 02/2019 started endocrine therapy with Arimidex 1 mg by mouth once daily.            February 2021 developed left hip pain.  03/12/2021 PET CT noted intense uptake left iliac crest.  04/07/2020 CT-guided left iliac wing bone biopsy: Metastatic carcinoma consistent with breast primary. 04/03/2021 completed radiation therapy in 10 fractions to left iliac crest.  05/10/2021 medical oncology: On Faslodex and Xgeva. On Ibrance.                        Review of Systems   Constitutional: Negative for activity change, appetite change, chills, fatigue, fever and unexpected weight change. Jordan Silva continues to do well with her ongoing oncology treatment. Taking care of her  and watches her grandchildren. Enjoys gardening. Respiratory: Negative for apnea, cough, chest tightness and shortness of breath. Cardiovascular: Negative for chest pain, palpitations and leg swelling. Gastrointestinal: Negative for constipation and diarrhea. Musculoskeletal: Negative for arthralgias and back pain. Left hip pain improved post RT. Neurological: Negative for dizziness, light-headedness, numbness (Again, CIPN remains subjectively stable.) and headaches. CIPN of fingers and bottoms of her feet bilaterally improved compared to prior. Objective:   Physical Exam  Vitals and nursing note reviewed. Constitutional:       General: She is not in acute distress. Appearance: She is well-developed. She is not diaphoretic. Comments: ECOG remains stable.    HENT:      Head: Normocephalic Pathological evaluation demonstrated:    A. Right breast, upper quadrant: Invasive mammary carcinoma of no special type (ductal, not otherwise specified), (grade 1) involving the anterior margin of excision. Tumor size: 2.2cm    B. Right breast, anterior inferior margin: Positive for small focus of invasive mammary carcinoma of no special type (ductal, not otherwise specified).  Final margin free of carcinoma.    C. [place] lymph node #1: Negative for metastatic carcinoma on H&E and pankeratin stained sections.    D. [place] lymph node #2: Positive for metastatic carcinoma on H&E and pankeratin stained sections.    E. [place] lymph node #3: Positive for metastatic carcinoma on H&E and pankeratin stained sections.    F. [place] lymph node #4: Negative for metastatic carcinoma on H&E and pankeratin stained sections. Lymph-Vascular Invasion  -present  Pathologic Staging  -pT2 (sn)pN1a  Ancillary Studies  (on [id number])   Estrogen receptor: 93%    Progesterone receptor:  77%    HER-2/marcos protein expression: Negative, 1+                                        Metastatic workup May 12, 2017 (ct chest, abdomen/pelvis and bone scan): Negative. 11/9/2017 completed adjuvant chemotherapy with AC-T per medical oncology, Dr. Mary Fiore . 01/29/2018 completed adjuvant RT    04/29/2019 Bilateral Mammogram Asymmetry in CC view of left breast.  Additional imaging recommended. Left breast US, finding in question on mammogram is not seen on US. MRI recommended. 05/21/2019 MRI bilateral breast:  Benign with no evidence of malignancy. 05/27/2020 bilateral screening mammogram @ [facility]:  Negative. Reports recent tumor markers were mildly elevated at medical oncology office. 10/15/2020 Restaging scans (CT of the chest, abdomen and pelvis, and bone scan) with no convincing evidence of metastatic disease. On CT of the abdomen and pelvis, there was mention of a tiny locule of gas in the urinary bladder.   She has had no recent bladder instrumentation. In light of her recurrent urinary tract infections, her reported bladder and uterine prolapse, will refer her to urology for further evaluation and recommendations. Her  follows with Dr. Warner Tidwell and she requests referral to same. February 2021 developed left hip pain.  03/12/2021 PET CT noted intense uptake left iliac crest.  04/07/2021 CT-guided left iliac wing bone biopsy: Metastatic carcinoma consistent with breast primary. 04/03/2021 completed radiation therapy in 10 fractions to left iliac crest.  05/10/2021 medical oncology: On Faslodex and to start Xgeva. On Ibrance. 06/22/2021 bilateral screening mammogram:  Negative, BI-RADS 1. Clinical follow-up she is tolerating Ibrance fairly well. Does c/o fatigue and decreased appetitive. Left hip pain improved post RT.     07/11/2022 bilateral screening mammogram: Benign, BI-RADS 2. Clinical follow-up reveals stable breast exam.  Imaging reviewed, including her BI-RADS result. She continues on Carmelina Boozer for her underlying metastatic breast cancer. No new bony complaints. Tolerating Ibrance relatively well although does have some neutropenia. Follows with medical oncology monthly. Reminded on the importance of BSE. Will plan to see in 1 year with screening mammogram same day; sooner for any breast related changes. Plan:       1. Continue monthly breast self examination. Bring any changes to your physician's attention. 2. Continue healthy diet and exercise routinely as tolerated. 3. Avoid alcohol or limit alcohol intake to < 3 drinks per week. 4. Continue cancer therapy as per Medical Oncology. 5. Mammogram May 2023 (not ordered). 6. Continue follow up with Medical Oncology, Primary Care, and all specialties as directed. 7. RTC 1 year; continue follow up with medical oncology for ongoing cancer therapy and all specialties as directed.       I spent a total of 26 minutes on the

## 2022-07-06 ENCOUNTER — OFFICE VISIT (OUTPATIENT)
Dept: FAMILY MEDICINE CLINIC | Age: 72
End: 2022-07-06
Payer: MEDICARE

## 2022-07-06 VITALS
DIASTOLIC BLOOD PRESSURE: 76 MMHG | RESPIRATION RATE: 16 BRPM | TEMPERATURE: 97.9 F | HEART RATE: 97 BPM | BODY MASS INDEX: 27.62 KG/M2 | WEIGHT: 176 LBS | OXYGEN SATURATION: 98 % | HEIGHT: 67 IN | SYSTOLIC BLOOD PRESSURE: 120 MMHG

## 2022-07-06 DIAGNOSIS — F06.4 ANXIETY DISORDER DUE TO GENERAL MEDICAL CONDITION: ICD-10-CM

## 2022-07-06 DIAGNOSIS — I48.91 ATRIAL FIBRILLATION, UNSPECIFIED TYPE (HCC): Primary | ICD-10-CM

## 2022-07-06 LAB
INTERNATIONAL NORMALIZATION RATIO, POC: 2.1
PROTHROMBIN TIME, POC: 25.6

## 2022-07-06 PROCEDURE — 1123F ACP DISCUSS/DSCN MKR DOCD: CPT | Performed by: FAMILY MEDICINE

## 2022-07-06 PROCEDURE — 99213 OFFICE O/P EST LOW 20 MIN: CPT | Performed by: FAMILY MEDICINE

## 2022-07-06 PROCEDURE — 85610 PROTHROMBIN TIME: CPT | Performed by: FAMILY MEDICINE

## 2022-07-06 RX ORDER — ESCITALOPRAM OXALATE 10 MG/1
10 TABLET ORAL EVERY EVENING
Qty: 90 TABLET | Refills: 1 | Status: SHIPPED | OUTPATIENT
Start: 2022-07-06

## 2022-07-06 ASSESSMENT — ENCOUNTER SYMPTOMS
CHEST TIGHTNESS: 0
EYE PAIN: 0
VOMITING: 0
SINUS PAIN: 0
PHOTOPHOBIA: 0
SORE THROAT: 0
COUGH: 0
NAUSEA: 0
BACK PAIN: 0
EYE REDNESS: 0
TROUBLE SWALLOWING: 0
BLOOD IN STOOL: 0
ABDOMINAL PAIN: 0
ALLERGIC/IMMUNOLOGIC NEGATIVE: 1
SHORTNESS OF BREATH: 0
EYE DISCHARGE: 0
DIARRHEA: 0

## 2022-07-06 NOTE — PROGRESS NOTES
22  Ni Jo : 1950 Sex: female  Age: 80 y.o. Assessment and Plan: Dhaval Delcid was seen today for anxiety. Diagnoses and all orders for this visit:    Atrial fibrillation, unspecified type ([facility] [address]  -     POCT INR    Anxiety disorder due to general medical condition  -     escitalopram (LEXAPRO) 10 MG tablet; Take 1 tablet by mouth every evening    Her pro time is back in the therapeutic range. Continue same warfarin schedule, recheck INR in 1 month. No follow-ups on file. Chief Complaint   Patient presents with   Amadeo Sutton Anxiety       Patient here for recheck Pro time. INR today 2.1, denies bleeding, bruising, dietary changes, dosage change. She needs a refill for her anxiety medication. Review of Systems   Constitutional: Negative for appetite change, fatigue and unexpected weight change. HENT: Negative for congestion, ear pain, hearing loss, sinus pain, sore throat and trouble swallowing. Eyes: Negative for photophobia, pain, discharge and redness. Respiratory: Negative for cough, chest tightness and shortness of breath. Cardiovascular: Negative for chest pain, palpitations and leg swelling. Gastrointestinal: Negative for abdominal pain, blood in stool, diarrhea, nausea and vomiting. Endocrine: Negative. Genitourinary: Negative for dysuria, flank pain, frequency and hematuria. Musculoskeletal: Negative for arthralgias, back pain, joint swelling and myalgias. Skin: Negative. Allergic/Immunologic: Negative. Neurological: Negative for dizziness, seizures, syncope, weakness, light-headedness, numbness and headaches. Hematological: Negative for adenopathy. Does not bruise/bleed easily. Psychiatric/Behavioral: Negative. Current Outpatient Medications:     escitalopram (LEXAPRO) 10 MG tablet, Take 1 tablet by mouth every evening, Disp: 90 tablet, Rfl: 1    terbinafine (LAMISIL) 1 % cream, Apply affected area topically 2 times daily. , Disp: 42 g, Rfl: 2    ammonium lactate (LAC-HYDRIN) 12 % lotion, Apply topically twice daily, Disp: 400 g, Rfl: 2    topiramate (TOPAMAX) 50 MG tablet, Take 1 tablet by mouth 2 times daily, Disp: 180 tablet, Rfl: 1    IBRANCE 100 MG tablet, , Disp: , Rfl:     propafenone (RYTHMOL) 225 MG tablet, Take 1 tablet by mouth three times daily, Disp: 270 tablet, Rfl: 1    verapamil (CALAN SR) 180 MG extended release tablet, Take 1 tablet by mouth daily, Disp: 90 tablet, Rfl: 1    warfarin (COUMADIN) 6 MG tablet, Indications: monday, wednesday, friday 3 mg, all other days are 6 mg 1/2 tab on M W F and 1 tab all other days of the week OR AS DIRECTED, Disp: 30 tablet, Rfl: 5    palbociclib (IBRANCE) 125 MG tablet, Take 125 mg by mouth daily, Disp: , Rfl:     fulvestrant (FASLODEX) 250 MG/5ML SOLN IM injection, Inject 500 mg into the muscle once, Disp: , Rfl:     denosumab (XGEVA) 120 MG/1.7ML SOLN SC injection, Inject 120 mg into the skin once, Disp: , Rfl:     ammonium lactate (LAC-HYDRIN) 12 % lotion, Apply topically twice daily, Disp: 400 g, Rfl: 2    calcium carbonate-vitamin D (CALTRATE 600+D) 600-400 MG-UNIT TABS per tab, Take 1 tablet by mouth daily, Disp: , Rfl:     Alpha-Lipoic Acid 100 MG CAPS, Take by mouth, Disp: , Rfl:     b complex vitamins capsule, Take 1 capsule by mouth daily, Disp: , Rfl:   Allergies   Allergen Reactions    Diltiazem Hcl Other (See Comments)     Other reaction(s): weak    Cardizem [Diltiazem] Palpitations    Cefuroxime Axetil Nausea And Vomiting, Other (See Comments) and Palpitations    Claritin [Loratadine] Palpitations    Erythromycin Nausea And Vomiting    Morphine Nausea And Vomiting    Mucinex [Guaifenesin Er] Palpitations    Propoxyphene Other (See Comments) and Nausea And Vomiting     DIZZY  Other reaction(s): Free Text    Zyrtec [Cetirizine] Nausea And Vomiting and Other (See Comments)     WEAK       Past Medical History:   Diagnosis Date    Bone cancer ([facility] [address] 02/2021    Breast cancer ([facility] [address] 04/2017    Chronic atrial fibrillation (HCC)     Fibroadenosis of breast     Headache     History of therapeutic radiation     Hyperlipidemia     Hypertension      Past Surgical History:   Procedure Laterality Date    [facility]GERY  1999    [facility]    BONE BIOPSY  04/2021    BREAST LUMPECTOMY Right 04/26/2017    COLONOSCOPY      CT BIOPSY PERCUTANEOUS DEEP BONE  04/07/2021    CT BIOPSY PERCUTANEOUS DEEP BONE 4/7/2021 Rona De MD [facility]    EYE SURGERY      SUDHIR LENSE IMPLANTS    OTHER SURGICAL HISTORY      port removal    TONSILLECTOMY      TUNNELED VENOUS PORT PLACEMENT  05/26/2017    Dr. Néstor Mathews     Family History   Problem Relation Age of Onset    Cancer Maternal Cousin 80        lung    Heart Disease Mother     Other Mother         [place] Palsy    Other Father         gall bladder problem, hip fx; AAA rupture    Coronary Art Dis Father         MI     Social History     Socioeconomic History    Marital status:      Spouse name: Pravin Bonilla Number of children: Not on file    Years of education: Not on file    Highest education level: Not on file   Occupational History    Occupation: retired      Comment: Retired 4 th    Tobacco Use    Smoking status: Never Smoker    Smokeless tobacco: Never Used   Vaping Use    Vaping Use: Not on file   Substance and Sexual Activity    Alcohol use: No     Comment: occasionally pop    Drug use: No    Sexual activity: Not Currently     Partners: Male   Other Topics Concern    Not on file   Social History Narrative    Drinks occasional Pepsi/Coke     Social Determinants of Health     Financial Resource Strain: Low Risk     Difficulty of Paying Living Expenses: Not hard at all   Food Insecurity: Unknown    Worried About [address] in the Last Year: Patient refused    [address] in the Last Year: Patient refused   Transportation Needs:     Lack of Transportation (Medical):  Not on file    Lack of Transportation (Non-Medical): Not on file   Physical Activity: Insufficiently Active    Days of Exercise per Week: 5 days    Minutes of Exercise per Session: 20 min   Stress:     Feeling of Stress : Not on file   Social Connections:     Frequency of Communication with Friends and Family: Not on file    Frequency of Social Gatherings with Friends and Family: Not on file    Attends Synagogue Services: Not on file    Active Member of Clubs or Organizations: Not on file    Attends Club or Organization Meetings: Not on file    Marital Status: Not on file   Intimate Partner Violence:     Fear of Current or Ex-Partner: Not on file    Emotionally Abused: Not on file    Physically Abused: Not on file    Sexually Abused: Not on file   Housing Stability:     Unable to Pay for Housing in the Last Year: Not on file    Number of [place] in the Last Year: Not on file    Unstable Housing in the Last Year: Not on file       Vitals:    07/06/22 1335   BP: 120/76   Pulse: 97   Resp: 16   Temp: 97.9 °F (36.6 °C)   TempSrc: Temporal   SpO2: 98%   Weight: 176 lb (79.8 kg)   Height: 5' 7\" (1.702 m)       Physical Exam  Vitals and nursing note reviewed. Constitutional:       Appearance: She is well-developed. HENT:      Head: Atraumatic. Right Ear: External ear normal.      Left Ear: External ear normal.      Nose: Nose normal.      Mouth/Throat:      Pharynx: No oropharyngeal exudate. Eyes:      Conjunctiva/sclera: Conjunctivae normal.      Pupils: Pupils are equal, round, and reactive to light. Neck:      Thyroid: No thyromegaly. Trachea: No tracheal deviation. Cardiovascular:      Rate and Rhythm: Normal rate and regular rhythm. Heart sounds: No murmur heard. No friction rub. No gallop. Pulmonary:      Effort: Pulmonary effort is normal. No respiratory distress. Breath sounds: Normal breath sounds.    Abdominal:      General: Bowel sounds are normal. Palpations: Abdomen is soft. Musculoskeletal:         General: No tenderness or deformity. Normal range of motion. Cervical back: Normal range of motion and neck supple. Lymphadenopathy:      Cervical: No cervical adenopathy. Skin:     General: Skin is warm and dry. Capillary Refill: Capillary refill takes less than 2 seconds. Findings: No rash. Neurological:      Mental Status: She is alert and oriented to person, place, and time. Sensory: No sensory deficit. Motor: No abnormal muscle tone.       Coordination: Coordination normal.      Deep Tendon Reflexes: Reflexes normal.             Seen By:  Irwin Rascon,

## 2022-07-11 ENCOUNTER — OFFICE VISIT (OUTPATIENT)
Dept: BREAST CENTER | Age: 72
End: 2022-07-11
Payer: MEDICARE

## 2022-07-11 VITALS
SYSTOLIC BLOOD PRESSURE: 142 MMHG | BODY MASS INDEX: 27.47 KG/M2 | HEART RATE: 60 BPM | DIASTOLIC BLOOD PRESSURE: 68 MMHG | RESPIRATION RATE: 18 BRPM | HEIGHT: 67 IN | TEMPERATURE: 97.6 F | OXYGEN SATURATION: 98 % | WEIGHT: 175 LBS

## 2022-07-11 DIAGNOSIS — C77.3 BREAST CANCER METASTASIZED TO AXILLARY LYMPH NODE, RIGHT (HCC): Primary | ICD-10-CM

## 2022-07-11 DIAGNOSIS — Z12.31 VISIT FOR SCREENING MAMMOGRAM: ICD-10-CM

## 2022-07-11 DIAGNOSIS — C50.911 BREAST CANCER METASTASIZED TO AXILLARY LYMPH NODE, RIGHT (HCC): Primary | ICD-10-CM

## 2022-07-11 PROCEDURE — 1123F ACP DISCUSS/DSCN MKR DOCD: CPT | Performed by: NURSE PRACTITIONER

## 2022-07-11 PROCEDURE — 99213 OFFICE O/P EST LOW 20 MIN: CPT | Performed by: NURSE PRACTITIONER

## 2022-07-22 DIAGNOSIS — G43.009 MIGRAINE WITHOUT AURA, NOT REFRACTORY: ICD-10-CM

## 2022-07-22 NOTE — TELEPHONE ENCOUNTER
Last Appointment:  7/6/2022  Future Appointments   Date Time Provider Comfort Contrerasi   8/10/2022 11:15 AM Kent DO AGUSTIN Lui [facility]   8/31/2022  1:00 PM Carmela Hidalgo DPM Col Podiatry [facility]   11/4/2022 10:00 AM DO BRANDAN May [facility] ENT [facility]   5/16/2023 10:00 AM Prabhakar Santiago MD ADV [facility]   7/17/2023  9:00 AM Sol Natarajan, APRN - CNP COL SOULEYMANE BRST [facility]

## 2022-07-24 RX ORDER — TOPIRAMATE 50 MG/1
50 TABLET, FILM COATED ORAL 2 TIMES DAILY
Qty: 180 TABLET | Refills: 1 | Status: SHIPPED | OUTPATIENT
Start: 2022-07-24

## 2022-08-10 ENCOUNTER — OFFICE VISIT (OUTPATIENT)
Dept: FAMILY MEDICINE CLINIC | Age: 72
End: 2022-08-10
Payer: MEDICARE

## 2022-08-10 VITALS
DIASTOLIC BLOOD PRESSURE: 62 MMHG | SYSTOLIC BLOOD PRESSURE: 138 MMHG | TEMPERATURE: 97 F | HEIGHT: 67 IN | RESPIRATION RATE: 16 BRPM | OXYGEN SATURATION: 99 % | HEART RATE: 57 BPM | BODY MASS INDEX: 27.62 KG/M2 | WEIGHT: 176 LBS

## 2022-08-10 DIAGNOSIS — I48.91 ATRIAL FIBRILLATION, UNSPECIFIED TYPE (HCC): ICD-10-CM

## 2022-08-10 DIAGNOSIS — I10 ESSENTIAL HYPERTENSION: ICD-10-CM

## 2022-08-10 DIAGNOSIS — J02.8 ACUTE PHARYNGITIS DUE TO OTHER SPECIFIED ORGANISMS: Primary | ICD-10-CM

## 2022-08-10 LAB
INTERNATIONAL NORMALIZATION RATIO, POC: 2.3
PROTHROMBIN TIME, POC: 28.2

## 2022-08-10 PROCEDURE — 85610 PROTHROMBIN TIME: CPT | Performed by: FAMILY MEDICINE

## 2022-08-10 PROCEDURE — 99213 OFFICE O/P EST LOW 20 MIN: CPT | Performed by: FAMILY MEDICINE

## 2022-08-10 PROCEDURE — 1123F ACP DISCUSS/DSCN MKR DOCD: CPT | Performed by: FAMILY MEDICINE

## 2022-08-10 RX ORDER — AZITHROMYCIN 250 MG
250 TABLET ORAL DAILY
Qty: 10 TABLET | Refills: 0 | Status: SHIPPED | OUTPATIENT
Start: 2022-08-10 | End: 2022-08-20

## 2022-08-10 ASSESSMENT — ENCOUNTER SYMPTOMS
SHORTNESS OF BREATH: 0
EYE REDNESS: 0
COUGH: 0
VOMITING: 0
ALLERGIC/IMMUNOLOGIC NEGATIVE: 1
ABDOMINAL PAIN: 0
SINUS PAIN: 0
PHOTOPHOBIA: 0
SORE THROAT: 1
DIARRHEA: 0
TROUBLE SWALLOWING: 0
NAUSEA: 0
EYE DISCHARGE: 0
BACK PAIN: 0
BLOOD IN STOOL: 0
CHEST TIGHTNESS: 0
EYE PAIN: 0

## 2022-08-10 NOTE — PROGRESS NOTES
8/10/22  Sailaja Francis : 1950 Sex: female  Age: 80 y.o. Assessment and Plan: [facility] was seen today for atrial fibrillation and pharyngitis. Diagnoses and all orders for this visit:    Acute pharyngitis due to other specified organisms  -     ZITHROMAX 250 MG tablet; Take 1 tablet by mouth in the morning for 10 days. Essential hypertension  -     verapamil (CALAN SR) 180 MG extended release tablet; Take 1 tablet by mouth in the morning. Atrial fibrillation, unspecified type (HCC)  -     verapamil (CALAN SR) 180 MG extended release tablet; Take 1 tablet by mouth in the morning.  -     POCT INR    Symptomatic treatment for the pharyngitis. Tylenol, fluids, rest, Mucinex, Claritin-D.  1 dose was also adjusted to half strength or 3 mg daily while on Z-Ryley. If complaints do not improve, or worsen in any way, she should present back to the office. Return in about 4 weeks (around 2022). Chief Complaint   Patient presents with    Atrial Fibrillation    Pharyngitis       Congestion, pressure, drainage, facial tenderness, sore throat onset 5 days ago. Denies fever, chills, diaphoresis, nausea, vomiting, decreased oral intake. Denies other GI or  complaints. OTC treatments minimally effective. Also here for recheck pro time, INR today 2.3. She denies bleeding, bruising, dietary changes, dosage change. Review of Systems   Constitutional:  Negative for appetite change, fatigue and unexpected weight change. HENT:  Positive for congestion, postnasal drip and sore throat. Negative for ear pain, hearing loss, sinus pain and trouble swallowing. Eyes:  Negative for photophobia, pain, discharge and redness. Respiratory:  Negative for cough, chest tightness and shortness of breath. Cardiovascular:  Negative for chest pain, palpitations and leg swelling. Gastrointestinal:  Negative for abdominal pain, blood in stool, diarrhea, nausea and vomiting. Endocrine: Negative. Genitourinary:  Negative for dysuria, flank pain, frequency and hematuria. Musculoskeletal:  Negative for arthralgias, back pain, joint swelling and myalgias. Skin: Negative. Allergic/Immunologic: Negative. Neurological:  Negative for dizziness, seizures, syncope, weakness, light-headedness, numbness and headaches. Hematological:  Negative for adenopathy. Does not bruise/bleed easily. Psychiatric/Behavioral: Negative. Current Outpatient Medications:     verapamil (CALAN SR) 180 MG extended release tablet, Take 1 tablet by mouth in the morning., Disp: 90 tablet, Rfl: 1    ZITHROMAX 250 MG tablet, Take 1 tablet by mouth in the morning for 10 days. , Disp: 10 tablet, Rfl: 0    topiramate (TOPAMAX) 50 MG tablet, Take 1 tablet by mouth in the morning and 1 tablet before bedtime. , Disp: 180 tablet, Rfl: 1    escitalopram (LEXAPRO) 10 MG tablet, Take 1 tablet by mouth every evening, Disp: 90 tablet, Rfl: 1    terbinafine (LAMISIL) 1 % cream, Apply affected area topically 2 times daily. , Disp: 42 g, Rfl: 2    ammonium lactate (LAC-HYDRIN) 12 % lotion, Apply topically twice daily, Disp: 400 g, Rfl: 2    IBRANCE 100 MG tablet, , Disp: , Rfl:     propafenone (RYTHMOL) 225 MG tablet, Take 1 tablet by mouth three times daily, Disp: 270 tablet, Rfl: 1    warfarin (COUMADIN) 6 MG tablet, Indications: monday, wednesday, friday 3 mg, all other days are 6 mg 1/2 tab on M W F and 1 tab all other days of the week OR AS DIRECTED (Patient taking differently: Indications: monday, friday 3 mg, all other days are 6 mg 1/2 tab on M  F and 1 tab all other days of the week OR AS DIRECTED), Disp: 30 tablet, Rfl: 5    fulvestrant (FASLODEX) 250 MG/5ML SOLN IM injection, Inject 500 mg into the muscle once, Disp: , Rfl:     denosumab (XGEVA) 120 MG/1.7ML SOLN SC injection, Inject 120 mg into the skin once, Disp: , Rfl:     calcium carbonate-vitamin D (CALTRATE) 600-400 MG-UNIT TABS per tab, Take 1 tablet by mouth daily, Disp: , Rfl:     Alpha-Lipoic Acid 100 MG CAPS, Take by mouth, Disp: , Rfl:     b complex vitamins capsule, Take 1 capsule by mouth daily, Disp: , Rfl:   Allergies   Allergen Reactions    Diltiazem Hcl Other (See Comments)     Other reaction(s): weak    Cardizem [Diltiazem] Palpitations    Cefuroxime Axetil Nausea And Vomiting, Other (See Comments) and Palpitations    Claritin [Loratadine] Palpitations    Erythromycin Nausea And Vomiting    Morphine Nausea And Vomiting    Mucinex [Guaifenesin Er] Palpitations    Propoxyphene Other (See Comments) and Nausea And Vomiting     DIZZY  Other reaction(s): Free Text    Zyrtec [Cetirizine] Nausea And Vomiting and Other (See Comments)     WEAK       Past Medical History:   Diagnosis Date    Bone cancer ([facility] [address] 02/2021    [facility]2017    Chronic atrial fibrillation (HCC)     Fibroadenosis of breast     Headache     History of therapeutic radiation     Hyperlipidemia     Hypertension      Past Surgical History:   Procedure Laterality Date    [address]    BONE BIOPSY  04/2021    BREAST LUMPECTOMY Right 04/26/2017    COLONOSCOPY      CT BIOPSY PERCUTANEOUS DEEP BONE  04/07/2021    CT BIOPSY PERCUTANEOUS DEEP BONE 4/7/2021 Krissy Dover MD [facility]    EYE SURGERY      SUDHIR LENSE IMPLANTS    OTHER SURGICAL HISTORY      port removal    TONSILLECTOMY      TUNNELED VENOUS PORT PLACEMENT  05/26/2017    Dr. Maura Leary     Family History   Problem Relation Age of Onset    Cancer Maternal Cousin 80        lung    Heart Disease Mother     Other Mother         [place] Palsy    Other Father         gall bladder problem, hip fx; AAA rupture    Coronary Art Dis Father         MI     Social History     Socioeconomic History    Marital status:      Spouse name: Lauren Briones    Number of children: Not on file    Years of education: Not on file    Highest education level: Not on file   Occupational History    Occupation: retired      Comment: Retired 4 th    Tobacco Use    Smoking status: Never    Smokeless tobacco: Never   Vaping Use    Vaping Use: Not on file   Substance and Sexual Activity    Alcohol use: No     Comment: occasionally pop    Drug use: No    Sexual activity: Not Currently     Partners: Male   Other Topics Concern    Not on file   Social History Narrative    Drinks occasional Pepsi/Coke     Social Determinants of Health     Financial Resource Strain: Low Risk     Difficulty of Paying Living Expenses: Not hard at all   Food Insecurity: Unknown    Worried About [address] [address] in the Last Year: Patient refused    Ran Out of Food in the Last Year: Patient refused   Transportation Needs: Not on file   Physical Activity: Insufficiently Active    Days of Exercise per Week: 5 days    Minutes of Exercise per Session: 20 min   Stress: Not on file   Social Connections: Not on file   Intimate Partner Violence: Not on file   Housing Stability: Not on file       Vitals:    08/10/22 1110   BP: 138/62   Pulse: 57   Resp: 16   Temp: 97 °F (36.1 °C)   TempSrc: Temporal   SpO2: 99%   Weight: 176 lb (79.8 kg)   Height: 5' 7\" (1.702 m)       Physical Exam  Vitals and nursing note reviewed. Constitutional:       Appearance: She is well-developed. HENT:      Head: Atraumatic. Right Ear: External ear normal.      Left Ear: External ear normal.      Nose: Congestion present. Mouth/Throat:      Pharynx: Posterior oropharyngeal erythema present. No oropharyngeal exudate. Eyes:      Conjunctiva/sclera: Conjunctivae normal.      Pupils: Pupils are equal, round, and reactive to light. Neck:      Thyroid: No thyromegaly. Trachea: No tracheal deviation. Cardiovascular:      Rate and Rhythm: Normal rate and regular rhythm. Heart sounds: No murmur heard. No friction rub. No gallop. Pulmonary:      Effort: Pulmonary effort is normal. No respiratory distress. Breath sounds: Normal breath sounds.    Abdominal: General: Bowel sounds are normal.      Palpations: Abdomen is soft. Musculoskeletal:         General: No tenderness or deformity. Normal range of motion. Cervical back: Normal range of motion and neck supple. Lymphadenopathy:      Cervical: No cervical adenopathy. Skin:     General: Skin is warm and dry. Capillary Refill: Capillary refill takes less than 2 seconds. Findings: No rash. Neurological:      Mental Status: She is alert and oriented to person, place, and time. Sensory: No sensory deficit. Motor: No abnormal muscle tone.       Coordination: Coordination normal.      Deep Tendon Reflexes: Reflexes normal.           Seen By:  Taco Borjas DO

## 2022-08-16 ENCOUNTER — TELEPHONE (OUTPATIENT)
Dept: FAMILY MEDICINE CLINIC | Age: 72
End: 2022-08-16

## 2022-08-16 DIAGNOSIS — J02.8 ACUTE PHARYNGITIS DUE TO OTHER SPECIFIED ORGANISMS: Primary | ICD-10-CM

## 2022-08-16 RX ORDER — CIPROFLOXACIN 500 MG/1
500 TABLET, FILM COATED ORAL 2 TIMES DAILY
Qty: 14 TABLET | Refills: 0 | Status: SHIPPED | OUTPATIENT
Start: 2022-08-16 | End: 2022-08-23

## 2022-08-16 NOTE — TELEPHONE ENCOUNTER
Patient called in. They will not cover the Zithromax. She needs something else called in. She cannot take the generic one. Please advise.

## 2022-08-16 NOTE — TELEPHONE ENCOUNTER
Any antibiotic prescribed, will affect her pro time and her warfarin dose.   If her throat is feeling better, would like to hold off

## 2022-08-31 ENCOUNTER — PROCEDURE VISIT (OUTPATIENT)
Dept: PODIATRY | Age: 72
End: 2022-08-31
Payer: MEDICARE

## 2022-08-31 VITALS — WEIGHT: 176 LBS | HEIGHT: 67 IN | BODY MASS INDEX: 27.62 KG/M2

## 2022-08-31 DIAGNOSIS — L84 CORNS AND CALLOSITIES: ICD-10-CM

## 2022-08-31 DIAGNOSIS — B35.3 TINEA PEDIS OF BOTH FEET: ICD-10-CM

## 2022-08-31 DIAGNOSIS — B35.1 TINEA UNGUIUM: Primary | ICD-10-CM

## 2022-08-31 DIAGNOSIS — Z79.01 ENCOUNTER FOR CURRENT LONG-TERM USE OF ANTICOAGULANTS: ICD-10-CM

## 2022-08-31 DIAGNOSIS — G60.8 HEREDITARY SENSORY NEUROPATHY: ICD-10-CM

## 2022-08-31 PROCEDURE — 11721 DEBRIDE NAIL 6 OR MORE: CPT | Performed by: PODIATRIST

## 2022-08-31 PROCEDURE — 11056 PARNG/CUTG B9 HYPRKR LES 2-4: CPT | Performed by: PODIATRIST

## 2022-08-31 NOTE — PROGRESS NOTES
Romi Cedeno is here today for nail care. her PCP is Erin Nguyen DO last OV was 07/06/2022. CC:    Follow-up foot and ankle exam.    HPI:   Foot ankle exam  History anticoagulant therapy with Coumadin. No pain foot or ankle. No additional pedal complaints today. ROS:  Const: Denies constitutional symptoms  Musculo: Denies symptoms other than stated above  Skin: Denies symptoms other than stated above       Current Outpatient Medications:     verapamil (CALAN SR) 180 MG extended release tablet, Take 1 tablet by mouth in the morning., Disp: 90 tablet, Rfl: 1    topiramate (TOPAMAX) 50 MG tablet, Take 1 tablet by mouth in the morning and 1 tablet before bedtime. , Disp: 180 tablet, Rfl: 1    escitalopram (LEXAPRO) 10 MG tablet, Take 1 tablet by mouth every evening, Disp: 90 tablet, Rfl: 1    terbinafine (LAMISIL) 1 % cream, Apply affected area topically 2 times daily. , Disp: 42 g, Rfl: 2    ammonium lactate (LAC-HYDRIN) 12 % lotion, Apply topically twice daily, Disp: 400 g, Rfl: 2    IBRANCE 100 MG tablet, , Disp: , Rfl:     propafenone (RYTHMOL) 225 MG tablet, Take 1 tablet by mouth three times daily, Disp: 270 tablet, Rfl: 1    warfarin (COUMADIN) 6 MG tablet, Indications: monday, wednesday, friday 3 mg, all other days are 6 mg 1/2 tab on M W F and 1 tab all other days of the week OR AS DIRECTED (Patient taking differently: Indications: monday, friday 3 mg, all other days are 6 mg 1/2 tab on M  F and 1 tab all other days of the week OR AS DIRECTED), Disp: 30 tablet, Rfl: 5    fulvestrant (FASLODEX) 250 MG/5ML SOLN IM injection, Inject 500 mg into the muscle once, Disp: , Rfl:     denosumab (XGEVA) 120 MG/1.7ML SOLN SC injection, Inject 120 mg into the skin once, Disp: , Rfl:     calcium carbonate-vitamin D (CALTRATE) 600-400 MG-UNIT TABS per tab, Take 1 tablet by mouth daily, Disp: , Rfl:     Alpha-Lipoic Acid 100 MG CAPS, Take by mouth, Disp: , Rfl:     b complex vitamins capsule, Take 1 capsule by mouth daily, Disp: , Rfl:   Allergies   Allergen Reactions    Diltiazem Hcl Other (See Comments)     Other reaction(s): weak    Cardizem [Diltiazem] Palpitations    Cefuroxime Axetil Nausea And Vomiting, Other (See Comments) and Palpitations    Claritin [Loratadine] Palpitations    Erythromycin Nausea And Vomiting    Morphine Nausea And Vomiting    Mucinex [Guaifenesin Er] Palpitations    Propoxyphene Other (See Comments) and Nausea And Vomiting     DIZZY  Other reaction(s): Free Text    Zyrtec [Cetirizine] Nausea And Vomiting and Other (See Comments)     WEAK       Past Medical History:   Diagnosis Date    Bone cancer ([facility] [address] 02/2021    Breast cancer ([facility] 04/2017    Chronic atrial fibrillation (HCC)     Fibroadenosis of breast     Headache     History of therapeutic radiation     Hyperlipidemia     Hypertension            Vitals:    08/31/22 1258   Weight: 176 lb (79.8 kg)   Height: 5' 7\" (1.702 m)       Work History/Social History: Retired  [employer]    Focused Lower Extremity Physical Exam:    Neurovascular examination:    Dorsalis Pedis palpable bilateral.  Posterior tibialis palpable bilateral.    Capillary Refill Time:  Immediate return  Hair growth:  Symmetrical and bilateral   Skin:  Not atrophic  Edema: Mild edema dorsal foot bilateral.  No erythema. Neurologic:  Light touch diminished bilateral.      Musculoskeletal/ Orthopedic examination:    Equinis: Absent bilateral  Dorsiflexion, plantarflexion, inversion, eversion bilateral 5 out of 5 muscle strength  Wiggling toes. Negative Homans. No pain foot or ankle. No pain insertion Achilles tendon. Negative calcaneal squeeze test.    Dermatology examination:    Toenails 1-5 right and left thickened, elongated, dystrophic, mycotic with subungual debris. Webspaces 1-4 bilateral clean, dry and intact. Hyperkeratotic tissue fifth metatarsal bilateral.  No open skin lesions or abrasions. Assessment and Plan:   Seymour Roberts was seen today for callouses and nail problem. Diagnoses and all orders for this visit:    Tinea unguium    Corns and callosities    Hereditary sensory neuropathy    Encounter for current long-term use of anticoagulants    Tinea pedis of both feet      Follow-up foot ankle exam  History anticoagulant therapy with Coumadin  Manual debridement with standard technique toenails 1 through 5 right and left in thickness and length. Patient tolerated procedure well. Paring hyperkeratotic tissue fifth metatarsal bilateral with #15 blade. Avoid barefoot. Follow-up 2 months. Return in about 2 months (around 10/31/2022). Seen By:  Carol Baldwin DPM      Document was created using voice recognition software. Note was reviewed, however may contain grammatical errors.

## 2022-09-08 ENCOUNTER — OFFICE VISIT (OUTPATIENT)
Dept: FAMILY MEDICINE CLINIC | Age: 72
End: 2022-09-08
Payer: MEDICARE

## 2022-09-08 VITALS
BODY MASS INDEX: 27.59 KG/M2 | HEART RATE: 59 BPM | DIASTOLIC BLOOD PRESSURE: 70 MMHG | SYSTOLIC BLOOD PRESSURE: 126 MMHG | OXYGEN SATURATION: 100 % | WEIGHT: 175.8 LBS | HEIGHT: 67 IN | RESPIRATION RATE: 16 BRPM | TEMPERATURE: 97.3 F

## 2022-09-08 DIAGNOSIS — I48.91 ATRIAL FIBRILLATION, UNSPECIFIED TYPE (HCC): ICD-10-CM

## 2022-09-08 DIAGNOSIS — J30.89 NON-SEASONAL ALLERGIC RHINITIS, UNSPECIFIED TRIGGER: Primary | ICD-10-CM

## 2022-09-08 LAB
INTERNATIONAL NORMALIZATION RATIO, POC: 1.4
PROTHROMBIN TIME, POC: 16.9

## 2022-09-08 PROCEDURE — 85610 PROTHROMBIN TIME: CPT | Performed by: FAMILY MEDICINE

## 2022-09-08 PROCEDURE — 99213 OFFICE O/P EST LOW 20 MIN: CPT | Performed by: FAMILY MEDICINE

## 2022-09-08 PROCEDURE — 1123F ACP DISCUSS/DSCN MKR DOCD: CPT | Performed by: FAMILY MEDICINE

## 2022-09-08 RX ORDER — PREDNISONE 10 MG/1
TABLET ORAL
Qty: 18 TABLET | Refills: 0 | Status: SHIPPED | OUTPATIENT
Start: 2022-09-08 | End: 2022-09-17

## 2022-09-08 ASSESSMENT — ENCOUNTER SYMPTOMS
EYE DISCHARGE: 0
ABDOMINAL PAIN: 0
TROUBLE SWALLOWING: 0
EYE PAIN: 0
SORE THROAT: 0
DIARRHEA: 0
RHINORRHEA: 1
SINUS PAIN: 0
BLOOD IN STOOL: 0
VOMITING: 0
SHORTNESS OF BREATH: 0
PHOTOPHOBIA: 0
BACK PAIN: 0
ALLERGIC/IMMUNOLOGIC NEGATIVE: 1
CHEST TIGHTNESS: 0
EYE REDNESS: 0
COUGH: 0
NAUSEA: 0
SINUS PRESSURE: 1

## 2022-09-08 NOTE — PROGRESS NOTES
22  Tasah Ochoa : 1950 Sex: female  Age: 80 y.o. Assessment and Plan: Ashley Rosado was seen today for atrial fibrillation. Diagnoses and all orders for this visit:    Non-seasonal allergic rhinitis, unspecified trigger  -     predniSONE (DELTASONE) 10 MG tablet; Take 3 tablets by mouth daily for 3 days, THEN 2 tablets daily for 3 days, THEN 1 tablet daily for 3 days. Atrial fibrillation, unspecified type ([address]  -     POCT INR    Prednisone taper to of her sinus issues completely. Is improved over last visit. Adjust warfarin to 6 mg daily, recheck in 2 weeks. Return Recheck INR 2 weeks. Chief Complaint   Patient presents with    Atrial Fibrillation     INR Check       Patient here for recheck pro time. She finished her antibiotics 2 weeks ago, at that time, warfarin dose was adjusted. Rekha subtherapeutic at 1.4 today. Denies bleeding, bruising, dietary changes but did have the medication adjustment as noted above. Continues with sinus congestion and drainage although, improved over last visit. Review of Systems   Constitutional:  Negative for appetite change, fatigue and unexpected weight change. HENT:  Positive for congestion, postnasal drip, rhinorrhea and sinus pressure. Negative for ear pain, hearing loss, sinus pain, sore throat and trouble swallowing. Eyes:  Negative for photophobia, pain, discharge and redness. Respiratory:  Negative for cough, chest tightness and shortness of breath. Cardiovascular:  Negative for chest pain, palpitations and leg swelling. Gastrointestinal:  Negative for abdominal pain, blood in stool, diarrhea, nausea and vomiting. Endocrine: Negative. Genitourinary:  Negative for dysuria, flank pain, frequency and hematuria. Musculoskeletal:  Negative for arthralgias, back pain, joint swelling and myalgias. Skin: Negative. Allergic/Immunologic: Negative.     Neurological:  Negative for dizziness, seizures, syncope, weakness, Reactions    Diltiazem Hcl Other (See Comments)     Other reaction(s): weak    Cardizem [Diltiazem] Palpitations    Cefuroxime Axetil Nausea And Vomiting, Other (See Comments) and Palpitations    Claritin [Loratadine] Palpitations    Erythromycin Nausea And Vomiting    Morphine Nausea And Vomiting    Mucinex [Guaifenesin Er] Palpitations    Propoxyphene Other (See Comments) and Nausea And Vomiting     DIZZY  Other reaction(s): Free Text    Zyrtec [Cetirizine] Nausea And Vomiting and Other (See Comments)     WEAK       Past Medical History:   Diagnosis Date    Bone cancer ([address] 02/2021    [facility] 04/2017    Chronic atrial fibrillation (HCC)     Fibroadenosis of breast     Headache     History of therapeutic radiation     Hyperlipidemia     Hypertension      Past Surgical History:   Procedure Laterality Date    [address]    BONE BIOPSY  04/2021    BREAST LUMPECTOMY Right 04/26/2017    COLONOSCOPY      CT BIOPSY PERCUTANEOUS DEEP BONE  04/07/2021    CT BIOPSY PERCUTANEOUS DEEP BONE 4/7/2021 Sharonda Duncan II, MD [facility]    EYE SURGERY      SUDHIR LENSE IMPLANTS    OTHER SURGICAL HISTORY      port removal    TONSILLECTOMY      TUNNELED VENOUS PORT PLACEMENT  05/26/2017    Dr. Royer Oshea     Family History   Problem Relation Age of Onset    Cancer Maternal Cousin 80        lung    Heart Disease Mother     Other Mother         [place] Palsy    Other Father         gall bladder problem, hip fx; AAA rupture    Coronary Art Dis Father         MI     Social History     Socioeconomic History    Marital status:      Spouse name: Max Connelly    Number of children: Not on file    Years of education: Not on file    Highest education level: Not on file   Occupational History    Occupation: retired      Comment: Retired 4 th    Tobacco Use    Smoking status: Never    Smokeless tobacco: Never   Vaping Use    Vaping Use: Not on file   Substance and Sexual Activity    Alcohol use: No     Comment: occasionally pop    Drug use: No    Sexual activity: Not Currently     Partners: Male   Other Topics Concern    Not on file   Social History Narrative    Drinks occasional Pepsi/Coke     Social Determinants of Health     Financial Resource Strain: Low Risk     Difficulty of Paying Living Expenses: Not hard at all   Food Insecurity: Unknown    Worried About [address] [employer] in the Last Year: Patient refused    Ran Out of Food in the Last Year: Patient refused   Transportation Needs: Not on file   Physical Activity: Insufficiently Active    Days of Exercise per Week: 5 days    Minutes of Exercise per Session: 20 min   Stress: Not on file   Social Connections: Not on file   Intimate Partner Violence: Not on file   Housing Stability: Not on file       Vitals:    09/08/22 1100   BP: 126/70   Pulse: 59   Resp: 16   Temp: 97.3 °F (36.3 °C)   TempSrc: Temporal   SpO2: 100%   Weight: 175 lb 12.8 oz (79.7 kg)   Height: 5' 7\" (1.702 m)       Physical Exam  Vitals and nursing note reviewed. Constitutional:       Appearance: She is well-developed. HENT:      Head: Atraumatic. Right Ear: External ear normal.      Left Ear: External ear normal.      Nose: Congestion present. Mouth/Throat:      Pharynx: Posterior oropharyngeal erythema present. No oropharyngeal exudate. Eyes:      Conjunctiva/sclera: Conjunctivae normal.      Pupils: Pupils are equal, round, and reactive to light. Neck:      Thyroid: No thyromegaly. Trachea: No tracheal deviation. Cardiovascular:      Rate and Rhythm: Normal rate and regular rhythm. Heart sounds: No murmur heard. No friction rub. No gallop. Pulmonary:      Effort: Pulmonary effort is normal. No respiratory distress. Breath sounds: Normal breath sounds. Abdominal:      General: Bowel sounds are normal.      Palpations: Abdomen is soft. Musculoskeletal:         General: No tenderness or deformity. Normal range of motion. Cervical back: Normal range of motion and neck supple. Lymphadenopathy:      Cervical: No cervical adenopathy. Skin:     General: Skin is warm and dry. Capillary Refill: Capillary refill takes less than 2 seconds. Findings: No rash. Neurological:      Mental Status: She is alert and oriented to person, place, and time. Sensory: No sensory deficit. Motor: No abnormal muscle tone.       Coordination: Coordination normal.      Deep Tendon Reflexes: Reflexes normal.           Seen By:  Db Del Valle DO

## 2022-09-23 ENCOUNTER — OFFICE VISIT (OUTPATIENT)
Dept: FAMILY MEDICINE CLINIC | Age: 72
End: 2022-09-23
Payer: MEDICARE

## 2022-09-23 VITALS
DIASTOLIC BLOOD PRESSURE: 70 MMHG | TEMPERATURE: 97.5 F | SYSTOLIC BLOOD PRESSURE: 130 MMHG | BODY MASS INDEX: 27.81 KG/M2 | WEIGHT: 177.2 LBS | HEART RATE: 66 BPM | HEIGHT: 67 IN | RESPIRATION RATE: 16 BRPM | OXYGEN SATURATION: 98 %

## 2022-09-23 DIAGNOSIS — I10 ESSENTIAL HYPERTENSION: Primary | ICD-10-CM

## 2022-09-23 DIAGNOSIS — I48.91 ATRIAL FIBRILLATION, UNSPECIFIED TYPE (HCC): ICD-10-CM

## 2022-09-23 LAB
INTERNATIONAL NORMALIZATION RATIO, POC: 2.8
PROTHROMBIN TIME, POC: 33.7

## 2022-09-23 PROCEDURE — 1123F ACP DISCUSS/DSCN MKR DOCD: CPT | Performed by: FAMILY MEDICINE

## 2022-09-23 PROCEDURE — 99213 OFFICE O/P EST LOW 20 MIN: CPT | Performed by: FAMILY MEDICINE

## 2022-09-23 PROCEDURE — 85610 PROTHROMBIN TIME: CPT | Performed by: FAMILY MEDICINE

## 2022-09-23 ASSESSMENT — ENCOUNTER SYMPTOMS
EYE PAIN: 0
SINUS PAIN: 0
ABDOMINAL PAIN: 0
DIARRHEA: 0
ALLERGIC/IMMUNOLOGIC NEGATIVE: 1
PHOTOPHOBIA: 0
SORE THROAT: 0
TROUBLE SWALLOWING: 0
CHEST TIGHTNESS: 0
NAUSEA: 0
EYE REDNESS: 0
BLOOD IN STOOL: 0
VOMITING: 0
SHORTNESS OF BREATH: 0
BACK PAIN: 0
EYE DISCHARGE: 0
COUGH: 0

## 2022-09-23 NOTE — PROGRESS NOTES
22  Suraj Hernadez : 1950 Sex: female  Age: 80 y.o. Assessment and Plan: Kira Wright was seen today for coagulation disorder. Diagnoses and all orders for this visit:    Essential hypertension    Atrial fibrillation, unspecified type ([facility])  -     POCT INR    INR in therapeutic range, to continue current warfarin schedule. Flu and Prevnar 20 at next office visit in October. Return in about 4 weeks (around 10/21/2022). Chief Complaint   Patient presents with    Coagulation Disorder     2 week follow up for INR       Patient returns for recheck pro time. INR 2.8, denies bleeding, bruising, dietary changes, doses change. She is doing well, denies any new voiced complaints. Review of Systems   Constitutional:  Negative for appetite change, fatigue and unexpected weight change. HENT:  Negative for congestion, ear pain, hearing loss, sinus pain, sore throat and trouble swallowing. Eyes:  Negative for photophobia, pain, discharge and redness. Respiratory:  Negative for cough, chest tightness and shortness of breath. Cardiovascular:  Negative for chest pain, palpitations and leg swelling. Gastrointestinal:  Negative for abdominal pain, blood in stool, diarrhea, nausea and vomiting. Endocrine: Negative. Genitourinary:  Negative for dysuria, flank pain, frequency and hematuria. Musculoskeletal:  Negative for arthralgias, back pain, joint swelling and myalgias. Skin: Negative. Allergic/Immunologic: Negative. Neurological:  Negative for dizziness, seizures, syncope, weakness, light-headedness, numbness and headaches. Hematological:  Negative for adenopathy. Does not bruise/bleed easily. Psychiatric/Behavioral: Negative.          Current Outpatient Medications:     verapamil (CALAN SR) 180 MG extended release tablet, Take 1 tablet by mouth in the morning., Disp: 90 tablet, Rfl: 1    topiramate (TOPAMAX) 50 MG tablet, Take 1 tablet by mouth in the morning and 1 tablet before bedtime. , Disp: 180 tablet, Rfl: 1    escitalopram (LEXAPRO) 10 MG tablet, Take 1 tablet by mouth every evening, Disp: 90 tablet, Rfl: 1    terbinafine (LAMISIL) 1 % cream, Apply affected area topically 2 times daily. , Disp: 42 g, Rfl: 2    ammonium lactate (LAC-HYDRIN) 12 % lotion, Apply topically twice daily, Disp: 400 g, Rfl: 2    IBRANCE 100 MG tablet, , Disp: , Rfl:     propafenone (RYTHMOL) 225 MG tablet, Take 1 tablet by mouth three times daily, Disp: 270 tablet, Rfl: 1    warfarin (COUMADIN) 6 MG tablet, Indications: monday, wednesday, friday 3 mg, all other days are 6 mg 1/2 tab on M W F and 1 tab all other days of the week OR AS DIRECTED (Patient taking differently: Indications: monday, friday 3 mg, all other days are 6 mg 1/2 tab on M  F and 1 tab all other days of the week OR AS DIRECTED), Disp: 30 tablet, Rfl: 5    fulvestrant (FASLODEX) 250 MG/5ML SOLN IM injection, Inject 500 mg into the muscle once, Disp: , Rfl:     denosumab (XGEVA) 120 MG/1.7ML SOLN SC injection, Inject 120 mg into the skin once, Disp: , Rfl:     calcium carbonate-vitamin D (CALTRATE) 600-400 MG-UNIT TABS per tab, Take 1 tablet by mouth daily, Disp: , Rfl:     Alpha-Lipoic Acid 100 MG CAPS, Take by mouth, Disp: , Rfl:     b complex vitamins capsule, Take 1 capsule by mouth daily, Disp: , Rfl:   Allergies   Allergen Reactions    Diltiazem Hcl Other (See Comments)     Other reaction(s): weak    Cardizem [Diltiazem] Palpitations    Cefuroxime Axetil Nausea And Vomiting, Other (See Comments) and Palpitations    Claritin [Loratadine] Palpitations    Erythromycin Nausea And Vomiting    Morphine Nausea And Vomiting    Mucinex [Guaifenesin Er] Palpitations    Propoxyphene Other (See Comments) and Nausea And Vomiting     DIZZY  Other reaction(s): Free Text    Zyrtec [Cetirizine] Nausea And Vomiting and Other (See Comments)     WEAK       Past Medical History:   Diagnosis Date    Bone cancer ([address] 02/2021    Breast [facility] ([facility] 04/2017    Chronic atrial fibrillation (HCC)     Fibroadenosis of breast     Headache     History of therapeutic radiation     Hyperlipidemia     Hypertension      Past Surgical History:   Procedure Laterality Date    [address]    BONE BIOPSY  04/2021    BREAST LUMPECTOMY Right 04/26/2017    COLONOSCOPY      CT BIOPSY PERCUTANEOUS DEEP BONE  04/07/2021    CT BIOPSY PERCUTANEOUS DEEP BONE 4/7/2021 Stacey Mascorro MD [facility]    EYE SURGERY      SUDHIR LENSE IMPLANTS    OTHER SURGICAL HISTORY      port removal    TONSILLECTOMY      TUNNELED VENOUS PORT PLACEMENT  05/26/2017    Dr. Helene Weems     Family History   Problem Relation Age of Onset    Cancer Maternal Cousin 80        lung    Heart Disease Mother     Other Mother         [place] Palsy    Other Father         gall bladder problem, hip fx; AAA rupture    Coronary Art Dis Father         MI     Social History     Socioeconomic History    Marital status:      Spouse name: Rox Santoyo    Number of children: Not on file    Years of education: Not on file    Highest education level: Not on file   Occupational History    Occupation: retired      Comment: Retired 4 th    Tobacco Use    Smoking status: Never    Smokeless tobacco: Never   Vaping Use    Vaping Use: Not on file   Substance and Sexual Activity    Alcohol use: No     Comment: occasionally pop    Drug use: No    Sexual activity: Not Currently     Partners: Male   Other Topics Concern    Not on file   Social History Narrative    Drinks occasional Pepsi/Coke     Social Determinants of Health     Financial Resource Strain: Low Risk     Difficulty of Paying Living Expenses: Not hard at all   Food Insecurity: Unknown    Worried About [address] in the Last Year: Patient refused    [address] in the Last Year: Patient refused   Transportation Needs: Not on file   Physical Activity: Insufficiently Active    Days of Exercise per Week: 5 days    Minutes of Exercise per Session: 20 min   Stress: Not on file   Social Connections: Not on file   Intimate Partner Violence: Not on file   Housing Stability: Not on file       Vitals:    09/23/22 1103 09/23/22 1120 09/23/22 1133   BP: (!) 160/58 (!) 164/70 130/70   Pulse: 66     Resp: 16     Temp: 97.5 °F (36.4 °C)     TempSrc: Temporal     SpO2: 98%     Weight: 177 lb 3.2 oz (80.4 kg)     Height: 5' 7\" (1.702 m)         Physical Exam  Vitals and nursing note reviewed. Constitutional:       Appearance: She is well-developed. HENT:      Head: Atraumatic. Right Ear: External ear normal.      Left Ear: External ear normal.      Nose: Nose normal.      Mouth/Throat:      Pharynx: No oropharyngeal exudate. Eyes:      Conjunctiva/sclera: Conjunctivae normal.      Pupils: Pupils are equal, round, and reactive to light. Neck:      Thyroid: No thyromegaly. Trachea: No tracheal deviation. Cardiovascular:      Rate and Rhythm: Normal rate and regular rhythm. Heart sounds: No murmur heard. No friction rub. No gallop. Pulmonary:      Effort: Pulmonary effort is normal. No respiratory distress. Breath sounds: Normal breath sounds. Abdominal:      General: Bowel sounds are normal.      Palpations: Abdomen is soft. Musculoskeletal:         General: No tenderness or deformity. Normal range of motion. Cervical back: Normal range of motion and neck supple. Lymphadenopathy:      Cervical: No cervical adenopathy. Skin:     General: Skin is warm and dry. Capillary Refill: Capillary refill takes less than 2 seconds. Findings: No rash. Neurological:      Mental Status: She is alert and oriented to person, place, and time. Sensory: No sensory deficit. Motor: No abnormal muscle tone.       Coordination: Coordination normal.      Deep Tendon Reflexes: Reflexes normal.           Seen By:  Syed Kay DO

## 2022-10-17 ENCOUNTER — HOSPITAL ENCOUNTER (OUTPATIENT)
Dept: CT IMAGING | Age: 72
Discharge: HOME | End: 2022-10-19
Payer: MEDICARE

## 2022-10-17 ENCOUNTER — HOSPITAL ENCOUNTER (OUTPATIENT)
Dept: NUCLEAR MEDICINE | Age: 72
Discharge: HOME | End: 2022-10-17
Payer: MEDICARE

## 2022-10-17 ENCOUNTER — HOSPITAL ENCOUNTER (OUTPATIENT)
Dept: GENERAL RADIOLOGY | Age: 72
Discharge: HOME | End: 2022-10-19
Payer: MEDICARE

## 2022-10-17 ENCOUNTER — HOSPITAL ENCOUNTER (OUTPATIENT)
Dept: GENERAL RADIOLOGY | Age: 72
End: 2022-10-17
Payer: MEDICARE

## 2022-10-17 DIAGNOSIS — C79.51 SECONDARY MALIGNANT NEOPLASM OF BONE (HCC): ICD-10-CM

## 2022-10-17 DIAGNOSIS — Z17.1 ESTROGEN RECEPTOR NEGATIVE STATUS (ER-): ICD-10-CM

## 2022-10-17 DIAGNOSIS — C79.51 SECONDARY MALIGNANT NEOPLASM OF BONE AND BONE MARROW (HCC): ICD-10-CM

## 2022-10-17 DIAGNOSIS — C50.211 MALIGNANT NEOPLASM OF UPPER-INNER QUADRANT OF RIGHT FEMALE BREAST, UNSPECIFIED ESTROGEN RECEPTOR STATUS (HCC): ICD-10-CM

## 2022-10-17 DIAGNOSIS — C79.52 SECONDARY MALIGNANT NEOPLASM OF BONE AND BONE MARROW (HCC): ICD-10-CM

## 2022-10-17 PROCEDURE — 3430000000 HC RX DIAGNOSTIC RADIOPHARMACEUTICAL: Performed by: RADIOLOGY

## 2022-10-17 PROCEDURE — 6360000004 HC RX CONTRAST MEDICATION: Performed by: RADIOLOGY

## 2022-10-17 PROCEDURE — 78306 BONE IMAGING WHOLE BODY: CPT | Performed by: NURSE PRACTITIONER

## 2022-10-17 PROCEDURE — 73140 X-RAY EXAM OF FINGER(S): CPT

## 2022-10-17 PROCEDURE — A9503 TC99M MEDRONATE: HCPCS | Performed by: RADIOLOGY

## 2022-10-17 PROCEDURE — 71260 CT THORAX DX C+: CPT

## 2022-10-17 PROCEDURE — 74177 CT ABD & PELVIS W/CONTRAST: CPT

## 2022-10-17 RX ORDER — TC 99M MEDRONATE 20 MG/10ML
25 INJECTION, POWDER, LYOPHILIZED, FOR SOLUTION INTRAVENOUS
Status: COMPLETED | OUTPATIENT
Start: 2022-10-17 | End: 2022-10-17

## 2022-10-17 RX ADMIN — TC 99M MEDRONATE 25 MILLICURIE: 20 INJECTION, POWDER, LYOPHILIZED, FOR SOLUTION INTRAVENOUS at 08:26

## 2022-10-17 RX ADMIN — IOPAMIDOL 75 ML: 755 INJECTION, SOLUTION INTRAVENOUS at 09:45

## 2022-10-17 RX ADMIN — IOPAMIDOL 18 ML: 755 INJECTION, SOLUTION INTRAVENOUS at 09:46

## 2022-10-21 ENCOUNTER — OFFICE VISIT (OUTPATIENT)
Dept: FAMILY MEDICINE CLINIC | Age: 72
End: 2022-10-21
Payer: MEDICARE

## 2022-10-21 VITALS
OXYGEN SATURATION: 99 % | DIASTOLIC BLOOD PRESSURE: 70 MMHG | SYSTOLIC BLOOD PRESSURE: 140 MMHG | TEMPERATURE: 97.5 F | HEIGHT: 67 IN | RESPIRATION RATE: 16 BRPM | WEIGHT: 177.2 LBS | HEART RATE: 61 BPM | BODY MASS INDEX: 27.81 KG/M2

## 2022-10-21 DIAGNOSIS — C50.919 INVASIVE CARCINOMA OF BREAST (HCC): Primary | ICD-10-CM

## 2022-10-21 DIAGNOSIS — I48.91 ATRIAL FIBRILLATION, UNSPECIFIED TYPE (HCC): ICD-10-CM

## 2022-10-21 LAB
INTERNATIONAL NORMALIZATION RATIO, POC: 2.4
PROTHROMBIN TIME, POC: 28.5

## 2022-10-21 PROCEDURE — 99213 OFFICE O/P EST LOW 20 MIN: CPT | Performed by: FAMILY MEDICINE

## 2022-10-21 PROCEDURE — 1123F ACP DISCUSS/DSCN MKR DOCD: CPT | Performed by: FAMILY MEDICINE

## 2022-10-21 PROCEDURE — 85610 PROTHROMBIN TIME: CPT | Performed by: FAMILY MEDICINE

## 2022-10-21 RX ORDER — WARFARIN SODIUM 6 MG/1
TABLET ORAL
Qty: 30 TABLET | Refills: 5 | Status: SHIPPED | OUTPATIENT
Start: 2022-10-21

## 2022-10-21 RX ORDER — PROPAFENONE HYDROCHLORIDE 225 MG/1
225 TABLET, FILM COATED ORAL 3 TIMES DAILY
Qty: 270 TABLET | Refills: 1 | Status: SHIPPED | OUTPATIENT
Start: 2022-10-21

## 2022-10-21 ASSESSMENT — ENCOUNTER SYMPTOMS
SHORTNESS OF BREATH: 0
EYE PAIN: 0
BLOOD IN STOOL: 0
CHEST TIGHTNESS: 0
PHOTOPHOBIA: 0
TROUBLE SWALLOWING: 0
VOMITING: 0
EYE DISCHARGE: 0
ALLERGIC/IMMUNOLOGIC NEGATIVE: 1
ABDOMINAL PAIN: 0
NAUSEA: 0
SORE THROAT: 0
BACK PAIN: 0
EYE REDNESS: 0
SINUS PAIN: 0
COUGH: 0
DIARRHEA: 0

## 2022-10-21 NOTE — PROGRESS NOTES
10/21/22  John Tamayo : 1950 Sex: female  Age: 71 y.o. Assessment and Plan: Chencho Ralph was seen today for atrial fibrillation. Diagnoses and all orders for this visit:    Invasive carcinoma of breast ([address]  Latest PET scanning shows no new lesions. Atrial fibrillation, unspecified type ([address]  -     POCT INR  -     propafenone (RYTHMOL) 225 MG tablet; Take 1 tablet by mouth three times daily  -     warfarin (COUMADIN) 6 MG tablet; Indications: monday, friday 3 mg, all other days are 6 mg 1/2 tab on M  F and 1 tab all other days of the week OR AS DIRECTED      Return in about 4 weeks (around 2022). Chief Complaint   Patient presents with    Atrial Fibrillation     INR Check       Patient returns for recheck pro time, INR today 2.4. She denies bleeding, bruising, dietary changes, dosage change. Review of Systems   Constitutional:  Negative for appetite change, fatigue and unexpected weight change. HENT:  Negative for congestion, ear pain, hearing loss, sinus pain, sore throat and trouble swallowing. Eyes:  Negative for photophobia, pain, discharge and redness. Respiratory:  Negative for cough, chest tightness and shortness of breath. Cardiovascular:  Negative for chest pain, palpitations and leg swelling. Gastrointestinal:  Negative for abdominal pain, blood in stool, diarrhea, nausea and vomiting. Endocrine: Negative. Genitourinary:  Negative for dysuria, flank pain, frequency and hematuria. Musculoskeletal:  Negative for arthralgias, back pain, joint swelling and myalgias. Skin: Negative. Allergic/Immunologic: Negative. Neurological:  Negative for dizziness, seizures, syncope, weakness, light-headedness, numbness and headaches. Hematological:  Negative for adenopathy. Does not bruise/bleed easily. Psychiatric/Behavioral: Negative.          Current Outpatient Medications:     propafenone (RYTHMOL) 225 MG tablet, Take 1 tablet by mouth three times daily, Disp: 270 tablet, Rfl: 1    warfarin (COUMADIN) 6 MG tablet, Indications: monday, friday 3 mg, all other days are 6 mg 1/2 tab on M  F and 1 tab all other days of the week OR AS DIRECTED, Disp: 30 tablet, Rfl: 5    verapamil (CALAN SR) 180 MG extended release tablet, Take 1 tablet by mouth in the morning., Disp: 90 tablet, Rfl: 1    topiramate (TOPAMAX) 50 MG tablet, Take 1 tablet by mouth in the morning and 1 tablet before bedtime. , Disp: 180 tablet, Rfl: 1    escitalopram (LEXAPRO) 10 MG tablet, Take 1 tablet by mouth every evening, Disp: 90 tablet, Rfl: 1    terbinafine (LAMISIL) 1 % cream, Apply affected area topically 2 times daily. , Disp: 42 g, Rfl: 2    ammonium lactate (LAC-HYDRIN) 12 % lotion, Apply topically twice daily, Disp: 400 g, Rfl: 2    fulvestrant (FASLODEX) 250 MG/5ML SOLN IM injection, Inject 500 mg into the muscle once, Disp: , Rfl:     denosumab (XGEVA) 120 MG/1.7ML SOLN SC injection, Inject 120 mg into the skin once, Disp: , Rfl:     calcium carbonate-vitamin D (CALTRATE) 600-400 MG-UNIT TABS per tab, Take 1 tablet by mouth daily, Disp: , Rfl:     Alpha-Lipoic Acid 100 MG CAPS, Take by mouth, Disp: , Rfl:     b complex vitamins capsule, Take 1 capsule by mouth daily, Disp: , Rfl:     IBRANCE 100 MG tablet, , Disp: , Rfl:   Allergies   Allergen Reactions    Diltiazem Hcl Other (See Comments)     Other reaction(s): weak    Cardizem [Diltiazem] Palpitations    Cefuroxime Axetil Nausea And Vomiting, Other (See Comments) and Palpitations    Claritin [Loratadine] Palpitations    Erythromycin Nausea And Vomiting    Morphine Nausea And Vomiting    Mucinex [Guaifenesin Er] Palpitations    Propoxyphene Other (See Comments) and Nausea And Vomiting     DIZZY  Other reaction(s): Free Text    Zyrtec [Cetirizine] Nausea And Vomiting and Other (See Comments)     WEAK       Past Medical History:   Diagnosis Date    [facility] 02/2021    Breast cancer ([address] 04/2017    Chronic atrial fibrillation ([address] Fibroadenosis of breast     Headache     History of therapeutic radiation     Hyperlipidemia     Hypertension      Past Surgical History:   Procedure Laterality Date    [address]    BONE BIOPSY  04/2021    BREAST LUMPECTOMY Right 04/26/2017    COLONOSCOPY      CT BIOPSY PERCUTANEOUS DEEP BONE  04/07/2021    CT BIOPSY PERCUTANEOUS DEEP BONE 4/7/2021 Erna Matamoros MD [facility]    EYE SURGERY      SUDHIR LENSE IMPLANTS    OTHER SURGICAL HISTORY      port removal    TONSILLECTOMY      TUNNELED VENOUS PORT PLACEMENT  05/26/2017    Dr. Lv George     Family History   Problem Relation Age of Onset    Cancer Maternal Cousin 80        lung    Heart Disease Mother     Other Mother         [place] Palsy    Other Father         gall bladder problem, hip fx; AAA rupture    Coronary Art Dis Father         MI     Social History     Socioeconomic History    Marital status:      Spouse name: Elsie Dominguez    Number of children: Not on file    Years of education: Not on file    Highest education level: Not on file   Occupational History    Occupation: retired      Comment: Retired 4 th    Tobacco Use    Smoking status: Never    Smokeless tobacco: Never   Vaping Use    Vaping Use: Not on file   Substance and Sexual Activity    Alcohol use: No     Comment: occasionally pop    Drug use: No    Sexual activity: Not Currently     Partners: Male   Other Topics Concern    Not on file   Social History Narrative    Drinks occasional Pepsi/Coke     Social Determinants of Health     Financial Resource Strain: Low Risk     Difficulty of Paying Living Expenses: Not hard at all   Food Insecurity: Unknown    Worried About [address] in the Last Year: Patient refused    [address] in the Last Year: Patient refused   Transportation Needs: Not on file   Physical Activity: Insufficiently Active    Days of Exercise per Week: 5 days    Minutes of Exercise per Session: 20 min   Stress: Not on file   Social Connections: Not on file   Intimate Partner Violence: Not on file   Housing Stability: Not on file       Vitals:    10/21/22 1138   BP: (!) 140/70   Pulse: 61   Resp: 16   Temp: 97.5 °F (36.4 °C)   TempSrc: Temporal   SpO2: 99%   Weight: 177 lb 3.2 oz (80.4 kg)   Height: 5' 7\" (1.702 m)       Physical Exam  Vitals and nursing note reviewed. Constitutional:       Appearance: She is well-developed. HENT:      Head: Atraumatic. Right Ear: External ear normal.      Left Ear: External ear normal.      Nose: Congestion present. Mouth/Throat:      Pharynx: No oropharyngeal exudate. Eyes:      Conjunctiva/sclera: Conjunctivae normal.      Pupils: Pupils are equal, round, and reactive to light. Neck:      Thyroid: No thyromegaly. Trachea: No tracheal deviation. Cardiovascular:      Rate and Rhythm: Normal rate and regular rhythm. Heart sounds: No murmur heard. No friction rub. No gallop. Pulmonary:      Effort: Pulmonary effort is normal. No respiratory distress. Breath sounds: Normal breath sounds. Abdominal:      General: Bowel sounds are normal.      Palpations: Abdomen is soft. Musculoskeletal:         General: No tenderness or deformity. Normal range of motion. Cervical back: Normal range of motion and neck supple. Lymphadenopathy:      Cervical: No cervical adenopathy. Skin:     General: Skin is warm and dry. Capillary Refill: Capillary refill takes less than 2 seconds. Findings: No rash. Neurological:      Mental Status: She is alert and oriented to person, place, and time. Sensory: No sensory deficit. Motor: No abnormal muscle tone.       Coordination: Coordination normal.      Deep Tendon Reflexes: Reflexes normal.           Seen By:  Demetrio Gross DO

## 2022-10-24 NOTE — TELEPHONE ENCOUNTER
I called [facility] was connected to [place], scheduling and no answer, I left a detailed message re: When patient is scheduled for biopsy, we are ready in 3 to 4 days to treat the patient. I also gave her my direct #. Yes - the patient is able to be screened

## 2022-11-02 ENCOUNTER — PROCEDURE VISIT (OUTPATIENT)
Dept: PODIATRY | Age: 72
End: 2022-11-02
Payer: MEDICARE

## 2022-11-02 VITALS — BODY MASS INDEX: 27.78 KG/M2 | HEIGHT: 67 IN | WEIGHT: 177 LBS

## 2022-11-02 DIAGNOSIS — Z79.01 ENCOUNTER FOR CURRENT LONG-TERM USE OF ANTICOAGULANTS: ICD-10-CM

## 2022-11-02 DIAGNOSIS — L84 CORNS AND CALLOSITIES: ICD-10-CM

## 2022-11-02 DIAGNOSIS — G60.8 HEREDITARY SENSORY NEUROPATHY: ICD-10-CM

## 2022-11-02 DIAGNOSIS — B35.1 TINEA UNGUIUM: Primary | ICD-10-CM

## 2022-11-02 DIAGNOSIS — B35.3 TINEA PEDIS OF BOTH FEET: ICD-10-CM

## 2022-11-02 PROCEDURE — 11721 DEBRIDE NAIL 6 OR MORE: CPT | Performed by: PODIATRIST

## 2022-11-02 PROCEDURE — 11056 PARNG/CUTG B9 HYPRKR LES 2-4: CPT | Performed by: PODIATRIST

## 2022-11-02 NOTE — PROGRESS NOTES
Patient in today for nail care. Patient does not have any complaints of pain at this time. Patient's PCP is Max Moyer DO date of last ov  10/21/2022       Jake Kumari DPM     CC:    Follow-up foot and ankle exam.    HPI:   Follow-up foot ankle exam  History anticoagulant therapy with Coumadin. No foot or ankle pain today. No calf pain. Here today Lunne toenails and calluses both feet. No additional pedal complaints. ROS:  Const: Denies constitutional symptoms  Musculo: Denies symptoms other than stated above  Skin: Denies symptoms other than stated above       Current Outpatient Medications:     propafenone (RYTHMOL) 225 MG tablet, Take 1 tablet by mouth three times daily, Disp: 270 tablet, Rfl: 1    warfarin (COUMADIN) 6 MG tablet, Indications: monday, friday 3 mg, all other days are 6 mg 1/2 tab on M  F and 1 tab all other days of the week OR AS DIRECTED, Disp: 30 tablet, Rfl: 5    verapamil (CALAN SR) 180 MG extended release tablet, Take 1 tablet by mouth in the morning., Disp: 90 tablet, Rfl: 1    topiramate (TOPAMAX) 50 MG tablet, Take 1 tablet by mouth in the morning and 1 tablet before bedtime. , Disp: 180 tablet, Rfl: 1    escitalopram (LEXAPRO) 10 MG tablet, Take 1 tablet by mouth every evening, Disp: 90 tablet, Rfl: 1    terbinafine (LAMISIL) 1 % cream, Apply affected area topically 2 times daily. , Disp: 42 g, Rfl: 2    ammonium lactate (LAC-HYDRIN) 12 % lotion, Apply topically twice daily, Disp: 400 g, Rfl: 2    IBRANCE 100 MG tablet, , Disp: , Rfl:     fulvestrant (FASLODEX) 250 MG/5ML SOLN IM injection, Inject 500 mg into the muscle once, Disp: , Rfl:     denosumab (XGEVA) 120 MG/1.7ML SOLN SC injection, Inject 120 mg into the skin once, Disp: , Rfl:     calcium carbonate-vitamin D (CALTRATE) 600-400 MG-UNIT TABS per tab, Take 1 tablet by mouth daily, Disp: , Rfl:     Alpha-Lipoic Acid 100 MG CAPS, Take by mouth, Disp: , Rfl:     b complex vitamins capsule, Take 1 capsule by mouth daily, Disp: , Rfl:   Allergies   Allergen Reactions    Diltiazem Hcl Other (See Comments)     Other reaction(s): weak    Cardizem [Diltiazem] Palpitations    Cefuroxime Axetil Nausea And Vomiting, Other (See Comments) and Palpitations    Claritin [Loratadine] Palpitations    Erythromycin Nausea And Vomiting    Morphine Nausea And Vomiting    Mucinex [Guaifenesin Er] Palpitations    Propoxyphene Other (See Comments) and Nausea And Vomiting     DIZZY  Other reaction(s): Free Text    Zyrtec [Cetirizine] Nausea And Vomiting and Other (See Comments)     WEAK       Past Medical History:   Diagnosis Date    Bone cancer ([facility] 02/2021    Breast cancer ([facility] 04/2017    Chronic atrial fibrillation (HCC)     Fibroadenosis of breast     Headache     History of therapeutic radiation     Hyperlipidemia     Hypertension            Vitals:    11/02/22 1314   Weight: 177 lb (80.3 kg)   Height: 5' 7\" (1.702 m)       Work History/Social History:  Focused Lower Extremity Physical Exam:    Neurovascular examination:    Dorsalis Pedis palpable bilateral.  Posterior tibialis palpable bilateral.    Capillary Refill Time:  Immediate return  Hair growth:  Symmetrical and bilateral   Skin:  Not atrophic  Edema: Mild edema dorsal foot bilateral.  No erythema. Neurologic:  Light touch diminished bilateral.      Musculoskeletal/ Orthopedic examination:    Equinis: Absent bilateral  Dorsiflexion, plantarflexion, inversion, eversion bilateral 5 out of 5 muscle strength  Wiggling toes. Negative Homans. No pain foot or ankle    Dermatology examination:    Toenails 1-5 right and left thickened, elongated, dystrophic, mycotic with subungual debris. Webspaces 1-4 bilateral clean, dry and intact. Hyperkeratotic tissue fifth metatarsal bilateral.  No open wounds. Assessment and Plan: Wing Zarate was seen today for nail problem.     Diagnoses and all orders for this visit:    Tinea unguium    Corns and callosities    Hereditary sensory neuropathy    Encounter for current long-term use of anticoagulants    Tinea pedis of both feet      Follow-up foot ankle exam  History anticoagulated with Coumadin  Manual debridement with standard technique toenails 1 through 5 right and left in thickness and length. Patient tolerated procedure well. I did also pare hyperkeratotic tissue fifth metatarsal bilateral.  Avoid barefoot  Follow-up 2 months    Return in about 2 months (around 1/2/2023). Seen By:  Jagjit Obando DPM      Document was created using voice recognition software. Note was reviewed, however may contain grammatical errors.

## 2022-11-04 ENCOUNTER — PROCEDURE VISIT (OUTPATIENT)
Dept: AUDIOLOGY | Age: 72
End: 2022-11-04
Payer: MEDICARE

## 2022-11-04 ENCOUNTER — OFFICE VISIT (OUTPATIENT)
Dept: ENT CLINIC | Age: 72
End: 2022-11-04
Payer: MEDICARE

## 2022-11-04 VITALS
BODY MASS INDEX: 27.62 KG/M2 | DIASTOLIC BLOOD PRESSURE: 69 MMHG | SYSTOLIC BLOOD PRESSURE: 158 MMHG | HEIGHT: 67 IN | HEART RATE: 57 BPM | WEIGHT: 176 LBS | OXYGEN SATURATION: 99 % | TEMPERATURE: 96.9 F

## 2022-11-04 DIAGNOSIS — H90.3 SENSORINEURAL HEARING LOSS (SNHL) OF BOTH EARS: ICD-10-CM

## 2022-11-04 DIAGNOSIS — H90.3 SENSORINEURAL HEARING LOSS, BILATERAL: Primary | ICD-10-CM

## 2022-11-04 DIAGNOSIS — H93.8X3 PRESSURE SENSATION IN BOTH EARS: ICD-10-CM

## 2022-11-04 DIAGNOSIS — J30.9 ALLERGIC RHINITIS, UNSPECIFIED SEASONALITY, UNSPECIFIED TRIGGER: Primary | ICD-10-CM

## 2022-11-04 PROCEDURE — 3078F DIAST BP <80 MM HG: CPT | Performed by: OTOLARYNGOLOGY

## 2022-11-04 PROCEDURE — 92567 TYMPANOMETRY: CPT | Performed by: OTOLARYNGOLOGY

## 2022-11-04 PROCEDURE — 99213 OFFICE O/P EST LOW 20 MIN: CPT | Performed by: OTOLARYNGOLOGY

## 2022-11-04 PROCEDURE — 92557 COMPREHENSIVE HEARING TEST: CPT | Performed by: AUDIOLOGIST

## 2022-11-04 PROCEDURE — 3074F SYST BP LT 130 MM HG: CPT | Performed by: OTOLARYNGOLOGY

## 2022-11-04 PROCEDURE — 96372 THER/PROPH/DIAG INJ SC/IM: CPT | Performed by: OTOLARYNGOLOGY

## 2022-11-04 PROCEDURE — 1123F ACP DISCUSS/DSCN MKR DOCD: CPT | Performed by: OTOLARYNGOLOGY

## 2022-11-04 PROCEDURE — 92650 AEP SCR AUDITORY POTENTIAL: CPT | Performed by: OTOLARYNGOLOGY

## 2022-11-04 PROCEDURE — 92567 TYMPANOMETRY: CPT | Performed by: AUDIOLOGIST

## 2022-11-04 RX ORDER — OLOPATADINE HYDROCHLORIDE 1 MG/ML
1 SOLUTION/ DROPS OPHTHALMIC 2 TIMES DAILY
Qty: 1 EACH | Refills: 3 | Status: SHIPPED | OUTPATIENT
Start: 2022-11-04 | End: 2022-12-04

## 2022-11-04 RX ORDER — AZELASTINE 1 MG/ML
2 SPRAY, METERED NASAL 2 TIMES DAILY
Qty: 120 ML | Refills: 3 | Status: SHIPPED | OUTPATIENT
Start: 2022-11-04

## 2022-11-04 RX ORDER — TRIAMCINOLONE ACETONIDE 40 MG/ML
40 INJECTION, SUSPENSION INTRA-ARTICULAR; INTRAMUSCULAR ONCE
Status: COMPLETED | OUTPATIENT
Start: 2022-11-04 | End: 2022-11-04

## 2022-11-04 RX ADMIN — TRIAMCINOLONE ACETONIDE 40 MG: 40 INJECTION, SUSPENSION INTRA-ARTICULAR; INTRAMUSCULAR at 11:06

## 2022-11-04 ASSESSMENT — ENCOUNTER SYMPTOMS
EYE DISCHARGE: 0
RESPIRATORY NEGATIVE: 1
EYES NEGATIVE: 1
EYE PAIN: 0
VOMITING: 0
GASTROINTESTINAL NEGATIVE: 1
DIARRHEA: 0
SHORTNESS OF BREATH: 0
ABDOMINAL PAIN: 0
COLOR CHANGE: 0
CHEST TIGHTNESS: 0
APNEA: 0

## 2022-11-04 NOTE — PROGRESS NOTES
\  Subjective:      Patient ID:  Bri Cates is a 71 y.o. female. HPI:  Bri Cates presents for recheck today for hearing loss. There are slight hearing changes noted by the patient. Pt does complain of severe allegies with sneezing attacks, watery eyes. Patient's medications, allergies, past medical, surgical, social and family histories were reviewed and updated as appropriate. Review of Systems   Constitutional: Negative. Negative for appetite change, chills and fever. HENT:  Positive for hearing loss. Negative for ear discharge, ear pain and tinnitus. Eyes: Negative. Negative for pain, discharge and visual disturbance. Respiratory: Negative. Negative for apnea, chest tightness and shortness of breath. Cardiovascular: Negative. Negative for chest pain, palpitations and leg swelling. Gastrointestinal: Negative. Negative for abdominal pain, diarrhea and vomiting. Endocrine: Negative for cold intolerance, heat intolerance and polydipsia. Genitourinary: Negative. Negative for dysuria, flank pain and hematuria. Musculoskeletal: Negative. Negative for arthralgias, gait problem and neck pain. Skin: Negative. Negative for color change, pallor and rash. Allergic/Immunologic: Negative for environmental allergies, food allergies and immunocompromised state. Neurological: Negative. Negative for dizziness, numbness and headaches. Hematological:  Negative for adenopathy. Psychiatric/Behavioral: Negative. Negative for behavioral problems and hallucinations. All other systems reviewed and are negative. Objective:   Physical Exam  Constitutional:       General: She is not in acute distress. Appearance: Normal appearance. HENT:      Head: Normocephalic and atraumatic. Right Ear: Tympanic membrane, ear canal and external ear normal. Tympanic membrane is not retracted.       Left Ear: Tympanic membrane, ear canal and external ear normal.      Nose: Nose normal.      Mouth/Throat:      Mouth: Mucous membranes are moist.      Pharynx: No oropharyngeal exudate. Eyes:      Extraocular Movements: Extraocular movements intact. Pupils: Pupils are equal, round, and reactive to light. Cardiovascular:      Rate and Rhythm: Normal rate and regular rhythm. Pulses: Normal pulses. Heart sounds: Normal heart sounds. Pulmonary:      Effort: Pulmonary effort is normal.      Breath sounds: Normal breath sounds. Abdominal:      General: Abdomen is flat. Musculoskeletal:         General: Normal range of motion. Cervical back: Normal range of motion and neck supple. Skin:     General: Skin is warm and dry. Neurological:      General: No focal deficit present. Mental Status: She is alert and oriented to person, place, and time. Psychiatric:         Mood and Affect: Mood normal.         Behavior: Behavior normal.         Audiogram -  Media Information          Document Information    Audiology   audio   10/29/2021   Attached To: Office Visit on 10/29/21 with Lynda sIaacs DO   Source Information    Chin Wade  [employer]                     Assessment:       Diagnosis Orders   1. Allergic rhinitis, unspecified seasonality, unspecified trigger        2. Sensorineural hearing loss (SNHL) of both ears                   Plan:      Allergic rhinitis  Sensorineural hearing loss       Plan:      - Audiogram performed in the office today and reviewed with patient around 5-10dB change since last year. - Pt is a candidate for hearing aids and may benefit from hearing aid evaluation.     - Will give astelin for allergic rhinitis. - will give Kenalog shot today in office for severe allergy flare ups. - Repeat audio in one year. Return earlier to clinic if any issues arise.      Follow up in one year    Electronically signed by Yuliana Nava DO on 11/4/2022 at 10:38 AM         Eben Abarca  1950    I have discussed the case, including pertinent history and exam findings with the resident. I have seen and examined the patient and the key elements of the encounter have been performed by me. I agree with the assessment, plan and orders as documented by the  resident              Remainder of medical problems as per  resident note. Patient seen and examined. Agree with above exam, assessment and plan.       Electronically signed by Blaze Green DO on 11/14/22 at 12:45 PM EST

## 2022-11-04 NOTE — PROGRESS NOTES
This patient was referred for audiometric and tympanometric testing by Dr. Zaina Dean due to  established hearing loss . Patient reported pressure/fullness in both ears, mainly the right ear. Patient stated that she experiences pain occasionally in the right ear. Patient denied any tinnitus, drainage, or dizziness, bilaterally. Audiometry using pure tone air and bone conduction testing revealed an essentially mild sloping to a moderately-severe  sensorineural hearing loss, bilaterally. Reliability was good. Speech reception thresholds were in good agreement with the pure tone averages, bilaterally. Speech discrimination scores were excellent, bilaterally. Tympanometry revealed normal middle ear peak pressure and compliance, bilaterally. The results were reviewed with the patient. Recommendations for follow up will be made pending physician consult. YAMILETH Davenport. Audiology Intern (4th Year)     Kalina DERAS   Audiology Services    Electronically signed by Chin Salazar on 11/4/2022 at 10:31 AM

## 2022-11-04 NOTE — PROGRESS NOTES
Administrations This Visit       triamcinolone acetonide (KENALOG-40) injection 40 mg       Admin Date  11/04/2022  11:06 Action  Given Dose  40 mg Route  IntraMUSCular Site  Deltoid Left Administered By  Matt Cummings MA    Ordering Provider:  Benito Contreras DO    NDC: 6115-2244-91    Lot#: [id number]    : [employer] (PRIMARY CARE)    Patient Supplied?: No

## 2022-11-09 ENCOUNTER — NURSE ONLY (OUTPATIENT)
Dept: FAMILY MEDICINE CLINIC | Age: 72
End: 2022-11-09
Payer: MEDICARE

## 2022-11-09 DIAGNOSIS — Z23 NEED FOR VACCINATION: Primary | ICD-10-CM

## 2022-11-09 PROCEDURE — 90674 CCIIV4 VAC NO PRSV 0.5 ML IM: CPT | Performed by: FAMILY MEDICINE

## 2022-11-09 PROCEDURE — G0008 ADMIN INFLUENZA VIRUS VAC: HCPCS | Performed by: FAMILY MEDICINE

## 2022-11-23 ENCOUNTER — OFFICE VISIT (OUTPATIENT)
Dept: FAMILY MEDICINE CLINIC | Age: 72
End: 2022-11-23
Payer: MEDICARE

## 2022-11-23 VITALS
WEIGHT: 178 LBS | SYSTOLIC BLOOD PRESSURE: 120 MMHG | HEART RATE: 62 BPM | RESPIRATION RATE: 16 BRPM | OXYGEN SATURATION: 98 % | DIASTOLIC BLOOD PRESSURE: 60 MMHG | HEIGHT: 67 IN | TEMPERATURE: 98.6 F | BODY MASS INDEX: 27.94 KG/M2

## 2022-11-23 DIAGNOSIS — I48.91 ATRIAL FIBRILLATION, UNSPECIFIED TYPE (HCC): ICD-10-CM

## 2022-11-23 LAB
INTERNATIONAL NORMALIZATION RATIO, POC: 1.6
PROTHROMBIN TIME, POC: 19.4

## 2022-11-23 PROCEDURE — 3078F DIAST BP <80 MM HG: CPT | Performed by: FAMILY MEDICINE

## 2022-11-23 PROCEDURE — 1123F ACP DISCUSS/DSCN MKR DOCD: CPT | Performed by: FAMILY MEDICINE

## 2022-11-23 PROCEDURE — 99213 OFFICE O/P EST LOW 20 MIN: CPT | Performed by: FAMILY MEDICINE

## 2022-11-23 PROCEDURE — 3074F SYST BP LT 130 MM HG: CPT | Performed by: FAMILY MEDICINE

## 2022-11-23 PROCEDURE — 85610 PROTHROMBIN TIME: CPT | Performed by: FAMILY MEDICINE

## 2022-11-23 ASSESSMENT — ENCOUNTER SYMPTOMS
EYE PAIN: 0
DIARRHEA: 0
PHOTOPHOBIA: 0
BACK PAIN: 0
EYE REDNESS: 0
TROUBLE SWALLOWING: 0
COUGH: 0
ALLERGIC/IMMUNOLOGIC NEGATIVE: 1
CHEST TIGHTNESS: 0
SINUS PAIN: 0
NAUSEA: 0
VOMITING: 0
BLOOD IN STOOL: 0
ABDOMINAL PAIN: 0
EYE DISCHARGE: 0
SHORTNESS OF BREATH: 0
SORE THROAT: 0

## 2022-11-23 NOTE — PROGRESS NOTES
22  Judy Davis : 1950 Sex: female  Age: 71 y.o. Assessment and Plan: Shannon Felton was seen today for atrial fibrillation. Diagnoses and all orders for this visit:    Atrial fibrillation, unspecified type ([address]  -     POCT INR  Adjust warfarin to 6 mg 6 days a week, 3 mg 1 day a week. Check INR in 2 weeks. Return in about 2 weeks (around 2022), or Check INR. Chief Complaint   Patient presents with    Atrial Fibrillation       Patient returns for recheck pro time. Today 1.6, subtherapeutic. He denies bleeding, bruising, dietary changes, dosage change. Review of Systems   Constitutional:  Negative for appetite change, fatigue and unexpected weight change. HENT:  Negative for congestion, ear pain, hearing loss, sinus pain, sore throat and trouble swallowing. Eyes:  Negative for photophobia, pain, discharge and redness. Respiratory:  Negative for cough, chest tightness and shortness of breath. Cardiovascular:  Negative for chest pain, palpitations and leg swelling. Gastrointestinal:  Negative for abdominal pain, blood in stool, diarrhea, nausea and vomiting. Endocrine: Negative. Genitourinary:  Negative for dysuria, flank pain, frequency and hematuria. Musculoskeletal:  Negative for arthralgias, back pain, joint swelling and myalgias. Skin: Negative. Allergic/Immunologic: Negative. Neurological:  Negative for dizziness, seizures, syncope, weakness, light-headedness, numbness and headaches. Hematological:  Negative for adenopathy. Does not bruise/bleed easily. Psychiatric/Behavioral: Negative.          Current Outpatient Medications:     azelastine (ASTELIN) 0.1 % nasal spray, 2 sprays by Nasal route 2 times daily Use in each nostril as directed, Disp: 120 mL, Rfl: 3    olopatadine (PATANOL) 0.1 % ophthalmic solution, Place 1 drop into both eyes 2 times daily, Disp: 1 each, Rfl: 3    propafenone (RYTHMOL) 225 MG tablet, Take 1 tablet by mouth three times daily, Disp: 270 tablet, Rfl: 1    warfarin (COUMADIN) 6 MG tablet, Indications: monday, friday 3 mg, all other days are 6 mg 1/2 tab on M  F and 1 tab all other days of the week OR AS DIRECTED, Disp: 30 tablet, Rfl: 5    verapamil (CALAN SR) 180 MG extended release tablet, Take 1 tablet by mouth in the morning., Disp: 90 tablet, Rfl: 1    topiramate (TOPAMAX) 50 MG tablet, Take 1 tablet by mouth in the morning and 1 tablet before bedtime. , Disp: 180 tablet, Rfl: 1    escitalopram (LEXAPRO) 10 MG tablet, Take 1 tablet by mouth every evening, Disp: 90 tablet, Rfl: 1    terbinafine (LAMISIL) 1 % cream, Apply affected area topically 2 times daily. , Disp: 42 g, Rfl: 2    ammonium lactate (LAC-HYDRIN) 12 % lotion, Apply topically twice daily, Disp: 400 g, Rfl: 2    IBRANCE 100 MG tablet, , Disp: , Rfl:     fulvestrant (FASLODEX) 250 MG/5ML SOLN IM injection, Inject 500 mg into the muscle once, Disp: , Rfl:     denosumab (XGEVA) 120 MG/1.7ML SOLN SC injection, Inject 120 mg into the skin once, Disp: , Rfl:     calcium carbonate-vitamin D (CALTRATE) 600-400 MG-UNIT TABS per tab, Take 1 tablet by mouth daily, Disp: , Rfl:     Alpha-Lipoic Acid 100 MG CAPS, Take by mouth, Disp: , Rfl:     b complex vitamins capsule, Take 1 capsule by mouth daily, Disp: , Rfl:   Allergies   Allergen Reactions    Diltiazem Hcl Other (See Comments)     Other reaction(s): weak    Cardizem [Diltiazem] Palpitations    Cefuroxime Axetil Nausea And Vomiting, Other (See Comments) and Palpitations    Claritin [Loratadine] Palpitations    Erythromycin Nausea And Vomiting    Morphine Nausea And Vomiting    Mucinex [Guaifenesin Er] Palpitations    Propoxyphene Other (See Comments) and Nausea And Vomiting     DIZZY  Other reaction(s): Free Text    Zyrtec [Cetirizine] Nausea And Vomiting and Other (See Comments)     WEAK       Past Medical History:   Diagnosis Date    [facility] ([facility] 02/2021    Breast cancer ([facility] 04/2017    Broken rib     left Broken toe     right    Chronic atrial fibrillation (HCC)     Fibroadenosis of breast     Headache     History of therapeutic radiation     Hyperlipidemia     Hypertension      Past Surgical History:   Procedure Laterality Date    [address]    BONE BIOPSY  04/2021    BREAST LUMPECTOMY Right 04/26/2017    COLONOSCOPY      CT BIOPSY PERCUTANEOUS DEEP BONE  04/07/2021    CT BIOPSY PERCUTANEOUS DEEP BONE 4/7/2021 Tianna Pickering MD [facility]    EYE SURGERY      SUDHIR LENSE IMPLANTS    OTHER SURGICAL HISTORY      port removal    TONSILLECTOMY      TUNNELED VENOUS PORT PLACEMENT  05/26/2017    Dr. Halle Mclaughlin     Family History   Problem Relation Age of Onset    Cancer Maternal Cousin 80        lung    Heart Disease Mother     Other Mother         [place] Palsy    Other Father         gall bladder problem, hip fx; AAA rupture    Coronary Art Dis Father         MI     Social History     Socioeconomic History    Marital status:      Spouse name: Griffin Luis    Number of children: Not on file    Years of education: Not on file    Highest education level: Not on file   Occupational History    Occupation: retired      Comment: Retired 4 th    Tobacco Use    Smoking status: Never    Smokeless tobacco: Never   Vaping Use    Vaping Use: Not on file   Substance and Sexual Activity    Alcohol use: No     Comment: occasionally pop    Drug use: No    Sexual activity: Not Currently     Partners: Male   Other Topics Concern    Not on file   Social History Narrative    Drinks occasional Pepsi/Coke     Social Determinants of Health     Financial Resource Strain: Low Risk     Difficulty of Paying Living Expenses: Not hard at all   Food Insecurity: Unknown    Worried About [address] in the Last Year: Patient refused    [address] in the Last Year: Patient refused   Transportation Needs: Not on file   Physical Activity: Insufficiently Active    Days of Exercise per Week: 5 days Minutes of Exercise per Session: 20 min   Stress: Not on file   Social Connections: Not on file   Intimate Partner Violence: Not on file   Housing Stability: Not on file       Vitals:    11/23/22 1442   BP: 120/60   Pulse: 62   Resp: 16   Temp: 98.6 °F (37 °C)   TempSrc: Temporal   SpO2: 98%   Weight: 178 lb (80.7 kg)   Height: 5' 7\" (1.702 m)       Physical Exam  Vitals and nursing note reviewed. Constitutional:       Appearance: She is well-developed. HENT:      Head: Atraumatic. Right Ear: External ear normal.      Left Ear: External ear normal.      Nose: Nose normal.      Mouth/Throat:      Pharynx: No oropharyngeal exudate. Eyes:      Conjunctiva/sclera: Conjunctivae normal.      Pupils: Pupils are equal, round, and reactive to light. Neck:      Thyroid: No thyromegaly. Trachea: No tracheal deviation. Cardiovascular:      Rate and Rhythm: Normal rate and regular rhythm. Heart sounds: No murmur heard. No friction rub. No gallop. Pulmonary:      Effort: Pulmonary effort is normal. No respiratory distress. Breath sounds: Normal breath sounds. Abdominal:      General: Bowel sounds are normal.      Palpations: Abdomen is soft. Musculoskeletal:         General: No tenderness or deformity. Normal range of motion. Cervical back: Normal range of motion and neck supple. Lymphadenopathy:      Cervical: No cervical adenopathy. Skin:     General: Skin is warm and dry. Capillary Refill: Capillary refill takes less than 2 seconds. Findings: No rash. Neurological:      Mental Status: She is alert and oriented to person, place, and time. Sensory: No sensory deficit. Motor: No abnormal muscle tone.       Coordination: Coordination normal.      Deep Tendon Reflexes: Reflexes normal.           Seen By:  Cornelio Zamora DO

## 2022-12-07 ENCOUNTER — OFFICE VISIT (OUTPATIENT)
Dept: FAMILY MEDICINE CLINIC | Age: 72
End: 2022-12-07
Payer: MEDICARE

## 2022-12-07 VITALS
HEART RATE: 65 BPM | BODY MASS INDEX: 27.47 KG/M2 | HEIGHT: 67 IN | OXYGEN SATURATION: 96 % | TEMPERATURE: 97 F | SYSTOLIC BLOOD PRESSURE: 132 MMHG | WEIGHT: 175 LBS | DIASTOLIC BLOOD PRESSURE: 64 MMHG | RESPIRATION RATE: 16 BRPM

## 2022-12-07 DIAGNOSIS — F06.4 ANXIETY DISORDER DUE TO GENERAL MEDICAL CONDITION: ICD-10-CM

## 2022-12-07 DIAGNOSIS — I10 ESSENTIAL HYPERTENSION: Primary | ICD-10-CM

## 2022-12-07 DIAGNOSIS — I48.91 ATRIAL FIBRILLATION, UNSPECIFIED TYPE (HCC): ICD-10-CM

## 2022-12-07 LAB
INTERNATIONAL NORMALIZATION RATIO, POC: 3.2
PROTHROMBIN TIME, POC: 38.2

## 2022-12-07 PROCEDURE — 3074F SYST BP LT 130 MM HG: CPT | Performed by: FAMILY MEDICINE

## 2022-12-07 PROCEDURE — 85610 PROTHROMBIN TIME: CPT | Performed by: FAMILY MEDICINE

## 2022-12-07 PROCEDURE — 3078F DIAST BP <80 MM HG: CPT | Performed by: FAMILY MEDICINE

## 2022-12-07 PROCEDURE — 1123F ACP DISCUSS/DSCN MKR DOCD: CPT | Performed by: FAMILY MEDICINE

## 2022-12-07 PROCEDURE — 99213 OFFICE O/P EST LOW 20 MIN: CPT | Performed by: FAMILY MEDICINE

## 2022-12-07 RX ORDER — ESCITALOPRAM OXALATE 10 MG/1
10 TABLET ORAL EVERY EVENING
Qty: 90 TABLET | Refills: 1 | Status: SHIPPED | OUTPATIENT
Start: 2022-12-07

## 2022-12-07 ASSESSMENT — ENCOUNTER SYMPTOMS
CHEST TIGHTNESS: 0
COUGH: 0
ALLERGIC/IMMUNOLOGIC NEGATIVE: 1
SORE THROAT: 0
SINUS PAIN: 0
EYE PAIN: 0
EYE DISCHARGE: 0
VOMITING: 0
BLOOD IN STOOL: 0
DIARRHEA: 0
PHOTOPHOBIA: 0
ABDOMINAL PAIN: 0
NAUSEA: 0
TROUBLE SWALLOWING: 0
SHORTNESS OF BREATH: 0
BACK PAIN: 0
EYE REDNESS: 0

## 2022-12-07 NOTE — PROGRESS NOTES
22  [facility] : 1950 Sex: female  Age: 71 y.o. Assessment and Plan: Talya Blunt was seen today for office visit for anticoagulation management. Diagnoses and all orders for this visit:    Essential hypertension    Atrial fibrillation, unspecified type ([facility] [address]  -     POCT INR    Anxiety disorder due to general medical condition  -     escitalopram (LEXAPRO) 10 MG tablet; Take 1 tablet by mouth every evening    Hold today's warfarin dose only, resume regular schedule starting tomorrow. Recheck INR in 2 weeks. Return 2 weeks. Chief Complaint   Patient presents with    Office Visit for Anticoagulation Management       Patient returns for pro time. INR today 3.2, supratherapeutic, denies bleeding, bruising, dosage change, dietary changes. She was ill over , her dosage schedule was disrupted. Review of Systems   Constitutional:  Negative for appetite change, fatigue and unexpected weight change. HENT:  Negative for congestion, ear pain, hearing loss, sinus pain, sore throat and trouble swallowing. Eyes:  Negative for photophobia, pain, discharge and redness. Respiratory:  Negative for cough, chest tightness and shortness of breath. Cardiovascular:  Negative for chest pain, palpitations and leg swelling. Gastrointestinal:  Negative for abdominal pain, blood in stool, diarrhea, nausea and vomiting. Endocrine: Negative. Genitourinary:  Negative for dysuria, flank pain, frequency and hematuria. Musculoskeletal:  Negative for arthralgias, back pain, joint swelling and myalgias. Skin: Negative. Allergic/Immunologic: Negative. Neurological:  Negative for dizziness, seizures, syncope, weakness, light-headedness, numbness and headaches. Hematological:  Negative for adenopathy. Does not bruise/bleed easily. Psychiatric/Behavioral: Negative.          Current Outpatient Medications:     escitalopram (LEXAPRO) 10 MG tablet, Take 1 tablet by mouth every evening, Disp: 90 tablet, Rfl: 1    azelastine (ASTELIN) 0.1 % nasal spray, 2 sprays by Nasal route 2 times daily Use in each nostril as directed, Disp: 120 mL, Rfl: 3    propafenone (RYTHMOL) 225 MG tablet, Take 1 tablet by mouth three times daily, Disp: 270 tablet, Rfl: 1    warfarin (COUMADIN) 6 MG tablet, Indications: monday, friday 3 mg, all other days are 6 mg 1/2 tab on M  F and 1 tab all other days of the week OR AS DIRECTED, Disp: 30 tablet, Rfl: 5    verapamil (CALAN SR) 180 MG extended release tablet, Take 1 tablet by mouth in the morning., Disp: 90 tablet, Rfl: 1    topiramate (TOPAMAX) 50 MG tablet, Take 1 tablet by mouth in the morning and 1 tablet before bedtime. , Disp: 180 tablet, Rfl: 1    terbinafine (LAMISIL) 1 % cream, Apply affected area topically 2 times daily. , Disp: 42 g, Rfl: 2    ammonium lactate (LAC-HYDRIN) 12 % lotion, Apply topically twice daily, Disp: 400 g, Rfl: 2    IBRANCE 100 MG tablet, , Disp: , Rfl:     fulvestrant (FASLODEX) 250 MG/5ML SOLN IM injection, Inject 500 mg into the muscle once, Disp: , Rfl:     denosumab (XGEVA) 120 MG/1.7ML SOLN SC injection, Inject 120 mg into the skin once, Disp: , Rfl:     calcium carbonate-vitamin D (CALTRATE) 600-400 MG-UNIT TABS per tab, Take 1 tablet by mouth daily, Disp: , Rfl:     Alpha-Lipoic Acid 100 MG CAPS, Take by mouth, Disp: , Rfl:     b complex vitamins capsule, Take 1 capsule by mouth daily, Disp: , Rfl:   Allergies   Allergen Reactions    Diltiazem Hcl Other (See Comments)     Other reaction(s): weak    Cardizem [Diltiazem] Palpitations    Cefuroxime Axetil Nausea And Vomiting, Other (See Comments) and Palpitations    Claritin [Loratadine] Palpitations    Erythromycin Nausea And Vomiting    Morphine Nausea And Vomiting    Mucinex [Guaifenesin Er] Palpitations    Propoxyphene Other (See Comments) and Nausea And Vomiting     DIZZY  Other reaction(s): Free Text    Zyrtec [Cetirizine] Nausea And Vomiting and Other (See Comments) WEAK       Past Medical History:   Diagnosis Date    Bone cancer ([facility] [address] 02/2021    Breast cancer ([facility] [address] 04/2017    Broken rib     left    Broken toe     right    Chronic atrial fibrillation (HCC)     Fibroadenosis of breast     Headache     History of therapeutic radiation     Hyperlipidemia     Hypertension      Past Surgical History:   Procedure Laterality Date    [address]    BONE BIOPSY  04/2021    BREAST LUMPECTOMY Right 04/26/2017    COLONOSCOPY      CT BIOPSY PERCUTANEOUS DEEP BONE  04/07/2021    CT BIOPSY PERCUTANEOUS DEEP BONE 4/7/2021 Martinez Wilson II, MD [facility]    EYE SURGERY      SUDHIR LENSE IMPLANTS    OTHER SURGICAL HISTORY      port removal    TONSILLECTOMY      TUNNELED VENOUS PORT PLACEMENT  05/26/2017    Dr. Irais Davies     Family History   Problem Relation Age of Onset    Cancer Maternal Cousin 80        lung    Heart Disease Mother     Other Mother         [place] Palsy    Other Father         gall bladder problem, hip fx; AAA rupture    Coronary Art Dis Father         MI     Social History     Socioeconomic History    Marital status:      Spouse name: Albino Peña    Number of children: Not on file    Years of education: Not on file    Highest education level: Not on file   Occupational History    Occupation: retired      Comment: Retired 4 th    Tobacco Use    Smoking status: Never    Smokeless tobacco: Never   Vaping Use    Vaping Use: Not on file   Substance and Sexual Activity    Alcohol use: No     Comment: occasionally pop    Drug use: No    Sexual activity: Not Currently     Partners: Male   Other Topics Concern    Not on file   Social History Narrative    Drinks occasional Pepsi/Coke     Social Determinants of Health     Financial Resource Strain: Low Risk     Difficulty of Paying Living Expenses: Not hard at all   Food Insecurity: Unknown    Worried About [address] in the Last Year: Patient refused    [address] in the Last Year: Patient refused   Transportation Needs: Not on file   Physical Activity: Insufficiently Active    Days of Exercise per Week: 5 days    Minutes of Exercise per Session: 20 min   Stress: Not on file   Social Connections: Not on file   Intimate Partner Violence: Not on file   Housing Stability: Not on file       Vitals:    12/07/22 1138   BP: 132/64   Pulse: 65   Resp: 16   Temp: 97 °F (36.1 °C)   TempSrc: Temporal   SpO2: 96%   Weight: 175 lb (79.4 kg)   Height: 5' 7\" (1.702 m)       Physical Exam  Vitals and nursing note reviewed. Constitutional:       Appearance: She is well-developed. HENT:      Head: Atraumatic. Right Ear: External ear normal.      Left Ear: External ear normal.      Nose: Nose normal.      Mouth/Throat:      Pharynx: No oropharyngeal exudate. Eyes:      Conjunctiva/sclera: Conjunctivae normal.      Pupils: Pupils are equal, round, and reactive to light. Neck:      Thyroid: No thyromegaly. Trachea: No tracheal deviation. Cardiovascular:      Rate and Rhythm: Normal rate and regular rhythm. Heart sounds: No murmur heard. No friction rub. No gallop. Pulmonary:      Effort: Pulmonary effort is normal. No respiratory distress. Breath sounds: Normal breath sounds. Abdominal:      General: Bowel sounds are normal.      Palpations: Abdomen is soft. Musculoskeletal:         General: No tenderness or deformity. Normal range of motion. Cervical back: Normal range of motion and neck supple. Lymphadenopathy:      Cervical: No cervical adenopathy. Skin:     General: Skin is warm and dry. Capillary Refill: Capillary refill takes less than 2 seconds. Findings: No rash. Neurological:      Mental Status: She is alert and oriented to person, place, and time. Sensory: No sensory deficit. Motor: No abnormal muscle tone.       Coordination: Coordination normal.      Deep Tendon Reflexes: Reflexes normal.           Seen By:  Demetrio Gross DO

## 2022-12-21 ENCOUNTER — OFFICE VISIT (OUTPATIENT)
Dept: FAMILY MEDICINE CLINIC | Age: 72
End: 2022-12-21
Payer: MEDICARE

## 2022-12-21 VITALS
WEIGHT: 177 LBS | RESPIRATION RATE: 16 BRPM | SYSTOLIC BLOOD PRESSURE: 126 MMHG | HEART RATE: 62 BPM | TEMPERATURE: 97.2 F | BODY MASS INDEX: 27.78 KG/M2 | DIASTOLIC BLOOD PRESSURE: 70 MMHG | OXYGEN SATURATION: 98 % | HEIGHT: 67 IN

## 2022-12-21 DIAGNOSIS — I48.91 ATRIAL FIBRILLATION, UNSPECIFIED TYPE (HCC): ICD-10-CM

## 2022-12-21 DIAGNOSIS — C50.911 BREAST CANCER METASTASIZED TO AXILLARY LYMPH NODE, RIGHT (HCC): Primary | ICD-10-CM

## 2022-12-21 DIAGNOSIS — C77.3 BREAST CANCER METASTASIZED TO AXILLARY LYMPH NODE, RIGHT (HCC): Primary | ICD-10-CM

## 2022-12-21 LAB
INTERNATIONAL NORMALIZATION RATIO, POC: 2.6
PROTHROMBIN TIME, POC: 31.8

## 2022-12-21 PROCEDURE — 3074F SYST BP LT 130 MM HG: CPT | Performed by: FAMILY MEDICINE

## 2022-12-21 PROCEDURE — 3078F DIAST BP <80 MM HG: CPT | Performed by: FAMILY MEDICINE

## 2022-12-21 PROCEDURE — 1123F ACP DISCUSS/DSCN MKR DOCD: CPT | Performed by: FAMILY MEDICINE

## 2022-12-21 PROCEDURE — 99213 OFFICE O/P EST LOW 20 MIN: CPT | Performed by: FAMILY MEDICINE

## 2022-12-21 PROCEDURE — 85610 PROTHROMBIN TIME: CPT | Performed by: FAMILY MEDICINE

## 2022-12-21 ASSESSMENT — ENCOUNTER SYMPTOMS
BACK PAIN: 0
EYE PAIN: 0
ALLERGIC/IMMUNOLOGIC NEGATIVE: 1
PHOTOPHOBIA: 0
NAUSEA: 0
BLOOD IN STOOL: 0
SINUS PAIN: 0
TROUBLE SWALLOWING: 0
SHORTNESS OF BREATH: 0
ABDOMINAL PAIN: 0
EYE DISCHARGE: 0
SORE THROAT: 0
EYE REDNESS: 0
CHEST TIGHTNESS: 0
VOMITING: 0
COUGH: 0
DIARRHEA: 0

## 2022-12-21 NOTE — PROGRESS NOTES
22  Bri Cates : 1950 Sex: female  Age: 71 y.o. Assessment and Plan: Nazario Hilliard was seen today for atrial fibrillation. Diagnoses and all orders for this visit:    Breast cancer metastasized to axillary lymph node, right (HCC)    Atrial fibrillation, unspecified type ([facility] [address]  -     Cancel: POCT INR  -     POCT INR    Recheck pro time in 1 month. C/ Cally 30 managing her breast cancer. Return in about 4 weeks (around 2023). Chief Complaint   Patient presents with    Atrial Fibrillation       Patient returns for recheck pro time. Today 2.6, denies bleeding, bruising, dietary changes, dosage change. Feeling well, denies any new voiced complaints. Review of Systems   Constitutional:  Negative for appetite change, fatigue and unexpected weight change. HENT:  Negative for congestion, ear pain, hearing loss, sinus pain, sore throat and trouble swallowing. Eyes:  Negative for photophobia, pain, discharge and redness. Respiratory:  Negative for cough, chest tightness and shortness of breath. Cardiovascular:  Negative for chest pain, palpitations and leg swelling. Gastrointestinal:  Negative for abdominal pain, blood in stool, diarrhea, nausea and vomiting. Endocrine: Negative. Genitourinary:  Negative for dysuria, flank pain, frequency and hematuria. Musculoskeletal:  Negative for arthralgias, back pain, joint swelling and myalgias. Skin: Negative. Allergic/Immunologic: Negative. Neurological:  Negative for dizziness, seizures, syncope, weakness, light-headedness, numbness and headaches. Hematological:  Negative for adenopathy. Does not bruise/bleed easily. Psychiatric/Behavioral: Negative.          Current Outpatient Medications:     escitalopram (LEXAPRO) 10 MG tablet, Take 1 tablet by mouth every evening, Disp: 90 tablet, Rfl: 1    azelastine (ASTELIN) 0.1 % nasal spray, 2 sprays by Nasal route 2 times daily Use in each nostril as directed, Disp: 120 mL, Rfl: 3    propafenone (RYTHMOL) 225 MG tablet, Take 1 tablet by mouth three times daily, Disp: 270 tablet, Rfl: 1    warfarin (COUMADIN) 6 MG tablet, Indications: monday, friday 3 mg, all other days are 6 mg 1/2 tab on M  F and 1 tab all other days of the week OR AS DIRECTED, Disp: 30 tablet, Rfl: 5    verapamil (CALAN SR) 180 MG extended release tablet, Take 1 tablet by mouth in the morning., Disp: 90 tablet, Rfl: 1    topiramate (TOPAMAX) 50 MG tablet, Take 1 tablet by mouth in the morning and 1 tablet before bedtime. , Disp: 180 tablet, Rfl: 1    terbinafine (LAMISIL) 1 % cream, Apply affected area topically 2 times daily. , Disp: 42 g, Rfl: 2    ammonium lactate (LAC-HYDRIN) 12 % lotion, Apply topically twice daily, Disp: 400 g, Rfl: 2    IBRANCE 100 MG tablet, , Disp: , Rfl:     fulvestrant (FASLODEX) 250 MG/5ML SOLN IM injection, Inject 500 mg into the muscle once, Disp: , Rfl:     denosumab (XGEVA) 120 MG/1.7ML SOLN SC injection, Inject 120 mg into the skin once, Disp: , Rfl:     calcium carbonate-vitamin D (CALTRATE) 600-400 MG-UNIT TABS per tab, Take 1 tablet by mouth daily, Disp: , Rfl:     Alpha-Lipoic Acid 100 MG CAPS, Take by mouth, Disp: , Rfl:     b complex vitamins capsule, Take 1 capsule by mouth daily, Disp: , Rfl:   Allergies   Allergen Reactions    Diltiazem Hcl Other (See Comments)     Other reaction(s): weak    Cardizem [Diltiazem] Palpitations    Cefuroxime Axetil Nausea And Vomiting, Other (See Comments) and Palpitations    Claritin [Loratadine] Palpitations    Erythromycin Nausea And Vomiting    Morphine Nausea And Vomiting    Mucinex [Guaifenesin Er] Palpitations    Propoxyphene Other (See Comments) and Nausea And Vomiting     DIZZY  Other reaction(s): Free Text    Zyrtec [Cetirizine] Nausea And Vomiting and Other (See Comments)     WEAK       Past Medical History:   Diagnosis Date    B[facility] ([facility] 02/2021    Breast cancer ([facility] 04/2017    Broken rib     left    Broken toe     right Chronic atrial fibrillation (HCC)     Fibroadenosis of breast     Headache     History of therapeutic radiation     Hyperlipidemia     Hypertension      Past Surgical History:   Procedure Laterality Date    [address]    BONE BIOPSY  04/2021    BREAST LUMPECTOMY Right 04/26/2017    COLONOSCOPY      CT BIOPSY PERCUTANEOUS DEEP BONE  04/07/2021    CT BIOPSY PERCUTANEOUS DEEP BONE 4/7/2021 Lissette Doherty MD [facility]    EYE SURGERY      SUDHIR LENSE IMPLANTS    OTHER SURGICAL HISTORY      port removal    TONSILLECTOMY      TUNNELED VENOUS PORT PLACEMENT  05/26/2017    Dr. Autumn Rachel     Family History   Problem Relation Age of Onset    Cancer Maternal Cousin 80        lung    Heart Disease Mother     Other Mother         [place] Palsy    Other Father         gall bladder problem, hip fx; AAA rupture    Coronary Art Dis Father         MI     Social History     Socioeconomic History    Marital status:      Spouse name: Luis Oneill    Number of children: Not on file    Years of education: Not on file    Highest education level: Not on file   Occupational History    Occupation: retired      Comment: Retired 4 th    Tobacco Use    Smoking status: Never    Smokeless tobacco: Never   Vaping Use    Vaping Use: Not on file   Substance and Sexual Activity    Alcohol use: No     Comment: occasionally pop    Drug use: No    Sexual activity: Not Currently     Partners: Male   Other Topics Concern    Not on file   Social History Narrative    Drinks occasional Pepsi/Coke     Social Determinants of Health     Financial Resource Strain: Low Risk     Difficulty of Paying Living Expenses: Not hard at all   Food Insecurity: Unknown    Worried About [address] in the Last Year: Patient refused    [address] in the Last Year: Patient refused   Transportation Needs: Not on file   Physical Activity: Insufficiently Active    Days of Exercise per Week: 5 days    Minutes of Exercise per Session: 20 min   Stress: Not on file   Social Connections: Not on file   Intimate Partner Violence: Not on file   Housing Stability: Not on file       Vitals:    12/21/22 1130 12/21/22 1153   BP: (!) 140/78 126/70   Pulse: 62    Resp: 16    Temp: 97.2 °F (36.2 °C)    TempSrc: Temporal    SpO2: 98%    Weight: 177 lb (80.3 kg)    Height: 5' 7\" (1.702 m)        Physical Exam  Vitals and nursing note reviewed. Constitutional:       Appearance: She is well-developed. HENT:      Head: Atraumatic. Right Ear: External ear normal.      Left Ear: External ear normal.      Nose: Nose normal.      Mouth/Throat:      Pharynx: No oropharyngeal exudate. Eyes:      Conjunctiva/sclera: Conjunctivae normal.      Pupils: Pupils are equal, round, and reactive to light. Neck:      Thyroid: No thyromegaly. Trachea: No tracheal deviation. Cardiovascular:      Rate and Rhythm: Normal rate and regular rhythm. Heart sounds: No murmur heard. No friction rub. No gallop. Pulmonary:      Effort: Pulmonary effort is normal. No respiratory distress. Breath sounds: Normal breath sounds. Abdominal:      General: Bowel sounds are normal.      Palpations: Abdomen is soft. Musculoskeletal:         General: No tenderness or deformity. Normal range of motion. Cervical back: Normal range of motion and neck supple. Lymphadenopathy:      Cervical: No cervical adenopathy. Skin:     General: Skin is warm and dry. Capillary Refill: Capillary refill takes less than 2 seconds. Findings: No rash. Neurological:      Mental Status: She is alert and oriented to person, place, and time. Sensory: No sensory deficit. Motor: No abnormal muscle tone.       Coordination: Coordination normal.      Deep Tendon Reflexes: Reflexes normal.           Seen By:  Santi Hahn DO

## 2023-01-04 ENCOUNTER — PROCEDURE VISIT (OUTPATIENT)
Dept: PODIATRY | Age: 73
End: 2023-01-04
Payer: MEDICARE

## 2023-01-04 DIAGNOSIS — Z79.01 ENCOUNTER FOR CURRENT LONG-TERM USE OF ANTICOAGULANTS: ICD-10-CM

## 2023-01-04 DIAGNOSIS — B35.1 TINEA UNGUIUM: Primary | ICD-10-CM

## 2023-01-04 DIAGNOSIS — G60.8 HEREDITARY SENSORY NEUROPATHY: ICD-10-CM

## 2023-01-04 DIAGNOSIS — L84 CORNS AND CALLOSITIES: ICD-10-CM

## 2023-01-04 DIAGNOSIS — B35.3 TINEA PEDIS OF BOTH FEET: ICD-10-CM

## 2023-01-04 PROCEDURE — 11056 PARNG/CUTG B9 HYPRKR LES 2-4: CPT | Performed by: PODIATRIST

## 2023-01-04 PROCEDURE — 11721 DEBRIDE NAIL 6 OR MORE: CPT | Performed by: PODIATRIST

## 2023-01-04 NOTE — PROGRESS NOTES
Hilda Anthony is here today for nail care. her PCP is Vini Maria DO last OV was 12/21/2022. CC:    Follow-up foot and ankle exam.    HPI:   Follow-up foot ankle exam  History anticoagulant therapy with Coumadin. No foot or ankle pain today. No additional pedal complaints. ROS:  Const: Denies constitutional symptoms  Musculo: Denies symptoms other than stated above  Skin: Denies symptoms other than stated above       Current Outpatient Medications:     escitalopram (LEXAPRO) 10 MG tablet, Take 1 tablet by mouth every evening, Disp: 90 tablet, Rfl: 1    azelastine (ASTELIN) 0.1 % nasal spray, 2 sprays by Nasal route 2 times daily Use in each nostril as directed, Disp: 120 mL, Rfl: 3    propafenone (RYTHMOL) 225 MG tablet, Take 1 tablet by mouth three times daily, Disp: 270 tablet, Rfl: 1    warfarin (COUMADIN) 6 MG tablet, Indications: monday, friday 3 mg, all other days are 6 mg 1/2 tab on M  F and 1 tab all other days of the week OR AS DIRECTED, Disp: 30 tablet, Rfl: 5    verapamil (CALAN SR) 180 MG extended release tablet, Take 1 tablet by mouth in the morning., Disp: 90 tablet, Rfl: 1    topiramate (TOPAMAX) 50 MG tablet, Take 1 tablet by mouth in the morning and 1 tablet before bedtime. , Disp: 180 tablet, Rfl: 1    terbinafine (LAMISIL) 1 % cream, Apply affected area topically 2 times daily. , Disp: 42 g, Rfl: 2    ammonium lactate (LAC-HYDRIN) 12 % lotion, Apply topically twice daily, Disp: 400 g, Rfl: 2    IBRANCE 100 MG tablet, , Disp: , Rfl:     fulvestrant (FASLODEX) 250 MG/5ML SOLN IM injection, Inject 500 mg into the muscle once, Disp: , Rfl:     denosumab (XGEVA) 120 MG/1.7ML SOLN SC injection, Inject 120 mg into the skin once, Disp: , Rfl:     calcium carbonate-vitamin D (CALTRATE) 600-400 MG-UNIT TABS per tab, Take 1 tablet by mouth daily, Disp: , Rfl:     Alpha-Lipoic Acid 100 MG CAPS, Take by mouth, Disp: , Rfl:     b complex vitamins capsule, Take 1 capsule by mouth daily, Disp: , Rfl:   Allergies   Allergen Reactions    Diltiazem Hcl Other (See Comments)     Other reaction(s): weak    Cardizem [Diltiazem] Palpitations    Cefuroxime Axetil Nausea And Vomiting, Other (See Comments) and Palpitations    Claritin [Loratadine] Palpitations    Erythromycin Nausea And Vomiting    Morphine Nausea And Vomiting    Mucinex [Guaifenesin Er] Palpitations    Propoxyphene Other (See Comments) and Nausea And Vomiting     DIZZY  Other reaction(s): Free Text    Zyrtec [Cetirizine] Nausea And Vomiting and Other (See Comments)     WEAK       Past Medical History:   Diagnosis Date    [facility] 02/2021    Breast cancer ([facility] 04/2017    Broken rib     left    Broken toe     right    Chronic atrial fibrillation (HCC)     Fibroadenosis of breast     Headache     History of therapeutic radiation     Hyperlipidemia     Hypertension            There were no vitals filed for this visit. Work History/Social History:  Focused Lower Extremity Physical Exam:    Neurovascular examination:    Dorsalis Pedis palpable bilateral.  Posterior tibialis palpable bilateral.    Capillary Refill Time:  Immediate return  Hair growth:  Symmetrical and bilateral   Skin:  Not atrophic  Edema: Mild edema dorsal foot bilateral.  No erythema. Neurologic:  Light touch diminished bilateral.      Musculoskeletal/ Orthopedic examination:    Equinis: Absent bilateral  Dorsiflexion, plantarflexion, inversion, eversion bilateral 5 out of 5 muscle strength  Wiggling toes. Mild tenderness lateral border left great toe. Dermatology examination:    Toenails 1-5 right and left thickened, elongated, dystrophic, mycotic with subungual debris. Webspaces 1-4 bilateral clean, dry and intact. Mild hyperkeratotic tissue fifth metatarsal bilateral.  No open skin lesions. Mild incurvated lateral border left great toenail. No erythema. No open wounds. Assessment and Plan: Víctor Stanton was seen today for callouses and nail problem.     Diagnoses and all orders for this visit:    Tinea unguium    Corns and callosities    Encounter for current long-term use of anticoagulants    Hereditary sensory neuropathy    Tinea pedis of both feet        Follow-up foot ankle exam  History anticoagulated with Coumadin. Manual debridement with standard technique toenails 1 through 5 right and left in thickness and length. Patient tolerated procedure well. Slant back procedure lateral border left great toenail. Tolerated procedures well. Paring hyperkeratotic tissue fifth metatarsal bilateral.  Avoid barefoot. Follow-up 2 months. Return in about 2 months (around 3/4/2023). Seen By:  Ileana Abdi DPM      Document was created using voice recognition software. Note was reviewed, however may contain grammatical errors.

## 2023-01-09 ENCOUNTER — OFFICE VISIT (OUTPATIENT)
Dept: FAMILY MEDICINE CLINIC | Age: 73
End: 2023-01-09
Payer: MEDICARE

## 2023-01-09 VITALS
SYSTOLIC BLOOD PRESSURE: 132 MMHG | TEMPERATURE: 98.5 F | HEART RATE: 65 BPM | WEIGHT: 178 LBS | DIASTOLIC BLOOD PRESSURE: 68 MMHG | OXYGEN SATURATION: 97 % | HEIGHT: 67 IN | BODY MASS INDEX: 27.94 KG/M2 | RESPIRATION RATE: 18 BRPM

## 2023-01-09 DIAGNOSIS — R09.81 HEAD CONGESTION: Primary | ICD-10-CM

## 2023-01-09 LAB
Lab: ABNORMAL
PERFORMING INSTRUMENT: ABNORMAL
QC PASS/FAIL: ABNORMAL
SARS-COV-2, POC: DETECTED

## 2023-01-09 PROCEDURE — 3075F SYST BP GE 130 - 139MM HG: CPT | Performed by: STUDENT IN AN ORGANIZED HEALTH CARE EDUCATION/TRAINING PROGRAM

## 2023-01-09 PROCEDURE — 1123F ACP DISCUSS/DSCN MKR DOCD: CPT | Performed by: STUDENT IN AN ORGANIZED HEALTH CARE EDUCATION/TRAINING PROGRAM

## 2023-01-09 PROCEDURE — 3078F DIAST BP <80 MM HG: CPT | Performed by: STUDENT IN AN ORGANIZED HEALTH CARE EDUCATION/TRAINING PROGRAM

## 2023-01-09 PROCEDURE — 87426 SARSCOV CORONAVIRUS AG IA: CPT | Performed by: STUDENT IN AN ORGANIZED HEALTH CARE EDUCATION/TRAINING PROGRAM

## 2023-01-09 PROCEDURE — 99213 OFFICE O/P EST LOW 20 MIN: CPT | Performed by: STUDENT IN AN ORGANIZED HEALTH CARE EDUCATION/TRAINING PROGRAM

## 2023-01-09 ASSESSMENT — ENCOUNTER SYMPTOMS
COUGH: 0
NAUSEA: 0
SHORTNESS OF BREATH: 0
RHINORRHEA: 0
ABDOMINAL PAIN: 0
VOMITING: 0

## 2023-01-09 NOTE — PROGRESS NOTES
Noemi Ng (:  1950) is a 71 y.o. female,Established patient, here for evaluation of the following chief complaint(s):  Fatigue (Onset x 7 days /Very exhausted /No appetite ), Generalized Body Aches, Headache, and Cough         ASSESSMENT/PLAN:  1. Head congestion  -     POCT COVID-19, Antigen    Immunocompromised, however she is improving, no breathing issues and 7-8 days out. We will forgo treatment at this time and continue with watchful monitoring. Let us know if any new symptoms develop. Reassuring physical exam and vitals. Discussed CDC protocol for quarantine. Discussed return and ER precautions. Patient and or parent verbalized understanding    No follow-ups on file. Subjective   SUBJECTIVE/OBJECTIVE:  Achy  Sore throat  Cough  Post nasal drip  No fever  Cancer history-being treated currently  Started 7 days ago  Having a lot of muscle pain and fatige      Review of Systems   Constitutional:  Positive for fatigue. Negative for chills and fever. HENT:  Negative for congestion and rhinorrhea. Respiratory:  Negative for cough and shortness of breath. Cardiovascular:  Negative for chest pain and leg swelling. Gastrointestinal:  Negative for abdominal pain, nausea and vomiting. Genitourinary:  Negative for dysuria and hematuria. Musculoskeletal:  Positive for myalgias. Negative for arthralgias. Skin:  Negative for rash and wound. Neurological:  Negative for dizziness and light-headedness. Objective   Physical Exam  Vitals reviewed. Constitutional:       General: She is not in acute distress. HENT:      Head: Normocephalic and atraumatic. Eyes:      Extraocular Movements: Extraocular movements intact. Conjunctiva/sclera: Conjunctivae normal.   Cardiovascular:      Rate and Rhythm: Normal rate and regular rhythm. Pulmonary:      Effort: Pulmonary effort is normal.      Breath sounds: Normal breath sounds. No wheezing.    Abdominal:      General: Abdomen is flat. There is no distension. Musculoskeletal:         General: No tenderness or deformity. Neurological:      General: No focal deficit present. Mental Status: She is alert and oriented to person, place, and time. An electronic signature was used to authenticate this note.     --Kira Rojas MD

## 2023-01-17 DIAGNOSIS — G43.009 MIGRAINE WITHOUT AURA, NOT REFRACTORY: ICD-10-CM

## 2023-01-17 NOTE — TELEPHONE ENCOUNTER
Last Appointment:  12/21/2022  Future Appointments   Date Time Provider Comfort Contrerasi   1/20/2023 10:30 AM [place] DO AGUSTIN Singh JUAN CARLOS [facility]   3/15/2023  1:15 PM Yovany Jain DPM Col Podiatry [facility]   5/16/2023 10:00 AM Wilfrido Aguero MD ADV WMNS [facility]   7/17/2023  8:00 AM MHYX [facility] S[facility] COL JACB[facility]   7/17/2023  9:00 AM [address], APRN - CNP COL SPECIALTY [facility]   11/7/2023 10:00  Pool Magana, DO Philippe ENT [facility]      Pt will not have enough til appt

## 2023-01-18 RX ORDER — TOPIRAMATE 50 MG/1
50 TABLET, FILM COATED ORAL 2 TIMES DAILY
Qty: 180 TABLET | Refills: 1 | Status: SHIPPED | OUTPATIENT
Start: 2023-01-18

## 2023-01-20 ENCOUNTER — OFFICE VISIT (OUTPATIENT)
Dept: FAMILY MEDICINE CLINIC | Age: 73
End: 2023-01-20

## 2023-01-20 VITALS
SYSTOLIC BLOOD PRESSURE: 138 MMHG | DIASTOLIC BLOOD PRESSURE: 60 MMHG | HEIGHT: 67 IN | TEMPERATURE: 98.2 F | RESPIRATION RATE: 16 BRPM | WEIGHT: 173 LBS | HEART RATE: 62 BPM | BODY MASS INDEX: 27.15 KG/M2 | OXYGEN SATURATION: 98 %

## 2023-01-20 DIAGNOSIS — I48.91 ATRIAL FIBRILLATION, UNSPECIFIED TYPE (HCC): ICD-10-CM

## 2023-01-20 LAB
INTERNATIONAL NORMALIZATION RATIO, POC: 5.4
PROTHROMBIN TIME, POC: 64.6

## 2023-01-20 ASSESSMENT — ENCOUNTER SYMPTOMS
NAUSEA: 0
ABDOMINAL PAIN: 0
EYE PAIN: 0
SHORTNESS OF BREATH: 0
ALLERGIC/IMMUNOLOGIC NEGATIVE: 1
PHOTOPHOBIA: 0
CHEST TIGHTNESS: 0
EYE DISCHARGE: 0
BLOOD IN STOOL: 0
VOMITING: 0
DIARRHEA: 0
SORE THROAT: 0
EYE REDNESS: 0
SINUS PAIN: 0
TROUBLE SWALLOWING: 0
BACK PAIN: 0
COUGH: 0

## 2023-01-20 NOTE — PROGRESS NOTES
23  Alanis Russ : 1950 Sex: female  Age: 71 y.o. Assessment and Plan: Jc Lazcano was seen today for hypertension and office visit for anticoagulation management. Diagnoses and all orders for this visit:    Atrial fibrillation, unspecified type ([facility] [address]  -     POCT INR  INR today 5.4, supratherapeutic. Hold warfarin daily and tomorrow, restart 3 mg on , recheck INR in the office on Monday. Return in about 3 days (around 2023). Chief Complaint   Patient presents with    Hypertension    Office Visit for Anticoagulation Management       Patient here for recheck pro time, INR today 5.4, supratherapeutic, denies bleeding, bruising, dosage change. She was discontinued 1 over cancer treatments and just recovering from [facility]. Review of Systems   Constitutional:  Negative for appetite change, fatigue and unexpected weight change. HENT:  Positive for congestion and postnasal drip. Negative for ear pain, hearing loss, sinus pain, sore throat and trouble swallowing. Eyes:  Negative for photophobia, pain, discharge and redness. Respiratory:  Negative for cough, chest tightness and shortness of breath. Cardiovascular:  Negative for chest pain, palpitations and leg swelling. Gastrointestinal:  Negative for abdominal pain, blood in stool, diarrhea, nausea and vomiting. Endocrine: Negative. Genitourinary:  Negative for dysuria, flank pain, frequency and hematuria. Musculoskeletal:  Negative for arthralgias, back pain, joint swelling and myalgias. Skin: Negative. Allergic/Immunologic: Negative. Neurological:  Negative for dizziness, seizures, syncope, weakness, light-headedness, numbness and headaches. Hematological:  Negative for adenopathy. Does not bruise/bleed easily. Psychiatric/Behavioral: Negative.          Current Outpatient Medications:     topiramate (TOPAMAX) 50 MG tablet, Take 1 tablet by mouth 2 times daily, Disp: 180 tablet, Rfl: 1    escitalopram (LEXAPRO) 10 MG tablet, Take 1 tablet by mouth every evening, Disp: 90 tablet, Rfl: 1    azelastine (ASTELIN) 0.1 % nasal spray, 2 sprays by Nasal route 2 times daily Use in each nostril as directed, Disp: 120 mL, Rfl: 3    propafenone (RYTHMOL) 225 MG tablet, Take 1 tablet by mouth three times daily, Disp: 270 tablet, Rfl: 1    warfarin (COUMADIN) 6 MG tablet, Indications: monday, friday 3 mg, all other days are 6 mg 1/2 tab on M  F and 1 tab all other days of the week OR AS DIRECTED, Disp: 30 tablet, Rfl: 5    verapamil (CALAN SR) 180 MG extended release tablet, Take 1 tablet by mouth in the morning., Disp: 90 tablet, Rfl: 1    terbinafine (LAMISIL) 1 % cream, Apply affected area topically 2 times daily. , Disp: 42 g, Rfl: 2    ammonium lactate (LAC-HYDRIN) 12 % lotion, Apply topically twice daily, Disp: 400 g, Rfl: 2    fulvestrant (FASLODEX) 250 MG/5ML SOLN IM injection, Inject 500 mg into the muscle once, Disp: , Rfl:     denosumab (XGEVA) 120 MG/1.7ML SOLN SC injection, Inject 120 mg into the skin once, Disp: , Rfl:     calcium carbonate-vitamin D (CALTRATE) 600-400 MG-UNIT TABS per tab, Take 1 tablet by mouth daily, Disp: , Rfl:     Alpha-Lipoic Acid 100 MG CAPS, Take by mouth, Disp: , Rfl:     b complex vitamins capsule, Take 1 capsule by mouth daily, Disp: , Rfl:     IBRANCE 100 MG tablet, , Disp: , Rfl:   Allergies   Allergen Reactions    Diltiazem Hcl Other (See Comments)     Other reaction(s): weak    Cardizem [Diltiazem] Palpitations    Cefuroxime Axetil Nausea And Vomiting, Other (See Comments) and Palpitations    Claritin [Loratadine] Palpitations    Erythromycin Nausea And Vomiting    Morphine Nausea And Vomiting    Mucinex [Guaifenesin Er] Palpitations    Propoxyphene Other (See Comments) and Nausea And Vomiting     DIZZY  Other reaction(s): Free Text    Zyrtec [Cetirizine] Nausea And Vomiting and Other (See Comments)     WEAK       Past Medical History:   Diagnosis Date    Bone cancer ([address] 02/2021 Breast cancer ([facility] [address] 04/2017    Broken rib     left    Broken toe     right    Chronic atrial fibrillation (HCC)     Fibroadenosis of breast     Headache     History of therapeutic radiation     Hyperlipidemia     Hypertension      Past Surgical History:   Procedure Laterality Date    [address]    BONE BIOPSY  04/2021    BREAST LUMPECTOMY Right 04/26/2017    COLONOSCOPY      CT BIOPSY PERCUTANEOUS DEEP BONE  04/07/2021    CT BIOPSY PERCUTANEOUS DEEP BONE 4/7/2021 Abby Brewster MD [facility]    EYE SURGERY      SUDHIR LENSE IMPLANTS    OTHER SURGICAL HISTORY      port removal    TONSILLECTOMY      TUNNELED VENOUS PORT PLACEMENT  05/26/2017    Dr. Arnie Woodruff     Family History   Problem Relation Age of Onset    Cancer Maternal Cousin 80        lung    Heart Disease Mother     Other Mother         [place] Palsy    Other Father         gall bladder problem, hip fx; AAA rupture    Coronary Art Dis Father         MI     Social History     Socioeconomic History    Marital status:      Spouse name: Werner Ryan    Number of children: Not on file    Years of education: Not on file    Highest education level: Not on file   Occupational History    Occupation: retired      Comment: Retired 4 th    Tobacco Use    Smoking status: Never    Smokeless tobacco: Never   Vaping Use    Vaping Use: Not on file   Substance and Sexual Activity    Alcohol use: No     Comment: occasionally pop    Drug use: No    Sexual activity: Not Currently     Partners: Male   Other Topics Concern    Not on file   Social History Narrative    Drinks occasional Pepsi/Coke     Social Determinants of Health     Financial Resource Strain: Low Risk     Difficulty of Paying Living Expenses: Not hard at all   Food Insecurity: Unknown    Worried About [address] in the Last Year: Patient refused    [address] in the Last Year: Patient refused   Transportation Needs: Not on file   Physical Activity: Insufficiently Active    Days of Exercise per Week: 5 days    Minutes of Exercise per Session: 20 min   Stress: Not on file   Social Connections: Not on file   Intimate Partner Violence: Not on file   Housing Stability: Not on file       Vitals:    01/20/23 1048   BP: 138/60   Pulse: 62   Resp: 16   Temp: 98.2 °F (36.8 °C)   TempSrc: Temporal   SpO2: 98%   Weight: 173 lb (78.5 kg)   Height: 5' 7\" (1.702 m)       Physical Exam  Vitals and nursing note reviewed. Constitutional:       Appearance: She is well-developed. HENT:      Head: Atraumatic. Right Ear: External ear normal.      Left Ear: External ear normal.      Nose: Congestion present. Mouth/Throat:      Pharynx: No oropharyngeal exudate. Eyes:      Conjunctiva/sclera: Conjunctivae normal.      Pupils: Pupils are equal, round, and reactive to light. Neck:      Thyroid: No thyromegaly. Trachea: No tracheal deviation. Cardiovascular:      Rate and Rhythm: Normal rate and regular rhythm. Heart sounds: No murmur heard. No friction rub. No gallop. Pulmonary:      Effort: Pulmonary effort is normal. No respiratory distress. Breath sounds: Normal breath sounds. Abdominal:      General: Bowel sounds are normal.      Palpations: Abdomen is soft. Musculoskeletal:         General: No tenderness or deformity. Normal range of motion. Cervical back: Normal range of motion and neck supple. Lymphadenopathy:      Cervical: No cervical adenopathy. Skin:     General: Skin is warm and dry. Capillary Refill: Capillary refill takes less than 2 seconds. Findings: No rash. Neurological:      Mental Status: She is alert and oriented to person, place, and time. Sensory: No sensory deficit. Motor: No abnormal muscle tone.       Coordination: Coordination normal.      Deep Tendon Reflexes: Reflexes normal.           Seen By:  Salvador Singh DO

## 2023-01-23 ENCOUNTER — OFFICE VISIT (OUTPATIENT)
Dept: FAMILY MEDICINE CLINIC | Age: 73
End: 2023-01-23
Payer: MEDICARE

## 2023-01-23 VITALS
RESPIRATION RATE: 16 BRPM | BODY MASS INDEX: 27.31 KG/M2 | DIASTOLIC BLOOD PRESSURE: 74 MMHG | SYSTOLIC BLOOD PRESSURE: 138 MMHG | HEIGHT: 67 IN | HEART RATE: 73 BPM | WEIGHT: 174 LBS | OXYGEN SATURATION: 99 % | TEMPERATURE: 97.8 F

## 2023-01-23 DIAGNOSIS — I48.91 ATRIAL FIBRILLATION, UNSPECIFIED TYPE (HCC): Primary | ICD-10-CM

## 2023-01-23 LAB
INTERNATIONAL NORMALIZATION RATIO, POC: 1.6
PROTHROMBIN TIME, POC: 18.9

## 2023-01-23 PROCEDURE — 99213 OFFICE O/P EST LOW 20 MIN: CPT | Performed by: FAMILY MEDICINE

## 2023-01-23 PROCEDURE — 3078F DIAST BP <80 MM HG: CPT | Performed by: FAMILY MEDICINE

## 2023-01-23 PROCEDURE — 3075F SYST BP GE 130 - 139MM HG: CPT | Performed by: FAMILY MEDICINE

## 2023-01-23 PROCEDURE — 1123F ACP DISCUSS/DSCN MKR DOCD: CPT | Performed by: FAMILY MEDICINE

## 2023-01-23 PROCEDURE — 85610 PROTHROMBIN TIME: CPT | Performed by: FAMILY MEDICINE

## 2023-01-23 ASSESSMENT — ENCOUNTER SYMPTOMS
EYE DISCHARGE: 0
NAUSEA: 0
CHEST TIGHTNESS: 0
BACK PAIN: 0
ABDOMINAL PAIN: 0
SINUS PAIN: 0
PHOTOPHOBIA: 0
EYE REDNESS: 0
VOMITING: 0
SORE THROAT: 0
SHORTNESS OF BREATH: 0
DIARRHEA: 0
ALLERGIC/IMMUNOLOGIC NEGATIVE: 1
EYE PAIN: 0
BLOOD IN STOOL: 0
TROUBLE SWALLOWING: 0
COUGH: 0

## 2023-01-23 NOTE — PROGRESS NOTES
23  Bharath Saavedra : 1950 Sex: female  Age: 71 y.o. Assessment and Plan: Rcio Hagan was seen today for atrial fibrillation. Diagnoses and all orders for this visit:    Atrial fibrillation, unspecified type [facility])  -     POCT INR  Adjust warfarin to 6 mg , 3 mg  and . Recheck INR in 1 week. Return in about 1 week (around 2023). Chief Complaint   Patient presents with    Atrial Fibrillation       Patient here for recheck pro time. INR today 1.6, this is down from 5.4 on Friday. Hold warfarin over the weekend and restarting 3 mg yesterday. Denies bleeding, bruising, dietary changes. Review of Systems   Constitutional:  Negative for appetite change, fatigue and unexpected weight change. HENT:  Negative for congestion, ear pain, hearing loss, sinus pain, sore throat and trouble swallowing. Eyes:  Negative for photophobia, pain, discharge and redness. Respiratory:  Negative for cough, chest tightness and shortness of breath. Cardiovascular:  Negative for chest pain, palpitations and leg swelling. Gastrointestinal:  Negative for abdominal pain, blood in stool, diarrhea, nausea and vomiting. Endocrine: Negative. Genitourinary:  Negative for dysuria, flank pain, frequency and hematuria. Musculoskeletal:  Negative for arthralgias, back pain, joint swelling and myalgias. Skin: Negative. Allergic/Immunologic: Negative. Neurological:  Negative for dizziness, seizures, syncope, weakness, light-headedness, numbness and headaches. Hematological:  Negative for adenopathy. Does not bruise/bleed easily. Psychiatric/Behavioral: Negative.          Current Outpatient Medications:     topiramate (TOPAMAX) 50 MG tablet, Take 1 tablet by mouth 2 times daily, Disp: 180 tablet, Rfl: 1    escitalopram (LEXAPRO) 10 MG tablet, Take 1 tablet by mouth every evening, Disp: 90 tablet, Rfl: 1    azelastine (ASTELIN) 0.1 % nasal spray, 2 sprays by Nasal route 2 times daily Use in each nostril as directed, Disp: 120 mL, Rfl: 3    propafenone (RYTHMOL) 225 MG tablet, Take 1 tablet by mouth three times daily, Disp: 270 tablet, Rfl: 1    warfarin (COUMADIN) 6 MG tablet, Indications: monday, friday 3 mg, all other days are 6 mg 1/2 tab on M  F and 1 tab all other days of the week OR AS DIRECTED, Disp: 30 tablet, Rfl: 5    verapamil (CALAN SR) 180 MG extended release tablet, Take 1 tablet by mouth in the morning., Disp: 90 tablet, Rfl: 1    terbinafine (LAMISIL) 1 % cream, Apply affected area topically 2 times daily. , Disp: 42 g, Rfl: 2    ammonium lactate (LAC-HYDRIN) 12 % lotion, Apply topically twice daily, Disp: 400 g, Rfl: 2    IBRANCE 100 MG tablet, , Disp: , Rfl:     fulvestrant (FASLODEX) 250 MG/5ML SOLN IM injection, Inject 500 mg into the muscle once, Disp: , Rfl:     denosumab (XGEVA) 120 MG/1.7ML SOLN SC injection, Inject 120 mg into the skin once, Disp: , Rfl:     calcium carbonate-vitamin D (CALTRATE) 600-400 MG-UNIT TABS per tab, Take 1 tablet by mouth daily, Disp: , Rfl:     Alpha-Lipoic Acid 100 MG CAPS, Take by mouth, Disp: , Rfl:     b complex vitamins capsule, Take 1 capsule by mouth daily, Disp: , Rfl:   Allergies   Allergen Reactions    Diltiazem Hcl Other (See Comments)     Other reaction(s): weak    Cardizem [Diltiazem] Palpitations    Cefuroxime Axetil Nausea And Vomiting, Other (See Comments) and Palpitations    Claritin [Loratadine] Palpitations    Erythromycin Nausea And Vomiting    Morphine Nausea And Vomiting    Mucinex [Guaifenesin Er] Palpitations    Propoxyphene Other (See Comments) and Nausea And Vomiting     DIZZY  Other reaction(s): Free Text    Zyrtec [Cetirizine] Nausea And Vomiting and Other (See Comments)     WEAK       Past Medical History:   Diagnosis Date    Bone cancer ([facility] 02/2021    Breast cancer ([facility] 04/2017    Broken rib     left    Broken toe     right    Chronic atrial fibrillation ([address] Fibroadenosis of breast     Headache     History of therapeutic radiation     Hyperlipidemia     Hypertension      Past Surgical History:   Procedure Laterality Date    [address]    BONE BIOPSY  04/2021    BREAST LUMPECTOMY Right 04/26/2017    COLONOSCOPY      CT BIOPSY PERCUTANEOUS DEEP BONE  04/07/2021    CT BIOPSY PERCUTANEOUS DEEP BONE 4/7/2021 Ngozi Churchill MD [facility]    EYE SURGERY      SUDHIR LENSE IMPLANTS    OTHER SURGICAL HISTORY      port removal    TONSILLECTOMY      TUNNELED VENOUS PORT PLACEMENT  05/26/2017    Dr. Sofia Dubon     Family History   Problem Relation Age of Onset    Cancer Maternal Cousin 80        lung    Heart Disease Mother     Other Mother         [place] Palsy    Other Father         gall bladder problem, hip fx; AAA rupture    Coronary Art Dis Father         MI     Social History     Socioeconomic History    Marital status:      Spouse name: Marietta Kelly    Number of children: Not on file    Years of education: Not on file    Highest education level: Not on file   Occupational History    Occupation: retired      Comment: Retired 4 th    Tobacco Use    Smoking status: Never    Smokeless tobacco: Never   Vaping Use    Vaping Use: Not on file   Substance and Sexual Activity    Alcohol use: No     Comment: occasionally pop    Drug use: No    Sexual activity: Not Currently     Partners: Male   Other Topics Concern    Not on file   Social History Narrative    Drinks occasional Pepsi/Coke     Social Determinants of Health     Financial Resource Strain: Low Risk     Difficulty of Paying Living Expenses: Not hard at all   Food Insecurity: Unknown    Worried About [address] in the Last Year: Patient refused    [address] in the Last Year: Patient refused   Transportation Needs: Not on file   Physical Activity: Insufficiently Active    Days of Exercise per Week: 5 days    Minutes of Exercise per Session: 20 min   Stress: Not on file   Social Connections: Not on file   Intimate Partner Violence: Not on file   Housing Stability: Not on file       Vitals:    01/23/23 1419   BP: 138/74   Pulse: 73   Resp: 16   Temp: 97.8 °F (36.6 °C)   TempSrc: Temporal   SpO2: 99%   Weight: 174 lb (78.9 kg)   Height: 5' 7\" (1.702 m)       Physical Exam  Vitals and nursing note reviewed. Constitutional:       Appearance: She is well-developed. HENT:      Head: Atraumatic. Right Ear: External ear normal.      Left Ear: External ear normal.      Nose: Nose normal.      Mouth/Throat:      Pharynx: No oropharyngeal exudate. Eyes:      Conjunctiva/sclera: Conjunctivae normal.      Pupils: Pupils are equal, round, and reactive to light. Neck:      Thyroid: No thyromegaly. Trachea: No tracheal deviation. Cardiovascular:      Rate and Rhythm: Normal rate and regular rhythm. Heart sounds: No murmur heard. No friction rub. No gallop. Pulmonary:      Effort: Pulmonary effort is normal. No respiratory distress. Breath sounds: Normal breath sounds. Abdominal:      General: Bowel sounds are normal.      Palpations: Abdomen is soft. Musculoskeletal:         General: No tenderness or deformity. Normal range of motion. Cervical back: Normal range of motion and neck supple. Lymphadenopathy:      Cervical: No cervical adenopathy. Skin:     General: Skin is warm and dry. Capillary Refill: Capillary refill takes less than 2 seconds. Findings: No rash. Neurological:      Mental Status: She is alert and oriented to person, place, and time. Sensory: No sensory deficit. Motor: No abnormal muscle tone.       Coordination: Coordination normal.      Deep Tendon Reflexes: Reflexes normal.           Seen By:  Keith Juares,

## 2023-01-30 ENCOUNTER — OFFICE VISIT (OUTPATIENT)
Dept: FAMILY MEDICINE CLINIC | Age: 73
End: 2023-01-30
Payer: MEDICARE

## 2023-01-30 VITALS
HEIGHT: 67 IN | BODY MASS INDEX: 27.15 KG/M2 | TEMPERATURE: 97.3 F | RESPIRATION RATE: 16 BRPM | OXYGEN SATURATION: 97 % | DIASTOLIC BLOOD PRESSURE: 66 MMHG | WEIGHT: 173 LBS | SYSTOLIC BLOOD PRESSURE: 138 MMHG | HEART RATE: 62 BPM

## 2023-01-30 DIAGNOSIS — N18.31 STAGE 3A CHRONIC KIDNEY DISEASE (HCC): ICD-10-CM

## 2023-01-30 DIAGNOSIS — I48.91 ATRIAL FIBRILLATION, UNSPECIFIED TYPE (HCC): Primary | ICD-10-CM

## 2023-01-30 DIAGNOSIS — C79.51 SECONDARY MALIGNANT NEOPLASM OF BONE (HCC): ICD-10-CM

## 2023-01-30 LAB
INTERNATIONAL NORMALIZATION RATIO, POC: 1.5
PROTHROMBIN TIME, POC: 18.4

## 2023-01-30 PROCEDURE — 1123F ACP DISCUSS/DSCN MKR DOCD: CPT | Performed by: FAMILY MEDICINE

## 2023-01-30 PROCEDURE — 99213 OFFICE O/P EST LOW 20 MIN: CPT | Performed by: FAMILY MEDICINE

## 2023-01-30 PROCEDURE — 3078F DIAST BP <80 MM HG: CPT | Performed by: FAMILY MEDICINE

## 2023-01-30 PROCEDURE — 85610 PROTHROMBIN TIME: CPT | Performed by: FAMILY MEDICINE

## 2023-01-30 PROCEDURE — 3075F SYST BP GE 130 - 139MM HG: CPT | Performed by: FAMILY MEDICINE

## 2023-01-30 ASSESSMENT — ENCOUNTER SYMPTOMS
ALLERGIC/IMMUNOLOGIC NEGATIVE: 1
SORE THROAT: 0
CHEST TIGHTNESS: 0
VOMITING: 0
EYE PAIN: 0
BACK PAIN: 0
NAUSEA: 0
EYE REDNESS: 0
PHOTOPHOBIA: 0
SINUS PAIN: 0
ABDOMINAL PAIN: 0
BLOOD IN STOOL: 0
COUGH: 0
SHORTNESS OF BREATH: 0
TROUBLE SWALLOWING: 0
EYE DISCHARGE: 0
DIARRHEA: 0

## 2023-01-30 NOTE — PROGRESS NOTES
23  Chey Newry : 1950 Sex: female  Age: 71 y.o. Assessment and Plan: Ambika Redmond was seen today for atrial fibrillation. Diagnoses and all orders for this visit:    Atrial fibrillation, unspecified type ([facility] [address]  -     POCT INR; Standing  -     POCT INR  Adjust warfarin to 6 mg 5 days a week, 3 mg 2 days a week. Recheck INR in 2 weeks. 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 Oncology managing this complaint. Stage 3a chronic kidney disease ([facility] [address]  This remained stable    Return in about 2 weeks (around 2023). Chief Complaint   Patient presents with    Atrial Fibrillation       Patient here for recheck pro time. INR today 1.5, subtherapeutic, denies bleeding, bruising, dietary changes, dosage change. Review of Systems   Constitutional:  Negative for appetite change, fatigue and unexpected weight change. HENT:  Negative for congestion, ear pain, hearing loss, sinus pain, sore throat and trouble swallowing. Eyes:  Negative for photophobia, pain, discharge and redness. Respiratory:  Negative for cough, chest tightness and shortness of breath. Cardiovascular:  Negative for chest pain, palpitations and leg swelling. Gastrointestinal:  Negative for abdominal pain, blood in stool, diarrhea, nausea and vomiting. Endocrine: Negative. Genitourinary:  Negative for dysuria, flank pain, frequency and hematuria. Musculoskeletal:  Negative for arthralgias, back pain, joint swelling and myalgias. Skin: Negative. Allergic/Immunologic: Negative. Neurological:  Negative for dizziness, seizures, syncope, weakness, light-headedness, numbness and headaches. Hematological:  Negative for adenopathy. Does not bruise/bleed easily. Psychiatric/Behavioral: Negative.          Current Outpatient Medications:     topiramate (TOPAMAX) 50 MG tablet, Take 1 tablet by mouth 2 times daily, Disp: 180 tablet, Rfl: 1    escitalopram (LEXAPRO) 10 MG tablet, Take 1 tablet by mouth every evening, Disp: 90 tablet, Rfl: 1    azelastine (ASTELIN) 0.1 % nasal spray, 2 sprays by Nasal route 2 times daily Use in each nostril as directed, Disp: 120 mL, Rfl: 3    propafenone (RYTHMOL) 225 MG tablet, Take 1 tablet by mouth three times daily, Disp: 270 tablet, Rfl: 1    warfarin (COUMADIN) 6 MG tablet, Indications: monday, friday 3 mg, all other days are 6 mg 1/2 tab on M  F and 1 tab all other days of the week OR AS DIRECTED, Disp: 30 tablet, Rfl: 5    verapamil (CALAN SR) 180 MG extended release tablet, Take 1 tablet by mouth in the morning., Disp: 90 tablet, Rfl: 1    terbinafine (LAMISIL) 1 % cream, Apply affected area topically 2 times daily. , Disp: 42 g, Rfl: 2    ammonium lactate (LAC-HYDRIN) 12 % lotion, Apply topically twice daily, Disp: 400 g, Rfl: 2    IBRANCE 100 MG tablet, , Disp: , Rfl:     fulvestrant (FASLODEX) 250 MG/5ML SOLN IM injection, Inject 500 mg into the muscle once, Disp: , Rfl:     denosumab (XGEVA) 120 MG/1.7ML SOLN SC injection, Inject 120 mg into the skin once, Disp: , Rfl:     calcium carbonate-vitamin D (CALTRATE) 600-400 MG-UNIT TABS per tab, Take 1 tablet by mouth daily, Disp: , Rfl:     Alpha-Lipoic Acid 100 MG CAPS, Take by mouth, Disp: , Rfl:     b complex vitamins capsule, Take 1 capsule by mouth daily, Disp: , Rfl:   Allergies   Allergen Reactions    Diltiazem Hcl Other (See Comments)     Other reaction(s): weak    Cardizem [Diltiazem] Palpitations    Cefuroxime Axetil Nausea And Vomiting, Other (See Comments) and Palpitations    Claritin [Loratadine] Palpitations    Erythromycin Nausea And Vomiting    Morphine Nausea And Vomiting    Mucinex [Guaifenesin Er] Palpitations    Propoxyphene Other (See Comments) and Nausea And Vomiting     DIZZY  Other reaction(s): Free Text    Zyrtec [Cetirizine] Nausea And Vomiting and Other (See Comments)     WEAK       Past Medical History:   Diagnosis Date    Bone cancer ([address] 02/2021    Breast cancer ([address] 04/2017    Broken rib     left    Broken toe     right    Chronic atrial fibrillation (HCC)     Fibroadenosis of breast     Headache     History of therapeutic radiation     Hyperlipidemia     Hypertension      Past Surgical History:   Procedure Laterality Date    [address]    BONE BIOPSY  04/2021    BREAST LUMPECTOMY Right 04/26/2017    COLONOSCOPY      CT BIOPSY PERCUTANEOUS DEEP BONE  04/07/2021    CT BIOPSY PERCUTANEOUS DEEP BONE 4/7/2021 Ellen Gonzalez MD [facility]    EYE SURGERY      SUDHIR LENSE IMPLANTS    OTHER SURGICAL HISTORY      port removal    TONSILLECTOMY      TUNNELED VENOUS PORT PLACEMENT  05/26/2017    Dr. Jorge L Lomas     Family History   Problem Relation Age of Onset    Cancer Maternal Cousin 80        lung    Heart Disease Mother     Other Mother         [place] Palsy    Other Father         gall bladder problem, hip fx; AAA rupture    Coronary Art Dis Father         MI     Social History     Socioeconomic History    Marital status:      Spouse name: Maxie Meigs    Number of children: Not on file    Years of education: Not on file    Highest education level: Not on file   Occupational History    Occupation: retired      Comment: Retired 4 th    Tobacco Use    Smoking status: Never    Smokeless tobacco: Never   Vaping Use    Vaping Use: Not on file   Substance and Sexual Activity    Alcohol use: No     Comment: occasionally pop    Drug use: No    Sexual activity: Not Currently     Partners: Male   Other Topics Concern    Not on file   Social History Narrative    Drinks occasional Pepsi/Coke     Social Determinants of Health     Financial Resource Strain: Low Risk     Difficulty of Paying Living Expenses: Not hard at all   Food Insecurity: Unknown    Worried About [address] in the Last Year: Patient refused    Ran Out of Food in the Last Year: Patient refused   Transportation Needs: Not on file   Physical Activity: Insufficiently Active    Days of Exercise per Week: 5 days    Minutes of Exercise per Session: 20 min   Stress: Not on file   Social Connections: Not on file   Intimate Partner Violence: Not on file   Housing Stability: Not on file       Vitals:    01/30/23 1323   BP: 138/66   Pulse: 62   Resp: 16   Temp: 97.3 °F (36.3 °C)   TempSrc: Temporal   SpO2: 97%   Weight: 173 lb (78.5 kg)   Height: 5' 7\" (1.702 m)       Physical Exam  Vitals and nursing note reviewed. Constitutional:       Appearance: She is well-developed. HENT:      Head: Atraumatic. Right Ear: External ear normal.      Left Ear: External ear normal.      Nose: Nose normal.      Mouth/Throat:      Pharynx: No oropharyngeal exudate. Eyes:      Conjunctiva/sclera: Conjunctivae normal.      Pupils: Pupils are equal, round, and reactive to light. Neck:      Thyroid: No thyromegaly. Trachea: No tracheal deviation. Cardiovascular:      Rate and Rhythm: Normal rate and regular rhythm. Heart sounds: No murmur heard. No friction rub. No gallop. Pulmonary:      Effort: Pulmonary effort is normal. No respiratory distress. Breath sounds: Normal breath sounds. Abdominal:      General: Bowel sounds are normal.      Palpations: Abdomen is soft. Musculoskeletal:         General: No tenderness or deformity. Normal range of motion. Cervical back: Normal range of motion and neck supple. Lymphadenopathy:      Cervical: No cervical adenopathy. Skin:     General: Skin is warm and dry. Capillary Refill: Capillary refill takes less than 2 seconds. Findings: No rash. Neurological:      Mental Status: She is alert and oriented to person, place, and time. Sensory: No sensory deficit. Motor: No abnormal muscle tone.       Coordination: Coordination normal.      Deep Tendon Reflexes: Reflexes normal.           Seen By:  Aiden De,

## 2023-02-13 ENCOUNTER — OFFICE VISIT (OUTPATIENT)
Dept: FAMILY MEDICINE CLINIC | Age: 73
End: 2023-02-13

## 2023-02-13 VITALS
HEART RATE: 56 BPM | OXYGEN SATURATION: 98 % | BODY MASS INDEX: 27.94 KG/M2 | DIASTOLIC BLOOD PRESSURE: 72 MMHG | TEMPERATURE: 97.8 F | SYSTOLIC BLOOD PRESSURE: 120 MMHG | WEIGHT: 178 LBS | HEIGHT: 67 IN | RESPIRATION RATE: 16 BRPM

## 2023-02-13 DIAGNOSIS — I48.91 ATRIAL FIBRILLATION, UNSPECIFIED TYPE (HCC): ICD-10-CM

## 2023-02-13 DIAGNOSIS — I10 ESSENTIAL HYPERTENSION: ICD-10-CM

## 2023-02-13 LAB
INTERNATIONAL NORMALIZATION RATIO, POC: 2
PROTHROMBIN TIME, POC: 23.4

## 2023-02-13 ASSESSMENT — ENCOUNTER SYMPTOMS
SINUS PAIN: 0
EYE PAIN: 0
PHOTOPHOBIA: 0
BLOOD IN STOOL: 0
EYE DISCHARGE: 0
EYE REDNESS: 0
ABDOMINAL PAIN: 0
CHEST TIGHTNESS: 0
BACK PAIN: 0
COUGH: 0
TROUBLE SWALLOWING: 0
SORE THROAT: 0
SHORTNESS OF BREATH: 0
NAUSEA: 0
DIARRHEA: 0
VOMITING: 0
ALLERGIC/IMMUNOLOGIC NEGATIVE: 1

## 2023-02-13 NOTE — PROGRESS NOTES
23  Nannette Skill : 1950 Sex: female  Age: 71 y.o. Assessment and Plan: Romel Mclaughlin was seen today for atrial fibrillation. Diagnoses and all orders for this visit:    Atrial fibrillation, unspecified type ([address]  -     POCT INR  -     verapamil (CALAN SR) 180 MG extended release tablet; Take 1 tablet by mouth daily    Essential hypertension  -     verapamil (CALAN SR) 180 MG extended release tablet; Take 1 tablet by mouth daily      Return in about 4 weeks (around 3/13/2023). Chief Complaint   Patient presents with    Atrial Fibrillation       Patient returns for recheck pro time. INR today 2.0, denies bleeding, bruising, dietary changes, dosage change. She will now be placing her  in hospice care. Review of Systems   Constitutional:  Negative for appetite change, fatigue and unexpected weight change. HENT:  Negative for congestion, ear pain, hearing loss, sinus pain, sore throat and trouble swallowing. Eyes:  Negative for photophobia, pain, discharge and redness. Respiratory:  Negative for cough, chest tightness and shortness of breath. Cardiovascular:  Negative for chest pain, palpitations and leg swelling. Gastrointestinal:  Negative for abdominal pain, blood in stool, diarrhea, nausea and vomiting. Endocrine: Negative. Genitourinary:  Negative for dysuria, flank pain, frequency and hematuria. Musculoskeletal:  Negative for arthralgias, back pain, joint swelling and myalgias. Skin: Negative. Allergic/Immunologic: Negative. Neurological:  Negative for dizziness, seizures, syncope, weakness, light-headedness, numbness and headaches. Hematological:  Negative for adenopathy. Does not bruise/bleed easily. Psychiatric/Behavioral: Negative.          Current Outpatient Medications:     verapamil (CALAN SR) 180 MG extended release tablet, Take 1 tablet by mouth daily, Disp: 90 tablet, Rfl: 1    topiramate (TOPAMAX) 50 MG tablet, Take 1 tablet by mouth 2 times daily, Disp: 180 tablet, Rfl: 1    escitalopram (LEXAPRO) 10 MG tablet, Take 1 tablet by mouth every evening, Disp: 90 tablet, Rfl: 1    azelastine (ASTELIN) 0.1 % nasal spray, 2 sprays by Nasal route 2 times daily Use in each nostril as directed, Disp: 120 mL, Rfl: 3    propafenone (RYTHMOL) 225 MG tablet, Take 1 tablet by mouth three times daily, Disp: 270 tablet, Rfl: 1    warfarin (COUMADIN) 6 MG tablet, Indications: monday, friday 3 mg, all other days are 6 mg 1/2 tab on M  F and 1 tab all other days of the week OR AS DIRECTED, Disp: 30 tablet, Rfl: 5    terbinafine (LAMISIL) 1 % cream, Apply affected area topically 2 times daily. , Disp: 42 g, Rfl: 2    ammonium lactate (LAC-HYDRIN) 12 % lotion, Apply topically twice daily, Disp: 400 g, Rfl: 2    IBRANCE 100 MG tablet, , Disp: , Rfl:     fulvestrant (FASLODEX) 250 MG/5ML SOLN IM injection, Inject 500 mg into the muscle once, Disp: , Rfl:     denosumab (XGEVA) 120 MG/1.7ML SOLN SC injection, Inject 120 mg into the skin once, Disp: , Rfl:     calcium carbonate-vitamin D (CALTRATE) 600-400 MG-UNIT TABS per tab, Take 1 tablet by mouth daily, Disp: , Rfl:     Alpha-Lipoic Acid 100 MG CAPS, Take by mouth, Disp: , Rfl:     b complex vitamins capsule, Take 1 capsule by mouth daily, Disp: , Rfl:   Allergies   Allergen Reactions    Diltiazem Hcl Other (See Comments)     Other reaction(s): weak    Cardizem [Diltiazem] Palpitations    Cefuroxime Axetil Nausea And Vomiting, Other (See Comments) and Palpitations    Claritin [Loratadine] Palpitations    Erythromycin Nausea And Vomiting    Morphine Nausea And Vomiting    Mucinex [Guaifenesin Er] Palpitations    Propoxyphene Other (See Comments) and Nausea And Vomiting     DIZZY  Other reaction(s): Free Text    Zyrtec [Cetirizine] Nausea And Vomiting and Other (See Comments)     WEAK       Past Medical History:   Diagnosis Date    Bone cancer ([facility] 02/2021    Breast cancer ([facility] 04/2017    Broken rib     left    Broken toe     right    Chronic atrial fibrillation (HCC)     Fibroadenosis of breast     Headache     History of therapeutic radiation     Hyperlipidemia     Hypertension      Past Surgical History:   Procedure Laterality Date    [address]    BONE BIOPSY  04/2021    BREAST LUMPECTOMY Right 04/26/2017    COLONOSCOPY      CT BIOPSY PERCUTANEOUS DEEP BONE  04/07/2021    CT BIOPSY PERCUTANEOUS DEEP BONE 4/7/2021 MD YENIFER [place] [facility]    EYE SURGERY      SUDHIR LENSE IMPLANTS    OTHER SURGICAL HISTORY      port removal    TONSILLECTOMY      TUNNELED VENOUS PORT PLACEMENT  05/26/2017    Dr. Melton [place]     Family History   Problem Relation Age of Onset    Cancer Maternal Cousin 80        lung    Heart Disease Mother     Other Mother         [place] Palsy    Other Father         gall bladder problem, hip fx; AAA rupture    Coronary Art Dis Father         MI     Social History     Socioeconomic History    Marital status:      Spouse name: Faheem Kulkarni    Number of children: Not on file    Years of education: Not on file    Highest education level: Not on file   Occupational History    Occupation: retired      Comment: Retired 4 th    Tobacco Use    Smoking status: Never    Smokeless tobacco: Never   Vaping Use    Vaping Use: Not on file   Substance and Sexual Activity    Alcohol use: No     Comment: occasionally pop    Drug use: No    Sexual activity: Not Currently     Partners: Male   Other Topics Concern    Not on file   Social History Narrative    Drinks occasional Pepsi/Coke     Social Determinants of Health     Financial Resource Strain: Low Risk     Difficulty of Paying Living Expenses: Not hard at all   Food Insecurity: Unknown    Worried About [address] in the Last Year: Patient refused    [address] in the Last Year: Patient refused   Transportation Needs: Not on file   Physical Activity: Insufficiently Active    Days of Exercise per Week: 5 days Minutes of Exercise per Session: 20 min   Stress: Not on file   Social Connections: Not on file   Intimate Partner Violence: Not on file   Housing Stability: Not on file       Vitals:    02/13/23 1140   BP: 120/72   Pulse: 56   Resp: 16   Temp: 97.8 °F (36.6 °C)   TempSrc: Temporal   SpO2: 98%   Weight: 178 lb (80.7 kg)   Height: 5' 7\" (1.702 m)       Physical Exam  Vitals and nursing note reviewed. Constitutional:       Appearance: She is well-developed. HENT:      Head: Atraumatic. Right Ear: External ear normal.      Left Ear: External ear normal.      Nose: Nose normal.      Mouth/Throat:      Pharynx: No oropharyngeal exudate. Eyes:      Conjunctiva/sclera: Conjunctivae normal.      Pupils: Pupils are equal, round, and reactive to light. Neck:      Thyroid: No thyromegaly. Trachea: No tracheal deviation. Cardiovascular:      Rate and Rhythm: Normal rate and regular rhythm. Heart sounds: No murmur heard. No friction rub. No gallop. Pulmonary:      Effort: Pulmonary effort is normal. No respiratory distress. Breath sounds: Normal breath sounds. Abdominal:      General: Bowel sounds are normal.      Palpations: Abdomen is soft. Musculoskeletal:         General: No tenderness or deformity. Normal range of motion. Cervical back: Normal range of motion and neck supple. Lymphadenopathy:      Cervical: No cervical adenopathy. Skin:     General: Skin is warm and dry. Capillary Refill: Capillary refill takes less than 2 seconds. Findings: No rash. Neurological:      Mental Status: She is alert and oriented to person, place, and time. Sensory: No sensory deficit. Motor: No abnormal muscle tone.       Coordination: Coordination normal.      Deep Tendon Reflexes: Reflexes normal.           Seen By:  Sandra Estrella DO

## 2023-03-15 ENCOUNTER — OFFICE VISIT (OUTPATIENT)
Dept: FAMILY MEDICINE CLINIC | Age: 73
End: 2023-03-15

## 2023-03-15 ENCOUNTER — PROCEDURE VISIT (OUTPATIENT)
Dept: PODIATRY | Age: 73
End: 2023-03-15
Payer: MEDICARE

## 2023-03-15 VITALS
DIASTOLIC BLOOD PRESSURE: 68 MMHG | BODY MASS INDEX: 28.56 KG/M2 | TEMPERATURE: 97.3 F | WEIGHT: 182 LBS | HEIGHT: 67 IN | OXYGEN SATURATION: 96 % | RESPIRATION RATE: 16 BRPM | HEART RATE: 61 BPM | SYSTOLIC BLOOD PRESSURE: 138 MMHG

## 2023-03-15 VITALS
WEIGHT: 182 LBS | HEART RATE: 61 BPM | HEIGHT: 67 IN | RESPIRATION RATE: 16 BRPM | DIASTOLIC BLOOD PRESSURE: 68 MMHG | TEMPERATURE: 97.3 F | SYSTOLIC BLOOD PRESSURE: 138 MMHG | OXYGEN SATURATION: 96 % | BODY MASS INDEX: 28.56 KG/M2

## 2023-03-15 VITALS — WEIGHT: 178 LBS | HEIGHT: 67 IN | BODY MASS INDEX: 27.94 KG/M2

## 2023-03-15 DIAGNOSIS — L84 CORNS AND CALLOSITIES: ICD-10-CM

## 2023-03-15 DIAGNOSIS — G60.8 HEREDITARY SENSORY NEUROPATHY: ICD-10-CM

## 2023-03-15 DIAGNOSIS — Z00.00 MEDICARE ANNUAL WELLNESS VISIT, SUBSEQUENT: Primary | ICD-10-CM

## 2023-03-15 DIAGNOSIS — Z79.01 ENCOUNTER FOR CURRENT LONG-TERM USE OF ANTICOAGULANTS: ICD-10-CM

## 2023-03-15 DIAGNOSIS — I10 ESSENTIAL HYPERTENSION: Primary | ICD-10-CM

## 2023-03-15 DIAGNOSIS — B35.1 TINEA UNGUIUM: Primary | ICD-10-CM

## 2023-03-15 DIAGNOSIS — I48.91 ATRIAL FIBRILLATION, UNSPECIFIED TYPE (HCC): ICD-10-CM

## 2023-03-15 LAB
INTERNATIONAL NORMALIZATION RATIO, POC: 1.6
PROTHROMBIN TIME, POC: 19.5

## 2023-03-15 PROCEDURE — 11721 DEBRIDE NAIL 6 OR MORE: CPT | Performed by: PODIATRIST

## 2023-03-15 PROCEDURE — 11056 PARNG/CUTG B9 HYPRKR LES 2-4: CPT | Performed by: PODIATRIST

## 2023-03-15 SDOH — ECONOMIC STABILITY: FOOD INSECURITY: WITHIN THE PAST 12 MONTHS, THE FOOD YOU BOUGHT JUST DIDN'T LAST AND YOU DIDN'T HAVE MONEY TO GET MORE.: NEVER TRUE

## 2023-03-15 SDOH — ECONOMIC STABILITY: FOOD INSECURITY: WITHIN THE PAST 12 MONTHS, YOU WORRIED THAT YOUR FOOD WOULD RUN OUT BEFORE YOU GOT MONEY TO BUY MORE.: NEVER TRUE

## 2023-03-15 SDOH — ECONOMIC STABILITY: HOUSING INSECURITY
IN THE LAST 12 MONTHS, WAS THERE A TIME WHEN YOU DID NOT HAVE A STEADY PLACE TO SLEEP OR SLEPT IN A SHELTER (INCLUDING NOW)?: NO

## 2023-03-15 SDOH — ECONOMIC STABILITY: INCOME INSECURITY: HOW HARD IS IT FOR YOU TO PAY FOR THE VERY BASICS LIKE FOOD, HOUSING, MEDICAL CARE, AND HEATING?: NOT HARD AT ALL

## 2023-03-15 ASSESSMENT — PATIENT HEALTH QUESTIONNAIRE - PHQ9
SUM OF ALL RESPONSES TO PHQ QUESTIONS 1-9: 0
SUM OF ALL RESPONSES TO PHQ9 QUESTIONS 1 & 2: 2
SUM OF ALL RESPONSES TO PHQ QUESTIONS 1-9: 2
1. LITTLE INTEREST OR PLEASURE IN DOING THINGS: 0
SUM OF ALL RESPONSES TO PHQ QUESTIONS 1-9: 2
1. LITTLE INTEREST OR PLEASURE IN DOING THINGS: 1
SUM OF ALL RESPONSES TO PHQ9 QUESTIONS 1 & 2: 0
2. FEELING DOWN, DEPRESSED OR HOPELESS: 1
SUM OF ALL RESPONSES TO PHQ QUESTIONS 1-9: 0
SUM OF ALL RESPONSES TO PHQ QUESTIONS 1-9: 2
2. FEELING DOWN, DEPRESSED OR HOPELESS: 0
SUM OF ALL RESPONSES TO PHQ QUESTIONS 1-9: 0
SUM OF ALL RESPONSES TO PHQ QUESTIONS 1-9: 2
SUM OF ALL RESPONSES TO PHQ QUESTIONS 1-9: 0

## 2023-03-15 ASSESSMENT — ENCOUNTER SYMPTOMS
SINUS PAIN: 0
BLOOD IN STOOL: 0
NAUSEA: 0
CHEST TIGHTNESS: 0
VOMITING: 0
ALLERGIC/IMMUNOLOGIC NEGATIVE: 1
COUGH: 0
PHOTOPHOBIA: 0
TROUBLE SWALLOWING: 0
SHORTNESS OF BREATH: 0
EYE PAIN: 0
BACK PAIN: 0
EYE DISCHARGE: 0
ABDOMINAL PAIN: 0
DIARRHEA: 0
SORE THROAT: 0
EYE REDNESS: 0

## 2023-03-15 ASSESSMENT — LIFESTYLE VARIABLES
HOW OFTEN DO YOU HAVE A DRINK CONTAINING ALCOHOL: NEVER
HOW MANY STANDARD DRINKS CONTAINING ALCOHOL DO YOU HAVE ON A TYPICAL DAY: PATIENT DOES NOT DRINK

## 2023-03-15 NOTE — PATIENT INSTRUCTIONS
Learning About Being Active as an Older Adult  Why is being active important as you get older? Being active is one of the best things you can do for your health. And it's never too late to start. Being active--or getting active, if you aren't already--has definite benefits. It can:  Give you more energy,  Keep your mind sharp. Improve balance to reduce your risk of falls. Help you manage chronic illness with fewer medicines. No matter how old you are, how fit you are, or what health problems you have, there is a form of activity that will work for you. And the more physical activity you can do, the better your overall health will be. What kinds of activity can help you stay healthy? Being more active will make your daily activities easier. Physical activity includes planned exercise and things you do in daily life. There are four types of activity:  Aerobic. Doing aerobic activity makes your heart and lungs strong. Includes walking, dancing, and gardening. Aim for at least 2½ hours spread throughout the week. It improves your energy and can help you sleep better. Muscle-strengthening. This type of activity can help maintain muscle and strengthen bones. Includes climbing stairs, using resistance bands, and lifting or carrying heavy loads. Aim for at least twice a week. It can help protect the knees and other joints. Stretching. Stretching gives you better range of motion in joints and muscles. Includes upper arm stretches, calf stretches, and gentle yoga. Aim for at least twice a week, preferably after your muscles are warmed up from other activities. It can help you function better in daily life. Balancing. This helps you stay coordinated and have good posture. Includes heel-to-toe walking, eda chi, and certain types of yoga. Aim for at least 3 days a week. It can reduce your risk of falling.   Even if you have a hard time meeting the recommendations, it's better to be more active than less active. All activity done in each category counts toward your weekly total. You'd be surprised how daily things like carrying groceries, keeping up with grandchildren, and taking the stairs can add up. What keeps you from being active? If you've had a hard time being more active, you're not alone. Maybe you remember being able to do more. Or maybe you've never thought of yourself as being active. It's frustrating when you can't do the things you want. Being more active can help. What's holding you back? Getting started. Have a goal, but break it into easy tasks. Small steps build into big accomplishments. Staying motivated. If you feel like skipping your activity, remember your goal. Maybe you want to move better and stay independent. Every activity gets you one step closer. Not feeling your best.  Start with 5 minutes of an activity you enjoy. Prove to yourself you can do it. As you get comfortable, increase your time. You may not be where you want to be. But you're in the process of getting there. Everyone starts somewhere. How can you find safe ways to stay active? Talk with your doctor about any physical challenges you're facing. Make a plan with your doctor if you have a health problem or aren't sure how to get started with activity. If you're already active, ask your doctor if there is anything you should change to stay safe as your body and health change. If you tend to feel dizzy after you take medicine, avoid activity at that time. Try being active before you take your medicine. This will reduce your risk of falls. If you plan to be active at home, make sure to clear your space before you get started. Remove things like TV cords, coffee tables, and throw rugs. It's safest to have plenty of space to move freely. The key to getting more active is to take it slow and steady. Try to improve only a little bit at a time.  Pick just one area to improve on at first. And if an activity hurts, stop and talk to your doctor. Where can you learn more? Go to http://www.leal.com/ and enter P600 to learn more about \"Learning About Being Active as an Older Adult. \"  Current as of: October 10, 2022               Content Version: 13.5  © 1433-6516 Healthwise, Incorporated. Care instructions adapted under license by [facility] ([facility]). If you have questions about a medical condition or this instruction, always ask your healthcare professional. [place] [address] any warranty or liability for your use of this information. Advance Directives: Care Instructions  Overview  An advance directive is a legal way to state your wishes at the end of your life. It tells your family and your doctor what to do if you can't say what you want. There are two main types of advance directives. You can change them any time your wishes change. Living will. This form tells your family and your doctor your wishes about life support and other treatment. The form is also called a declaration. Medical power of . This form lets you name a person to make treatment decisions for you when you can't speak for yourself. This person is called a health care agent (health care proxy, health care surrogate). The form is also called a durable power of  for health care. If you do not have an advance directive, decisions about your medical care may be made by a family member, or by a doctor or a  who doesn't know you. It may help to think of an advance directive as a gift to the people who care for you. If you have one, they won't have to make tough decisions by themselves. For more information, including forms for your state, see the [address] website (www.caringinfo.org/planning/advance-directives/). Follow-up care is a key part of your treatment and safety. Be sure to make and go to all appointments, and call your doctor if you are having problems.  It's also a good idea to know your test results and keep a list of the medicines you take. What should you include in an advance directive? Many states have a unique advance directive form. (It may ask you to address specific issues.) Or you might use a universal form that's approved by many states. If your form doesn't tell you what to address, it may be hard to know what to include in your advance directive. Use the questions below to help you get started. Who do you want to make decisions about your medical care if you are not able to? What life-support measures do you want if you have a serious illness that gets worse over time or can't be cured? What are you most afraid of that might happen? (Maybe you're afraid of having pain, losing your independence, or being kept alive by machines.)  Where would you prefer to die? (Your home? A hospital? A nursing home?)  Do you want to donate your organs when you die? Do you want certain [employer] practices performed before you die? When should you call for help? Be sure to contact your doctor if you have any questions. Where can you learn more? Go to http://[employer].leal.com/ and enter R264 to learn more about \"Advance Directives: Care Instructions. \"  Current as of: June 16, 2022               Content Version: 13.5  © 2006-2022 Healthwise, Incorporated. Care instructions adapted under license by [facility] [place] ([facility]). If you have questions about a medical condition or this instruction, always ask your healthcare professional. [place] [address] any warranty or liability for your use of this information. A Healthy Heart: Care Instructions  Your Care Instructions     Coronary artery disease, also called heart disease, occurs when a substance called plaque builds up in the vessels that supply oxygen-rich blood to your heart muscle. This can narrow the blood vessels and reduce blood flow. A heart attack happens when blood flow is completely blocked.  A high-fat diet, smoking, and other factors increase the risk of heart disease. Your doctor has found that you have a chance of having heart disease. You can do lots of things to keep your heart healthy. It may not be easy, but you can change your diet, exercise more, and quit smoking. These steps really work to lower your chance of heart disease. Follow-up care is a key part of your treatment and safety. Be sure to make and go to all appointments, and call your doctor if you are having problems. It's also a good idea to know your test results and keep a list of the medicines you take. How can you care for yourself at home? Diet    Use less salt when you cook and eat. This helps lower your blood pressure. Taste food before salting. Add only a little salt when you think you need it. With time, your taste buds will adjust to less salt.     Eat fewer snack items, fast foods, canned soups, and other high-salt, high-fat, processed foods.     Read food labels and try to avoid saturated and trans fats. They increase your risk of heart disease by raising cholesterol levels.     Limit the amount of solid fat-butter, margarine, and shortening-you eat. Use olive, peanut, or canola oil when you cook. Bake, broil, and steam foods instead of frying them.     Eat a variety of fruit and vegetables every day. Dark green, deep orange, red, or yellow fruits and vegetables are especially good for you. Examples include spinach, carrots, peaches, and berries.     Foods high in fiber can reduce your cholesterol and provide important vitamins and minerals. High-fiber foods include whole-grain cereals and breads, oatmeal, beans, brown rice, citrus fruits, and apples.     Eat lean proteins. Heart-healthy proteins include seafood, lean meats and poultry, eggs, beans, peas, nuts, seeds, and soy products.     Limit drinks and foods with added sugar. These include candy, desserts, and soda pop.    Lifestyle changes    If your doctor recommends it, get more exercise. Walking is a good choice. Bit by bit, increase the amount you walk every day. Try for at least 30 minutes on most days of the week. You also may want to swim, bike, or do other activities.     Do not smoke. If you need help quitting, talk to your doctor about stop-smoking programs and medicines. These can increase your chances of quitting for good. Quitting smoking may be the most important step you can take to protect your heart. It is never too late to quit.     Limit alcohol to 2 drinks a day for men and 1 drink a day for women. Too much alcohol can cause health problems.     Manage other health problems such as diabetes, high blood pressure, and high cholesterol. If you think you may have a problem with alcohol or drug use, talk to your doctor. Medicines    Take your medicines exactly as prescribed. Call your doctor if you think you are having a problem with your medicine.     If your doctor recommends aspirin, take the amount directed each day. Make sure you take aspirin and not another kind of pain reliever, such as acetaminophen (Tylenol). When should you call for help? Call 911 if you have symptoms of a heart attack. These may include:    Chest pain or pressure, or a strange feeling in the chest.     Sweating.     Shortness of breath.     Pain, pressure, or a strange feeling in the back, neck, jaw, or upper belly or in one or both shoulders or arms.     Lightheadedness or sudden weakness.     A fast or irregular heartbeat. After you call 911, the  may tell you to chew 1 adult-strength or 2 to 4 low-dose aspirin. Wait for an ambulance. Do not try to drive yourself. Watch closely for changes in your health, and be sure to contact your doctor if you have any problems. Where can you learn more? Go to http://www.leal.com/ and enter F075 to learn more about \"A Healthy Heart: Care Instructions. \"  Current as of: September 7, 2022               Content Version: 13.5  © 2006-2022 [employer].   Care instructions adapted under license by [employer]. If you have questions about a medical condition or this instruction, always ask your healthcare professional. [employer] disclaims any warranty or liability for your use of this information.      Personalized Preventive Plan for Jaimie Bradford - 3/15/2023  Medicare offers a range of preventive health benefits. Some of the tests and screenings are paid in full while other may be subject to a deductible, co-insurance, and/or copay.    Some of these benefits include a comprehensive review of your medical history including lifestyle, illnesses that may run in your family, and various assessments and screenings as appropriate.    After reviewing your medical record and screening and assessments performed today your provider may have ordered immunizations, labs, imaging, and/or referrals for you.  A list of these orders (if applicable) as well as your Preventive Care list are included within your After Visit Summary for your review.    Other Preventive Recommendations:    A preventive eye exam performed by an eye specialist is recommended every 1-2 years to screen for glaucoma; cataracts, macular degeneration, and other eye disorders.  A preventive dental visit is recommended every 6 months.  Try to get at least 150 minutes of exercise per week or 10,000 steps per day on a pedometer .  Order or download the FREE \"Exercise & Physical Activity: Your Everyday Guide\" from [employer]. Call 1-877.952.7920 or search [employer] online.  You need 4946-3046 mg of calcium and 0435-8726 IU of vitamin D per day. It is possible to meet your calcium requirement with diet alone, but a vitamin D supplement is usually necessary to meet this goal.  When exposed to the sun, use a sunscreen that protects against both UVA and UVB radiation with an SPF of 30 or greater. Reapply every 2 to  3 hours or after sweating, drying off with a towel, or swimming. Always wear a seat belt when traveling in a car. Always wear a helmet when riding a bicycle or motorcycle.

## 2023-03-15 NOTE — PROGRESS NOTES
Fernanda Castro is here today for nail care. her PCP is Ramona Donis DO last OV was 02/13/2023. CC:    Follow-up foot and ankle exam.    HPI:   Follow-up foot ankle exam  History anticoagulant therapy with Coumadin. No open wounds. No recent injury. No additional pedal complaints today. ROS:  Const: Denies constitutional symptoms  Musculo: Denies symptoms other than stated above  Skin: Denies symptoms other than stated above       Current Outpatient Medications:     verapamil (CALAN SR) 180 MG extended release tablet, Take 1 tablet by mouth daily, Disp: 90 tablet, Rfl: 1    topiramate (TOPAMAX) 50 MG tablet, Take 1 tablet by mouth 2 times daily, Disp: 180 tablet, Rfl: 1    escitalopram (LEXAPRO) 10 MG tablet, Take 1 tablet by mouth every evening, Disp: 90 tablet, Rfl: 1    azelastine (ASTELIN) 0.1 % nasal spray, 2 sprays by Nasal route 2 times daily Use in each nostril as directed, Disp: 120 mL, Rfl: 3    propafenone (RYTHMOL) 225 MG tablet, Take 1 tablet by mouth three times daily, Disp: 270 tablet, Rfl: 1    warfarin (COUMADIN) 6 MG tablet, Indications: monday, friday 3 mg, all other days are 6 mg 1/2 tab on M  F and 1 tab all other days of the week OR AS DIRECTED, Disp: 30 tablet, Rfl: 5    terbinafine (LAMISIL) 1 % cream, Apply affected area topically 2 times daily. , Disp: 42 g, Rfl: 2    ammonium lactate (LAC-HYDRIN) 12 % lotion, Apply topically twice daily, Disp: 400 g, Rfl: 2    IBRANCE 100 MG tablet, , Disp: , Rfl:     fulvestrant (FASLODEX) 250 MG/5ML SOLN IM injection, Inject 500 mg into the muscle once, Disp: , Rfl:     denosumab (XGEVA) 120 MG/1.7ML SOLN SC injection, Inject 120 mg into the skin once, Disp: , Rfl:     calcium carbonate-vitamin D (CALTRATE) 600-400 MG-UNIT TABS per tab, Take 1 tablet by mouth daily, Disp: , Rfl:     Alpha-Lipoic Acid 100 MG CAPS, Take by mouth, Disp: , Rfl:     b complex vitamins capsule, Take 1 capsule by mouth daily, Disp: , Rfl:   Allergies   Allergen Reactions    Diltiazem Hcl Other (See Comments)     Other reaction(s): weak    Cardizem [Diltiazem] Palpitations    Cefuroxime Axetil Nausea And Vomiting, Other (See Comments) and Palpitations    Claritin [Loratadine] Palpitations    Erythromycin Nausea And Vomiting    Morphine Nausea And Vomiting    Mucinex [Guaifenesin Er] Palpitations    Propoxyphene Other (See Comments) and Nausea And Vomiting     DIZZY  Other reaction(s): Free Text    Zyrtec [Cetirizine] Nausea And Vomiting and Other (See Comments)     WEAK       Past Medical History:   Diagnosis Date    Bone cancer (HCC) 02/2021    Breast cancer (HCC) 04/2017    Broken rib     left    Broken toe     right    Chronic atrial fibrillation (HCC)     Fibroadenosis of breast     Headache     History of therapeutic radiation     Hyperlipidemia     Hypertension            Vitals:    03/15/23 1309   Weight: 178 lb (80.7 kg)   Height: 5' 7\" (1.702 m)         Work History/Social History:  Focused Lower Extremity Physical Exam:    Neurovascular examination:    Dorsalis Pedis palpable bilateral.  Posterior tibialis palpable bilateral.    Capillary Refill Time:  Immediate return  Hair growth:  Symmetrical and bilateral   Skin:  Not atrophic  Edema: Mild edema dorsal foot bilateral.  No significant ankle edema noted today.    Neurologic:  Light touch diminished bilateral.      Musculoskeletal/ Orthopedic examination:    Equinis: Absent bilateral  Dorsiflexion, plantarflexion, inversion, eversion bilateral 5 out of 5 muscle strength  Wiggling toes.  No significant pain foot or ankle today.    Dermatology examination:    Toenails 1-5 right and left thickened, elongated, dystrophic, mycotic with subungual debris.  Webspaces 1-4 bilateral clean, dry and intact.   Hyperkeratotic tissue fifth metatarsal head bilateral.  No open wounds.  No erythema.      Assessment and Plan:  Jaimie was seen today for callouses and nail problem.    Diagnoses and all orders for this visit:    Tinea  unguium    Corns and callosities    Encounter for current long-term use of anticoagulants    Hereditary sensory neuropathy        Follow-up foot ankle exam  History anticoagulant therapy with Coumadin    Manual debridement with standard technique toenails 1 through 5 right and left in thickness and length. Patient tolerated procedure well. Pare hyperkeratotic tissue fifth metatarsal bilateral.  No open wounds. Follow-up in office 2 months. Return in about 2 months (around 5/15/2023). Seen By:  Aly Larios DPM      Document was created using voice recognition software. Note was reviewed, however may contain grammatical errors.

## 2023-03-15 NOTE — PROGRESS NOTES
3/15/23  Adama Singh : 1950 Sex: female  Age: 71 y.o. Assessment and Plan:  Diagnoses and all orders for this visit:    Essential hypertension  Blood pressure well controlled with calcium channel blocker. Atrial fibrillation, unspecified type (HCC)  -     POCT INR  Adjust warfarin to 6 mg 6 days a week, 3 mg 1 day a week. Recheck INR in 2 weeks. Return in about 2 weeks (around 3/29/2023). Chief Complaint   Patient presents with    Atrial Fibrillation         Patient returns for pro time. INR 1.6, subtherapeutic, denies bleeding, bruising, dietary changes, dosage change. Review of Systems   Constitutional:  Negative for appetite change, fatigue and unexpected weight change. HENT:  Negative for congestion, ear pain, hearing loss, sinus pain, sore throat and trouble swallowing. Eyes:  Negative for photophobia, pain, discharge and redness. Respiratory:  Negative for cough, chest tightness and shortness of breath. Cardiovascular:  Negative for chest pain, palpitations and leg swelling. Gastrointestinal:  Negative for abdominal pain, blood in stool, diarrhea, nausea and vomiting. Endocrine: Negative. Genitourinary:  Negative for dysuria, flank pain, frequency and hematuria. Musculoskeletal:  Negative for arthralgias, back pain, joint swelling and myalgias. Skin: Negative. Allergic/Immunologic: Negative. Neurological:  Negative for dizziness, seizures, syncope, weakness, light-headedness, numbness and headaches. Hematological:  Negative for adenopathy. Does not bruise/bleed easily. Psychiatric/Behavioral: Negative.          Current Outpatient Medications:     verapamil (CALAN SR) 180 MG extended release tablet, Take 1 tablet by mouth daily, Disp: 90 tablet, Rfl: 1    topiramate (TOPAMAX) 50 MG tablet, Take 1 tablet by mouth 2 times daily, Disp: 180 tablet, Rfl: 1    escitalopram (LEXAPRO) 10 MG tablet, Take 1 tablet by mouth every evening, Disp: 90 tablet, Rfl: 1    azelastine (ASTELIN) 0.1 % nasal spray, 2 sprays by Nasal route 2 times daily Use in each nostril as directed, Disp: 120 mL, Rfl: 3    propafenone (RYTHMOL) 225 MG tablet, Take 1 tablet by mouth three times daily, Disp: 270 tablet, Rfl: 1    warfarin (COUMADIN) 6 MG tablet, Indications: monday, friday 3 mg, all other days are 6 mg 1/2 tab on M  F and 1 tab all other days of the week OR AS DIRECTED, Disp: 30 tablet, Rfl: 5    terbinafine (LAMISIL) 1 % cream, Apply affected area topically 2 times daily. , Disp: 42 g, Rfl: 2    ammonium lactate (LAC-HYDRIN) 12 % lotion, Apply topically twice daily, Disp: 400 g, Rfl: 2    IBRANCE 100 MG tablet, , Disp: , Rfl:     fulvestrant (FASLODEX) 250 MG/5ML SOLN IM injection, Inject 500 mg into the muscle once, Disp: , Rfl:     denosumab (XGEVA) 120 MG/1.7ML SOLN SC injection, Inject 120 mg into the skin once, Disp: , Rfl:     calcium carbonate-vitamin D (CALTRATE) 600-400 MG-UNIT TABS per tab, Take 1 tablet by mouth daily, Disp: , Rfl:     Alpha-Lipoic Acid 100 MG CAPS, Take by mouth, Disp: , Rfl:     b complex vitamins capsule, Take 1 capsule by mouth daily, Disp: , Rfl:   Allergies   Allergen Reactions    Diltiazem Hcl Other (See Comments)     Other reaction(s): weak    Cardizem [Diltiazem] Palpitations    Cefuroxime Axetil Nausea And Vomiting, Other (See Comments) and Palpitations    Claritin [Loratadine] Palpitations    Erythromycin Nausea And Vomiting    Morphine Nausea And Vomiting    Mucinex [Guaifenesin Er] Palpitations    Propoxyphene Other (See Comments) and Nausea And Vomiting     DIZZY  Other reaction(s): Free Text    Zyrtec [Cetirizine] Nausea And Vomiting and Other (See Comments)     WEAK       Past Medical History:   Diagnosis Date    Bone cancer ([facility] [address] 02/2021    Breast cancer ([address] 04/2017    Broken rib     left    Broken toe     right    Chronic atrial fibrillation (HCC)     Fibroadenosis of breast     Headache     History of therapeutic radiation Hyperlipidemia     Hypertension      Past Surgical History:   Procedure Laterality Date    [facility]GERY  1999    [facility]    BONE BIOPSY  04/2021    BREAST LUMPECTOMY Right 04/26/2017    COLONOSCOPY      CT BIOPSY PERCUTANEOUS DEEP BONE  04/07/2021    CT BIOPSY PERCUTANEOUS DEEP BONE 4/7/2021 Stephan Bo MD [facility]    EYE SURGERY      SUDHIR LENSE IMPLANTS    OTHER SURGICAL HISTORY      port removal    TONSILLECTOMY      TUNNELED VENOUS PORT PLACEMENT  05/26/2017    Dr. Yury Rosenthal     Family History   Problem Relation Age of Onset    Cancer Maternal Cousin 80        lung    Heart Disease Mother     Other Mother         [place] Palsy    Other Father         gall bladder problem, hip fx; AAA rupture    Coronary Art Dis Father         MI     Social History     Socioeconomic History    Marital status:      Spouse name: Nicholas Armas    Number of children: Not on file    Years of education: Not on file    Highest education level: Not on file   Occupational History    Occupation: retired      Comment: Retired 4 th    Tobacco Use    Smoking status: Never    Smokeless tobacco: Never   Vaping Use    Vaping Use: Not on file   Substance and Sexual Activity    Alcohol use: No     Comment: occasionally pop    Drug use: No    Sexual activity: Not Currently     Partners: Male   Other Topics Concern    Not on file   Social History Narrative    Drinks occasional Pepsi/Coke     Social Determinants of Health     Financial Resource Strain: Low Risk     Difficulty of Paying Living Expenses: Not hard at all   Food Insecurity: No Food Insecurity    Worried About Running Out of Food in the Last Year: Never true    [address] in the Last Year: Never true   Transportation Needs: Unknown    Lack of Transportation (Medical): Not on file    Lack of Transportation (Non-Medical):  No   Physical Activity: Not on file   Stress: Not on file   Social Connections: Not on file   Intimate Partner Violence: Not on file Housing Stability: Unknown    Unable to Pay for Housing in the Last Year: Not on file    Number of Places Lived in the Last Year: Not on file    Unstable Housing in the Last Year: No       Vitals:    03/15/23 1357   BP: 138/68   Pulse: 61   Resp: 16   Temp: 97.3 °F (36.3 °C)   TempSrc: Temporal   SpO2: 96%   Weight: 182 lb (82.6 kg)   Height: 5' 7\" (1.702 m)       Physical Exam  Vitals and nursing note reviewed. Constitutional:       Appearance: She is well-developed. HENT:      Head: Atraumatic. Right Ear: External ear normal.      Left Ear: External ear normal.      Nose: Nose normal.      Mouth/Throat:      Pharynx: No oropharyngeal exudate. Eyes:      Conjunctiva/sclera: Conjunctivae normal.      Pupils: Pupils are equal, round, and reactive to light. Neck:      Thyroid: No thyromegaly. Trachea: No tracheal deviation. Cardiovascular:      Rate and Rhythm: Normal rate and regular rhythm. Heart sounds: No murmur heard. No friction rub. No gallop. Pulmonary:      Effort: Pulmonary effort is normal. No respiratory distress. Breath sounds: Normal breath sounds. Abdominal:      General: Bowel sounds are normal.      Palpations: Abdomen is soft. Musculoskeletal:         General: No tenderness or deformity. Normal range of motion. Cervical back: Normal range of motion and neck supple. Lymphadenopathy:      Cervical: No cervical adenopathy. Skin:     General: Skin is warm and dry. Capillary Refill: Capillary refill takes less than 2 seconds. Findings: No rash. Neurological:      Mental Status: She is alert and oriented to person, place, and time. Sensory: No sensory deficit. Motor: No abnormal muscle tone.       Coordination: Coordination normal.      Deep Tendon Reflexes: Reflexes normal.           Seen By:  Gricelda Hurt DO

## 2023-03-15 NOTE — PROGRESS NOTES
3/15/23  Tin Hidalgo : 1950 Sex: female  Age: 71 y.o. Assessment and Plan:  There are no diagnoses linked to this encounter. No follow-ups on file. Chief Complaint   Patient presents with    Medicare AWV       HPI    Review of Systems      Current Outpatient Medications:     verapamil (CALAN SR) 180 MG extended release tablet, Take 1 tablet by mouth daily, Disp: 90 tablet, Rfl: 1    topiramate (TOPAMAX) 50 MG tablet, Take 1 tablet by mouth 2 times daily, Disp: 180 tablet, Rfl: 1    escitalopram (LEXAPRO) 10 MG tablet, Take 1 tablet by mouth every evening, Disp: 90 tablet, Rfl: 1    azelastine (ASTELIN) 0.1 % nasal spray, 2 sprays by Nasal route 2 times daily Use in each nostril as directed, Disp: 120 mL, Rfl: 3    propafenone (RYTHMOL) 225 MG tablet, Take 1 tablet by mouth three times daily, Disp: 270 tablet, Rfl: 1    warfarin (COUMADIN) 6 MG tablet, Indications: monday, friday 3 mg, all other days are 6 mg 1/2 tab on M  F and 1 tab all other days of the week OR AS DIRECTED, Disp: 30 tablet, Rfl: 5    terbinafine (LAMISIL) 1 % cream, Apply affected area topically 2 times daily. , Disp: 42 g, Rfl: 2    ammonium lactate (LAC-HYDRIN) 12 % lotion, Apply topically twice daily, Disp: 400 g, Rfl: 2    IBRANCE 100 MG tablet, , Disp: , Rfl:     fulvestrant (FASLODEX) 250 MG/5ML SOLN IM injection, Inject 500 mg into the muscle once, Disp: , Rfl:     denosumab (XGEVA) 120 MG/1.7ML SOLN SC injection, Inject 120 mg into the skin once, Disp: , Rfl:     calcium carbonate-vitamin D (CALTRATE) 600-400 MG-UNIT TABS per tab, Take 1 tablet by mouth daily, Disp: , Rfl:     Alpha-Lipoic Acid 100 MG CAPS, Take by mouth, Disp: , Rfl:     b complex vitamins capsule, Take 1 capsule by mouth daily, Disp: , Rfl:   Allergies   Allergen Reactions    Diltiazem Hcl Other (See Comments)     Other reaction(s): weak    Cardizem [Diltiazem] Palpitations    Cefuroxime Axetil Nausea And Vomiting, Other (See Comments) and Palpitations    Claritin [Loratadine] Palpitations    Erythromycin Nausea And Vomiting    Morphine Nausea And Vomiting    Mucinex [Guaifenesin Er] Palpitations    Propoxyphene Other (See Comments) and Nausea And Vomiting     DIZZY  Other reaction(s): Free Text    Zyrtec [Cetirizine] Nausea And Vomiting and Other (See Comments)     WEAK       Past Medical History:   Diagnosis Date    Bone cancer ([facility] [address] 02/2021    Breast cancer ([facility] [address] 04/2017    Broken rib     left    Broken toe     right    Chronic atrial fibrillation (HCC)     Fibroadenosis of breast     Headache     History of therapeutic radiation     Hyperlipidemia     Hypertension      Past Surgical History:   Procedure Laterality Date    [address]    BONE BIOPSY  04/2021    BREAST LUMPECTOMY Right 04/26/2017    COLONOSCOPY      CT BIOPSY PERCUTANEOUS DEEP BONE  04/07/2021    CT BIOPSY PERCUTANEOUS DEEP BONE 4/7/2021 Ellen Gonzalez MD [facility]    EYE SURGERY      SUDHIR LENSE IMPLANTS    OTHER SURGICAL HISTORY      port removal    TONSILLECTOMY      TUNNELED VENOUS PORT PLACEMENT  05/26/2017    Dr. Jorge L Lomas     Family History   Problem Relation Age of Onset    Cancer Maternal Cousin 80        lung    Heart Disease Mother     Other Mother         [place] Palsy    Other Father         gall bladder problem, hip fx; AAA rupture    Coronary Art Dis Father         MI     Social History     Socioeconomic History    Marital status:      Spouse name: Maxie Meigs    Number of children: Not on file    Years of education: Not on file    Highest education level: Not on file   Occupational History    Occupation: retired      Comment: Retired 4 th    Tobacco Use    Smoking status: Never    Smokeless tobacco: Never   Vaping Use    Vaping Use: Not on file   Substance and Sexual Activity    Alcohol use: No     Comment: occasionally pop    Drug use: No    Sexual activity: Not Currently     Partners: Male   Other Topics Concern Not on file   Social History Narrative    Drinks occasional Pepsi/Coke     Social Determinants of Health     Financial Resource Strain: Low Risk     Difficulty of Paying Living Expenses: Not hard at all   Food Insecurity: No Food Insecurity    Worried About [address] in the Last Year: Never true    [address] in the Last Year: Never true   Transportation Needs: Unknown    Lack of Transportation (Medical): Not on file    Lack of Transportation (Non-Medical): No   Physical Activity: Not on file   Stress: Not on file   Social Connections: Not on file   Intimate Partner Violence: Not on file   Housing Stability: Unknown    Unable to Pay for Housing in the Last Year: Not on file    Number of Places Lived in the Last Year: Not on file    Unstable Housing in the Last Year: No       There were no vitals filed for this visit. Physical Exam        Seen By:  Yeyo Costa, DO  Medicare Annual Wellness Visit    Martinacarrie Manju is here for Medicare AWV    Assessment & Plan   Medicare annual wellness visit, subsequent      Recommendations for Preventive Services Due: see orders and patient instructions/AVS.  Recommended screening schedule for the next 5-10 years is provided to the patient in written form: see Patient Instructions/AVS.     Return for Medicare Annual Wellness Visit in 1 year. Subjective       Patient's complete Health Risk Assessment and screening values have been reviewed and are found in Flowsheets. The following problems were reviewed today and where indicated follow up appointments were made and/or referrals ordered.     Positive Risk Factor Screenings with Interventions:                 Weight and Activity:  Physical Activity: Inactive    Days of Exercise per Week: 0 days    Minutes of Exercise per Session: 0 min     On average, how many days per week do you engage in moderate to strenuous exercise (like a brisk walk)?: 0 days  Have you lost any weight without trying in the past 3 months?: No  Body mass index: (!) 28.5      Inactivity Interventions:  Patient declined any further interventions or treatment       Hearing Screen:  Do you or your family notice any trouble with your hearing that hasn't been managed with hearing aids?: (!) Yes    Interventions:  Patient declines any further evaluation or treatment                       Objective   Vitals:    03/15/23 1449   BP: 138/68   Pulse: 61   Resp: 16   Temp: 97.3 °F (36.3 °C)   TempSrc: Temporal   SpO2: 96%   Weight: 182 lb (82.6 kg)   Height: 5' 7\" (1.702 m)      Body mass index is 28.51 kg/m². Allergies   Allergen Reactions    Diltiazem Hcl Other (See Comments)     Other reaction(s): weak    Cardizem [Diltiazem] Palpitations    Cefuroxime Axetil Nausea And Vomiting, Other (See Comments) and Palpitations    Claritin [Loratadine] Palpitations    Erythromycin Nausea And Vomiting    Morphine Nausea And Vomiting    Mucinex [Guaifenesin Er] Palpitations    Propoxyphene Other (See Comments) and Nausea And Vomiting     DIZZY  Other reaction(s): Free Text    Zyrtec [Cetirizine] Nausea And Vomiting and Other (See Comments)     WEAK     Prior to Visit Medications    Medication Sig Taking?  Authorizing Provider   verapamil (CALAN SR) 180 MG extended release tablet Take 1 tablet by mouth daily Yes [place] JILLIAN Ayala, DO   topiramate (TOPAMAX) 50 MG tablet Take 1 tablet by mouth 2 times daily Yes [place] Ayala DO   escitalopram (LEXAPRO) 10 MG tablet Take 1 tablet by mouth every evening Yes [place] JILLIAN Ayala DO   azelastine (ASTELIN) 0.1 % nasal spray 2 sprays by Nasal route 2 times daily Use in each nostril as directed Yes Macario Sierra,    propafenone (RYTHMOL) 225 MG tablet Take 1 tablet by mouth three times daily Yes [place] JILLIAN Ayala DO   warfarin (COUMADIN) 6 MG tablet Indications: monday, friday 3 mg, all other days are 6 mg 1/2 tab on M  F and 1 tab all other days of the week OR AS DIRECTED Yes [place] Ayala DO   terbinafine (LAMISIL) 1 % cream Apply affected area topically 2 times daily.  Yes Erin Ospina DPM   ammonium lactate (LAC-HYDRIN) 12 % lotion Apply topically twice daily Yes Galindo Mcmullen DPM   IBRANCE 100 MG tablet  Yes Historical Provider, MD   fulvestrant (FASLODEX) 250 MG/5ML SOLN IM injection Inject 500 mg into the muscle once Yes Historical Provider, MD   denosumab (XGEVA) 120 MG/1.7ML SOLN SC injection Inject 120 mg into the skin once Yes Historical Provider, MD   calcium carbonate-vitamin D (CALTRATE) 600-400 MG-UNIT TABS per tab Take 1 tablet by mouth daily Yes Historical Provider, MD   Alpha-Lipoic Acid 100 MG CAPS Take by mouth Yes Historical Provider, MD   b complex vitamins capsule Take 1 capsule by mouth daily Yes Historical Provider, MD Abdul (Including outside providers/suppliers regularly involved in providing care):   Patient Care Team:  Marlen Cassidy DO as PCP - General (Family Medicine)  Marlen Cassidy DO [facility] PCP [facility]  Ubaldo Severin, MD as Consulting Physician (Cardiology)  Erin Ospina DPM as Consulting Physician (Podiatry)  [place] Vicente MD (Ophthalmology)  Charity Hurtado MD as Consulting Physician (Hematology and Oncology)  Orville Mckeon MD as Surgeon (General Surgery)  Charity Hurtado MD as Consulting Physician (Hematology and Oncology)     Reviewed and updated this visit:  Allergies  Meds  Problems  Med Hx  Surg Hx  Soc Hx  Fam Hx             [place] Nathaly Whitehead DO

## 2023-03-29 ENCOUNTER — OFFICE VISIT (OUTPATIENT)
Dept: FAMILY MEDICINE CLINIC | Age: 73
End: 2023-03-29
Payer: MEDICARE

## 2023-03-29 VITALS
HEART RATE: 61 BPM | OXYGEN SATURATION: 97 % | SYSTOLIC BLOOD PRESSURE: 138 MMHG | RESPIRATION RATE: 16 BRPM | DIASTOLIC BLOOD PRESSURE: 60 MMHG | HEIGHT: 67 IN | TEMPERATURE: 97.5 F | BODY MASS INDEX: 28.56 KG/M2 | WEIGHT: 182 LBS

## 2023-03-29 DIAGNOSIS — I48.91 ATRIAL FIBRILLATION, UNSPECIFIED TYPE (HCC): ICD-10-CM

## 2023-03-29 DIAGNOSIS — I10 ESSENTIAL HYPERTENSION: Primary | ICD-10-CM

## 2023-03-29 LAB
INTERNATIONAL NORMALIZATION RATIO, POC: 2.6
PROTHROMBIN TIME, POC: 30.7

## 2023-03-29 PROCEDURE — 1123F ACP DISCUSS/DSCN MKR DOCD: CPT | Performed by: FAMILY MEDICINE

## 2023-03-29 PROCEDURE — 85610 PROTHROMBIN TIME: CPT | Performed by: FAMILY MEDICINE

## 2023-03-29 PROCEDURE — 3075F SYST BP GE 130 - 139MM HG: CPT | Performed by: FAMILY MEDICINE

## 2023-03-29 PROCEDURE — 3078F DIAST BP <80 MM HG: CPT | Performed by: FAMILY MEDICINE

## 2023-03-29 PROCEDURE — 99213 OFFICE O/P EST LOW 20 MIN: CPT | Performed by: FAMILY MEDICINE

## 2023-03-29 ASSESSMENT — ENCOUNTER SYMPTOMS
EYE PAIN: 0
VOMITING: 0
SINUS PAIN: 0
SORE THROAT: 0
ABDOMINAL PAIN: 0
COUGH: 0
BLOOD IN STOOL: 0
NAUSEA: 0
TROUBLE SWALLOWING: 0
BACK PAIN: 0
CHEST TIGHTNESS: 0
ALLERGIC/IMMUNOLOGIC NEGATIVE: 1
EYE DISCHARGE: 0
EYE REDNESS: 0
PHOTOPHOBIA: 0
DIARRHEA: 0
SHORTNESS OF BREATH: 0

## 2023-03-29 NOTE — PROGRESS NOTES
3/29/23  Hilda Anthony : 1950 Sex: female  Age: 71 y.o. Assessment and Plan: Taryn Davis was seen today for atrial fibrillation. Diagnoses and all orders for this visit:    Essential hypertension    Atrial fibrillation, unspecified type ([facility] [address]  -     POCT INR  Maintain current warfarin schedule. Recheck INR in 1 month. Return in about 4 weeks (around 2023). Chief Complaint   Patient presents with    Atrial Fibrillation              Patient returns for recheck pro time. INR 2.6, therapeutic, denies being, bruising, dietary changes, dosage change. Currently taking 6 mg 6 days a week, 3 mg 1 day a week. Review of Systems   Constitutional:  Negative for appetite change, fatigue and unexpected weight change. HENT:  Negative for congestion, ear pain, hearing loss, sinus pain, sore throat and trouble swallowing. Eyes:  Negative for photophobia, pain, discharge and redness. Respiratory:  Negative for cough, chest tightness and shortness of breath. Cardiovascular:  Negative for chest pain, palpitations and leg swelling. Gastrointestinal:  Negative for abdominal pain, blood in stool, diarrhea, nausea and vomiting. Endocrine: Negative. Genitourinary:  Negative for dysuria, flank pain, frequency and hematuria. Musculoskeletal:  Negative for arthralgias, back pain, joint swelling and myalgias. Skin: Negative. Allergic/Immunologic: Negative. Neurological:  Negative for dizziness, seizures, syncope, weakness, light-headedness, numbness and headaches. Hematological:  Negative for adenopathy. Does not bruise/bleed easily. Psychiatric/Behavioral: Negative.          Current Outpatient Medications:     verapamil (CALAN SR) 180 MG extended release tablet, Take 1 tablet by mouth daily, Disp: 90 tablet, Rfl: 1    topiramate (TOPAMAX) 50 MG tablet, Take 1 tablet by mouth 2 times daily, Disp: 180 tablet, Rfl: 1    escitalopram (LEXAPRO) 10 MG tablet, Take 1 tablet by mouth every

## 2023-04-01 ENCOUNTER — OFFICE VISIT (OUTPATIENT)
Dept: FAMILY MEDICINE CLINIC | Age: 73
End: 2023-04-01

## 2023-04-01 VITALS
HEIGHT: 67 IN | HEART RATE: 60 BPM | OXYGEN SATURATION: 98 % | TEMPERATURE: 97.8 F | DIASTOLIC BLOOD PRESSURE: 70 MMHG | SYSTOLIC BLOOD PRESSURE: 138 MMHG | WEIGHT: 180 LBS | BODY MASS INDEX: 28.25 KG/M2

## 2023-04-01 DIAGNOSIS — H69.81 DYSFUNCTION OF RIGHT EUSTACHIAN TUBE: Primary | ICD-10-CM

## 2023-04-01 DIAGNOSIS — H92.01 RIGHT EAR PAIN: ICD-10-CM

## 2023-04-01 RX ORDER — OLOPATADINE HYDROCHLORIDE 1 MG/ML
SOLUTION/ DROPS OPHTHALMIC
COMMUNITY
Start: 2023-02-20

## 2023-04-01 RX ORDER — AMOXICILLIN 500 MG/1
500 CAPSULE ORAL 3 TIMES DAILY
Qty: 21 CAPSULE | Refills: 0 | Status: SHIPPED | OUTPATIENT
Start: 2023-04-01 | End: 2023-04-08

## 2023-04-01 RX ORDER — PREDNISONE 10 MG/1
TABLET ORAL
Qty: 12 TABLET | Refills: 0 | Status: SHIPPED | OUTPATIENT
Start: 2023-04-01 | End: 2023-04-07

## 2023-04-01 ASSESSMENT — ENCOUNTER SYMPTOMS
EYES NEGATIVE: 1
SHORTNESS OF BREATH: 0
CHEST TIGHTNESS: 0
WHEEZING: 0
SINUS PRESSURE: 0
SORE THROAT: 0
COUGH: 0

## 2023-04-01 NOTE — PROGRESS NOTES
tablet by mouth 2 times daily, Disp: 180 tablet, Rfl: 1    escitalopram (LEXAPRO) 10 MG tablet, Take 1 tablet by mouth every evening, Disp: 90 tablet, Rfl: 1    azelastine (ASTELIN) 0.1 % nasal spray, 2 sprays by Nasal route 2 times daily Use in each nostril as directed, Disp: 120 mL, Rfl: 3    propafenone (RYTHMOL) 225 MG tablet, Take 1 tablet by mouth three times daily, Disp: 270 tablet, Rfl: 1    warfarin (COUMADIN) 6 MG tablet, Indications: monday, friday 3 mg, all other days are 6 mg 1/2 tab on M  F and 1 tab all other days of the week OR AS DIRECTED, Disp: 30 tablet, Rfl: 5    terbinafine (LAMISIL) 1 % cream, Apply affected area topically 2 times daily. , Disp: 42 g, Rfl: 2    ammonium lactate (LAC-HYDRIN) 12 % lotion, Apply topically twice daily, Disp: 400 g, Rfl: 2    palbociclib (IBRANCE) 75 MG capsule, Take 75 mg by mouth Takes 3 weeks out of the month then off for one week then repeat, Disp: , Rfl:     calcium carbonate-vitamin D (CALTRATE) 600-400 MG-UNIT TABS per tab, Take 1 tablet by mouth daily, Disp: , Rfl:     Alpha-Lipoic Acid 100 MG CAPS, Take by mouth, Disp: , Rfl:     b complex vitamins capsule, Take 1 capsule by mouth daily, Disp: , Rfl:     olopatadine (PATANOL) 0.1 % ophthalmic solution, instill 1 drop into both eyes twice a day, Disp: , Rfl:     fulvestrant (FASLODEX) 250 MG/5ML SOLN IM injection, Inject 500 mg into the muscle once, Disp: , Rfl:     denosumab (XGEVA) 120 MG/1.7ML SOLN SC injection, Inject 120 mg into the skin once, Disp: , Rfl:   Allergies   Allergen Reactions    Diltiazem Hcl Other (See Comments)     Other reaction(s): weak    Cardizem [Diltiazem] Palpitations    Cefuroxime Axetil Nausea And Vomiting, Other (See Comments) and Palpitations    Claritin [Loratadine] Palpitations    Erythromycin Nausea And Vomiting    Morphine Nausea And Vomiting    Mucinex [Guaifenesin Er] Palpitations    Propoxyphene Other (See Comments) and Nausea And Vomiting     DIZZY  Other reaction(s):

## 2023-04-18 ENCOUNTER — HOSPITAL ENCOUNTER (OUTPATIENT)
Dept: CT IMAGING | Age: 73
Discharge: HOME | End: 2023-04-20
Payer: MEDICARE

## 2023-04-18 DIAGNOSIS — C50.211 MALIGNANT NEOPLASM OF UPPER-INNER QUADRANT OF RIGHT FEMALE BREAST, UNSPECIFIED ESTROGEN RECEPTOR STATUS (HCC): ICD-10-CM

## 2023-04-18 DIAGNOSIS — Z17.1 ESTROGEN RECEPTOR NEGATIVE STATUS (ER-): ICD-10-CM

## 2023-04-18 DIAGNOSIS — C79.51 SECONDARY MALIGNANT NEOPLASM OF BONE (HCC): ICD-10-CM

## 2023-04-18 PROCEDURE — 6360000004 HC RX CONTRAST MEDICATION: Performed by: RADIOLOGY

## 2023-04-18 PROCEDURE — 71260 CT THORAX DX C+: CPT

## 2023-04-18 PROCEDURE — 74177 CT ABD & PELVIS W/CONTRAST: CPT

## 2023-04-18 RX ADMIN — IOPAMIDOL 18 ML: 755 INJECTION, SOLUTION INTRAVENOUS at 16:56

## 2023-04-18 RX ADMIN — IOPAMIDOL 100 ML: 755 INJECTION, SOLUTION INTRAVENOUS at 16:55

## 2023-04-24 ENCOUNTER — HOSPITAL ENCOUNTER (OUTPATIENT)
Dept: NUCLEAR MEDICINE | Age: 73
Discharge: HOME | End: 2023-04-24
Payer: MEDICARE

## 2023-04-24 DIAGNOSIS — C50.211 MALIGNANT NEOPLASM OF UPPER-INNER QUADRANT OF RIGHT FEMALE BREAST, UNSPECIFIED ESTROGEN RECEPTOR STATUS (HCC): ICD-10-CM

## 2023-04-24 DIAGNOSIS — Z17.1 ESTROGEN RECEPTOR NEGATIVE STATUS (ER-): ICD-10-CM

## 2023-04-24 DIAGNOSIS — C79.51 SECONDARY MALIGNANT NEOPLASM OF BONE (HCC): ICD-10-CM

## 2023-04-24 PROCEDURE — 78306 BONE IMAGING WHOLE BODY: CPT | Performed by: INTERNAL MEDICINE

## 2023-04-24 PROCEDURE — 3430000000 HC RX DIAGNOSTIC RADIOPHARMACEUTICAL: Performed by: RADIOLOGY

## 2023-04-24 PROCEDURE — A9503 TC99M MEDRONATE: HCPCS | Performed by: RADIOLOGY

## 2023-04-24 RX ORDER — TC 99M MEDRONATE 20 MG/10ML
25 INJECTION, POWDER, LYOPHILIZED, FOR SOLUTION INTRAVENOUS
Status: COMPLETED | OUTPATIENT
Start: 2023-04-24 | End: 2023-04-24

## 2023-04-24 RX ADMIN — TC 99M MEDRONATE 25 MILLICURIE: 20 INJECTION, POWDER, LYOPHILIZED, FOR SOLUTION INTRAVENOUS at 11:37

## 2023-04-28 ENCOUNTER — OFFICE VISIT (OUTPATIENT)
Dept: FAMILY MEDICINE CLINIC | Age: 73
End: 2023-04-28

## 2023-04-28 VITALS
HEIGHT: 67 IN | DIASTOLIC BLOOD PRESSURE: 70 MMHG | BODY MASS INDEX: 28.88 KG/M2 | RESPIRATION RATE: 16 BRPM | SYSTOLIC BLOOD PRESSURE: 142 MMHG | OXYGEN SATURATION: 96 % | WEIGHT: 184 LBS | TEMPERATURE: 97.2 F | HEART RATE: 65 BPM

## 2023-04-28 DIAGNOSIS — J01.40 ACUTE NON-RECURRENT PANSINUSITIS: Primary | ICD-10-CM

## 2023-04-28 DIAGNOSIS — I48.91 ATRIAL FIBRILLATION, UNSPECIFIED TYPE (HCC): ICD-10-CM

## 2023-04-28 LAB
INTERNATIONAL NORMALIZATION RATIO, POC: 2.6
PROTHROMBIN TIME, POC: 31.4

## 2023-04-28 RX ORDER — WARFARIN SODIUM 6 MG/1
TABLET ORAL
Qty: 30 TABLET | Refills: 5 | Status: SHIPPED | OUTPATIENT
Start: 2023-04-28

## 2023-04-28 RX ORDER — AMOXICILLIN 500 MG/1
500 CAPSULE ORAL 3 TIMES DAILY
Qty: 30 CAPSULE | Refills: 0 | Status: SHIPPED | OUTPATIENT
Start: 2023-04-28 | End: 2023-05-08

## 2023-04-28 ASSESSMENT — ENCOUNTER SYMPTOMS
EYE REDNESS: 0
EYE DISCHARGE: 0
BLOOD IN STOOL: 0
TROUBLE SWALLOWING: 0
ALLERGIC/IMMUNOLOGIC NEGATIVE: 1
NAUSEA: 0
DIARRHEA: 0
CHEST TIGHTNESS: 0
COUGH: 1
SORE THROAT: 1
BACK PAIN: 0
EYE PAIN: 0
SHORTNESS OF BREATH: 0
PHOTOPHOBIA: 0
SINUS PAIN: 0
ABDOMINAL PAIN: 0
VOMITING: 0

## 2023-04-28 NOTE — PROGRESS NOTES
23  Ajay Nam : 1950 Sex: female  Age: 71 y.o. Assessment and Plan: Alexis Mariano was seen today for office visit for anticoagulation management, pharyngitis and otalgia. Diagnoses and all orders for this visit:    Acute non-recurrent pansinusitis  -     amoxicillin (AMOXIL) 500 MG capsule; Take 1 capsule by mouth 3 times daily for 10 days    Atrial fibrillation, unspecified type (HCC)  -     warfarin (COUMADIN) 6 MG tablet; Indications: monday, friday 3 mg, all other days are 6 mg 1/2 tab on M  F and 1 tab all other days of the week OR AS DIRECTED  -     POCT INR    We will treat her sinusitis with 10 days of amoxicillin. He is taking this in the past and tolerated it well. To make treatment can include Tylenol, fluids, rest, Mucinex, Claritin, coolmist vaporizer. If complaints do not improve, or worsen in any way, present back to the office. No follow-ups on file. Chief Complaint   Patient presents with    Office Visit for Anticoagulation Management    Pharyngitis    Otalgia     X 3 day        Congestion, pressure, drainage, facial tenderness, mild headache, sore throat, earache, onset 2 days ago. Denies fever, chills, diaphoresis, nausea, vomiting, decreased oral intake. Denies other GI or  complaints. OTC treatments minimally effective. Also here for recheck pro time. INR today 2.6, therapeutic, denies bleeding, bruising, dietary changes, dosage change. Review of Systems   Constitutional:  Negative for appetite change, fatigue and unexpected weight change. HENT:  Positive for congestion, ear pain and sore throat. Negative for hearing loss, sinus pain and trouble swallowing. Eyes:  Negative for photophobia, pain, discharge and redness. Respiratory:  Positive for cough. Negative for chest tightness and shortness of breath. Cardiovascular:  Negative for chest pain, palpitations and leg swelling.    Gastrointestinal:  Negative for abdominal pain, blood in stool,

## 2023-05-02 ENCOUNTER — FOLLOWUP TELEPHONE ENCOUNTER (OUTPATIENT)
Dept: AUDIOLOGY | Age: 73
End: 2023-05-02

## 2023-05-02 NOTE — TELEPHONE ENCOUNTER
Patient had called and wanted hearing aid. Ordered after phone conversation, which was not documented. Test will be 10 months old on 5/4/23.      Scheduled for HAF 5/4/23    Electronically signed by Pixie Plant on 5/2/2023 at 8:57 AM

## 2023-05-04 ENCOUNTER — HOSPITAL ENCOUNTER (OUTPATIENT)
Dept: AUDIOLOGY | Age: 73
Discharge: HOME | End: 2023-05-04

## 2023-05-04 PROCEDURE — 9990000010 HC NO CHARGE VISIT: Performed by: AUDIOLOGIST

## 2023-05-04 NOTE — PROGRESS NOTES
Fit with [employer] binaural  "[employer]E  hearing aids. Instructed in use and care. Gave  battery charger, warranty information and scheduled  30 day check for 6/6/23. Hearing aid contract/battery warning form reviewed and signed. Hearing aids paired to phone inder. Patient was satisfied and will follow up on above date, unless problems arise. No charge visit today. Will bill at 30 day follow up .  SELF PAY/NO DISCOUNT    Lucas Caraballo M.A., [address] [facility] [id number]  Electronically signed by Kalyani Dyer on 5/4/2023 at 1:01 PM

## 2023-05-11 ENCOUNTER — HOSPITAL ENCOUNTER (OUTPATIENT)
Dept: AUDIOLOGY | Age: 73
Discharge: HOME | End: 2023-05-11

## 2023-05-13 ENCOUNTER — NURSE ONLY (OUTPATIENT)
Dept: FAMILY MEDICINE CLINIC | Age: 73
End: 2023-05-13

## 2023-05-13 VITALS
TEMPERATURE: 97 F | BODY MASS INDEX: 28.88 KG/M2 | WEIGHT: 184 LBS | SYSTOLIC BLOOD PRESSURE: 174 MMHG | HEIGHT: 67 IN | OXYGEN SATURATION: 99 % | DIASTOLIC BLOOD PRESSURE: 70 MMHG | HEART RATE: 62 BPM

## 2023-05-13 DIAGNOSIS — L30.9 DERMATITIS: Primary | ICD-10-CM

## 2023-05-13 RX ORDER — TRIAMCINOLONE ACETONIDE 1 MG/G
CREAM TOPICAL
Qty: 80 G | Refills: 0 | Status: SHIPPED | OUTPATIENT
Start: 2023-05-13

## 2023-05-13 RX ORDER — METHYLPREDNISOLONE ACETATE 40 MG/ML
40 INJECTION, SUSPENSION INTRA-ARTICULAR; INTRALESIONAL; INTRAMUSCULAR; SOFT TISSUE ONCE
Status: COMPLETED | OUTPATIENT
Start: 2023-05-13 | End: 2023-05-13

## 2023-05-13 RX ADMIN — METHYLPREDNISOLONE ACETATE 40 MG: 40 INJECTION, SUSPENSION INTRA-ARTICULAR; INTRALESIONAL; INTRAMUSCULAR; SOFT TISSUE at 11:10

## 2023-05-13 ASSESSMENT — ENCOUNTER SYMPTOMS
SHORTNESS OF BREATH: 0
WHEEZING: 0
PHOTOPHOBIA: 0
BLOOD IN STOOL: 0
COUGH: 1
BACK PAIN: 1
ABDOMINAL PAIN: 0
EYE REDNESS: 0

## 2023-05-13 NOTE — PROGRESS NOTES
OFFICE NOTE    23  Name: Les Nurse  :1950   Sex:female   Age:72 y.o. SUBJECTIVE  Chief Complaint   Patient presents with    Otalgia     Left ear pain     Other     Feels itchy - b/l legs, elbows, navel, and throat. Has been itchy all week. States that it is driving her nuts. Nasal Congestion     Runny nose        HPI Is on several targeted treatments for her stage 4 breast cancer. Due for MRI this week to look at back.  at home on Hospice for different malignancy. Son local, daughter in  [address]   Constitutional:  Positive for activity change and fatigue. Negative for fever and unexpected weight change. HENT:  Positive for congestion and postnasal drip. Eyes:  Negative for photophobia, redness and visual disturbance. Respiratory:  Positive for cough. Negative for shortness of breath and wheezing. Cardiovascular:  Negative for chest pain and palpitations. Gastrointestinal:  Negative for abdominal pain and blood in stool. Genitourinary:  Positive for frequency and urgency. Negative for dysuria and hematuria. Musculoskeletal:  Positive for arthralgias and back pain. Skin:  Positive for pallor and rash. Lower legs, pruritic   Neurological:  Positive for weakness and numbness. Negative for tremors and seizures. Psychiatric/Behavioral:  Negative for dysphoric mood. The patient is not nervous/anxious. Current Outpatient Medications:     triamcinolone (KENALOG) 0.1 % cream, Apply topically 2 times daily. , Disp: 80 g, Rfl: 0    warfarin (COUMADIN) 6 MG tablet, Indications: monday, friday 3 mg, all other days are 6 mg 1/2 tab on M  F and 1 tab all other days of the week OR AS DIRECTED, Disp: 30 tablet, Rfl: 5    propafenone (RYTHMOL) 225 MG tablet, take 1 tablet by mouth three times a day, Disp: 270 tablet, Rfl: 1    olopatadine (PATANOL) 0.1 % ophthalmic solution, instill 1 drop into both eyes twice a day, Disp: , Rfl:     verapamil

## 2023-05-17 ENCOUNTER — PROCEDURE VISIT (OUTPATIENT)
Dept: PODIATRY | Age: 73
End: 2023-05-17
Payer: MEDICARE

## 2023-05-17 VITALS — WEIGHT: 179 LBS | BODY MASS INDEX: 28.09 KG/M2 | HEIGHT: 67 IN

## 2023-05-17 DIAGNOSIS — B35.1 TINEA UNGUIUM: Primary | ICD-10-CM

## 2023-05-17 DIAGNOSIS — B35.3 TINEA PEDIS OF BOTH FEET: ICD-10-CM

## 2023-05-17 DIAGNOSIS — L84 CORNS AND CALLOSITIES: ICD-10-CM

## 2023-05-17 DIAGNOSIS — G60.8 HEREDITARY SENSORY NEUROPATHY: ICD-10-CM

## 2023-05-17 DIAGNOSIS — Z79.01 ENCOUNTER FOR CURRENT LONG-TERM USE OF ANTICOAGULANTS: ICD-10-CM

## 2023-05-17 PROCEDURE — 11721 DEBRIDE NAIL 6 OR MORE: CPT | Performed by: PODIATRIST

## 2023-05-17 PROCEDURE — 11056 PARNG/CUTG B9 HYPRKR LES 2-4: CPT | Performed by: PODIATRIST

## 2023-05-17 RX ORDER — CLOTRIMAZOLE AND BETAMETHASONE DIPROPIONATE 10; .64 MG/G; MG/G
CREAM TOPICAL
Qty: 45 G | Refills: 2 | Status: SHIPPED | OUTPATIENT
Start: 2023-05-17

## 2023-05-19 ENCOUNTER — HOSPITAL ENCOUNTER (OUTPATIENT)
Dept: MRI IMAGING | Age: 73
End: 2023-05-19
Payer: MEDICARE

## 2023-05-19 DIAGNOSIS — Z17.1 ESTROGEN RECEPTOR NEGATIVE STATUS (ER-): ICD-10-CM

## 2023-05-19 DIAGNOSIS — C79.51 METASTASIS TO BONE (HCC): ICD-10-CM

## 2023-05-19 DIAGNOSIS — C50.211 MALIGNANT NEOPLASM OF UPPER-INNER QUADRANT OF RIGHT FEMALE BREAST, UNSPECIFIED ESTROGEN RECEPTOR STATUS (HCC): ICD-10-CM

## 2023-05-19 PROCEDURE — A9577 INJ MULTIHANCE: HCPCS | Performed by: RADIOLOGY

## 2023-05-19 PROCEDURE — 6360000004 HC RX CONTRAST MEDICATION: Performed by: RADIOLOGY

## 2023-05-19 PROCEDURE — 72158 MRI LUMBAR SPINE W/O & W/DYE: CPT

## 2023-05-19 RX ADMIN — GADOBENATE DIMEGLUMINE 16 ML: 529 INJECTION, SOLUTION INTRAVENOUS at 09:39

## 2023-05-25 ENCOUNTER — NURSE ONLY (OUTPATIENT)
Dept: ENT CLINIC | Age: 73
End: 2023-05-25
Payer: MEDICARE

## 2023-05-25 DIAGNOSIS — J30.89 NON-SEASONAL ALLERGIC RHINITIS, UNSPECIFIED TRIGGER: Primary | ICD-10-CM

## 2023-05-25 PROCEDURE — 96372 THER/PROPH/DIAG INJ SC/IM: CPT | Performed by: OTOLARYNGOLOGY

## 2023-05-25 RX ORDER — TRIAMCINOLONE ACETONIDE 40 MG/ML
40 INJECTION, SUSPENSION INTRA-ARTICULAR; INTRAMUSCULAR ONCE
Status: COMPLETED | OUTPATIENT
Start: 2023-05-25 | End: 2023-05-25

## 2023-05-25 RX ADMIN — TRIAMCINOLONE ACETONIDE 40 MG: 40 INJECTION, SUSPENSION INTRA-ARTICULAR; INTRAMUSCULAR at 16:03

## 2023-05-25 NOTE — PROGRESS NOTES
Administrations This Visit       triamcinolone acetonide (KENALOG-40) injection 40 mg       Admin Date  05/25/2023  16:03 Action  Given Dose  40 mg Route  IntraMUSCular Site  Ventrogluteal Right Administered By  Missy Barragan MA    Ordering Provider: Napoleon Godwin DO    NDC: 8359-1859-34    Lot#: [id number]    : [facility] (PRIMARY CARE)    Patient Supplied?: No

## 2023-05-31 ENCOUNTER — OFFICE VISIT (OUTPATIENT)
Dept: FAMILY MEDICINE CLINIC | Age: 73
End: 2023-05-31
Payer: MEDICARE

## 2023-05-31 ENCOUNTER — ANTI-COAG VISIT (OUTPATIENT)
Dept: FAMILY MEDICINE CLINIC | Age: 73
End: 2023-05-31

## 2023-05-31 VITALS
BODY MASS INDEX: 28.72 KG/M2 | RESPIRATION RATE: 20 BRPM | WEIGHT: 183 LBS | SYSTOLIC BLOOD PRESSURE: 136 MMHG | HEART RATE: 63 BPM | OXYGEN SATURATION: 97 % | HEIGHT: 67 IN | TEMPERATURE: 97.3 F | DIASTOLIC BLOOD PRESSURE: 80 MMHG

## 2023-05-31 DIAGNOSIS — I48.91 ATRIAL FIBRILLATION, UNSPECIFIED TYPE (HCC): ICD-10-CM

## 2023-05-31 DIAGNOSIS — I10 ESSENTIAL HYPERTENSION: Primary | ICD-10-CM

## 2023-05-31 DIAGNOSIS — C79.51 MALIGNANT NEOPLASM METASTATIC TO BONE (HCC): ICD-10-CM

## 2023-05-31 LAB
INTERNATIONAL NORMALIZATION RATIO, POC: 2.2
PROTHROMBIN TIME, POC: 26.5

## 2023-05-31 PROCEDURE — 3075F SYST BP GE 130 - 139MM HG: CPT | Performed by: FAMILY MEDICINE

## 2023-05-31 PROCEDURE — 3078F DIAST BP <80 MM HG: CPT | Performed by: FAMILY MEDICINE

## 2023-05-31 PROCEDURE — 85610 PROTHROMBIN TIME: CPT | Performed by: FAMILY MEDICINE

## 2023-05-31 PROCEDURE — 1123F ACP DISCUSS/DSCN MKR DOCD: CPT | Performed by: FAMILY MEDICINE

## 2023-05-31 PROCEDURE — 99213 OFFICE O/P EST LOW 20 MIN: CPT | Performed by: FAMILY MEDICINE

## 2023-05-31 ASSESSMENT — ENCOUNTER SYMPTOMS
BLOOD IN STOOL: 0
BACK PAIN: 1
COUGH: 0
ABDOMINAL PAIN: 0
NAUSEA: 0
TROUBLE SWALLOWING: 0
SHORTNESS OF BREATH: 0
DIARRHEA: 0
ALLERGIC/IMMUNOLOGIC NEGATIVE: 1
SINUS PAIN: 0
PHOTOPHOBIA: 0
EYE DISCHARGE: 0
CHEST TIGHTNESS: 0
EYE PAIN: 0
VOMITING: 0
EYE REDNESS: 0
SORE THROAT: 0

## 2023-05-31 NOTE — PROGRESS NOTES
Other Topics Concern    Not on file   Social History Narrative    Drinks occasional Pepsi/Coke     Social Determinants of Health     Financial Resource Strain: Low Risk     Difficulty of Paying Living Expenses: Not hard at all   Food Insecurity: No Food Insecurity    Worried About [address] in the Last Year: Never true    [address] [facility] [address] in the Last Year: Never true   Transportation Needs: Unknown    Lack of Transportation (Medical): Not on file    Lack of Transportation (Non-Medical): No   Physical Activity: Inactive    Days of Exercise per Week: 0 days    Minutes of Exercise per Session: 0 min   Stress: Not on file   Social Connections: Not on file   Intimate Partner Violence: Not on file   Housing Stability: Unknown    Unable to Pay for Housing in the Last Year: Not on file    Number of Places Lived in the Last Year: Not on file    Unstable Housing in the Last Year: No       Vitals:    05/31/23 1124 05/31/23 1144   BP: (!) 146/54 136/80   Site: Left Upper Arm    Position: Sitting    Cuff Size: Large Adult    Pulse: 63    Resp: 20    Temp: 97.3 °F (36.3 °C)    TempSrc: Temporal    SpO2: 97%    Weight: 183 lb (83 kg)    Height: 5' 7\" (1.702 m)        Physical Exam  Vitals and nursing note reviewed. Constitutional:       Appearance: She is well-developed. HENT:      Head: Atraumatic. Right Ear: External ear normal.      Left Ear: External ear normal.      Nose: Nose normal.      Mouth/Throat:      Pharynx: No oropharyngeal exudate. Eyes:      Conjunctiva/sclera: Conjunctivae normal.      Pupils: Pupils are equal, round, and reactive to light. Neck:      Thyroid: No thyromegaly. Trachea: No tracheal deviation. Cardiovascular:      Rate and Rhythm: Normal rate and regular rhythm. Heart sounds: No murmur heard. No friction rub. No gallop. Pulmonary:      Effort: Pulmonary effort is normal. No respiratory distress. Breath sounds: Normal breath sounds.    Abdominal:

## 2023-06-06 ENCOUNTER — HOSPITAL ENCOUNTER (OUTPATIENT)
Dept: AUDIOLOGY | Age: 73
Discharge: HOME | End: 2023-06-06
Payer: MEDICARE

## 2023-06-06 PROCEDURE — V5140 BEHIND EAR BINAUR HEARING AI: HCPCS | Performed by: AUDIOLOGIST

## 2023-06-06 PROCEDURE — V5160 DISPENSING FEE BINAURAL: HCPCS | Performed by: AUDIOLOGIST

## 2023-06-06 NOTE — PROGRESS NOTES
The patient came in for a 30 day hearing aid follow up. Patient reported they are doing well with the hearing aids. and they have chosen to keep the them. No changes made to the hearing aid. Her only complaint is that they are not staying in her ears. Made ear impressions bilaterally for custom lucite skeleton molds. Patient is satisfied with the hearing aids and billing will be done today as a self-pay patient. No insurance coverage for hearing aids through [facility]), patient has agreed upon payment and understands. Patient to return when molds are in.     BILLED BTE HEARING AIDS AND DISP FEE    Stacy Salvador M.A., [address] [address] [id number]  Electronically signed by Moraima Urbano on 6/6/2023 at 10:06 AM

## 2023-06-19 ENCOUNTER — PROCEDURE VISIT (OUTPATIENT)
Dept: AUDIOLOGY | Age: 73
End: 2023-06-19

## 2023-06-19 DIAGNOSIS — H90.3 SENSORINEURAL HEARING LOSS, BILATERAL: Primary | ICD-10-CM

## 2023-06-19 PROCEDURE — 99024 POSTOP FOLLOW-UP VISIT: CPT | Performed by: AUDIOLOGIST

## 2023-06-19 NOTE — PROGRESS NOTES
Patient was seen to  custom molds for her hearing aids due to difficulty with retention and poor sound quality. Fit was good. Programming updated with new acoustics. Reduced loud and moderate gain for background noise ( has O2 mixer at home which is noisy). She will call if she has any further problems.      Maggie Moreau M.A., [address] [facility] [id number]  Electronically signed by Jose Ma on 6/19/2023 at 3:19 PM

## 2023-06-30 ENCOUNTER — OFFICE VISIT (OUTPATIENT)
Dept: FAMILY MEDICINE CLINIC | Age: 73
End: 2023-06-30

## 2023-06-30 VITALS
SYSTOLIC BLOOD PRESSURE: 130 MMHG | BODY MASS INDEX: 27.78 KG/M2 | WEIGHT: 177 LBS | TEMPERATURE: 97.2 F | RESPIRATION RATE: 17 BRPM | OXYGEN SATURATION: 99 % | HEIGHT: 67 IN | HEART RATE: 55 BPM | DIASTOLIC BLOOD PRESSURE: 62 MMHG

## 2023-06-30 DIAGNOSIS — I83.813 VARICOSE VEINS OF BOTH LOWER EXTREMITIES WITH PAIN: ICD-10-CM

## 2023-06-30 DIAGNOSIS — F06.4 ANXIETY DISORDER DUE TO GENERAL MEDICAL CONDITION: ICD-10-CM

## 2023-06-30 DIAGNOSIS — I10 ESSENTIAL HYPERTENSION: Primary | ICD-10-CM

## 2023-06-30 DIAGNOSIS — R21 RASH AND OTHER NONSPECIFIC SKIN ERUPTION: ICD-10-CM

## 2023-06-30 DIAGNOSIS — I48.91 ATRIAL FIBRILLATION, UNSPECIFIED TYPE (HCC): ICD-10-CM

## 2023-06-30 LAB
INTERNATIONAL NORMALIZATION RATIO, POC: 2.9
PROTHROMBIN TIME, POC: 35

## 2023-06-30 RX ORDER — TRIAMCINOLONE ACETONIDE 1 MG/G
CREAM TOPICAL
Qty: 80 G | Refills: 0 | Status: SHIPPED | OUTPATIENT
Start: 2023-06-30

## 2023-06-30 RX ORDER — ESCITALOPRAM OXALATE 10 MG/1
10 TABLET ORAL EVERY EVENING
Qty: 90 TABLET | Refills: 1 | Status: SHIPPED | OUTPATIENT
Start: 2023-06-30

## 2023-06-30 ASSESSMENT — ENCOUNTER SYMPTOMS
BACK PAIN: 0
COUGH: 0
SORE THROAT: 1
EYE DISCHARGE: 0
NAUSEA: 0
EYE REDNESS: 0
EYE PAIN: 0
VOMITING: 0
CHEST TIGHTNESS: 0
ABDOMINAL PAIN: 0
TROUBLE SWALLOWING: 0
SINUS PAIN: 0
DIARRHEA: 0
SHORTNESS OF BREATH: 0
BLOOD IN STOOL: 0
ALLERGIC/IMMUNOLOGIC NEGATIVE: 1
PHOTOPHOBIA: 0

## 2023-07-07 ENCOUNTER — FOLLOWUP TELEPHONE ENCOUNTER (OUTPATIENT)
Dept: AUDIOLOGY | Age: 73
End: 2023-07-07

## 2023-07-07 NOTE — TELEPHONE ENCOUNTER
is still not charging on the right side. Called [employer] and they are sending replacement. Conf # [id number]. Call to arrange swap when received.      Electronically signed by Nannette Mendez on 7/7/2023 at 11:08 AM

## 2023-07-10 ASSESSMENT — ENCOUNTER SYMPTOMS
COUGH: 0
APNEA: 0
CHEST TIGHTNESS: 0
DIARRHEA: 0
CONSTIPATION: 0
SHORTNESS OF BREATH: 0
BACK PAIN: 0

## 2023-07-17 ENCOUNTER — HOSPITAL ENCOUNTER (OUTPATIENT)
Dept: AUDIOLOGY | Age: 73
Discharge: HOME | End: 2023-07-17

## 2023-07-17 ENCOUNTER — OFFICE VISIT (OUTPATIENT)
Dept: BREAST CENTER | Age: 73
End: 2023-07-17
Payer: MEDICARE

## 2023-07-17 VITALS
HEART RATE: 59 BPM | HEIGHT: 67 IN | DIASTOLIC BLOOD PRESSURE: 80 MMHG | RESPIRATION RATE: 18 BRPM | WEIGHT: 175 LBS | TEMPERATURE: 98 F | SYSTOLIC BLOOD PRESSURE: 142 MMHG | BODY MASS INDEX: 27.47 KG/M2 | OXYGEN SATURATION: 99 %

## 2023-07-17 DIAGNOSIS — Z85.3 PERSONAL HISTORY OF BREAST CANCER: Primary | ICD-10-CM

## 2023-07-17 PROCEDURE — 99213 OFFICE O/P EST LOW 20 MIN: CPT | Performed by: NURSE PRACTITIONER

## 2023-07-17 PROCEDURE — 9990000010 HC NO CHARGE VISIT

## 2023-07-17 PROCEDURE — 3077F SYST BP >= 140 MM HG: CPT | Performed by: NURSE PRACTITIONER

## 2023-07-17 PROCEDURE — 3079F DIAST BP 80-89 MM HG: CPT | Performed by: NURSE PRACTITIONER

## 2023-07-17 PROCEDURE — 1123F ACP DISCUSS/DSCN MKR DOCD: CPT | Performed by: NURSE PRACTITIONER

## 2023-07-17 RX ORDER — AMMONIUM LACTATE 12 G/100G
LOTION TOPICAL
COMMUNITY
Start: 2022-06-29

## 2023-07-17 NOTE — PROGRESS NOTES
Patient came in due to left hearing aid not working. Patient believed it was her , so we ordered patient a new . Patient came in today to swap chargers and realized it was not the , it was the lithium ion rechargeable battery in the hearing aid that needed changed. Battery serial number updated in the software. Patient was satisfied with sound and comfort of both hearing aids. Swapped chargers due to [facility] needing old  returned. Double checked new  worked properly. Shipped old  back to [facility] (07/17). Both hearing aids connected to phone. Patient to call if any problems arise.      Chin Bahena/CCC-A  [place] Q.[id number]  Electronically signed by Chin Bahena on 7/17/2023 at 3:47 PM

## 2023-07-18 DIAGNOSIS — G43.009 MIGRAINE WITHOUT AURA, NOT REFRACTORY: ICD-10-CM

## 2023-07-18 RX ORDER — TOPIRAMATE 50 MG/1
50 TABLET, FILM COATED ORAL 2 TIMES DAILY
Qty: 180 TABLET | Refills: 1 | Status: SHIPPED | OUTPATIENT
Start: 2023-07-18

## 2023-07-20 ENCOUNTER — FOLLOWUP TELEPHONE ENCOUNTER (OUTPATIENT)
Dept: AUDIOLOGY | Age: 73
End: 2023-07-20

## 2023-07-20 NOTE — TELEPHONE ENCOUNTER
Patient left message stating she got new phone and cannot get hearing aids to connect to inder. Called back to  her how to re-pair to new iPhone; however, she did not have hearing aids with her. She will call back when she has hearing aids.      Electronically signed by Devora Brown on 7/20/2023 at 12:31 PM

## 2023-07-26 ENCOUNTER — PROCEDURE VISIT (OUTPATIENT)
Dept: PODIATRY | Age: 73
End: 2023-07-26
Payer: MEDICARE

## 2023-07-26 DIAGNOSIS — G60.8 HEREDITARY SENSORY NEUROPATHY: ICD-10-CM

## 2023-07-26 DIAGNOSIS — Z79.01 ENCOUNTER FOR CURRENT LONG-TERM USE OF ANTICOAGULANTS: ICD-10-CM

## 2023-07-26 DIAGNOSIS — L84 CORNS AND CALLOSITIES: ICD-10-CM

## 2023-07-26 DIAGNOSIS — B35.1 TINEA UNGUIUM: Primary | ICD-10-CM

## 2023-07-26 DIAGNOSIS — B35.3 TINEA PEDIS OF BOTH FEET: ICD-10-CM

## 2023-07-26 PROCEDURE — 11721 DEBRIDE NAIL 6 OR MORE: CPT | Performed by: PODIATRIST

## 2023-07-26 PROCEDURE — 11056 PARNG/CUTG B9 HYPRKR LES 2-4: CPT | Performed by: PODIATRIST

## 2023-07-26 RX ORDER — AMMONIUM LACTATE 12 G/100G
LOTION TOPICAL
Qty: 400 G | Refills: 2 | Status: SHIPPED | OUTPATIENT
Start: 2023-07-26

## 2023-07-26 NOTE — PROGRESS NOTES
Kristopher Payne is here today for nail care. her PCP is Srinivasa Puente DO last OV was 06/30/2023. CC:    Follow-up foot and ankle exam.    HPI:   Follow-up foot ankle exam  History Coumadin. Has been using ammonium lactate lotion daily has noted significant improvement. Denies any foot or ankle issues today. No open wounds. No additional pedal complaints. ROS:  Const: Denies constitutional symptoms  Musculo: Denies symptoms other than stated above  Skin: Denies symptoms other than stated above       Current Outpatient Medications:     topiramate (TOPAMAX) 50 MG tablet, Take 1 tablet by mouth 2 times daily, Disp: 180 tablet, Rfl: 1    ammonium lactate (LAC-HYDRIN) 12 % lotion, ammonium lactate (LAC-HYDRIN) 12 % lotion Apply topically twice daily 400 g 2 06/29/2022 Active, Disp: , Rfl:     escitalopram (LEXAPRO) 10 MG tablet, Take 1 tablet by mouth every evening, Disp: 90 tablet, Rfl: 1    triamcinolone (KENALOG) 0.1 % cream, Apply topically 2 times daily. , Disp: 80 g, Rfl: 0    clotrimazole-betamethasone (LOTRISONE) 1-0.05 % cream, Apply topically 2 times daily. , Disp: 45 g, Rfl: 2    clobetasol (TEMOVATE) 0.05 % ointment, Apply topically every evening, Disp: 30 g, Rfl: 2    warfarin (COUMADIN) 6 MG tablet, Indications: monday, friday 3 mg, all other days are 6 mg 1/2 tab on M  F and 1 tab all other days of the week OR AS DIRECTED, Disp: 30 tablet, Rfl: 5    propafenone (RYTHMOL) 225 MG tablet, take 1 tablet by mouth three times a day, Disp: 270 tablet, Rfl: 1    olopatadine (PATANOL) 0.1 % ophthalmic solution, instill 1 drop into both eyes twice a day, Disp: , Rfl:     verapamil (CALAN SR) 180 MG extended release tablet, Take 1 tablet by mouth daily, Disp: 90 tablet, Rfl: 1    azelastine (ASTELIN) 0.1 % nasal spray, 2 sprays by Nasal route 2 times daily Use in each nostril as directed, Disp: 120 mL, Rfl: 3    palbociclib (IBRANCE) 75 MG capsule, Take 75 mg by mouth Takes 3 weeks out of the month then

## 2023-07-27 ENCOUNTER — OFFICE VISIT (OUTPATIENT)
Dept: FAMILY MEDICINE CLINIC | Age: 73
End: 2023-07-27
Payer: MEDICARE

## 2023-07-27 VITALS
OXYGEN SATURATION: 97 % | HEART RATE: 73 BPM | TEMPERATURE: 98.5 F | HEIGHT: 67 IN | SYSTOLIC BLOOD PRESSURE: 126 MMHG | BODY MASS INDEX: 27.94 KG/M2 | DIASTOLIC BLOOD PRESSURE: 78 MMHG | RESPIRATION RATE: 16 BRPM | WEIGHT: 178 LBS

## 2023-07-27 DIAGNOSIS — I48.91 ATRIAL FIBRILLATION, UNSPECIFIED TYPE (HCC): ICD-10-CM

## 2023-07-27 LAB
INTERNATIONAL NORMALIZATION RATIO, POC: 3.6
PROTHROMBIN TIME, POC: 43.3

## 2023-07-27 PROCEDURE — 85610 PROTHROMBIN TIME: CPT | Performed by: FAMILY MEDICINE

## 2023-07-27 PROCEDURE — 3074F SYST BP LT 130 MM HG: CPT | Performed by: FAMILY MEDICINE

## 2023-07-27 PROCEDURE — 3078F DIAST BP <80 MM HG: CPT | Performed by: FAMILY MEDICINE

## 2023-07-27 PROCEDURE — 99213 OFFICE O/P EST LOW 20 MIN: CPT | Performed by: FAMILY MEDICINE

## 2023-07-27 PROCEDURE — 1123F ACP DISCUSS/DSCN MKR DOCD: CPT | Performed by: FAMILY MEDICINE

## 2023-07-27 ASSESSMENT — ENCOUNTER SYMPTOMS
TROUBLE SWALLOWING: 0
BLOOD IN STOOL: 0
NAUSEA: 0
ABDOMINAL PAIN: 0
PHOTOPHOBIA: 0
SINUS PAIN: 0
CHEST TIGHTNESS: 0
DIARRHEA: 0
VOMITING: 0
EYE DISCHARGE: 0
EYE REDNESS: 0
EYE PAIN: 0
SHORTNESS OF BREATH: 0
COUGH: 0
BACK PAIN: 0
ALLERGIC/IMMUNOLOGIC NEGATIVE: 1
SORE THROAT: 0

## 2023-07-27 NOTE — PROGRESS NOTES
23  Joceline Whittaker : 1950 Sex: female  Age: 71 y.o. Assessment and Plan: [facility] was seen today for coagulation disorder. Diagnoses and all orders for this visit:    Atrial fibrillation, unspecified type ([address])  -     POCT INR    Hold warfarin for today, resume regular schedule tomorrow. Recheck INR 2 weeks    Return in about 2 weeks (around 8/10/2023). Chief Complaint   Patient presents with    Coagulation Disorder       Patient returns for pro time. INR today 3.6, supratherapeutic, he just restarted chemotherapy again. She also denies bleeding, bruising, dietary changes, dosage change. Review of Systems   Constitutional:  Negative for appetite change, fatigue and unexpected weight change. HENT:  Negative for congestion, ear pain, hearing loss, sinus pain, sore throat and trouble swallowing. Eyes:  Negative for photophobia, pain, discharge and redness. Respiratory:  Negative for cough, chest tightness and shortness of breath. Cardiovascular:  Negative for chest pain, palpitations and leg swelling. Gastrointestinal:  Negative for abdominal pain, blood in stool, diarrhea, nausea and vomiting. Endocrine: Negative. Genitourinary:  Negative for dysuria, flank pain, frequency and hematuria. Musculoskeletal:  Negative for arthralgias, back pain, joint swelling and myalgias. Skin: Negative. Allergic/Immunologic: Negative. Neurological:  Negative for dizziness, seizures, syncope, weakness, light-headedness, numbness and headaches. Hematological:  Negative for adenopathy. Does not bruise/bleed easily. Psychiatric/Behavioral: Negative.          Current Outpatient Medications:     ammonium lactate (LAC-HYDRIN) 12 % lotion, Apply topically twice daily, Disp: 400 g, Rfl: 2    topiramate (TOPAMAX) 50 MG tablet, Take 1 tablet by mouth 2 times daily, Disp: 180 tablet, Rfl: 1    ammonium lactate (LAC-HYDRIN) 12 % lotion, ammonium lactate (LAC-HYDRIN) 12 % lotion Apply

## 2023-08-03 ENCOUNTER — HOSPITAL ENCOUNTER (OUTPATIENT)
Dept: AUDIOLOGY | Age: 73
Discharge: HOME | End: 2023-08-03

## 2023-08-03 PROCEDURE — 9990000010 HC NO CHARGE VISIT: Performed by: AUDIOLOGIST

## 2023-08-03 NOTE — PROGRESS NOTES
Was having problem pairing hearing aids to new phone. Unpaired and then re-paired in settings. Appears to be working. Discussed how to unpair her old phone/forget devices on her old phone so that there is not conflict with bluetooth trying to connect to 2 devices. Return as needed.      Electronically signed by Trino Campa on 8/3/2023 at 3:27 PM

## 2023-08-11 ENCOUNTER — OFFICE VISIT (OUTPATIENT)
Dept: FAMILY MEDICINE CLINIC | Age: 73
End: 2023-08-11

## 2023-08-11 VITALS
WEIGHT: 173 LBS | DIASTOLIC BLOOD PRESSURE: 52 MMHG | TEMPERATURE: 96.9 F | HEIGHT: 67 IN | BODY MASS INDEX: 27.15 KG/M2 | RESPIRATION RATE: 16 BRPM | SYSTOLIC BLOOD PRESSURE: 120 MMHG | OXYGEN SATURATION: 98 % | HEART RATE: 71 BPM

## 2023-08-11 DIAGNOSIS — I48.91 ATRIAL FIBRILLATION, UNSPECIFIED TYPE (HCC): ICD-10-CM

## 2023-08-11 DIAGNOSIS — I10 ESSENTIAL HYPERTENSION: ICD-10-CM

## 2023-08-11 LAB
INTERNATIONAL NORMALIZATION RATIO, POC: 2.6
PROTHROMBIN TIME, POC: 31.5

## 2023-08-11 ASSESSMENT — ENCOUNTER SYMPTOMS
NAUSEA: 0
EYE PAIN: 0
DIARRHEA: 0
SINUS PAIN: 0
TROUBLE SWALLOWING: 0
SHORTNESS OF BREATH: 0
EYE REDNESS: 0
COUGH: 0
BACK PAIN: 0
BLOOD IN STOOL: 0
SORE THROAT: 0
CHEST TIGHTNESS: 0
EYE DISCHARGE: 0
PHOTOPHOBIA: 0
ALLERGIC/IMMUNOLOGIC NEGATIVE: 1
VOMITING: 0
ABDOMINAL PAIN: 0

## 2023-08-11 NOTE — PROGRESS NOTES
skin once, Disp: , Rfl:     calcium carbonate-vitamin D (CALTRATE) 600-400 MG-UNIT TABS per tab, Take 1 tablet by mouth daily, Disp: , Rfl:     Alpha-Lipoic Acid 100 MG CAPS, Take by mouth, Disp: , Rfl:     b complex vitamins capsule, Take 1 capsule by mouth daily, Disp: , Rfl:   Allergies   Allergen Reactions    Diltiazem Hcl Other (See Comments)     Other reaction(s): weak    Cardizem [Diltiazem] Palpitations    Cefuroxime Axetil Nausea And Vomiting, Other (See Comments) and Palpitations    Claritin [Loratadine] Palpitations    Erythromycin Nausea And Vomiting    Morphine Nausea And Vomiting    Mucinex [Guaifenesin Er] Palpitations    Propoxyphene Other (See Comments) and Nausea And Vomiting     DIZZY  Other reaction(s): Free Text    Zyrtec [Cetirizine] Nausea And Vomiting and Other (See Comments)     WEAK       Past Medical History:   Diagnosis Date    Bladder prolapse, female, acquired     Bone cancer ([address]) 02/2021    Breast cancer ([address]) 04/2017    Broken rib     left    Broken toe     right    Chronic atrial fibrillation (HCC)     Fibroadenosis of breast     Headache     History of therapeutic radiation     Hyperlipidemia     Hypertension      Past Surgical History:   Procedure Laterality Date    [address]    BONE BIOPSY  04/2021    BREAST LUMPECTOMY Right 04/26/2017    COLONOSCOPY      CT BIOPSY PERCUTANEOUS DEEP BONE  04/07/2021    CT BIOPSY PERCUTANEOUS DEEP BONE 4/7/2021 Pavan Farnsworth MD [facility]    EYE SURGERY      SUDHIR LENSE IMPLANTS    OTHER SURGICAL HISTORY      port removal    TONSILLECTOMY      TUNNELED VENOUS PORT PLACEMENT  05/26/2017    Dr. Jamar Chávez     Family History   Problem Relation Age of Onset    Cancer Maternal Cousin 80        lung    Heart Disease Mother     Other Mother         [place] Palsy    Other Father         gall bladder problem, hip fx; AAA rupture    Coronary Art Dis Father         MI     Social History     Socioeconomic History    Marital

## 2023-09-08 ENCOUNTER — FOLLOWUP TELEPHONE ENCOUNTER (OUTPATIENT)
Dept: AUDIOLOGY | Age: 73
End: 2023-09-08

## 2023-09-08 ENCOUNTER — OFFICE VISIT (OUTPATIENT)
Dept: FAMILY MEDICINE CLINIC | Age: 73
End: 2023-09-08

## 2023-09-08 VITALS
HEART RATE: 64 BPM | SYSTOLIC BLOOD PRESSURE: 126 MMHG | WEIGHT: 177.6 LBS | HEIGHT: 67 IN | TEMPERATURE: 97.8 F | BODY MASS INDEX: 27.88 KG/M2 | RESPIRATION RATE: 18 BRPM | DIASTOLIC BLOOD PRESSURE: 70 MMHG | OXYGEN SATURATION: 98 %

## 2023-09-08 DIAGNOSIS — I48.91 ATRIAL FIBRILLATION, UNSPECIFIED TYPE (HCC): ICD-10-CM

## 2023-09-08 LAB
INTERNATIONAL NORMALIZATION RATIO, POC: 2.6
PROTHROMBIN TIME, POC: 31

## 2023-09-08 ASSESSMENT — ENCOUNTER SYMPTOMS
BACK PAIN: 0
SINUS PAIN: 0
EYE DISCHARGE: 0
SHORTNESS OF BREATH: 0
SORE THROAT: 0
PHOTOPHOBIA: 0
EYE PAIN: 0
CHEST TIGHTNESS: 0
BLOOD IN STOOL: 0
ALLERGIC/IMMUNOLOGIC NEGATIVE: 1
DIARRHEA: 0
TROUBLE SWALLOWING: 0
ABDOMINAL PAIN: 0
EYE REDNESS: 0
VOMITING: 0
COUGH: 0
NAUSEA: 0

## 2023-09-08 NOTE — PROGRESS NOTES
23  Joceline Whittaker : 1950 Sex: female  Age: 71 y.o. Assessment and Plan: Em Little was seen today for other. Diagnoses and all orders for this visit:    Atrial fibrillation, unspecified type ([address])  -     POCT INR    INR therapeutic range, new warfarin dose as scheduled. Check INR in 1 month. Return in about 4 weeks (around 10/6/2023). Chief Complaint   Patient presents with    Other     1 month INR check       Returns for recheck pro time. INR today 2.6, therapeutic, denies bleeding, bruising, dietary changes, dosage change. She denies any other voiced complaints. Review of Systems   Constitutional:  Negative for appetite change, fatigue and unexpected weight change. HENT:  Negative for congestion, ear pain, hearing loss, sinus pain, sore throat and trouble swallowing. Eyes:  Negative for photophobia, pain, discharge and redness. Respiratory:  Negative for cough, chest tightness and shortness of breath. Cardiovascular:  Negative for chest pain, palpitations and leg swelling. Gastrointestinal:  Negative for abdominal pain, blood in stool, diarrhea, nausea and vomiting. Endocrine: Negative. Genitourinary:  Negative for dysuria, flank pain, frequency and hematuria. Musculoskeletal:  Negative for arthralgias, back pain, joint swelling and myalgias. Skin: Negative. Allergic/Immunologic: Negative. Neurological:  Negative for dizziness, seizures, syncope, weakness, light-headedness, numbness and headaches. Hematological:  Negative for adenopathy. Does not bruise/bleed easily. Psychiatric/Behavioral: Negative.          Current Outpatient Medications:     verapamil (CALAN SR) 180 MG extended release tablet, Take 1 tablet by mouth daily, Disp: 90 tablet, Rfl: 1    ammonium lactate (LAC-HYDRIN) 12 % lotion, Apply topically twice daily, Disp: 400 g, Rfl: 2    topiramate (TOPAMAX) 50 MG tablet, Take 1 tablet by mouth 2 times daily, Disp: 180 tablet, Rfl: 1

## 2023-09-08 NOTE — TELEPHONE ENCOUNTER
Hearing aid is dead (left) and no lights glow when in . [employer] approved express exchange, REF [id number]. Call when in and will swap out the aid. SHE WILL NEED TO BRING IN THE RIGHT AID TO ENSURE WIRELESS PAIRING.      Shabnam Vincent M.A., [address] [id number]  Electronically signed by Lexi Bragg on 9/8/2023 at 3:51 PM

## 2023-09-15 ENCOUNTER — HOSPITAL ENCOUNTER (OUTPATIENT)
Dept: AUDIOLOGY | Age: 73
Discharge: HOME | End: 2023-09-15

## 2023-09-15 PROCEDURE — 9990000010 HC NO CHARGE VISIT: Performed by: AUDIOLOGIST

## 2023-09-15 NOTE — PROGRESS NOTES
Picked up left hearing aid repair/express exchange. Updated firmware on the right aid. Re-paired to phone. HA appears to be working at this time. Return as needed.      Mathieu Landin M.A., [address] [facility] [id number]  Electronically signed by Issa Grigsby on 9/15/2023 at 2:31 PM

## 2023-09-28 ENCOUNTER — HOSPITAL ENCOUNTER (OUTPATIENT)
Dept: AUDIOLOGY | Age: 73
Discharge: HOME | End: 2023-09-28
Payer: MEDICARE

## 2023-09-28 NOTE — PROGRESS NOTES
Patient states right hearing aid is not connecting to phone. It works and charges, but does not connect. Hearing aids connect to software. Re-paired to phone, rebooted phone. Turned off audio hand off and control nearby devices. Will see if any problems, and she will call if continues to disconnect. If continued problem, with order hearing aid express exchange.      Mak Pierre M.A., [address] [facility] [id number]  Electronically signed by Yony Yousif on 9/28/2023 at 12:47 PM

## 2023-10-04 ENCOUNTER — PROCEDURE VISIT (OUTPATIENT)
Dept: PODIATRY | Age: 73
End: 2023-10-04
Payer: MEDICARE

## 2023-10-04 VITALS — HEIGHT: 67 IN | BODY MASS INDEX: 26.84 KG/M2 | WEIGHT: 171 LBS

## 2023-10-04 DIAGNOSIS — Z79.01 ENCOUNTER FOR CURRENT LONG-TERM USE OF ANTICOAGULANTS: ICD-10-CM

## 2023-10-04 DIAGNOSIS — B35.1 TINEA UNGUIUM: Primary | ICD-10-CM

## 2023-10-04 DIAGNOSIS — B35.3 TINEA PEDIS OF BOTH FEET: ICD-10-CM

## 2023-10-04 DIAGNOSIS — L84 CORNS AND CALLOSITIES: ICD-10-CM

## 2023-10-04 DIAGNOSIS — G60.8 HEREDITARY SENSORY NEUROPATHY: ICD-10-CM

## 2023-10-04 PROCEDURE — 11056 PARNG/CUTG B9 HYPRKR LES 2-4: CPT | Performed by: PODIATRIST

## 2023-10-04 PROCEDURE — 11721 DEBRIDE NAIL 6 OR MORE: CPT | Performed by: PODIATRIST

## 2023-10-04 RX ORDER — CLOTRIMAZOLE AND BETAMETHASONE DIPROPIONATE 10; .64 MG/G; MG/G
CREAM TOPICAL
Qty: 45 G | Refills: 1 | Status: SHIPPED | OUTPATIENT
Start: 2023-10-04

## 2023-10-04 NOTE — PROGRESS NOTES
Jyotsna Harrison is here today for nail care. her PCP is Ferdinand Hoyt DO last OV was 08/11/2023. CC:    Follow-up foot and ankle exam.    HPI:   Follow-up foot ankle exam.  No open wounds. No recent injury or trauma. History Coumadin. Did use Lotrisone in the past with improvement of athlete's foot. Would like a refill of Lotrisone today. No additional pedal complaints. ROS:  Const: Denies constitutional symptoms  Musculo: Denies symptoms other than stated above  Skin: Denies symptoms other than stated above       Current Outpatient Medications:     clotrimazole-betamethasone (LOTRISONE) 1-0.05 % cream, Apply topically 2 times daily. , Disp: 45 g, Rfl: 1    nitrofurantoin, macrocrystal-monohydrate, (MACROBID) 100 MG capsule, take 1 capsule by mouth twice a day for 7 days, Disp: , Rfl:     escitalopram (LEXAPRO) 10 MG tablet, Take 1 tablet by mouth every evening, Disp: 90 tablet, Rfl: 1    propafenone (RYTHMOL) 225 MG tablet, Take 1 tablet by mouth 3 times daily, Disp: 270 tablet, Rfl: 1    verapamil (CALAN SR) 180 MG extended release tablet, Take 1 tablet by mouth daily, Disp: 90 tablet, Rfl: 1    ammonium lactate (LAC-HYDRIN) 12 % lotion, Apply topically twice daily, Disp: 400 g, Rfl: 2    topiramate (TOPAMAX) 50 MG tablet, Take 1 tablet by mouth 2 times daily, Disp: 180 tablet, Rfl: 1    ammonium lactate (LAC-HYDRIN) 12 % lotion, ammonium lactate (LAC-HYDRIN) 12 % lotion Apply topically twice daily 400 g 2 06/29/2022 Active, Disp: , Rfl:     triamcinolone (KENALOG) 0.1 % cream, Apply topically 2 times daily. , Disp: 80 g, Rfl: 0    clotrimazole-betamethasone (LOTRISONE) 1-0.05 % cream, Apply topically 2 times daily. , Disp: 45 g, Rfl: 2    clobetasol (TEMOVATE) 0.05 % ointment, Apply topically every evening, Disp: 30 g, Rfl: 2    warfarin (COUMADIN) 6 MG tablet, Indications: monday, friday 3 mg, all other days are 6 mg 1/2 tab on M F and 1 tab all other days of the week OR AS DIRECTED, Disp: 30

## 2023-10-09 ENCOUNTER — OFFICE VISIT (OUTPATIENT)
Dept: FAMILY MEDICINE CLINIC | Age: 73
End: 2023-10-09
Payer: MEDICARE

## 2023-10-09 VITALS
SYSTOLIC BLOOD PRESSURE: 122 MMHG | WEIGHT: 175 LBS | BODY MASS INDEX: 27.47 KG/M2 | HEART RATE: 58 BPM | OXYGEN SATURATION: 98 % | DIASTOLIC BLOOD PRESSURE: 70 MMHG | TEMPERATURE: 97 F | RESPIRATION RATE: 17 BRPM | HEIGHT: 67 IN

## 2023-10-09 DIAGNOSIS — I48.91 ATRIAL FIBRILLATION, UNSPECIFIED TYPE (HCC): ICD-10-CM

## 2023-10-09 DIAGNOSIS — F06.4 ANXIETY DISORDER DUE TO GENERAL MEDICAL CONDITION: ICD-10-CM

## 2023-10-09 LAB
INTERNATIONAL NORMALIZATION RATIO, POC: 3.4
PROTHROMBIN TIME, POC: 41

## 2023-10-09 PROCEDURE — 3078F DIAST BP <80 MM HG: CPT | Performed by: FAMILY MEDICINE

## 2023-10-09 PROCEDURE — 99213 OFFICE O/P EST LOW 20 MIN: CPT | Performed by: FAMILY MEDICINE

## 2023-10-09 PROCEDURE — 1123F ACP DISCUSS/DSCN MKR DOCD: CPT | Performed by: FAMILY MEDICINE

## 2023-10-09 PROCEDURE — 85610 PROTHROMBIN TIME: CPT | Performed by: FAMILY MEDICINE

## 2023-10-09 PROCEDURE — 3074F SYST BP LT 130 MM HG: CPT | Performed by: FAMILY MEDICINE

## 2023-10-09 RX ORDER — PROPAFENONE HYDROCHLORIDE 225 MG/1
225 TABLET, FILM COATED ORAL 3 TIMES DAILY
Qty: 270 TABLET | Refills: 1 | Status: SHIPPED | OUTPATIENT
Start: 2023-10-09

## 2023-10-09 RX ORDER — ESCITALOPRAM OXALATE 10 MG/1
10 TABLET ORAL EVERY EVENING
Qty: 90 TABLET | Refills: 1 | Status: SHIPPED | OUTPATIENT
Start: 2023-10-09

## 2023-10-09 RX ORDER — NITROFURANTOIN 25; 75 MG/1; MG/1
CAPSULE ORAL
COMMUNITY
Start: 2023-10-05

## 2023-10-09 ASSESSMENT — ENCOUNTER SYMPTOMS
EYE REDNESS: 0
BACK PAIN: 0
EYE DISCHARGE: 0
TROUBLE SWALLOWING: 0
SHORTNESS OF BREATH: 0
DIARRHEA: 0
VOMITING: 0
SORE THROAT: 0
NAUSEA: 0
PHOTOPHOBIA: 0
SINUS PAIN: 0
ABDOMINAL PAIN: 0
ALLERGIC/IMMUNOLOGIC NEGATIVE: 1
COUGH: 0
EYE PAIN: 0
BLOOD IN STOOL: 0
CHEST TIGHTNESS: 0

## 2023-10-09 NOTE — PROGRESS NOTES
10/9/23  Barby Santacruz : 1950 Sex: female  Age: 68 y.o. Assessment and Plan: Marianela Lynn was seen today for atrial fibrillation. Diagnoses and all orders for this visit:    Anxiety disorder due to general medical condition  -     escitalopram (LEXAPRO) 10 MG tablet; Take 1 tablet by mouth every evening    Atrial fibrillation, unspecified type (HCC)  -     propafenone (RYTHMOL) 225 MG tablet; Take 1 tablet by mouth 3 times daily  -     POCT INR    She is to adjust her warfarin dose to 3 mg a day while on the antibiotics. Can resume her regular schedule after she has completed the course for urinary tract infection. Recheck with me in 2 weeks for another INR and urinalysis. No follow-ups on file. Chief Complaint   Patient presents with    Atrial Fibrillation       Patient returns for recheck pro time. INR today 3.4, supratherapeutic, has been on antibiotics for urinary tract infection from oncology. Review of Systems   Constitutional:  Negative for appetite change, fatigue and unexpected weight change. HENT:  Negative for congestion, ear pain, hearing loss, sinus pain, sore throat and trouble swallowing. Eyes:  Negative for photophobia, pain, discharge and redness. Respiratory:  Negative for cough, chest tightness and shortness of breath. Cardiovascular:  Negative for chest pain, palpitations and leg swelling. Gastrointestinal:  Negative for abdominal pain, blood in stool, diarrhea, nausea and vomiting. Endocrine: Negative. Genitourinary:  Negative for dysuria, flank pain, frequency and hematuria. Musculoskeletal:  Negative for arthralgias, back pain, joint swelling and myalgias. Skin: Negative. Allergic/Immunologic: Negative. Neurological:  Negative for dizziness, seizures, syncope, weakness, light-headedness, numbness and headaches. Hematological:  Negative for adenopathy. Does not bruise/bleed easily. Psychiatric/Behavioral: Negative.            Current

## 2023-10-11 DIAGNOSIS — I48.91 ATRIAL FIBRILLATION, UNSPECIFIED TYPE (HCC): ICD-10-CM

## 2023-10-11 RX ORDER — PROPAFENONE HYDROCHLORIDE 225 MG/1
225 TABLET, FILM COATED ORAL 3 TIMES DAILY
Qty: 270 TABLET | Refills: 1 | OUTPATIENT
Start: 2023-10-11

## 2023-10-24 ENCOUNTER — OFFICE VISIT (OUTPATIENT)
Dept: FAMILY MEDICINE CLINIC | Age: 73
End: 2023-10-24
Payer: MEDICARE

## 2023-10-24 VITALS
OXYGEN SATURATION: 94 % | DIASTOLIC BLOOD PRESSURE: 60 MMHG | SYSTOLIC BLOOD PRESSURE: 120 MMHG | HEIGHT: 67 IN | TEMPERATURE: 97.5 F | HEART RATE: 60 BPM | BODY MASS INDEX: 27.41 KG/M2 | RESPIRATION RATE: 16 BRPM

## 2023-10-24 DIAGNOSIS — N39.0 URINARY TRACT INFECTION WITHOUT HEMATURIA, SITE UNSPECIFIED: Primary | ICD-10-CM

## 2023-10-24 DIAGNOSIS — I48.91 ATRIAL FIBRILLATION, UNSPECIFIED TYPE (HCC): ICD-10-CM

## 2023-10-24 LAB
BILIRUBIN, POC: NORMAL
BLOOD URINE, POC: NORMAL
CLARITY, POC: CLEAR
COLOR, POC: YELLOW
GLUCOSE URINE, POC: NORMAL
INTERNATIONAL NORMALIZATION RATIO, POC: 2.9
KETONES, POC: NORMAL
LEUKOCYTE EST, POC: NORMAL
NITRITE, POC: NORMAL
PH, POC: 7
PROTEIN, POC: NORMAL
PROTHROMBIN TIME, POC: 34.4
SPECIFIC GRAVITY, POC: 1.01
UROBILINOGEN, POC: 0.2

## 2023-10-24 PROCEDURE — 3078F DIAST BP <80 MM HG: CPT | Performed by: FAMILY MEDICINE

## 2023-10-24 PROCEDURE — 3074F SYST BP LT 130 MM HG: CPT | Performed by: FAMILY MEDICINE

## 2023-10-24 PROCEDURE — 1123F ACP DISCUSS/DSCN MKR DOCD: CPT | Performed by: FAMILY MEDICINE

## 2023-10-24 PROCEDURE — 81002 URINALYSIS NONAUTO W/O SCOPE: CPT | Performed by: FAMILY MEDICINE

## 2023-10-24 PROCEDURE — 85610 PROTHROMBIN TIME: CPT | Performed by: FAMILY MEDICINE

## 2023-10-24 PROCEDURE — 99213 OFFICE O/P EST LOW 20 MIN: CPT | Performed by: FAMILY MEDICINE

## 2023-10-24 ASSESSMENT — ENCOUNTER SYMPTOMS
CHEST TIGHTNESS: 0
COUGH: 0
TROUBLE SWALLOWING: 0
EYE DISCHARGE: 0
BLOOD IN STOOL: 0
ALLERGIC/IMMUNOLOGIC NEGATIVE: 1
VOMITING: 0
SHORTNESS OF BREATH: 0
NAUSEA: 0
PHOTOPHOBIA: 0
SORE THROAT: 0
BACK PAIN: 0
ABDOMINAL PAIN: 0
DIARRHEA: 0
SINUS PAIN: 0
EYE PAIN: 0
EYE REDNESS: 0

## 2023-10-24 NOTE — PROGRESS NOTES
External ear normal.      Left Ear: External ear normal.      Nose: Nose normal.      Mouth/Throat:      Pharynx: No oropharyngeal exudate. Eyes:      Conjunctiva/sclera: Conjunctivae normal.      Pupils: Pupils are equal, round, and reactive to light. Neck:      Thyroid: No thyromegaly. Trachea: No tracheal deviation. Cardiovascular:      Rate and Rhythm: Normal rate and regular rhythm. Heart sounds: No murmur heard. No friction rub. No gallop. Pulmonary:      Effort: Pulmonary effort is normal. No respiratory distress. Breath sounds: Normal breath sounds. Abdominal:      General: Bowel sounds are normal.      Palpations: Abdomen is soft. Musculoskeletal:         General: No tenderness or deformity. Normal range of motion. Cervical back: Normal range of motion and neck supple. Lymphadenopathy:      Cervical: No cervical adenopathy. Skin:     General: Skin is warm and dry. Capillary Refill: Capillary refill takes less than 2 seconds. Findings: No rash. Neurological:      Mental Status: She is alert and oriented to person, place, and time. Sensory: No sensory deficit. Motor: No abnormal muscle tone.       Coordination: Coordination normal.      Deep Tendon Reflexes: Reflexes normal.             Seen By:  Tadeo Barrios DO

## 2023-10-26 ENCOUNTER — HOSPITAL ENCOUNTER (OUTPATIENT)
Dept: NUCLEAR MEDICINE | Age: 73
Discharge: HOME | End: 2023-10-26
Attending: INTERNAL MEDICINE
Payer: MEDICARE

## 2023-10-26 ENCOUNTER — HOSPITAL ENCOUNTER (OUTPATIENT)
Dept: CT IMAGING | Age: 73
Discharge: HOME | End: 2023-10-28
Attending: INTERNAL MEDICINE
Payer: MEDICARE

## 2023-10-26 DIAGNOSIS — C79.51 SECONDARY MALIGNANT NEOPLASM OF BONE (HCC): ICD-10-CM

## 2023-10-26 DIAGNOSIS — Z17.1 ESTROGEN RECEPTOR NEGATIVE STATUS (ER-): ICD-10-CM

## 2023-10-26 DIAGNOSIS — C50.211 MALIGNANT NEOPLASM OF UPPER-INNER QUADRANT OF RIGHT FEMALE BREAST, UNSPECIFIED ESTROGEN RECEPTOR STATUS (HCC): ICD-10-CM

## 2023-10-26 LAB
CULTURE: NO GROWTH
SPECIMEN DESCRIPTION: NORMAL

## 2023-10-26 PROCEDURE — A9503 TC99M MEDRONATE: HCPCS | Performed by: RADIOLOGY

## 2023-10-26 PROCEDURE — 6360000004 HC RX CONTRAST MEDICATION: Performed by: RADIOLOGY

## 2023-10-26 PROCEDURE — 74177 CT ABD & PELVIS W/CONTRAST: CPT

## 2023-10-26 PROCEDURE — 3430000000 HC RX DIAGNOSTIC RADIOPHARMACEUTICAL: Performed by: RADIOLOGY

## 2023-10-26 PROCEDURE — 71260 CT THORAX DX C+: CPT

## 2023-10-26 PROCEDURE — 78306 BONE IMAGING WHOLE BODY: CPT | Performed by: INTERNAL MEDICINE

## 2023-10-26 RX ORDER — TC 99M MEDRONATE 20 MG/10ML
25 INJECTION, POWDER, LYOPHILIZED, FOR SOLUTION INTRAVENOUS
Status: COMPLETED | OUTPATIENT
Start: 2023-10-26 | End: 2023-10-26

## 2023-10-26 RX ADMIN — IOPAMIDOL 18 ML: 755 INJECTION, SOLUTION INTRAVENOUS at 12:35

## 2023-10-26 RX ADMIN — TC 99M MEDRONATE 25 MILLICURIE: 20 INJECTION, POWDER, LYOPHILIZED, FOR SOLUTION INTRAVENOUS at 11:10

## 2023-10-26 RX ADMIN — IOPAMIDOL 75 ML: 755 INJECTION, SOLUTION INTRAVENOUS at 12:35

## 2023-11-21 ENCOUNTER — OFFICE VISIT (OUTPATIENT)
Dept: FAMILY MEDICINE CLINIC | Age: 73
End: 2023-11-21
Payer: MEDICARE

## 2023-11-21 VITALS
RESPIRATION RATE: 16 BRPM | TEMPERATURE: 97.9 F | WEIGHT: 174 LBS | DIASTOLIC BLOOD PRESSURE: 74 MMHG | BODY MASS INDEX: 27.31 KG/M2 | SYSTOLIC BLOOD PRESSURE: 126 MMHG | HEIGHT: 67 IN | HEART RATE: 59 BPM | OXYGEN SATURATION: 98 %

## 2023-11-21 DIAGNOSIS — I48.91 ATRIAL FIBRILLATION, UNSPECIFIED TYPE (HCC): ICD-10-CM

## 2023-11-21 DIAGNOSIS — F06.4 ANXIETY DISORDER DUE TO GENERAL MEDICAL CONDITION: ICD-10-CM

## 2023-11-21 DIAGNOSIS — I10 ESSENTIAL HYPERTENSION: Primary | ICD-10-CM

## 2023-11-21 PROCEDURE — 3078F DIAST BP <80 MM HG: CPT | Performed by: FAMILY MEDICINE

## 2023-11-21 PROCEDURE — 99213 OFFICE O/P EST LOW 20 MIN: CPT | Performed by: FAMILY MEDICINE

## 2023-11-21 PROCEDURE — 1123F ACP DISCUSS/DSCN MKR DOCD: CPT | Performed by: FAMILY MEDICINE

## 2023-11-21 PROCEDURE — 3074F SYST BP LT 130 MM HG: CPT | Performed by: FAMILY MEDICINE

## 2023-11-21 RX ORDER — ESCITALOPRAM OXALATE 10 MG/1
10 TABLET ORAL EVERY EVENING
Qty: 90 TABLET | Refills: 1 | Status: SHIPPED | OUTPATIENT
Start: 2023-11-21

## 2023-11-21 RX ORDER — WARFARIN SODIUM 6 MG/1
TABLET ORAL
Qty: 30 TABLET | Refills: 5 | Status: SHIPPED | OUTPATIENT
Start: 2023-11-21

## 2023-11-21 ASSESSMENT — ENCOUNTER SYMPTOMS
ALLERGIC/IMMUNOLOGIC NEGATIVE: 1
EYE PAIN: 0
EYE REDNESS: 0
CHEST TIGHTNESS: 0
EYE DISCHARGE: 0
SORE THROAT: 0
TROUBLE SWALLOWING: 0
COUGH: 0
DIARRHEA: 0
BACK PAIN: 0
SHORTNESS OF BREATH: 0
NAUSEA: 0
PHOTOPHOBIA: 0
SINUS PAIN: 0
ABDOMINAL PAIN: 0
VOMITING: 0
BLOOD IN STOOL: 0

## 2023-11-21 NOTE — PROGRESS NOTES
23  Sonia Rascon : 1950 Sex: female  Age: 68 y.o. Assessment and Plan: Reno Moulton was seen today for coagulation disorder. Diagnoses and all orders for this visit:    Essential hypertension    Anxiety disorder due to general medical condition  -     escitalopram (LEXAPRO) 10 MG tablet; Take 1 tablet by mouth every evening    Atrial fibrillation, unspecified type (HCC)  -     warfarin (COUMADIN) 6 MG tablet; Indications: monday, friday 3 mg, all other days are 6 mg 1/2 tab on M  F and 1 tab all other days of the week OR AS DIRECTED        Return in about 4 weeks (around 2023), or Recheck INR. Chief Complaint   Patient presents with    Coagulation Disorder     INR 2.9  Missed 1 dose (3 mg)       Patient returns for recheck pro time. INR today 2.9, therapeutic, denies bleeding, bruising, dietary changes, dosage change. Review of Systems   Constitutional:  Negative for appetite change, fatigue and unexpected weight change. HENT:  Negative for congestion, ear pain, hearing loss, sinus pain, sore throat and trouble swallowing. Eyes:  Negative for photophobia, pain, discharge and redness. Respiratory:  Negative for cough, chest tightness and shortness of breath. Cardiovascular:  Negative for chest pain, palpitations and leg swelling. Gastrointestinal:  Negative for abdominal pain, blood in stool, diarrhea, nausea and vomiting. Endocrine: Negative. Genitourinary:  Negative for dysuria, flank pain, frequency and hematuria. Musculoskeletal:  Negative for arthralgias, back pain, joint swelling and myalgias. Skin: Negative. Allergic/Immunologic: Negative. Neurological:  Negative for dizziness, seizures, syncope, weakness, light-headedness, numbness and headaches. Hematological:  Negative for adenopathy. Does not bruise/bleed easily. Psychiatric/Behavioral: Negative.            Current Outpatient Medications:     escitalopram (LEXAPRO) 10 MG tablet, Take 1

## 2023-12-13 ENCOUNTER — PROCEDURE VISIT (OUTPATIENT)
Dept: PODIATRY | Age: 73
End: 2023-12-13
Payer: MEDICARE

## 2023-12-13 VITALS — WEIGHT: 174 LBS | BODY MASS INDEX: 27.25 KG/M2

## 2023-12-13 DIAGNOSIS — Z79.01 ENCOUNTER FOR CURRENT LONG-TERM USE OF ANTICOAGULANTS: ICD-10-CM

## 2023-12-13 DIAGNOSIS — L84 CORNS AND CALLOSITIES: ICD-10-CM

## 2023-12-13 DIAGNOSIS — B35.1 TINEA UNGUIUM: Primary | ICD-10-CM

## 2023-12-13 DIAGNOSIS — G60.8 HEREDITARY SENSORY NEUROPATHY: ICD-10-CM

## 2023-12-13 PROCEDURE — 11056 PARNG/CUTG B9 HYPRKR LES 2-4: CPT | Performed by: PODIATRIST

## 2023-12-13 PROCEDURE — 11721 DEBRIDE NAIL 6 OR MORE: CPT | Performed by: PODIATRIST

## 2023-12-13 NOTE — PROGRESS NOTES
azelastine (ASTELIN) 0.1 % nasal spray, 2 sprays by Nasal route 2 times daily Use in each nostril as directed, Disp: 120 mL, Rfl: 3    palbociclib (IBRANCE) 75 MG capsule, Take 75 mg by mouth Takes 3 weeks out of the month then off for one week then repeat, Disp: , Rfl:     fulvestrant (FASLODEX) 250 MG/5ML SOLN IM injection, Inject 10 mLs into the muscle once, Disp: , Rfl:     denosumab (XGEVA) 120 MG/1.7ML SOLN SC injection, Inject 1.7 mLs into the skin once, Disp: , Rfl:     calcium carbonate-vitamin D (CALTRATE) 600-400 MG-UNIT TABS per tab, Take 1 tablet by mouth daily, Disp: , Rfl:     Alpha-Lipoic Acid 100 MG CAPS, Take by mouth, Disp: , Rfl:     b complex vitamins capsule, Take 1 capsule by mouth daily, Disp: , Rfl:   Allergies   Allergen Reactions    Diltiazem Hcl Other (See Comments)     Other reaction(s): weak    Cardizem [Diltiazem] Palpitations    Cefuroxime Axetil Nausea And Vomiting, Other (See Comments) and Palpitations    Claritin [Loratadine] Palpitations    Erythromycin Nausea And Vomiting    Morphine Nausea And Vomiting    Mucinex [Guaifenesin Er] Palpitations    Propoxyphene Other (See Comments) and Nausea And Vomiting     DIZZY  Other reaction(s): Free Text    Zyrtec [Cetirizine] Nausea And Vomiting and Other (See Comments)     WEAK       Past Medical History:   Diagnosis Date    Bladder prolapse, female, acquired     Bone cancer ([facility]) 02/2021    Breast cancer ([address]) 04/2017    Broken rib     left    Broken toe     right    Chronic atrial fibrillation (HCC)     Fibroadenosis of breast     Headache     History of therapeutic radiation     Hyperlipidemia     Hypertension            Vitals:    12/13/23 0955   Weight: 78.9 kg (174 lb)           Work History/Social History:  Focused Lower Extremity Physical Exam:    Neurovascular examination:    Dorsalis Pedis palpable bilateral.  Posterior tibialis palpable bilateral.    Capillary Refill Time:  Immediate return  Hair growth:  Symmetrical and

## 2023-12-15 ENCOUNTER — PROCEDURE VISIT (OUTPATIENT)
Dept: AUDIOLOGY | Age: 73
End: 2023-12-15

## 2023-12-15 ENCOUNTER — OFFICE VISIT (OUTPATIENT)
Dept: ENT CLINIC | Age: 73
End: 2023-12-15
Payer: MEDICARE

## 2023-12-15 VITALS
TEMPERATURE: 97.4 F | WEIGHT: 175 LBS | DIASTOLIC BLOOD PRESSURE: 55 MMHG | BODY MASS INDEX: 27.47 KG/M2 | OXYGEN SATURATION: 100 % | HEIGHT: 67 IN | SYSTOLIC BLOOD PRESSURE: 147 MMHG | HEART RATE: 60 BPM

## 2023-12-15 DIAGNOSIS — H90.3 SENSORINEURAL HEARING LOSS (SNHL) OF BOTH EARS: ICD-10-CM

## 2023-12-15 DIAGNOSIS — J30.9 ALLERGIC RHINITIS, UNSPECIFIED SEASONALITY, UNSPECIFIED TRIGGER: ICD-10-CM

## 2023-12-15 DIAGNOSIS — H90.3 SENSORINEURAL HEARING LOSS, BILATERAL: Primary | ICD-10-CM

## 2023-12-15 DIAGNOSIS — H10.13 ALLERGIC CONJUNCTIVITIS OF BOTH EYES: Primary | ICD-10-CM

## 2023-12-15 PROCEDURE — 3077F SYST BP >= 140 MM HG: CPT | Performed by: OTOLARYNGOLOGY

## 2023-12-15 PROCEDURE — 69210 REMOVE IMPACTED EAR WAX UNI: CPT | Performed by: OTOLARYNGOLOGY

## 2023-12-15 PROCEDURE — 3078F DIAST BP <80 MM HG: CPT | Performed by: OTOLARYNGOLOGY

## 2023-12-15 PROCEDURE — 99213 OFFICE O/P EST LOW 20 MIN: CPT | Performed by: OTOLARYNGOLOGY

## 2023-12-15 PROCEDURE — 1123F ACP DISCUSS/DSCN MKR DOCD: CPT | Performed by: OTOLARYNGOLOGY

## 2023-12-15 PROCEDURE — 96372 THER/PROPH/DIAG INJ SC/IM: CPT | Performed by: OTOLARYNGOLOGY

## 2023-12-15 RX ORDER — TRIAMCINOLONE ACETONIDE 40 MG/ML
40 INJECTION, SUSPENSION INTRA-ARTICULAR; INTRAMUSCULAR ONCE
Status: COMPLETED | OUTPATIENT
Start: 2023-12-15 | End: 2023-12-15

## 2023-12-15 RX ORDER — OLOPATADINE HYDROCHLORIDE 1 MG/ML
1 SOLUTION/ DROPS OPHTHALMIC 2 TIMES DAILY
Qty: 5 ML | Refills: 1 | Status: SHIPPED | OUTPATIENT
Start: 2023-12-15

## 2023-12-15 RX ORDER — AZELASTINE 1 MG/ML
2 SPRAY, METERED NASAL 2 TIMES DAILY
Qty: 120 ML | Refills: 3 | Status: SHIPPED | OUTPATIENT
Start: 2023-12-15

## 2023-12-15 RX ADMIN — TRIAMCINOLONE ACETONIDE 40 MG: 40 INJECTION, SUSPENSION INTRA-ARTICULAR; INTRAMUSCULAR at 11:58

## 2023-12-15 NOTE — PROGRESS NOTES
This patient was referred for audiometric/tympanometric testing by Dr. Damon Houser due to annual hearing test.  Patient reports no noticed changes in hearing. Audiometry using pure tone air and bone conduction revealed hearing sensitivity within normal limits through 500 Hz sloping to a mild sensorineural hearing loss through 6000 Hz and a moderately severe hearing loss at 8000 Hz   Reliability was good. Speech reception thresholds were in good agreement with the pure tone averages, bilaterally. Speech discrimination scores were excellent, bilaterally. Tympanometry revealed normal middle ear peak pressure and compliance, bilaterally. Hearing stable from 2022 hearing test.       The results were reviewed with the patient and physician. Recommendations for follow up will be made pending physician consult.     Chin Oquendo/CCC-A  [place] Lic # [id number]

## 2023-12-15 NOTE — PROGRESS NOTES
Administrations This Visit       triamcinolone acetonide (KENALOG-40) injection 40 mg       Admin Date  12/15/2023  11:58 Action  Given Dose  40 mg Route  IntraMUSCular Site  Ventrogluteal Right Administered By  Barbie Law MA    Ordering Provider: Lonnie Duran DO    NDC: 5894-1573-72    Lot#: [id number]    : [facility] (PRIMARY CARE)    Patient Supplied?: No

## 2023-12-15 NOTE — PROGRESS NOTES
\  Subjective:      Patient ID:  Jaimie Bradford is a 73 y.o. female.    HPI:  Jaimie Bradford presents for recheck today for follow up of hearing loss and audio. Denies any hearing changes or worsening. Reports worsened itchy eyes and runny nose. Uses Astelin and olopatadine eye drops.     Patient's medications, allergies, past medical, surgical, social and family histories were reviewed and updated as appropriate.      Review of Systems   Constitutional: Negative.  Negative for appetite change, chills and fever.   HENT:  Positive for hearing loss and rhinorrhea. Negative for ear discharge, ear pain and tinnitus.         + itchy eyes   Eyes: Negative.  Negative for pain, discharge and visual disturbance.   Respiratory: Negative.  Negative for apnea, chest tightness and shortness of breath.    Cardiovascular: Negative.  Negative for chest pain, palpitations and leg swelling.   Gastrointestinal: Negative.  Negative for abdominal pain, diarrhea and vomiting.   Endocrine: Negative for cold intolerance, heat intolerance and polydipsia.   Genitourinary: Negative.  Negative for dysuria, flank pain and hematuria.   Musculoskeletal: Negative.  Negative for arthralgias, gait problem and neck pain.   Skin: Negative.  Negative for color change, pallor and rash.   Allergic/Immunologic: Negative for environmental allergies, food allergies and immunocompromised state.   Neurological: Negative.  Negative for dizziness, numbness and headaches.   Hematological:  Negative for adenopathy.   Psychiatric/Behavioral: Negative.  Negative for behavioral problems and hallucinations.    All other systems reviewed and are negative.              Objective:   Physical Exam  Constitutional:       General: She is not in acute distress.     Appearance: Normal appearance.   HENT:      Head: Normocephalic and atraumatic.      Right Ear: Tympanic membrane, ear canal and external ear normal. Tympanic membrane is not retracted.      Left Ear: Tympanic

## 2023-12-18 PROBLEM — D69.6 THROMBOCYTOPENIA, UNSPECIFIED (HCC): Status: ACTIVE | Noted: 2023-12-18

## 2023-12-19 DIAGNOSIS — I10 ESSENTIAL HYPERTENSION: ICD-10-CM

## 2023-12-19 LAB
CHOLESTEROL: 179 MG/DL
HDLC SERPL-MCNC: 60 MG/DL
LDL CHOLESTEROL: 105 MG/DL
TRIGL SERPL-MCNC: 71 MG/DL
TSH SERPL DL<=0.05 MIU/L-ACNC: 2.51 UIU/ML (ref 0.27–4.2)
VLDLC SERPL CALC-MCNC: 14 MG/DL

## 2024-01-05 ENCOUNTER — TELEPHONE (OUTPATIENT)
Dept: FAMILY MEDICINE CLINIC | Age: 74
End: 2024-01-05

## 2024-01-05 NOTE — TELEPHONE ENCOUNTER
Gege quigley calling in,  She had a urine test at the [facility]. The sent it out to get a culture. She's been 2 weeks without relief. They are wondering if you would be willing to send something over for a UTI? The results are in the faxes.  [facility] [place]

## 2024-01-24 ENCOUNTER — OFFICE VISIT (OUTPATIENT)
Dept: FAMILY MEDICINE CLINIC | Age: 74
End: 2024-01-24
Payer: MEDICARE

## 2024-01-24 VITALS
WEIGHT: 176 LBS | BODY MASS INDEX: 27.62 KG/M2 | DIASTOLIC BLOOD PRESSURE: 62 MMHG | HEART RATE: 80 BPM | OXYGEN SATURATION: 97 % | HEIGHT: 67 IN | TEMPERATURE: 97.8 F | SYSTOLIC BLOOD PRESSURE: 142 MMHG

## 2024-01-24 DIAGNOSIS — R30.0 DYSURIA: ICD-10-CM

## 2024-01-24 DIAGNOSIS — N18.31 STAGE 3A CHRONIC KIDNEY DISEASE (HCC): ICD-10-CM

## 2024-01-24 DIAGNOSIS — I48.91 ATRIAL FIBRILLATION, UNSPECIFIED TYPE (HCC): Primary | ICD-10-CM

## 2024-01-24 DIAGNOSIS — D69.6 THROMBOCYTOPENIA, UNSPECIFIED (HCC): ICD-10-CM

## 2024-01-24 DIAGNOSIS — C79.51 SECONDARY MALIGNANT NEOPLASM OF BONE (HCC): ICD-10-CM

## 2024-01-24 DIAGNOSIS — I10 ESSENTIAL HYPERTENSION: ICD-10-CM

## 2024-01-24 LAB
BILIRUBIN, POC: NORMAL
BLOOD URINE, POC: NORMAL
CLARITY, POC: CLEAR
COLOR, POC: YELLOW
GLUCOSE URINE, POC: NORMAL
INTERNATIONAL NORMALIZATION RATIO, POC: 2.7
KETONES, POC: NORMAL
LEUKOCYTE EST, POC: NORMAL
NITRITE, POC: NORMAL
PH, POC: 7
PROTEIN, POC: NORMAL
PROTHROMBIN TIME, POC: 32.9
SPECIFIC GRAVITY, POC: 1.02
UROBILINOGEN, POC: 0.2

## 2024-01-24 PROCEDURE — 85610 PROTHROMBIN TIME: CPT | Performed by: STUDENT IN AN ORGANIZED HEALTH CARE EDUCATION/TRAINING PROGRAM

## 2024-01-24 PROCEDURE — 99214 OFFICE O/P EST MOD 30 MIN: CPT | Performed by: STUDENT IN AN ORGANIZED HEALTH CARE EDUCATION/TRAINING PROGRAM

## 2024-01-24 PROCEDURE — 3078F DIAST BP <80 MM HG: CPT | Performed by: STUDENT IN AN ORGANIZED HEALTH CARE EDUCATION/TRAINING PROGRAM

## 2024-01-24 PROCEDURE — 3077F SYST BP >= 140 MM HG: CPT | Performed by: STUDENT IN AN ORGANIZED HEALTH CARE EDUCATION/TRAINING PROGRAM

## 2024-01-24 PROCEDURE — 1123F ACP DISCUSS/DSCN MKR DOCD: CPT | Performed by: STUDENT IN AN ORGANIZED HEALTH CARE EDUCATION/TRAINING PROGRAM

## 2024-01-24 PROCEDURE — 81002 URINALYSIS NONAUTO W/O SCOPE: CPT | Performed by: STUDENT IN AN ORGANIZED HEALTH CARE EDUCATION/TRAINING PROGRAM

## 2024-01-24 ASSESSMENT — PATIENT HEALTH QUESTIONNAIRE - PHQ9
SUM OF ALL RESPONSES TO PHQ QUESTIONS 1-9: 0
1. LITTLE INTEREST OR PLEASURE IN DOING THINGS: 0
2. FEELING DOWN, DEPRESSED OR HOPELESS: 0
SUM OF ALL RESPONSES TO PHQ QUESTIONS 1-9: 0
SUM OF ALL RESPONSES TO PHQ QUESTIONS 1-9: 0
SUM OF ALL RESPONSES TO PHQ9 QUESTIONS 1 & 2: 0
SUM OF ALL RESPONSES TO PHQ QUESTIONS 1-9: 0

## 2024-01-24 NOTE — PROGRESS NOTES
[facility]  Office Note  Dr. Jae Dumont      Patient:  Jaimie Bradford 73 y.o. female     Date of Service: 1/24/24      Chief complaint:   Chief Complaint   Patient presents with    Office Visit for Anticoagulation Management    Establish Care    Pruritis     Gets itchy in various places about a week prior to getting injections for cancer.   Currently along jaw line, ears. Has a soreness in ears.         History of Present Illness   The patient is a 73 y.o. female  presented to the clinic with complaints as above and to establish care with me    A fib  On warfarin, also takes verapamil and propafenone. She has been on propafenone for years and is rx by PCP. Hx of ablation. Rate and rhythm controlled today.    Hx of metastatic breast cancer to bone. Currently in remission. She gets maintenance therapy. She is on faslodex, xgeva and ibrance    She takes topamax for a migraine history-it works very well. She follows with Dr. Gallego at the [facility] as well as [facility] [place]      Summary:  Stage IV carcinoma of the right breast: ER/AZ positive HER-2/marcos negative disease.  -Adjuvant chemotherapy per Dr. Gallego with AC-T.completed on 11/09/2017.    -Post-operative RT:  Radiation completed 01/29/2018.   -ET:  Arimidex Feb 2018        She is on lexapro for an anxiety/depression that was brought on by some family traume. Works well without side effects    The 10-year ASCVD risk score (Ashanti GREY, et al., 2019) is: 18.2%    Values used to calculate the score:      Age: 73 years      Sex: Female      Is Non- : No      Diabetic: No      Tobacco smoker: No      Systolic Blood Pressure: 142 mmHg      Is BP treated: Yes      HDL Cholesterol: 60 mg/dL      Total Cholesterol: 179 mg/dL      She had a UTI recently and was advised to get     Past Medical History:      Diagnosis Date    Bladder prolapse, female, acquired     Bone cancer (HCC) 02/2021    Breast cancer (HCC)

## 2024-01-25 ASSESSMENT — ENCOUNTER SYMPTOMS
SHORTNESS OF BREATH: 0
NAUSEA: 0
COUGH: 0
ABDOMINAL PAIN: 0

## 2024-02-14 ENCOUNTER — PROCEDURE VISIT (OUTPATIENT)
Dept: PODIATRY | Age: 74
End: 2024-02-14

## 2024-02-14 DIAGNOSIS — G60.8 HEREDITARY SENSORY NEUROPATHY: ICD-10-CM

## 2024-02-14 DIAGNOSIS — L84 CORNS AND CALLOSITIES: ICD-10-CM

## 2024-02-14 DIAGNOSIS — Z79.01 ENCOUNTER FOR CURRENT LONG-TERM USE OF ANTICOAGULANTS: ICD-10-CM

## 2024-02-14 DIAGNOSIS — B35.1 TINEA UNGUIUM: Primary | ICD-10-CM

## 2024-02-14 NOTE — PROGRESS NOTES
Jaimie Bradford is here today for nail care. her PCP is Jae Dumont MD last OV was 01/24/2024.       CC:    Follow-up foot and ankle exam.    HPI:   Follow-up foot ankle exam.  No open wounds.  History Coumadin.  Callus tissue both feet.      ROS:  Const: Denies constitutional symptoms  Musculo: Denies symptoms other than stated above  Skin: Denies symptoms other than stated above       Current Outpatient Medications:     verapamil (CALAN SR) 180 MG extended release tablet, Take 1 tablet by mouth daily, Disp: 90 tablet, Rfl: 1    topiramate (TOPAMAX) 50 MG tablet, Take 1 tablet by mouth 2 times daily, Disp: 180 tablet, Rfl: 1    azelastine (ASTELIN) 0.1 % nasal spray, 2 sprays by Nasal route 2 times daily Use in each nostril as directed, Disp: 120 mL, Rfl: 3    olopatadine (PATANOL) 0.1 % ophthalmic solution, Place 1 drop into both eyes in the morning and at bedtime, Disp: 5 mL, Rfl: 1    escitalopram (LEXAPRO) 10 MG tablet, Take 1 tablet by mouth every evening, Disp: 90 tablet, Rfl: 1    warfarin (COUMADIN) 6 MG tablet, Indications: monday, friday 3 mg, all other days are 6 mg 1/2 tab on M  F and 1 tab all other days of the week OR AS DIRECTED, Disp: 30 tablet, Rfl: 5    propafenone (RYTHMOL) 225 MG tablet, Take 1 tablet by mouth 3 times daily, Disp: 270 tablet, Rfl: 1    ammonium lactate (LAC-HYDRIN) 12 % lotion, Apply topically twice daily, Disp: 400 g, Rfl: 2    triamcinolone (KENALOG) 0.1 % cream, Apply topically 2 times daily., Disp: 80 g, Rfl: 0    clotrimazole-betamethasone (LOTRISONE) 1-0.05 % cream, Apply topically 2 times daily., Disp: 45 g, Rfl: 2    palbociclib (IBRANCE) 75 MG capsule, Take 75 mg by mouth Takes 3 weeks out of the month then off for one week then repeat, Disp: , Rfl:     fulvestrant (FASLODEX) 250 MG/5ML SOLN IM injection, Inject 10 mLs into the muscle once, Disp: , Rfl:     denosumab (XGEVA) 120 MG/1.7ML SOLN SC injection, Inject 1.7 mLs into the skin once, Disp: , Rfl:     calcium

## 2024-02-23 ENCOUNTER — OFFICE VISIT (OUTPATIENT)
Dept: FAMILY MEDICINE CLINIC | Age: 74
End: 2024-02-23

## 2024-02-23 VITALS
SYSTOLIC BLOOD PRESSURE: 138 MMHG | BODY MASS INDEX: 27.1 KG/M2 | TEMPERATURE: 97.1 F | OXYGEN SATURATION: 98 % | WEIGHT: 173 LBS | DIASTOLIC BLOOD PRESSURE: 48 MMHG | HEART RATE: 63 BPM

## 2024-02-23 DIAGNOSIS — I10 ESSENTIAL HYPERTENSION: Primary | ICD-10-CM

## 2024-02-23 DIAGNOSIS — I48.91 ATRIAL FIBRILLATION, UNSPECIFIED TYPE (HCC): ICD-10-CM

## 2024-02-23 LAB
INTERNATIONAL NORMALIZATION RATIO, POC: 3.1
PROTHROMBIN TIME, POC: 36.6

## 2024-02-23 ASSESSMENT — ENCOUNTER SYMPTOMS
SHORTNESS OF BREATH: 0
RHINORRHEA: 0
VOMITING: 0
NAUSEA: 0
COUGH: 0
ABDOMINAL PAIN: 0

## 2024-02-23 NOTE — PROGRESS NOTES
[facility]  Office Note  Dr. Jae Dumont      Patient:  Jaimie Bradford 73 y.o. female     Date of Service: 1/24/24      Chief complaint:   Chief Complaint   Patient presents with    Office Visit for Anticoagulation Management         History of Present Illness   The patient is a 73 y.o. female  presented to the clinic with complaints as above and to establish care with me      A fib  On warfarin, also takes verapamil and propafenone. She has been on propafenone for years and is rx by PCP. Hx of ablation. Rate and rhythm controlled today.    Hx of metastatic breast cancer to bone. Currently in remission. She gets maintenance therapy. She is on faslodex, xgeva and ibrance. Blood work yesterday revealed neutropenia likely secondary to ibrance. Her last dose is tomorrow before a 7 day break and hematology feels these will improve    She was treated for an ear infection by hematology. Feels improved but not 100% better    She takes topamax for a migraine history-it works very well. She follows with Dr. Gallego at the [facility] as well as [facility] breast cancer [place]. Sees them again 3/21/24      Summary:  Stage IV carcinoma of the right breast: ER/FL positive HER-2/marcos negative disease.  -Adjuvant chemotherapy per Dr. Gallego with AC-T.completed on 11/09/2017.    -Post-operative RT:  Radiation completed 01/29/2018.   -ET:  Arimidex Feb 2018        She is on lexapro for an anxiety/depression that was brought on by some family traume. Works well without side effects    The 10-year ASCVD risk score (Ashanti GREY, et al., 2019) is: 17.3%    Values used to calculate the score:      Age: 73 years      Sex: Female      Is Non- : No      Diabetic: No      Tobacco smoker: No      Systolic Blood Pressure: 138 mmHg      Is BP treated: Yes      HDL Cholesterol: 60 mg/dL      Total Cholesterol: 179 mg/dL    Wt Readings from Last 3 Encounters:   02/23/24 78.5 kg (173 lb)   01/24/24 79.8 kg (176

## 2024-03-28 SDOH — ECONOMIC STABILITY: FOOD INSECURITY: WITHIN THE PAST 12 MONTHS, THE FOOD YOU BOUGHT JUST DIDN'T LAST AND YOU DIDN'T HAVE MONEY TO GET MORE.: NEVER TRUE

## 2024-03-28 SDOH — ECONOMIC STABILITY: INCOME INSECURITY: HOW HARD IS IT FOR YOU TO PAY FOR THE VERY BASICS LIKE FOOD, HOUSING, MEDICAL CARE, AND HEATING?: NOT HARD AT ALL

## 2024-03-28 SDOH — ECONOMIC STABILITY: FOOD INSECURITY: WITHIN THE PAST 12 MONTHS, YOU WORRIED THAT YOUR FOOD WOULD RUN OUT BEFORE YOU GOT MONEY TO BUY MORE.: NEVER TRUE

## 2024-03-28 SDOH — ECONOMIC STABILITY: TRANSPORTATION INSECURITY
IN THE PAST 12 MONTHS, HAS LACK OF TRANSPORTATION KEPT YOU FROM MEETINGS, WORK, OR FROM GETTING THINGS NEEDED FOR DAILY LIVING?: NO

## 2024-03-28 SDOH — HEALTH STABILITY: PHYSICAL HEALTH: ON AVERAGE, HOW MANY DAYS PER WEEK DO YOU ENGAGE IN MODERATE TO STRENUOUS EXERCISE (LIKE A BRISK WALK)?: 4 DAYS

## 2024-03-28 SDOH — HEALTH STABILITY: PHYSICAL HEALTH: ON AVERAGE, HOW MANY MINUTES DO YOU ENGAGE IN EXERCISE AT THIS LEVEL?: 30 MIN

## 2024-03-28 ASSESSMENT — PATIENT HEALTH QUESTIONNAIRE - PHQ9
SUM OF ALL RESPONSES TO PHQ QUESTIONS 1-9: 0
SUM OF ALL RESPONSES TO PHQ QUESTIONS 1-9: 0
2. FEELING DOWN, DEPRESSED OR HOPELESS: NOT AT ALL
1. LITTLE INTEREST OR PLEASURE IN DOING THINGS: NOT AT ALL
SUM OF ALL RESPONSES TO PHQ QUESTIONS 1-9: 0
SUM OF ALL RESPONSES TO PHQ QUESTIONS 1-9: 0
SUM OF ALL RESPONSES TO PHQ9 QUESTIONS 1 & 2: 0

## 2024-03-28 ASSESSMENT — LIFESTYLE VARIABLES
HOW OFTEN DO YOU HAVE A DRINK CONTAINING ALCOHOL: 1
HOW OFTEN DO YOU HAVE A DRINK CONTAINING ALCOHOL: NEVER
HOW MANY STANDARD DRINKS CONTAINING ALCOHOL DO YOU HAVE ON A TYPICAL DAY: 0
HOW MANY STANDARD DRINKS CONTAINING ALCOHOL DO YOU HAVE ON A TYPICAL DAY: PATIENT DOES NOT DRINK
HOW OFTEN DO YOU HAVE SIX OR MORE DRINKS ON ONE OCCASION: 1

## 2024-03-29 ENCOUNTER — ANTI-COAG VISIT (OUTPATIENT)
Dept: FAMILY MEDICINE CLINIC | Age: 74
End: 2024-03-29

## 2024-03-29 ENCOUNTER — OFFICE VISIT (OUTPATIENT)
Dept: FAMILY MEDICINE CLINIC | Age: 74
End: 2024-03-29

## 2024-03-29 VITALS
DIASTOLIC BLOOD PRESSURE: 64 MMHG | HEIGHT: 67 IN | WEIGHT: 177 LBS | TEMPERATURE: 97.6 F | SYSTOLIC BLOOD PRESSURE: 130 MMHG | RESPIRATION RATE: 18 BRPM | HEART RATE: 62 BPM | BODY MASS INDEX: 27.78 KG/M2 | OXYGEN SATURATION: 97 %

## 2024-03-29 DIAGNOSIS — I48.91 ATRIAL FIBRILLATION, UNSPECIFIED TYPE (HCC): ICD-10-CM

## 2024-03-29 DIAGNOSIS — Z00.00 MEDICARE ANNUAL WELLNESS VISIT, SUBSEQUENT: Primary | ICD-10-CM

## 2024-03-29 LAB
INTERNATIONAL NORMALIZATION RATIO, POC: 3.6
PROTHROMBIN TIME, POC: 42.9

## 2024-03-29 RX ORDER — WARFARIN SODIUM 6 MG/1
TABLET ORAL
Qty: 30 TABLET | Refills: 5
Start: 2024-03-29

## 2024-03-29 ASSESSMENT — ENCOUNTER SYMPTOMS
ABDOMINAL PAIN: 0
VOMITING: 0
SHORTNESS OF BREATH: 0
NAUSEA: 0
EYE ITCHING: 1
COUGH: 0
EYE REDNESS: 1
RHINORRHEA: 0

## 2024-03-29 NOTE — PROGRESS NOTES
MD Jae   verapamil (CALAN SR) 180 MG extended release tablet Take 1 tablet by mouth daily  Jae Dumont MD   topiramate (TOPAMAX) 50 MG tablet Take 1 tablet by mouth 2 times daily  Duke Ayala DO   azelastine (ASTELIN) 0.1 % nasal spray 2 sprays by Nasal route 2 times daily Use in each nostril as directed  Lonnie Duran DO   olopatadine (PATANOL) 0.1 % ophthalmic solution Place 1 drop into both eyes in the morning and at bedtime  Lonnie Duran DO   escitalopram (LEXAPRO) 10 MG tablet Take 1 tablet by mouth every evening  Duke Ayala DO   propafenone (RYTHMOL) 225 MG tablet Take 1 tablet by mouth 3 times daily  Duke Ayala DO   ammonium lactate (LAC-HYDRIN) 12 % lotion Apply topically twice daily  Galindo Mcmullen DPM   triamcinolone (KENALOG) 0.1 % cream Apply topically 2 times daily.  Duke Ayala DO   clotrimazole-betamethasone (LOTRISONE) 1-0.05 % cream Apply topically 2 times daily.  Galindo Mcmullen DPM   palbociclib (IBRANCE) 75 MG capsule Take 75 mg by mouth Takes 3 weeks out of the month then off for one week then repeat  Edmundo Velazco MD   fulvestrant (FASLODEX) 250 MG/5ML SOLN IM injection Inject 10 mLs into the muscle once  Edmundo Velazco MD   denosumab (XGEVA) 120 MG/1.7ML SOLN SC injection Inject 1.7 mLs into the skin once  ProviderEdmundo MD   calcium carbonate-vitamin D (CALTRATE) 600-400 MG-UNIT TABS per tab Take 1 tablet by mouth daily  Edmundo Velazco MD   Alpha-Lipoic Acid 100 MG CAPS Take by mouth  Edmundo Velazco MD   b complex vitamins capsule Take 1 capsule by mouth daily  Edmunod Velazco MD CareTeam (Including outside providers/suppliers regularly involved in providing care):   Patient Care Team:  Jae Dumont MD as PCP - General (Family Medicine)  Jae Dumont MD as PCP - Empaneled Provider  Nemo Edward MD as Consulting Physician (Cardiology)  Galindo Mcmullen DPM as Consulting

## 2024-03-29 NOTE — PROGRESS NOTES
[facility]  Office Progress Note  Dr. Jae Dumont      Patient:  Jaimie Bradford 73 y.o. female     Date of Service: 3/29/24      Chief complaint:   Chief Complaint   Patient presents with    Office Visit for Anticoagulation Management    Follow-up     1 month f/u          History of Present Illness   The patient is a 73 y.o. female  here to follow up of their atrial fibrillation    A fib  On warfarin, also takes verapamil and propafenone. She has been on propafenone for years and is rx by PCP. Hx of ablation. Rate and rhythm controlled today.  She is supratherpeutic INR today. No bleeding issues     Hx of metastatic breast cancer to bone. Currently in remission. She gets maintenance therapy. She is on faslodex, xgeva and ibrance. She has upcoming scans next week     She takes topamax for a migraine history-it works very well. She follows with Dr. Gallego at the [facility] as well as [facility] center.       Summary:  Stage IV carcinoma of the right breast: ER/CO positive HER-2/marcso negative disease.  -Adjuvant chemotherapy per Dr. Gallego with AC-T.completed on 11/09/2017.    -Post-operative RT:  Radiation completed 01/29/2018.   -ET:  Arimidex Feb 2018         She is on lexapro for an anxiety/depression that was brought on by some family trauma. Works well without side effects      Eye redness, chronic issue. Following with eye doctor without much relief. Usually her allergy kenalog injection helps. Her last one was in December. Lots of watering, some itch. Antihistamine drops and artifical tears are not helping    Past Medical History:      Diagnosis Date    Bladder prolapse, female, acquired     Bone cancer (HCC) 02/2021    Breast cancer (HCC) 04/2017    Broken rib     left    Broken toe     right    Chronic atrial fibrillation (HCC)     Fibroadenosis of breast     Headache     History of therapeutic radiation     Hyperlipidemia     Hypertension        Review of Systems:   Review of

## 2024-04-01 ENCOUNTER — HOSPITAL ENCOUNTER (OUTPATIENT)
Dept: CT IMAGING | Age: 74
Discharge: HOME | End: 2024-04-03
Attending: INTERNAL MEDICINE
Payer: MEDICARE

## 2024-04-01 ENCOUNTER — HOSPITAL ENCOUNTER (OUTPATIENT)
Dept: NUCLEAR MEDICINE | Age: 74
Discharge: HOME | End: 2024-04-01
Attending: INTERNAL MEDICINE
Payer: MEDICARE

## 2024-04-01 DIAGNOSIS — Z17.1 ESTROGEN RECEPTOR NEGATIVE STATUS (ER-): ICD-10-CM

## 2024-04-01 DIAGNOSIS — C79.51 SECONDARY MALIGNANT NEOPLASM OF BONE (HCC): ICD-10-CM

## 2024-04-01 DIAGNOSIS — C50.211 MALIGNANT NEOPLASM OF UPPER-INNER QUADRANT OF RIGHT FEMALE BREAST, UNSPECIFIED ESTROGEN RECEPTOR STATUS (HCC): ICD-10-CM

## 2024-04-01 PROCEDURE — A9503 TC99M MEDRONATE: HCPCS | Performed by: RADIOLOGY

## 2024-04-01 PROCEDURE — 6360000004 HC RX CONTRAST MEDICATION: Performed by: RADIOLOGY

## 2024-04-01 PROCEDURE — 78306 BONE IMAGING WHOLE BODY: CPT | Performed by: INTERNAL MEDICINE

## 2024-04-01 PROCEDURE — 71260 CT THORAX DX C+: CPT

## 2024-04-01 PROCEDURE — 74177 CT ABD & PELVIS W/CONTRAST: CPT

## 2024-04-01 PROCEDURE — 3430000000 HC RX DIAGNOSTIC RADIOPHARMACEUTICAL: Performed by: RADIOLOGY

## 2024-04-01 RX ORDER — TC 99M MEDRONATE 20 MG/10ML
25 INJECTION, POWDER, LYOPHILIZED, FOR SOLUTION INTRAVENOUS
Status: COMPLETED | OUTPATIENT
Start: 2024-04-01 | End: 2024-04-01

## 2024-04-01 RX ADMIN — TC 99M MEDRONATE 25 MILLICURIE: 20 INJECTION, POWDER, LYOPHILIZED, FOR SOLUTION INTRAVENOUS at 11:10

## 2024-04-01 RX ADMIN — IOPAMIDOL 18 ML: 755 INJECTION, SOLUTION INTRAVENOUS at 12:46

## 2024-04-01 RX ADMIN — IOPAMIDOL 75 ML: 755 INJECTION, SOLUTION INTRAVENOUS at 12:46

## 2024-04-02 ENCOUNTER — NURSE ONLY (OUTPATIENT)
Dept: FAMILY MEDICINE CLINIC | Age: 74
End: 2024-04-02
Payer: MEDICARE

## 2024-04-02 DIAGNOSIS — J30.1 NON-SEASONAL ALLERGIC RHINITIS DUE TO POLLEN: Primary | ICD-10-CM

## 2024-04-02 DIAGNOSIS — I48.91 ATRIAL FIBRILLATION, UNSPECIFIED TYPE (HCC): ICD-10-CM

## 2024-04-02 PROCEDURE — 96372 THER/PROPH/DIAG INJ SC/IM: CPT | Performed by: STUDENT IN AN ORGANIZED HEALTH CARE EDUCATION/TRAINING PROGRAM

## 2024-04-02 RX ORDER — PROPAFENONE HYDROCHLORIDE 225 MG/1
225 TABLET, FILM COATED ORAL 3 TIMES DAILY
Qty: 270 TABLET | Refills: 1 | Status: SHIPPED | OUTPATIENT
Start: 2024-04-02

## 2024-04-02 RX ORDER — TRIAMCINOLONE ACETONIDE 40 MG/ML
40 INJECTION, SUSPENSION INTRA-ARTICULAR; INTRAMUSCULAR ONCE
Status: COMPLETED | OUTPATIENT
Start: 2024-04-02 | End: 2024-04-02

## 2024-04-02 RX ADMIN — TRIAMCINOLONE ACETONIDE 40 MG: 40 INJECTION, SUSPENSION INTRA-ARTICULAR; INTRAMUSCULAR at 09:19

## 2024-04-02 NOTE — TELEPHONE ENCOUNTER
Last Appointment:  12/18/2023  Future Appointments   Date Time Provider Department Center   4/17/2024  1:30 PM Galindo Mcmullen DPM Col Podiatry [facility]   5/1/2024 10:30 AM Jae Dumont MD COLUMB BIRK [facility]   5/30/2024 11:00 AM Mono Lenz MD AFL ADVWMNS [facility]   7/22/2024  9:00 AM ANGELIQUEYX MORGAN IMAGING SRI COL JACBCC [facility]   7/29/2024  1:00 PM Vero Natarajan APRN - BASILIO COL SOULEYMANE BRST [facility]   12/17/2024  1:00 PM Fahad Askew DO [facility]

## 2024-04-17 ENCOUNTER — OFFICE VISIT (OUTPATIENT)
Dept: FAMILY MEDICINE CLINIC | Age: 74
End: 2024-04-17

## 2024-04-17 ENCOUNTER — PROCEDURE VISIT (OUTPATIENT)
Dept: PODIATRY | Age: 74
End: 2024-04-17
Payer: MEDICARE

## 2024-04-17 VITALS
SYSTOLIC BLOOD PRESSURE: 150 MMHG | WEIGHT: 175 LBS | OXYGEN SATURATION: 98 % | HEART RATE: 66 BPM | TEMPERATURE: 97.3 F | HEIGHT: 67 IN | BODY MASS INDEX: 27.47 KG/M2 | DIASTOLIC BLOOD PRESSURE: 70 MMHG

## 2024-04-17 DIAGNOSIS — B35.3 TINEA PEDIS OF BOTH FEET: ICD-10-CM

## 2024-04-17 DIAGNOSIS — Z79.01 ENCOUNTER FOR CURRENT LONG-TERM USE OF ANTICOAGULANTS: ICD-10-CM

## 2024-04-17 DIAGNOSIS — R82.90 FOUL SMELLING URINE: ICD-10-CM

## 2024-04-17 DIAGNOSIS — N30.01 ACUTE CYSTITIS WITH HEMATURIA: Primary | ICD-10-CM

## 2024-04-17 DIAGNOSIS — B35.1 TINEA UNGUIUM: Primary | ICD-10-CM

## 2024-04-17 DIAGNOSIS — L84 CORNS AND CALLOSITIES: ICD-10-CM

## 2024-04-17 DIAGNOSIS — G60.8 HEREDITARY SENSORY NEUROPATHY: ICD-10-CM

## 2024-04-17 LAB
BILIRUBIN, POC: NEGATIVE
BLOOD URINE, POC: ABNORMAL
CLARITY, POC: ABNORMAL
COLOR, POC: YELLOW
GLUCOSE URINE, POC: NEGATIVE
KETONES, POC: NEGATIVE
LEUKOCYTE EST, POC: ABNORMAL
NITRITE, POC: POSITIVE
PH, POC: 7
PROTEIN, POC: NEGATIVE
SPECIFIC GRAVITY, POC: 1.01
UROBILINOGEN, POC: 0.2

## 2024-04-17 PROCEDURE — 11056 PARNG/CUTG B9 HYPRKR LES 2-4: CPT | Performed by: PODIATRIST

## 2024-04-17 PROCEDURE — 11721 DEBRIDE NAIL 6 OR MORE: CPT | Performed by: PODIATRIST

## 2024-04-17 RX ORDER — NITROFURANTOIN 25; 75 MG/1; MG/1
100 CAPSULE ORAL 2 TIMES DAILY
Qty: 10 CAPSULE | Refills: 0 | Status: SHIPPED | OUTPATIENT
Start: 2024-04-17 | End: 2024-04-22

## 2024-04-17 NOTE — PROGRESS NOTES
Jaimie Bradford is here today for nail care. her PCP is Jae Dumont MD last OV was 03/29/2024.         CC:    Follow-up foot and ankle exam.    HPI:   Follow-up foot and ankle exam.  No open wounds.  Does have history of Coumadin long-term anticoagulant therapy.  Denies any open skin lesions or abrasions.  No calf pain.  No additional pedal complaints today.    ROS:  Const: Denies constitutional symptoms  Musculo: Denies symptoms other than stated above  Skin: Denies symptoms other than stated above       Current Outpatient Medications:     nitrofurantoin, macrocrystal-monohydrate, (MACROBID) 100 MG capsule, Take 1 capsule by mouth 2 times daily for 5 days, Disp: 10 capsule, Rfl: 0    propafenone (RYTHMOL) 225 MG tablet, take 1 tablet by mouth three times a day, Disp: 270 tablet, Rfl: 1    warfarin (COUMADIN) 6 MG tablet, Indications: monday, friday 3 mg, all other days are 6 mg 1/2 tab on M  F and 1 tab all other days of the week OR AS DIRECTED, Disp: 30 tablet, Rfl: 5    verapamil (CALAN SR) 180 MG extended release tablet, Take 1 tablet by mouth daily, Disp: 90 tablet, Rfl: 1    topiramate (TOPAMAX) 50 MG tablet, Take 1 tablet by mouth 2 times daily, Disp: 180 tablet, Rfl: 1    azelastine (ASTELIN) 0.1 % nasal spray, 2 sprays by Nasal route 2 times daily Use in each nostril as directed, Disp: 120 mL, Rfl: 3    olopatadine (PATANOL) 0.1 % ophthalmic solution, Place 1 drop into both eyes in the morning and at bedtime, Disp: 5 mL, Rfl: 1    escitalopram (LEXAPRO) 10 MG tablet, Take 1 tablet by mouth every evening, Disp: 90 tablet, Rfl: 1    ammonium lactate (LAC-HYDRIN) 12 % lotion, Apply topically twice daily, Disp: 400 g, Rfl: 2    triamcinolone (KENALOG) 0.1 % cream, Apply topically 2 times daily., Disp: 80 g, Rfl: 0    clotrimazole-betamethasone (LOTRISONE) 1-0.05 % cream, Apply topically 2 times daily., Disp: 45 g, Rfl: 2    palbociclib (IBRANCE) 75 MG capsule, Take 75 mg by mouth Takes 3 weeks out of the

## 2024-04-17 NOTE — PROGRESS NOTES
Chief Complaint       Urinary Tract Infection (Strong \"fishy\" odor in urine that started last weekend )      History of Present Illness   Source of history provided by:  patient.      Jaimie Bradford is a 73 y.o. old female presenting to the walk in clinic for evaluation of malodorous urine x 5 days. Reports associated frequency, urgency, and suprapubic pressure. Denies gross hematuria.  Denies associated flank pain. Denies any fever, chills, vaginal discharge, vaginal bleeding, possibility of pregnancy, vomiting, diarrhea, or lethargy.  No LMP recorded. Patient is postmenopausal.    ROS    Unless otherwise stated in this report or unable to obtain because of the patient's clinical or mental status as evidenced by the medical record, this patients's positive and negative responses for Review of Systems, constitutional, psych, eyes, ENT, cardiovascular, respiratory, gastrointestinal, neurological, genitourinary, musculoskeletal, integument systems and systems related to the presenting problem are either stated in the preceding or were not pertinent or were negative for the symptoms and/or complaints related to the medical problem.    Past Medical History:  has a past medical history of Bladder prolapse, female, acquired, Bone cancer (HCC), Breast cancer (HCC), Broken rib, Broken toe, Chronic atrial fibrillation (HCC), Fibroadenosis of breast, Headache, History of therapeutic radiation, Hyperlipidemia, and Hypertension.  Past Surgical History:  has a past surgical history that includes Atrial ablation surgery (1999); Colonoscopy; Tonsillectomy; eye surgery; Breast lumpectomy (Right, 04/26/2017); Tunneled venous port placement (05/26/2017); other surgical history; CT BIOPSY DEEP BONE PERCUTANEOUS (04/07/2021); bone biopsy (04/2021); and Endoscopic vein laser treatment.  Social History:  reports that she has never smoked. She has never used smokeless tobacco. She reports that she does not drink alcohol and does not

## 2024-04-20 LAB
CULTURE: ABNORMAL
SPECIMEN DESCRIPTION: ABNORMAL

## 2024-05-01 ENCOUNTER — OFFICE VISIT (OUTPATIENT)
Dept: FAMILY MEDICINE CLINIC | Age: 74
End: 2024-05-01

## 2024-05-01 VITALS
DIASTOLIC BLOOD PRESSURE: 54 MMHG | HEIGHT: 67 IN | TEMPERATURE: 97.4 F | OXYGEN SATURATION: 97 % | WEIGHT: 175 LBS | BODY MASS INDEX: 27.47 KG/M2 | SYSTOLIC BLOOD PRESSURE: 144 MMHG | HEART RATE: 66 BPM

## 2024-05-01 DIAGNOSIS — I48.91 ATRIAL FIBRILLATION, UNSPECIFIED TYPE (HCC): ICD-10-CM

## 2024-05-01 DIAGNOSIS — R21 RASH AND OTHER NONSPECIFIC SKIN ERUPTION: ICD-10-CM

## 2024-05-01 LAB
INTERNATIONAL NORMALIZATION RATIO, POC: 2.3
PROTHROMBIN TIME, POC: 27.1

## 2024-05-01 RX ORDER — TRIAMCINOLONE ACETONIDE 1 MG/G
CREAM TOPICAL
Qty: 80 G | Refills: 5 | Status: SHIPPED | OUTPATIENT
Start: 2024-05-01

## 2024-05-01 ASSESSMENT — ENCOUNTER SYMPTOMS
COUGH: 0
SHORTNESS OF BREATH: 0
NAUSEA: 0
VOMITING: 0
RHINORRHEA: 0
ABDOMINAL PAIN: 0

## 2024-05-14 DIAGNOSIS — Z85.3 PERSONAL HISTORY OF BREAST CANCER: ICD-10-CM

## 2024-05-14 DIAGNOSIS — Z12.31 VISIT FOR SCREENING MAMMOGRAM: Primary | ICD-10-CM

## 2024-05-18 ENCOUNTER — OFFICE VISIT (OUTPATIENT)
Dept: FAMILY MEDICINE CLINIC | Age: 74
End: 2024-05-18

## 2024-05-18 VITALS
TEMPERATURE: 97.6 F | DIASTOLIC BLOOD PRESSURE: 64 MMHG | HEIGHT: 67 IN | HEART RATE: 66 BPM | WEIGHT: 175 LBS | SYSTOLIC BLOOD PRESSURE: 120 MMHG | OXYGEN SATURATION: 99 % | BODY MASS INDEX: 27.47 KG/M2

## 2024-05-18 DIAGNOSIS — J01.90 ACUTE SINUSITIS, RECURRENCE NOT SPECIFIED, UNSPECIFIED LOCATION: Primary | ICD-10-CM

## 2024-05-18 DIAGNOSIS — H69.91 DYSFUNCTION OF RIGHT EUSTACHIAN TUBE: ICD-10-CM

## 2024-05-18 DIAGNOSIS — J34.89 SINUS DRAINAGE: ICD-10-CM

## 2024-05-18 RX ORDER — AMOXICILLIN AND CLAVULANATE POTASSIUM 875; 125 MG/1; MG/1
1 TABLET, FILM COATED ORAL 2 TIMES DAILY
Qty: 14 TABLET | Refills: 0 | Status: SHIPPED | OUTPATIENT
Start: 2024-05-18 | End: 2024-05-25

## 2024-05-18 RX ORDER — PREDNISONE 10 MG/1
TABLET ORAL
Qty: 12 TABLET | Refills: 0 | Status: SHIPPED | OUTPATIENT
Start: 2024-05-18 | End: 2024-05-23

## 2024-05-18 ASSESSMENT — ENCOUNTER SYMPTOMS
EYES NEGATIVE: 1
NAUSEA: 0
WHEEZING: 0
ABDOMINAL PAIN: 0
SINUS PRESSURE: 1
SHORTNESS OF BREATH: 0
DIARRHEA: 0
SORE THROAT: 1
COUGH: 1

## 2024-05-18 NOTE — PROGRESS NOTES
[facility]     24  Jaimie Bradford : 1950 Sex: female  Age: 73 y.o.    Chief Complaint   Patient presents with    Sinusitis     Has been going on a few days, sinus pressure and congestion          HPI      Patient presents to express care today complaining of sore throat, sinus drainage and pressure, cough, myalgia, right ear discomfort.  Denies fever or chills.  States that several of her family members have been sick.  Patient is a cancer survivor and is immunocompromise.  She is concerned and wants to catch this early.  No shortness of breath.  States symptoms have been going on for few days.      Review of Systems   Constitutional:  Negative for chills and fever.   HENT:  Positive for congestion, ear pain, postnasal drip, sinus pressure and sore throat.    Eyes: Negative.    Respiratory:  Positive for cough. Negative for chest tightness, shortness of breath and wheezing.    Cardiovascular:  Negative for chest pain.   Gastrointestinal:  Negative for abdominal pain, diarrhea, nausea and vomiting.   Musculoskeletal:  Positive for myalgias.   Neurological:  Negative for headaches.                 REST OF PERTINENT ROS GONE OVER AND WAS NEGATIVE.                   Current Outpatient Medications:     predniSONE (DELTASONE) 10 MG tablet, Take 3 tablets by mouth daily for 2 days, THEN 2 tablets daily for 2 days, THEN 1 tablet daily for 2 days., Disp: 12 tablet, Rfl: 0    amoxicillin-clavulanate (AUGMENTIN) 875-125 MG per tablet, Take 1 tablet by mouth 2 times daily for 7 days, Disp: 14 tablet, Rfl: 0    triamcinolone (KENALOG) 0.1 % cream, Apply topically 2 times daily., Disp: 80 g, Rfl: 5    propafenone (RYTHMOL) 225 MG tablet, take 1 tablet by mouth three times a day, Disp: 270 tablet, Rfl: 1    warfarin (COUMADIN) 6 MG tablet, Indications: monday, friday 3 mg, all other days are 6 mg 1/2 tab on M  F and 1 tab all other days of the week OR AS DIRECTED, Disp: 30 tablet, Rfl: 5    verapamil

## 2024-05-20 ENCOUNTER — TELEPHONE (OUTPATIENT)
Dept: FAMILY MEDICINE CLINIC | Age: 74
End: 2024-05-20

## 2024-05-20 DIAGNOSIS — J01.90 ACUTE SINUSITIS, RECURRENCE NOT SPECIFIED, UNSPECIFIED LOCATION: Primary | ICD-10-CM

## 2024-05-20 RX ORDER — AMOXICILLIN 500 MG/1
500 CAPSULE ORAL 2 TIMES DAILY
Qty: 14 CAPSULE | Refills: 0 | Status: SHIPPED | OUTPATIENT
Start: 2024-05-20 | End: 2024-05-27

## 2024-05-20 NOTE — TELEPHONE ENCOUNTER
We will try amoxicillin. There is a component of amoxicillin the the augmentin, but I suspect it is the other ingredient making her sick.    I am trying to avoid other antibiotics given they may be more likely to cause large fluctuations in her INR. Let em know if she has questions

## 2024-05-20 NOTE — TELEPHONE ENCOUNTER
She only took 1 pill of the Augmentin, so is not planning to come tomorrow for INR check.  She has upcoming appt. On 5/31/2024.  Does she need INR check before this? She will be on Amoxicillin thru the 5/28/2024.

## 2024-05-20 NOTE — TELEPHONE ENCOUNTER
Jennie was seen [facility] on Saturday for a sinus infection, and given Augmentin.    This is making her sick, and she can't keep it down.     She is asking if you can prescribe something else for her to take to get over this?

## 2024-05-24 ENCOUNTER — ANTI-COAG VISIT (OUTPATIENT)
Dept: FAMILY MEDICINE CLINIC | Age: 74
End: 2024-05-24

## 2024-05-24 ENCOUNTER — NURSE ONLY (OUTPATIENT)
Dept: FAMILY MEDICINE CLINIC | Age: 74
End: 2024-05-24
Payer: MEDICARE

## 2024-05-24 ENCOUNTER — OFFICE VISIT (OUTPATIENT)
Dept: FAMILY MEDICINE CLINIC | Age: 74
End: 2024-05-24

## 2024-05-24 VITALS
WEIGHT: 172 LBS | TEMPERATURE: 97.3 F | BODY MASS INDEX: 27 KG/M2 | HEIGHT: 67 IN | SYSTOLIC BLOOD PRESSURE: 130 MMHG | HEART RATE: 63 BPM | RESPIRATION RATE: 16 BRPM | OXYGEN SATURATION: 97 % | DIASTOLIC BLOOD PRESSURE: 68 MMHG

## 2024-05-24 DIAGNOSIS — R05.1 ACUTE COUGH: Primary | ICD-10-CM

## 2024-05-24 DIAGNOSIS — I48.91 ATRIAL FIBRILLATION, UNSPECIFIED TYPE (HCC): ICD-10-CM

## 2024-05-24 LAB
INTERNATIONAL NORMALIZATION RATIO, POC: 2.2
PROTHROMBIN TIME, POC: 26.1

## 2024-05-24 PROCEDURE — 85610 PROTHROMBIN TIME: CPT | Performed by: STUDENT IN AN ORGANIZED HEALTH CARE EDUCATION/TRAINING PROGRAM

## 2024-05-24 PROCEDURE — 93793 ANTICOAG MGMT PT WARFARIN: CPT | Performed by: STUDENT IN AN ORGANIZED HEALTH CARE EDUCATION/TRAINING PROGRAM

## 2024-05-24 RX ORDER — BENZONATATE 100 MG/1
100 CAPSULE ORAL 3 TIMES DAILY PRN
Qty: 30 CAPSULE | Refills: 0 | Status: SHIPPED | OUTPATIENT
Start: 2024-05-24 | End: 2024-06-03

## 2024-05-24 NOTE — PROGRESS NOTES
[facility] [address] [address]  Dept: [phone]  Dept Fax: [phone]  Loc: [phone]   DATE OF VISIT : 2024      Patient:  Jaimie Bradford  Age: 73 y.o.       : 1950      Chief complaint:   Chief Complaint   Patient presents with    Cough    Congestion         History of Present Illness     Jaimie Bradford is a 73 y.o. female who presented to the clinic today for cough and congestion.    Continues to take amoxicillin and prednisone. Reports having 3 days later. Reports not feeling better with cough and congestion.  Patient reports cough being worse at nighttime.  She denies any recent fevers, chills, chest pain or shortness of breath.    Medication List:    Current Outpatient Medications   Medication Sig Dispense Refill    benzonatate (TESSALON) 100 MG capsule Take 1 capsule by mouth 3 times daily as needed for Cough 30 capsule 0    amoxicillin (AMOXIL) 500 MG capsule Take 1 capsule by mouth 2 times daily for 7 days 14 capsule 0    triamcinolone (KENALOG) 0.1 % cream Apply topically 2 times daily. 80 g 5    propafenone (RYTHMOL) 225 MG tablet take 1 tablet by mouth three times a day 270 tablet 1    warfarin (COUMADIN) 6 MG tablet Indications: monday, friday 3 mg, all other days are 6 mg 1/2 tab on M  F and 1 tab all other days of the week OR AS DIRECTED 30 tablet 5    verapamil (CALAN SR) 180 MG extended release tablet Take 1 tablet by mouth daily 90 tablet 1    topiramate (TOPAMAX) 50 MG tablet Take 1 tablet by mouth 2 times daily 180 tablet 1    azelastine (ASTELIN) 0.1 % nasal spray 2 sprays by Nasal route 2 times daily Use in each nostril as directed 120 mL 3    olopatadine (PATANOL) 0.1 % ophthalmic solution Place 1 drop into both eyes in the morning and at bedtime 5 mL 1    escitalopram (LEXAPRO) 10 MG tablet Take 1 tablet by mouth every evening 90 tablet 1    ammonium lactate (LAC-HYDRIN) 12 % lotion

## 2024-05-30 PROBLEM — M79.672 PAIN OF LEFT HEEL: Status: RESOLVED | Noted: 2021-07-07 | Resolved: 2024-05-30

## 2024-05-30 PROBLEM — I83.93 VARICOSE VEINS OF BOTH LOWER EXTREMITIES: Status: RESOLVED | Noted: 2018-06-11 | Resolved: 2024-05-30

## 2024-05-30 PROBLEM — I83.813 VARICOSE VEINS OF BOTH LOWER EXTREMITIES WITH PAIN: Status: RESOLVED | Noted: 2023-06-30 | Resolved: 2024-05-30

## 2024-05-31 ENCOUNTER — OFFICE VISIT (OUTPATIENT)
Dept: FAMILY MEDICINE CLINIC | Age: 74
End: 2024-05-31

## 2024-05-31 ENCOUNTER — APPOINTMENT (OUTPATIENT)
Dept: CT IMAGING | Age: 74
End: 2024-05-31
Payer: MEDICARE

## 2024-05-31 ENCOUNTER — HOSPITAL ENCOUNTER (EMERGENCY)
Age: 74
Discharge: HOME | End: 2024-05-31
Payer: MEDICARE

## 2024-05-31 VITALS
DIASTOLIC BLOOD PRESSURE: 60 MMHG | TEMPERATURE: 97.1 F | BODY MASS INDEX: 26.78 KG/M2 | WEIGHT: 171 LBS | HEART RATE: 74 BPM | OXYGEN SATURATION: 96 % | SYSTOLIC BLOOD PRESSURE: 116 MMHG

## 2024-05-31 VITALS
HEART RATE: 87 BPM | SYSTOLIC BLOOD PRESSURE: 148 MMHG | TEMPERATURE: 98.9 F | RESPIRATION RATE: 18 BRPM | OXYGEN SATURATION: 98 % | DIASTOLIC BLOOD PRESSURE: 62 MMHG

## 2024-05-31 DIAGNOSIS — J40 BRONCHITIS: Primary | ICD-10-CM

## 2024-05-31 DIAGNOSIS — G43.009 MIGRAINE WITHOUT AURA, NOT REFRACTORY: ICD-10-CM

## 2024-05-31 DIAGNOSIS — I48.91 ATRIAL FIBRILLATION, UNSPECIFIED TYPE (HCC): ICD-10-CM

## 2024-05-31 DIAGNOSIS — M54.2 NECK PAIN: Primary | ICD-10-CM

## 2024-05-31 DIAGNOSIS — J40 BRONCHITIS: ICD-10-CM

## 2024-05-31 DIAGNOSIS — F06.4 ANXIETY DISORDER DUE TO GENERAL MEDICAL CONDITION: ICD-10-CM

## 2024-05-31 LAB
INTERNATIONAL NORMALIZATION RATIO, POC: 3.3
PROTHROMBIN TIME, POC: 39.9

## 2024-05-31 PROCEDURE — 72125 CT NECK SPINE W/O DYE: CPT

## 2024-05-31 PROCEDURE — 99284 EMERGENCY DEPT VISIT MOD MDM: CPT

## 2024-05-31 PROCEDURE — 6370000000 HC RX 637 (ALT 250 FOR IP): Performed by: PHYSICIAN ASSISTANT

## 2024-05-31 PROCEDURE — 70450 CT HEAD/BRAIN W/O DYE: CPT

## 2024-05-31 RX ORDER — TOPIRAMATE 50 MG/1
50 TABLET, FILM COATED ORAL 2 TIMES DAILY
Qty: 180 TABLET | Refills: 1 | Status: SHIPPED | OUTPATIENT
Start: 2024-05-31

## 2024-05-31 RX ORDER — CYCLOBENZAPRINE HCL 10 MG
10 TABLET ORAL ONCE
Status: COMPLETED | OUTPATIENT
Start: 2024-05-31 | End: 2024-05-31

## 2024-05-31 RX ORDER — ESCITALOPRAM OXALATE 10 MG/1
10 TABLET ORAL EVERY EVENING
Qty: 90 TABLET | Refills: 1 | Status: SHIPPED | OUTPATIENT
Start: 2024-05-31

## 2024-05-31 RX ORDER — CYCLOBENZAPRINE HCL 5 MG
5 TABLET ORAL 2 TIMES DAILY PRN
Qty: 10 TABLET | Refills: 0 | Status: SHIPPED | OUTPATIENT
Start: 2024-05-31 | End: 2024-06-10

## 2024-05-31 RX ORDER — OXYCODONE HYDROCHLORIDE AND ACETAMINOPHEN 5; 325 MG/1; MG/1
1 TABLET ORAL ONCE
Status: COMPLETED | OUTPATIENT
Start: 2024-05-31 | End: 2024-05-31

## 2024-05-31 RX ADMIN — CYCLOBENZAPRINE 10 MG: 10 TABLET, FILM COATED ORAL at 22:49

## 2024-05-31 RX ADMIN — OXYCODONE HYDROCHLORIDE AND ACETAMINOPHEN 1 TABLET: 5; 325 TABLET ORAL at 22:49

## 2024-05-31 ASSESSMENT — ENCOUNTER SYMPTOMS
SHORTNESS OF BREATH: 0
RHINORRHEA: 1
SINUS PRESSURE: 1
ABDOMINAL PAIN: 0
NAUSEA: 0
COUGH: 1
SINUS PAIN: 1
CHEST TIGHTNESS: 1
VOMITING: 0

## 2024-05-31 ASSESSMENT — PAIN SCALES - GENERAL: PAINLEVEL_OUTOF10: 10

## 2024-05-31 NOTE — PROGRESS NOTES
[facility]  Office Progress Note  Dr. Jae Dumont      Patient:  Jaimie Bradford 73 y.o. female     Date of Service: 5/31/24      Chief complaint:   Chief Complaint   Patient presents with    Office Visit for Anticoagulation Management    Sinus Problem     Weak, cough, sore throat, not eating much, fatigued. Has been on Amoxil and prednisone. Benzonatate not effective         History of Present Illness   The patient is a 73 y.o. female  here to follow up of their URI    She is still dealing with a URI, cough, weakness, fatigue, congestion. She has not taken some delsym at home. Tessalon is not helping. Has also been rx prednisone and amoxil which have not resolved things.    A fib  On warfarin, also takes verapamil and propafenone. She has been on propafenone for years and is rx by PCP. Hx of ablation. Rate and rhythm controlled today.  INR elevated today. No bleeding issues     Hx of metastatic breast cancer to bone. Currently in remission. She gets maintenance therapy. She is on faslodex, xgeva and ibrance. She gets some itching she believes is from these medications. Typically topical steroids help     She takes topamax for a migraine history-it works very well. She follows with Dr. Gallego at the [facility] as well as [facility] center.       Summary:  Stage IV carcinoma of the right breast: ER/NH positive HER-2/marcos negative disease.  -Adjuvant chemotherapy per Dr. Gallego with AC-T.completed on 11/09/2017.    -Post-operative RT:  Radiation completed 01/29/2018.   -ET:  Arimidex Feb 2018         She is on lexapro for an anxiety/depression that was brought on by some family trauma. Works well without side effects       Past Medical History:      Diagnosis Date    Bladder prolapse, female, acquired     Bone cancer (HCC) 02/2021    Breast cancer (HCC) 04/2017    Broken rib     left    Broken toe     right    Chronic atrial fibrillation (HCC)     Fibroadenosis of breast     Headache

## 2024-06-01 LAB
ADENOVIRUS PCR: NOT DETECTED
BORDETELLA PARAPERTUSSIS BY PCR: NOT DETECTED
BORDETELLA PERTUSSIS PCR: NOT DETECTED
CHLAMYDIA PNEUMONIAE BY PCR: NOT DETECTED
CORONAVIRUS 229E PCR: NOT DETECTED
CORONAVIRUS HKU1 PCR: NOT DETECTED
CORONAVIRUS NL63 PCR: NOT DETECTED
CORONAVIRUS OC43 PCR: NOT DETECTED
HUMAN METAPNEUMOVIRUS PCR: DETECTED
INFLUENZA A BY PCR: NOT DETECTED
INFLUENZA B BY PCR: NOT DETECTED
MYCOPLASMA PNEUMONIAE PCR: NOT DETECTED
PARAINFLUENZA 1 PCR: NOT DETECTED
PARAINFLUENZA 2 PCR: NOT DETECTED
PARAINFLUENZA 3 PCR: NOT DETECTED
PARAINFLUENZA 4 PCR: NOT DETECTED
RESP SYNCYTIAL VIRUS PCR: NOT DETECTED
RHINO/ENTEROVIRUS PCR: NOT DETECTED
SARS-COV-2, PCR: NOT DETECTED
SOURCE: ABNORMAL

## 2024-06-01 NOTE — ED PROVIDER NOTES
the plan of care and counseling regarding the diagnosis and prognosis.  Questions are answered at this time and they are agreeable with the plan.      --------------------------------- IMPRESSION AND DISPOSITION ---------------------------------    IMPRESSION  1. Neck pain        DISPOSITION  Disposition: Discharge to home  Patient condition is good                 Yvette Heaton PA-C  05/31/24 0358

## 2024-06-04 ENCOUNTER — CARE COORDINATION (OUTPATIENT)
Dept: CARE COORDINATION | Age: 74
End: 2024-06-04

## 2024-06-04 NOTE — CARE COORDINATION
Attempted to reach patient by telephone.  Unable to leave message, phone number not working.  Will send introduction letter via [employer].Will attempt to reach patient again.

## 2024-06-07 ENCOUNTER — NURSE ONLY (OUTPATIENT)
Dept: FAMILY MEDICINE CLINIC | Age: 74
End: 2024-06-07

## 2024-06-07 ENCOUNTER — ANTI-COAG VISIT (OUTPATIENT)
Dept: FAMILY MEDICINE CLINIC | Age: 74
End: 2024-06-07

## 2024-06-07 DIAGNOSIS — I48.91 ATRIAL FIBRILLATION, UNSPECIFIED TYPE (HCC): ICD-10-CM

## 2024-06-07 LAB — INTERNATIONAL NORMALIZATION RATIO, POC: 1.9

## 2024-06-07 RX ORDER — CYCLOBENZAPRINE HCL 5 MG
5 TABLET ORAL 2 TIMES DAILY PRN
Qty: 10 TABLET | Refills: 0 | Status: SHIPPED | OUTPATIENT
Start: 2024-06-07 | End: 2024-06-17

## 2024-06-07 NOTE — PROGRESS NOTES
Patient informed of below information about coumadin dosing - patient understood and scheduled for a 1 wk INR check next Friday

## 2024-06-07 NOTE — PROGRESS NOTES
INR just below goal at 1.9. Take an extra 3 mg today (for a total of 6 mg) but after that continue same dosing-3 Mg on Monday and 6 every other day. Will determine next Friday's dose after INR

## 2024-06-07 NOTE — TELEPHONE ENCOUNTER
Patient states that she was given cyclobenzaprine 5 mg in the hospital to help with her neck and back. She states that they have been helping however she ran out. She would like to know if she can have a refill on those sent to her pharmacy?

## 2024-06-13 ENCOUNTER — CARE COORDINATION (OUTPATIENT)
Dept: CARE COORDINATION | Age: 74
End: 2024-06-13

## 2024-06-13 NOTE — CARE COORDINATION
ACM contacted Jennie to offer enrollment in care coordination.  Care coordination services explained to Jennie.  She denies needs and declines enrollment.

## 2024-06-14 ENCOUNTER — ANTI-COAG VISIT (OUTPATIENT)
Dept: FAMILY MEDICINE CLINIC | Age: 74
End: 2024-06-14

## 2024-06-14 ENCOUNTER — NURSE ONLY (OUTPATIENT)
Dept: FAMILY MEDICINE CLINIC | Age: 74
End: 2024-06-14

## 2024-06-14 DIAGNOSIS — I48.91 ATRIAL FIBRILLATION, UNSPECIFIED TYPE (HCC): ICD-10-CM

## 2024-06-14 LAB
INTERNATIONAL NORMALIZATION RATIO, POC: 4.7
PROTHROMBIN TIME, POC: 56.7

## 2024-06-14 NOTE — PROGRESS NOTES
INR 4.7. Skip dose today and tomorrow    3 mg Sunday, Monday Wednesday   6 mg on Tuesday and Thursday  Recheck Friday 6/21 AM

## 2024-06-17 ENCOUNTER — TRANSCRIBE ORDERS (OUTPATIENT)
Dept: ADMINISTRATIVE | Age: 74
End: 2024-06-17

## 2024-06-17 DIAGNOSIS — Z17.1 ESTROGEN RECEPTOR NEGATIVE STATUS (ER-): ICD-10-CM

## 2024-06-17 DIAGNOSIS — C79.51 SECONDARY MALIGNANT NEOPLASM OF BONE AND BONE MARROW (HCC): Primary | ICD-10-CM

## 2024-06-17 DIAGNOSIS — C50.211 MALIGNANT NEOPLASM OF UPPER-INNER QUADRANT OF RIGHT FEMALE BREAST, UNSPECIFIED ESTROGEN RECEPTOR STATUS (HCC): ICD-10-CM

## 2024-06-17 DIAGNOSIS — C79.52 SECONDARY MALIGNANT NEOPLASM OF BONE AND BONE MARROW (HCC): Primary | ICD-10-CM

## 2024-06-18 ENCOUNTER — TRANSCRIBE ORDERS (OUTPATIENT)
Dept: ADMINISTRATIVE | Age: 74
End: 2024-06-18

## 2024-06-18 DIAGNOSIS — C79.52 SECONDARY MALIGNANT NEOPLASM OF BONE AND BONE MARROW (HCC): Primary | ICD-10-CM

## 2024-06-18 DIAGNOSIS — C79.51 SECONDARY MALIGNANT NEOPLASM OF BONE AND BONE MARROW (HCC): Primary | ICD-10-CM

## 2024-06-18 DIAGNOSIS — Z17.1 ESTROGEN RECEPTOR NEGATIVE STATUS (ER-): ICD-10-CM

## 2024-06-18 DIAGNOSIS — C50.211 MALIGNANT NEOPLASM OF UPPER-INNER QUADRANT OF RIGHT FEMALE BREAST, UNSPECIFIED ESTROGEN RECEPTOR STATUS (HCC): ICD-10-CM

## 2024-06-19 ENCOUNTER — PROCEDURE VISIT (OUTPATIENT)
Dept: PODIATRY | Age: 74
End: 2024-06-19
Payer: MEDICARE

## 2024-06-19 DIAGNOSIS — G60.8 HEREDITARY SENSORY NEUROPATHY: ICD-10-CM

## 2024-06-19 DIAGNOSIS — L84 CORNS AND CALLOSITIES: ICD-10-CM

## 2024-06-19 DIAGNOSIS — Z79.01 ENCOUNTER FOR CURRENT LONG-TERM USE OF ANTICOAGULANTS: ICD-10-CM

## 2024-06-19 DIAGNOSIS — B35.1 TINEA UNGUIUM: Primary | ICD-10-CM

## 2024-06-19 PROCEDURE — 11056 PARNG/CUTG B9 HYPRKR LES 2-4: CPT | Performed by: PODIATRIST

## 2024-06-19 PROCEDURE — 11721 DEBRIDE NAIL 6 OR MORE: CPT | Performed by: PODIATRIST

## 2024-06-19 NOTE — PROGRESS NOTES
Disp: , Rfl:     denosumab (XGEVA) 120 MG/1.7ML SOLN SC injection, Inject 1.7 mLs into the skin once, Disp: , Rfl:     calcium carbonate-vitamin D (CALTRATE) 600-400 MG-UNIT TABS per tab, Take 1 tablet by mouth daily, Disp: , Rfl:     Alpha-Lipoic Acid 100 MG CAPS, Take by mouth, Disp: , Rfl:     b complex vitamins capsule, Take 1 capsule by mouth daily, Disp: , Rfl:   Allergies   Allergen Reactions    Diltiazem Hcl Other (See Comments)     Other reaction(s): weak    Sulfa Antibiotics     Cardizem [Diltiazem] Palpitations    Cefuroxime Axetil Nausea And Vomiting, Other (See Comments) and Palpitations    Claritin [Loratadine] Palpitations    Erythromycin Nausea And Vomiting    Morphine Nausea And Vomiting    Mucinex [Guaifenesin Er] Palpitations    Propoxyphene Other (See Comments) and Nausea And Vomiting     DIZZY  Other reaction(s): Free Text    Zyrtec [Cetirizine] Nausea And Vomiting and Other (See Comments)     WEAK       Past Medical History:   Diagnosis Date    Bladder prolapse, female, acquired     Bone cancer (HCC) 02/2021    Breast cancer (HCC) 04/2017    Broken rib     left    Broken toe     right    Chronic atrial fibrillation (HCC)     Fibroadenosis of breast     Headache     History of therapeutic radiation     Hyperlipidemia     Hypertension            There were no vitals filed for this visit.          Work History/Social History:  Focused Lower Extremity Physical Exam:    Neurovascular examination:    Dorsalis Pedis palpable bilateral.  Posterior tibialis palpable bilateral.    Capillary Refill Time:  Immediate return  Hair growth:  Symmetrical and bilateral   Skin:  Not atrophic  Edema: Mild edema dorsal feet.  No erythema.    Musculoskeletal/ Orthopedic examination:    Equinis: Absent bilateral  Dorsiflexion, plantarflexion, inversion, eversion bilateral 5 out of 5 muscle strength  Negative Homans.  No pain foot or ankle.    Dermatology examination:    Toenails 1-5 right and left thickened,

## 2024-06-21 ENCOUNTER — ANTI-COAG VISIT (OUTPATIENT)
Dept: FAMILY MEDICINE CLINIC | Age: 74
End: 2024-06-21
Payer: MEDICARE

## 2024-06-21 ENCOUNTER — NURSE ONLY (OUTPATIENT)
Dept: FAMILY MEDICINE CLINIC | Age: 74
End: 2024-06-21
Payer: MEDICARE

## 2024-06-21 ENCOUNTER — ANTI-COAG VISIT (OUTPATIENT)
Dept: FAMILY MEDICINE CLINIC | Age: 74
End: 2024-06-21

## 2024-06-21 DIAGNOSIS — I48.91 ATRIAL FIBRILLATION, UNSPECIFIED TYPE (HCC): ICD-10-CM

## 2024-06-21 DIAGNOSIS — I48.91 ATRIAL FIBRILLATION, UNSPECIFIED TYPE (HCC): Primary | ICD-10-CM

## 2024-06-21 LAB
INTERNATIONAL NORMALIZATION RATIO, POC: 2.5
PROTHROMBIN TIME, POC: 30.6

## 2024-06-21 PROCEDURE — 93793 ANTICOAG MGMT PT WARFARIN: CPT | Performed by: STUDENT IN AN ORGANIZED HEALTH CARE EDUCATION/TRAINING PROGRAM

## 2024-06-21 PROCEDURE — 93793 ANTICOAG MGMT PT WARFARIN: CPT | Performed by: FAMILY MEDICINE

## 2024-06-21 PROCEDURE — 85610 PROTHROMBIN TIME: CPT | Performed by: STUDENT IN AN ORGANIZED HEALTH CARE EDUCATION/TRAINING PROGRAM

## 2024-06-21 PROCEDURE — 99999 PR OFFICE/OUTPT VISIT,PROCEDURE ONLY: CPT | Performed by: STUDENT IN AN ORGANIZED HEALTH CARE EDUCATION/TRAINING PROGRAM

## 2024-06-21 NOTE — PROGRESS NOTES
6/21/24  INR 2.5  Much improved from last week  3 mg Sunday, Monday Wednesday   6 mg on Tues, Thurs, Fri, Sat  Recheck Friday 6/28 AM

## 2024-06-27 ENCOUNTER — TELEPHONE (OUTPATIENT)
Dept: FAMILY MEDICINE CLINIC | Age: 74
End: 2024-06-27

## 2024-06-27 DIAGNOSIS — Z85.3 HISTORY OF BREAST CANCER: Primary | ICD-10-CM

## 2024-06-27 DIAGNOSIS — Z78.0 POST-MENOPAUSAL: ICD-10-CM

## 2024-06-27 NOTE — TELEPHONE ENCOUNTER
Patient is scheduled for a mammogram on 7/22.  Patient asking if she can have an order placed for a Dexa scan per Dr. Gallego's recommendation.  Patient would like to schedule Dexa the same day as her mammogram.  Order pending for your review.

## 2024-06-28 ENCOUNTER — NURSE ONLY (OUTPATIENT)
Dept: FAMILY MEDICINE CLINIC | Age: 74
End: 2024-06-28
Payer: MEDICARE

## 2024-06-28 ENCOUNTER — ANTI-COAG VISIT (OUTPATIENT)
Dept: FAMILY MEDICINE CLINIC | Age: 74
End: 2024-06-28

## 2024-06-28 DIAGNOSIS — I48.91 ATRIAL FIBRILLATION, UNSPECIFIED TYPE (HCC): Primary | ICD-10-CM

## 2024-06-28 LAB
INTERNATIONAL NORMALIZATION RATIO, POC: 2.4
PROTHROMBIN TIME, POC: 29

## 2024-06-28 PROCEDURE — 85610 PROTHROMBIN TIME: CPT | Performed by: FAMILY MEDICINE

## 2024-06-28 NOTE — PROGRESS NOTES
Left a message on the voicemail to call back to the nurse line regarding today's INR results

## 2024-07-03 ENCOUNTER — HOSPITAL ENCOUNTER (OUTPATIENT)
Dept: MRI IMAGING | Age: 74
Discharge: HOME | End: 2024-07-05
Attending: INTERNAL MEDICINE
Payer: MEDICARE

## 2024-07-03 DIAGNOSIS — Z17.1 ESTROGEN RECEPTOR NEGATIVE STATUS (ER-): ICD-10-CM

## 2024-07-03 DIAGNOSIS — C79.52 SECONDARY MALIGNANT NEOPLASM OF BONE AND BONE MARROW (HCC): ICD-10-CM

## 2024-07-03 DIAGNOSIS — C79.51 SECONDARY MALIGNANT NEOPLASM OF BONE AND BONE MARROW (HCC): ICD-10-CM

## 2024-07-03 DIAGNOSIS — C50.211 MALIGNANT NEOPLASM OF UPPER-INNER QUADRANT OF RIGHT FEMALE BREAST, UNSPECIFIED ESTROGEN RECEPTOR STATUS (HCC): ICD-10-CM

## 2024-07-03 PROCEDURE — 6360000004 HC RX CONTRAST MEDICATION: Performed by: RADIOLOGY

## 2024-07-03 PROCEDURE — A9577 INJ MULTIHANCE: HCPCS | Performed by: RADIOLOGY

## 2024-07-03 PROCEDURE — 72156 MRI NECK SPINE W/O & W/DYE: CPT

## 2024-07-03 RX ADMIN — GADOBENATE DIMEGLUMINE 16 ML: 529 INJECTION, SOLUTION INTRAVENOUS at 20:58

## 2024-07-12 ENCOUNTER — NURSE ONLY (OUTPATIENT)
Dept: FAMILY MEDICINE CLINIC | Age: 74
End: 2024-07-12
Payer: MEDICARE

## 2024-07-12 ENCOUNTER — OFFICE VISIT (OUTPATIENT)
Dept: FAMILY MEDICINE CLINIC | Age: 74
End: 2024-07-12

## 2024-07-12 VITALS
BODY MASS INDEX: 27.1 KG/M2 | TEMPERATURE: 96.8 F | DIASTOLIC BLOOD PRESSURE: 70 MMHG | RESPIRATION RATE: 18 BRPM | HEART RATE: 69 BPM | OXYGEN SATURATION: 98 % | WEIGHT: 173 LBS | SYSTOLIC BLOOD PRESSURE: 140 MMHG

## 2024-07-12 DIAGNOSIS — I48.91 ATRIAL FIBRILLATION, UNSPECIFIED TYPE (HCC): ICD-10-CM

## 2024-07-12 DIAGNOSIS — L23.9 ALLERGIC DERMATITIS: Primary | ICD-10-CM

## 2024-07-12 LAB
INTERNATIONAL NORMALIZATION RATIO, POC: 2.4
PROTHROMBIN TIME, POC: 28.7

## 2024-07-12 PROCEDURE — 93793 ANTICOAG MGMT PT WARFARIN: CPT | Performed by: STUDENT IN AN ORGANIZED HEALTH CARE EDUCATION/TRAINING PROGRAM

## 2024-07-12 PROCEDURE — 85610 PROTHROMBIN TIME: CPT | Performed by: STUDENT IN AN ORGANIZED HEALTH CARE EDUCATION/TRAINING PROGRAM

## 2024-07-12 RX ORDER — METHYLPREDNISOLONE 4 MG/1
TABLET ORAL
Qty: 1 KIT | Refills: 0 | Status: SHIPPED | OUTPATIENT
Start: 2024-07-12 | End: 2024-07-18

## 2024-07-12 RX ORDER — TRIAMCINOLONE ACETONIDE 40 MG/ML
40 INJECTION, SUSPENSION INTRA-ARTICULAR; INTRAMUSCULAR ONCE
Status: COMPLETED | OUTPATIENT
Start: 2024-07-12 | End: 2024-07-12

## 2024-07-12 RX ADMIN — TRIAMCINOLONE ACETONIDE 40 MG: 40 INJECTION, SUSPENSION INTRA-ARTICULAR; INTRAMUSCULAR at 10:38

## 2024-07-12 NOTE — PROGRESS NOTES
Chief Complaint   Rash      History of Present Illness   Source of history provided by: patient.      Jaimie Bradford is a 73 y.o. old female presenting to the walk in clinic for evaluation of a rash to the bilateral arms, bilateral legs, and back, which pt first noticed a few days ago. Pt reports the rash is typically precipitated by sun exposure and her hormone injections for breast CA. Denies any known cause for the rash including any new soaps, detergents, lotions, foods, or medications. Since onset the symptoms have progressed. Reports associated erythema, mild burning, and pruritis. Denies any bleeding or drainage. Denies any lymphangitic streaking, fever, chills, HA , dyspnea, dysphagia, recent illness, myalgias, vomiting, or lethargy.      ROS    Unless otherwise stated in this report or unable to obtain because of the patient's clinical or mental status as evidenced by the medical record, this patients's positive and negative responses for Review of Systems, constitutional, psych, eyes, ENT, cardiovascular, respiratory, gastrointestinal, neurological, genitourinary, musculoskeletal, integument systems and systems related to the presenting problem are either stated in the preceding or were not pertinent or were negative for the symptoms and/or complaints related to the medical problem.    Past Medical History:  has a past medical history of Bladder prolapse, female, acquired, Bone cancer (HCC), Breast cancer (HCC), Broken rib, Broken toe, Chronic atrial fibrillation (HCC), Fibroadenosis of breast, Headache, History of therapeutic radiation, Hyperlipidemia, and Hypertension.  Past Surgical History:  has a past surgical history that includes Atrial ablation surgery (1999); Colonoscopy; Tonsillectomy; eye surgery; Breast lumpectomy (Right, 04/26/2017); Tunneled venous port placement (05/26/2017); other surgical history; CT BIOPSY DEEP BONE PERCUTANEOUS (04/07/2021); bone biopsy (04/2021); and Endoscopic vein

## 2024-07-12 NOTE — PROGRESS NOTES
Patient here for INR. Taking 3 mg warfarin Sunday, Monday, Wednesday and 6 mg the rest.      INR is 2.4    Patient has itchy rash and will be seen through express [facility] today.

## 2024-07-15 NOTE — PROGRESS NOTES
No change to coumadin dosing.  However given she is on steroids let's recheck the INR Tuesday or Wednesday this week on a nurse visit if she is able

## 2024-07-18 ENCOUNTER — TELEPHONE (OUTPATIENT)
Dept: FAMILY MEDICINE CLINIC | Age: 74
End: 2024-07-18

## 2024-07-18 DIAGNOSIS — G43.009 MIGRAINE WITHOUT AURA, NOT REFRACTORY: ICD-10-CM

## 2024-07-18 RX ORDER — TOPIRAMATE 50 MG/1
50 TABLET, FILM COATED ORAL 2 TIMES DAILY
Qty: 180 TABLET | Refills: 1 | Status: SHIPPED | OUTPATIENT
Start: 2024-07-18

## 2024-07-18 NOTE — TELEPHONE ENCOUNTER
Pt asked about her INR instructions from Monday.   I advised her of the instructions in her chart. I am not sure why she wasn't called.   Instructions were to check her INR Tues or Wed, which have passed.     She states she will be here Monday morning for some scans and asked if she can be checked then. If not, she will come tomorrow.     Please advise.

## 2024-07-18 NOTE — TELEPHONE ENCOUNTER
Pt called in for a refill for topiramate. I informed her a refill was sent to [facility] on 5/31, however the pt had enough to get her to this weekend, and she did not  the script.     She is requesting this be sent to [facility].

## 2024-07-19 ENCOUNTER — NURSE ONLY (OUTPATIENT)
Dept: FAMILY MEDICINE CLINIC | Age: 74
End: 2024-07-19
Payer: MEDICARE

## 2024-07-19 DIAGNOSIS — I48.91 ATRIAL FIBRILLATION, UNSPECIFIED TYPE (HCC): ICD-10-CM

## 2024-07-19 LAB
INTERNATIONAL NORMALIZATION RATIO, POC: 2.3
PROTHROMBIN TIME, POC: 28.1

## 2024-07-19 PROCEDURE — 85610 PROTHROMBIN TIME: CPT | Performed by: STUDENT IN AN ORGANIZED HEALTH CARE EDUCATION/TRAINING PROGRAM

## 2024-07-19 PROCEDURE — 93793 ANTICOAG MGMT PT WARFARIN: CPT | Performed by: STUDENT IN AN ORGANIZED HEALTH CARE EDUCATION/TRAINING PROGRAM

## 2024-07-19 NOTE — PROGRESS NOTES
INR at goal.   Currently she is 3 mg on Sunday Monday Wednesday and 6 mg every other day  Would like her to go to 3 mg MW and 6 mg every other day-she will have the same amount of medication per week but this will be easier to follow.   Looks like she will be here 7/29 to see Vero, lets do another nurse visit INR check then and if in a normal range we can go back to monthly

## 2024-07-26 ENCOUNTER — TELEPHONE (OUTPATIENT)
Dept: BREAST CENTER | Age: 74
End: 2024-07-26

## 2024-07-26 NOTE — TELEPHONE ENCOUNTER
I spoke to Jennie today and she continues close follow up with medical oncology, Dr. Gallego monthly for her metastatic breast cancer.  She had her mammogram and Dexa bone density on 07/22/2024  which were reported as benign.  At this time, we will see her on an as needed basis.  She will call in the event she has any questions or concerns.    Vero Natarajan (Terrie), RN, MSN, APRN-CNP, AOCNP  Advanced Oncology Certified Nurse Practitioner  Department of Breast Surgery  [facility]/  [facility] in collaboration with Dr. Roselyn Coleman/Dr. Humaira Vance/Dr. Jeferson Natarajan APRN-CNP

## 2024-07-30 NOTE — PROGRESS NOTES
Patient was here for her INR 7/19. Nobody called her. She was supposed to come yesterday to see breast care and they canceled her. She's wondering when she needs to see you again and her next INR?

## 2024-08-13 DIAGNOSIS — I48.91 ATRIAL FIBRILLATION, UNSPECIFIED TYPE (HCC): ICD-10-CM

## 2024-08-13 DIAGNOSIS — I10 ESSENTIAL HYPERTENSION: ICD-10-CM

## 2024-08-13 NOTE — TELEPHONE ENCOUNTER
Patient called for a refill on Verapamil 180mg. Please send to [facility] in [place].     Last Appointment:  5/31/2024  Future Appointments   Date Time Provider Department Center   8/21/2024  1:30 PM Galindo Mcmullen DPM [facility]   8/21/2024  2:00 PM SCHEDULE, KRISHNA RANGEL [facility]   9/11/2024  2:30 PM [place], MD AGUSTIN Rowe [facility]   12/17/2024  1:00 PM Fahad Askew DO [facility]    '

## 2024-08-21 ENCOUNTER — LAB (OUTPATIENT)
Dept: FAMILY MEDICINE CLINIC | Age: 74
End: 2024-08-21

## 2024-08-21 ENCOUNTER — PROCEDURE VISIT (OUTPATIENT)
Dept: PODIATRY | Age: 74
End: 2024-08-21
Payer: MEDICARE

## 2024-08-21 ENCOUNTER — ANTI-COAG VISIT (OUTPATIENT)
Dept: FAMILY MEDICINE CLINIC | Age: 74
End: 2024-08-21

## 2024-08-21 VITALS — WEIGHT: 173 LBS | HEIGHT: 67 IN | BODY MASS INDEX: 27.15 KG/M2

## 2024-08-21 DIAGNOSIS — L84 CORNS AND CALLOSITIES: ICD-10-CM

## 2024-08-21 DIAGNOSIS — B35.3 TINEA PEDIS OF BOTH FEET: ICD-10-CM

## 2024-08-21 DIAGNOSIS — I48.91 ATRIAL FIBRILLATION, UNSPECIFIED TYPE (HCC): ICD-10-CM

## 2024-08-21 DIAGNOSIS — B35.1 TINEA UNGUIUM: Primary | ICD-10-CM

## 2024-08-21 DIAGNOSIS — G60.8 HEREDITARY SENSORY NEUROPATHY: ICD-10-CM

## 2024-08-21 DIAGNOSIS — Z79.01 ENCOUNTER FOR CURRENT LONG-TERM USE OF ANTICOAGULANTS: ICD-10-CM

## 2024-08-21 LAB
INTERNATIONAL NORMALIZATION RATIO, POC: 1.8
PROTHROMBIN TIME, POC: 22

## 2024-08-21 PROCEDURE — 11721 DEBRIDE NAIL 6 OR MORE: CPT | Performed by: PODIATRIST

## 2024-08-21 PROCEDURE — 11056 PARNG/CUTG B9 HYPRKR LES 2-4: CPT | Performed by: PODIATRIST

## 2024-08-21 RX ORDER — CLOTRIMAZOLE AND BETAMETHASONE DIPROPIONATE 10; .64 MG/G; MG/G
CREAM TOPICAL
Qty: 45 G | Refills: 1 | Status: SHIPPED | OUTPATIENT
Start: 2024-08-21

## 2024-08-21 NOTE — PROGRESS NOTES
Increase dosing,   Take 3 mg on Tuesdays and Fridays  Take 6 mg all other days   Next check at appt with me

## 2024-08-21 NOTE — PROGRESS NOTES
Patient advised of the new plan:  She is agreeable to this, and will keep her appointment.       Increase dosing,   Take 3 mg on Tuesdays and Fridays  Take 6 mg all other days   Next check at appt with me

## 2024-08-21 NOTE — PROGRESS NOTES
Patient in today for nail care. Patient does not have any complaints of pain at this time. Patient's PCP is Jae Dumont MD and date of last ov 5/31/2024.    Galindo Mcmullen DPM       CC:    Follow-up foot and ankle exam.    HPI:   Follow-up foot ankle exam  Does have history of alpha lipoic acid.  History of anticoagulant therapy with Coumadin.  Callus tissue both feet.  No open wounds.  No additional pedal complaints.      ROS:  Const: Denies constitutional symptoms  Musculo: Denies symptoms other than stated above  Skin: Denies symptoms other than stated above       Current Outpatient Medications:     clotrimazole-betamethasone (LOTRISONE) 1-0.05 % cream, Apply topically 2 times daily., Disp: 45 g, Rfl: 1    verapamil (CALAN SR) 180 MG extended release tablet, Take 1 tablet by mouth daily, Disp: 90 tablet, Rfl: 1    topiramate (TOPAMAX) 50 MG tablet, Take 1 tablet by mouth 2 times daily, Disp: 180 tablet, Rfl: 1    escitalopram (LEXAPRO) 10 MG tablet, Take 1 tablet by mouth every evening, Disp: 90 tablet, Rfl: 1    triamcinolone (KENALOG) 0.1 % cream, Apply topically 2 times daily., Disp: 80 g, Rfl: 5    propafenone (RYTHMOL) 225 MG tablet, take 1 tablet by mouth three times a day, Disp: 270 tablet, Rfl: 1    warfarin (COUMADIN) 6 MG tablet, Indications: monday, friday 3 mg, all other days are 6 mg 1/2 tab on M  F and 1 tab all other days of the week OR AS DIRECTED, Disp: 30 tablet, Rfl: 5    azelastine (ASTELIN) 0.1 % nasal spray, 2 sprays by Nasal route 2 times daily Use in each nostril as directed, Disp: 120 mL, Rfl: 3    olopatadine (PATANOL) 0.1 % ophthalmic solution, Place 1 drop into both eyes in the morning and at bedtime, Disp: 5 mL, Rfl: 1    ammonium lactate (LAC-HYDRIN) 12 % lotion, Apply topically twice daily, Disp: 400 g, Rfl: 2    clotrimazole-betamethasone (LOTRISONE) 1-0.05 % cream, Apply topically 2 times daily., Disp: 45 g, Rfl: 2    palbociclib (IBRANCE) 75 MG capsule, Take 75 mg by

## 2024-09-11 ENCOUNTER — OFFICE VISIT (OUTPATIENT)
Dept: FAMILY MEDICINE CLINIC | Age: 74
End: 2024-09-11

## 2024-09-11 VITALS
TEMPERATURE: 97.2 F | BODY MASS INDEX: 27 KG/M2 | HEIGHT: 67 IN | SYSTOLIC BLOOD PRESSURE: 136 MMHG | HEART RATE: 62 BPM | OXYGEN SATURATION: 96 % | WEIGHT: 172 LBS | DIASTOLIC BLOOD PRESSURE: 60 MMHG

## 2024-09-11 DIAGNOSIS — G43.009 MIGRAINE WITHOUT AURA, NOT REFRACTORY: ICD-10-CM

## 2024-09-11 DIAGNOSIS — I10 ESSENTIAL HYPERTENSION: Primary | ICD-10-CM

## 2024-09-11 DIAGNOSIS — I48.91 ATRIAL FIBRILLATION, UNSPECIFIED TYPE (HCC): ICD-10-CM

## 2024-09-11 LAB
INTERNATIONAL NORMALIZATION RATIO, POC: 2.3
PROTHROMBIN TIME, POC: 27.2

## 2024-09-11 ASSESSMENT — ENCOUNTER SYMPTOMS
SINUS PRESSURE: 0
CHEST TIGHTNESS: 0
COUGH: 0
VOMITING: 0
NAUSEA: 0
SINUS PAIN: 0
RHINORRHEA: 0
ABDOMINAL PAIN: 0
SHORTNESS OF BREATH: 0

## 2024-09-27 ENCOUNTER — OFFICE VISIT (OUTPATIENT)
Dept: FAMILY MEDICINE CLINIC | Age: 74
End: 2024-09-27

## 2024-09-27 VITALS
SYSTOLIC BLOOD PRESSURE: 140 MMHG | HEART RATE: 62 BPM | DIASTOLIC BLOOD PRESSURE: 60 MMHG | TEMPERATURE: 97.3 F | OXYGEN SATURATION: 98 % | BODY MASS INDEX: 26.94 KG/M2 | WEIGHT: 172 LBS

## 2024-09-27 DIAGNOSIS — R82.90 BAD ODOR OF URINE: Primary | ICD-10-CM

## 2024-09-27 DIAGNOSIS — N39.0 URINARY TRACT INFECTION WITHOUT HEMATURIA, SITE UNSPECIFIED: ICD-10-CM

## 2024-09-27 LAB
BILIRUBIN, POC: NORMAL
BLOOD URINE, POC: NORMAL
CLARITY, POC: NORMAL
COLOR, POC: YELLOW
GLUCOSE URINE, POC: NORMAL MG/DL
KETONES, POC: NORMAL MG/DL
LEUKOCYTE EST, POC: NORMAL
NITRITE, POC: NORMAL
PH, POC: 7.5
PROTEIN, POC: NORMAL MG/DL
SPECIFIC GRAVITY, POC: 1.02
UROBILINOGEN, POC: 0.2 MG/DL

## 2024-09-27 RX ORDER — NITROFURANTOIN 25; 75 MG/1; MG/1
100 CAPSULE ORAL 2 TIMES DAILY
Qty: 14 CAPSULE | Refills: 0 | Status: SHIPPED | OUTPATIENT
Start: 2024-09-27 | End: 2024-10-04

## 2024-09-27 ASSESSMENT — ENCOUNTER SYMPTOMS
NAUSEA: 0
DIARRHEA: 0
ABDOMINAL PAIN: 0
VOMITING: 0

## 2024-09-29 LAB
CULTURE: NORMAL
SPECIMEN DESCRIPTION: NORMAL

## 2024-09-30 ENCOUNTER — TELEPHONE (OUTPATIENT)
Dept: FAMILY MEDICINE CLINIC | Age: 74
End: 2024-09-30

## 2024-09-30 NOTE — TELEPHONE ENCOUNTER
Please notify patient that urine culture showing mixed growth of organisms most likely indicating contamination however she did have a positive urine dip and with her symptoms would finish up the antibiotic as long as she is showing some improvement.  Follow-up with PCP.

## 2024-10-01 ENCOUNTER — ANTI-COAG VISIT (OUTPATIENT)
Dept: FAMILY MEDICINE CLINIC | Age: 74
End: 2024-10-01
Payer: MEDICARE

## 2024-10-01 ENCOUNTER — NURSE ONLY (OUTPATIENT)
Dept: FAMILY MEDICINE CLINIC | Age: 74
End: 2024-10-01
Payer: MEDICARE

## 2024-10-01 DIAGNOSIS — I48.91 ATRIAL FIBRILLATION, UNSPECIFIED TYPE (HCC): Primary | ICD-10-CM

## 2024-10-01 DIAGNOSIS — I48.91 ATRIAL FIBRILLATION, UNSPECIFIED TYPE (HCC): ICD-10-CM

## 2024-10-01 LAB
INTERNATIONAL NORMALIZATION RATIO, POC: 2.7
PROTHROMBIN TIME, POC: 32.8

## 2024-10-01 PROCEDURE — 85610 PROTHROMBIN TIME: CPT | Performed by: STUDENT IN AN ORGANIZED HEALTH CARE EDUCATION/TRAINING PROGRAM

## 2024-10-01 PROCEDURE — 93793 ANTICOAG MGMT PT WARFARIN: CPT | Performed by: FAMILY MEDICINE

## 2024-10-01 NOTE — PROGRESS NOTES
10/1/24  INR 2.7  INR goal 2-3 so at goal  Continue:   Take 3 mg on Tuesdays and Fridays  Take 6 mg all other days   Recheck 3-4 weeks

## 2024-10-25 ENCOUNTER — ANTI-COAG VISIT (OUTPATIENT)
Dept: FAMILY MEDICINE CLINIC | Age: 74
End: 2024-10-25

## 2024-10-25 ENCOUNTER — PROCEDURE VISIT (OUTPATIENT)
Dept: PODIATRY | Age: 74
End: 2024-10-25

## 2024-10-25 ENCOUNTER — LAB (OUTPATIENT)
Dept: FAMILY MEDICINE CLINIC | Age: 74
End: 2024-10-25

## 2024-10-25 VITALS — WEIGHT: 172 LBS | BODY MASS INDEX: 27 KG/M2 | HEIGHT: 67 IN

## 2024-10-25 DIAGNOSIS — I48.91 ATRIAL FIBRILLATION, UNSPECIFIED TYPE (HCC): ICD-10-CM

## 2024-10-25 DIAGNOSIS — Z79.01 ENCOUNTER FOR CURRENT LONG-TERM USE OF ANTICOAGULANTS: ICD-10-CM

## 2024-10-25 DIAGNOSIS — L84 CORNS AND CALLOSITIES: ICD-10-CM

## 2024-10-25 DIAGNOSIS — B35.1 TINEA UNGUIUM: Primary | ICD-10-CM

## 2024-10-25 DIAGNOSIS — G60.8 HEREDITARY SENSORY NEUROPATHY: ICD-10-CM

## 2024-10-25 LAB
INTERNATIONAL NORMALIZATION RATIO, POC: 2
PROTHROMBIN TIME, POC: 24.5

## 2024-10-25 NOTE — PROGRESS NOTES
Patient in today for nail care. Patient does not have any complaints of pain at this time. Patient's PCP is Jae Dumont MD and date of last ov 9/11/2024.            Galindo Mcmullen DPM       CC:    Follow-up foot and ankle exam.    HPI:   Follow-up foot ankle exam.  History of Coumadin.  Elongated toenails and callus tissue both feet.  No open wounds no recent injury or trauma.        ROS:  Const: Denies constitutional symptoms  Musculo: Denies symptoms other than stated above  Skin: Denies symptoms other than stated above       Current Outpatient Medications:     clotrimazole-betamethasone (LOTRISONE) 1-0.05 % cream, Apply topically 2 times daily., Disp: 45 g, Rfl: 1    verapamil (CALAN SR) 180 MG extended release tablet, Take 1 tablet by mouth daily, Disp: 90 tablet, Rfl: 1    topiramate (TOPAMAX) 50 MG tablet, Take 1 tablet by mouth 2 times daily, Disp: 180 tablet, Rfl: 1    escitalopram (LEXAPRO) 10 MG tablet, Take 1 tablet by mouth every evening, Disp: 90 tablet, Rfl: 1    triamcinolone (KENALOG) 0.1 % cream, Apply topically 2 times daily., Disp: 80 g, Rfl: 5    propafenone (RYTHMOL) 225 MG tablet, take 1 tablet by mouth three times a day, Disp: 270 tablet, Rfl: 1    warfarin (COUMADIN) 6 MG tablet, Indications: monday, friday 3 mg, all other days are 6 mg 1/2 tab on M  F and 1 tab all other days of the week OR AS DIRECTED, Disp: 30 tablet, Rfl: 5    azelastine (ASTELIN) 0.1 % nasal spray, 2 sprays by Nasal route 2 times daily Use in each nostril as directed, Disp: 120 mL, Rfl: 3    olopatadine (PATANOL) 0.1 % ophthalmic solution, Place 1 drop into both eyes in the morning and at bedtime, Disp: 5 mL, Rfl: 1    ammonium lactate (LAC-HYDRIN) 12 % lotion, Apply topically twice daily, Disp: 400 g, Rfl: 2    clotrimazole-betamethasone (LOTRISONE) 1-0.05 % cream, Apply topically 2 times daily., Disp: 45 g, Rfl: 2    palbociclib (IBRANCE) 75 MG capsule, Take 75 mg by mouth Takes 3 weeks out of the month then

## 2024-10-25 NOTE — PROGRESS NOTES
Left message , for patient. She asked I call daughter, if it went to [facility], she was afraid dog may have eaten phone while she was gone.

## 2024-10-25 NOTE — PROGRESS NOTES
INR at goal. No change at this time  Recheck in 4 weeks     Left message on patients machine, then as requested, called Daughters phone spoke with daughter verbal understanding demonstrated.

## 2024-11-12 ENCOUNTER — OFFICE VISIT (OUTPATIENT)
Dept: FAMILY MEDICINE CLINIC | Age: 74
End: 2024-11-12

## 2024-11-12 VITALS
SYSTOLIC BLOOD PRESSURE: 138 MMHG | DIASTOLIC BLOOD PRESSURE: 62 MMHG | HEIGHT: 67 IN | BODY MASS INDEX: 27.47 KG/M2 | TEMPERATURE: 97.4 F | WEIGHT: 175 LBS | HEART RATE: 60 BPM | OXYGEN SATURATION: 97 %

## 2024-11-12 DIAGNOSIS — B96.89 ACUTE BACTERIAL SINUSITIS: Primary | ICD-10-CM

## 2024-11-12 DIAGNOSIS — J01.90 ACUTE BACTERIAL SINUSITIS: Primary | ICD-10-CM

## 2024-11-12 DIAGNOSIS — F06.4 ANXIETY DISORDER DUE TO GENERAL MEDICAL CONDITION: ICD-10-CM

## 2024-11-12 DIAGNOSIS — I48.91 ATRIAL FIBRILLATION, UNSPECIFIED TYPE (HCC): ICD-10-CM

## 2024-11-12 LAB
INTERNATIONAL NORMALIZATION RATIO, POC: 1.9
PROTHROMBIN TIME, POC: 22.6

## 2024-11-12 RX ORDER — PROPAFENONE HYDROCHLORIDE 225 MG/1
225 TABLET, FILM COATED ORAL 3 TIMES DAILY
Qty: 270 TABLET | Refills: 1 | Status: SHIPPED | OUTPATIENT
Start: 2024-11-12

## 2024-11-12 RX ORDER — AMOXICILLIN 500 MG/1
500 CAPSULE ORAL 2 TIMES DAILY
Qty: 14 CAPSULE | Refills: 0 | Status: SHIPPED | OUTPATIENT
Start: 2024-11-12 | End: 2024-11-19

## 2024-11-12 RX ORDER — ESCITALOPRAM OXALATE 10 MG/1
10 TABLET ORAL EVERY EVENING
Qty: 90 TABLET | Refills: 1 | Status: SHIPPED | OUTPATIENT
Start: 2024-11-12

## 2024-11-12 RX ORDER — WARFARIN SODIUM 6 MG/1
TABLET ORAL
Qty: 30 TABLET | Refills: 5 | Status: SHIPPED | OUTPATIENT
Start: 2024-11-12

## 2024-11-12 ASSESSMENT — ENCOUNTER SYMPTOMS
SINUS PAIN: 1
RHINORRHEA: 0
SHORTNESS OF BREATH: 0
ABDOMINAL PAIN: 0
NAUSEA: 0
SORE THROAT: 1
SINUS PRESSURE: 0
VOMITING: 0
CHEST TIGHTNESS: 0
COUGH: 0

## 2024-11-12 NOTE — PROGRESS NOTES
[facility]  Office Progress Note  Dr. Jea Dumont      Patient:  Jaimie Bradford 74 y.o. female     Date of Service: 11/12/24      Chief complaint:   Chief Complaint   Patient presents with    Office Visit for Anticoagulation Management     1.9 today         History of Present Illness   The patient is a 74 y.o. female  here to follow up of their URI      A fib  On warfarin, also takes verapamil and propafenone. She has been on propafenone for years and is rx by PCP. Hx of ablation. Rate and rhythm controlled today.  INR at goal today-2.3 No bleeding issues       Hx of metastatic breast cancer to bone. Currently in remission. She gets maintenance therapy. She is on faslodex, xgeva and ibrance. She gets some itching she believes is from these medications. Typically topical steroids help     She takes topamax for a migraine history-it works very well.    She follows with Dr. Gallego at the [facility] as well as [facility] [place].       Summary:  Stage IV carcinoma of the right breast: ER/KS positive HER-2/marcos negative disease.  -Adjuvant chemotherapy per Dr. Gallego with AC-T.completed on 11/09/2017.    -Post-operative RT:  Radiation completed 01/29/2018.   -ET:  Arimidex Feb 2018         She is on lexapro for an anxiety/depression that was brought on by some family trauma. Works well without side effects    She is following with dermatology-recently dx with leukocytoclastic vasculitis. She is going to be seeing vascular surgery next week. Has not had follow up with dermatology yet.    Sinus issues/drainage. Going on for about 2 weeks. Congestion. Post nasal drip. Pain under eyes. No fever. No breathing issues. Does not tolerate oral antihistamines.        Past Medical History:      Diagnosis Date    Bladder prolapse, female, acquired     Bone cancer (HCC) 02/2021    Breast cancer (HCC) 04/2017    Broken rib     left    Broken toe     right    Chronic atrial fibrillation (HCC)

## 2024-12-17 ENCOUNTER — PROCEDURE VISIT (OUTPATIENT)
Dept: AUDIOLOGY | Age: 74
End: 2024-12-17
Payer: MEDICARE

## 2024-12-17 ENCOUNTER — OFFICE VISIT (OUTPATIENT)
Dept: ENT CLINIC | Age: 74
End: 2024-12-17
Payer: MEDICARE

## 2024-12-17 VITALS
SYSTOLIC BLOOD PRESSURE: 139 MMHG | RESPIRATION RATE: 18 BRPM | WEIGHT: 175 LBS | BODY MASS INDEX: 27.47 KG/M2 | TEMPERATURE: 97.6 F | HEIGHT: 67 IN | OXYGEN SATURATION: 98 % | DIASTOLIC BLOOD PRESSURE: 68 MMHG | HEART RATE: 63 BPM

## 2024-12-17 DIAGNOSIS — H90.3 SENSORINEURAL HEARING LOSS (SNHL) OF BOTH EARS: ICD-10-CM

## 2024-12-17 DIAGNOSIS — H90.3 SENSORINEURAL HEARING LOSS, BILATERAL: Primary | ICD-10-CM

## 2024-12-17 DIAGNOSIS — J30.9 ALLERGIC RHINITIS, UNSPECIFIED SEASONALITY, UNSPECIFIED TRIGGER: Primary | ICD-10-CM

## 2024-12-17 DIAGNOSIS — I48.91 ATRIAL FIBRILLATION, UNSPECIFIED TYPE (HCC): ICD-10-CM

## 2024-12-17 DIAGNOSIS — J30.89 NON-SEASONAL ALLERGIC RHINITIS, UNSPECIFIED TRIGGER: ICD-10-CM

## 2024-12-17 PROCEDURE — 3075F SYST BP GE 130 - 139MM HG: CPT | Performed by: OTOLARYNGOLOGY

## 2024-12-17 PROCEDURE — 1123F ACP DISCUSS/DSCN MKR DOCD: CPT | Performed by: OTOLARYNGOLOGY

## 2024-12-17 PROCEDURE — 92567 TYMPANOMETRY: CPT

## 2024-12-17 PROCEDURE — 3078F DIAST BP <80 MM HG: CPT | Performed by: OTOLARYNGOLOGY

## 2024-12-17 PROCEDURE — 92557 COMPREHENSIVE HEARING TEST: CPT

## 2024-12-17 PROCEDURE — 99213 OFFICE O/P EST LOW 20 MIN: CPT | Performed by: OTOLARYNGOLOGY

## 2024-12-17 PROCEDURE — 69210 REMOVE IMPACTED EAR WAX UNI: CPT

## 2024-12-17 RX ORDER — AZELASTINE 1 MG/ML
2 SPRAY, METERED NASAL 2 TIMES DAILY
Qty: 120 ML | Refills: 3 | Status: SHIPPED | OUTPATIENT
Start: 2024-12-17

## 2024-12-17 RX ORDER — WARFARIN SODIUM 6 MG/1
TABLET ORAL
Qty: 30 TABLET | Refills: 5 | Status: SHIPPED | OUTPATIENT
Start: 2024-12-17

## 2024-12-17 NOTE — TELEPHONE ENCOUNTER
Last Appointment:  11/12/2024    Future appts:  Future Appointments   Date Time Provider Department Center   12/17/2024  1:00 PM Fahad Askew DO [facility]   12/17/2024  1:15 PM SCHEDULE, KRISHNA SANON AUDIO BDM Audio [facility]   12/31/2024 11:00 AM Galindo Mcmullen DPM [facility] Pod[facility]   1/14/2025 10:30 AM Jae Dumont MD [facility] E[facility] DEP       Subjective   Patient ID: Chauncey is a 60 year old male.    Patient Care Team:  Jaleel Smith MD as PCP - General (Family Practice)  Phuc Auguste MD (Neurology)    Current listed medical conditions:  Patient Active Problem List   Diagnosis   • Arthritis   • Back pain   • Hyperlipidemia   • Essential hypertension   • Hypertriglyceridemia   • Muscle spasm   • Idiopathic peripheral neuropathy   • IGT (impaired glucose tolerance)     Chief Complaint   Patient presents with   • Annual Exam   • Imm/Inj     HPI    Patient here today for annual exam  No LMP for male patient.    Relationships: , 1 son in [place]  Occupation: [employer]  Stress: manageable  Sleep: sleeps well  Diet: trying to eat healthier  Exercise: minimal    Colonoscopy done 5 years ago, not due until 10 years from last. Normal findings.    Of note seeing neurology for gradual progression of BLE neuropathy, was seen last year for the issue.    Recent PHQ 2/9 Score    PHQ 2:  Date Adult PHQ 2 Score Adult PHQ 2 Interpretation   9/24/2021 0 No further screening needed       PHQ 9:  Date Adult PHQ 9 Score Adult PHQ 9 Interpretation   9/24/2021 1 Minimal Depression     Last four GAD7 Assessments     No flowsheet data found.    Current Outpatient Medications   Medication Sig Dispense Refill   • metFORMIN (GLUCOPHAGE) 500 MG tablet TAKE 1 TABLET BY MOUTH TWICE A DAY WITH MEALS 180 tablet 1   • Testosterone 20 % Cream 50 mg every morning. 1 Bottle 1   • amLODIPine (NORVASC) 5 MG tablet Take 1 tablet by mouth daily. 90 tablet 3     No current facility-administered medications for this visit.       Patient's medications, allergies, past medical, surgical, social and family histories were reviewed and updated as appropriate.    History reviewed. No pertinent past medical history.  History reviewed. No pertinent surgical history.  Social History     Socioeconomic History   • Marital status: /Civil Union     Spouse name: Not on file   •  Number of children: Not on file   • Years of education: Not on file   • Highest education level: Not on file   Occupational History   • Not on file   Tobacco Use   • Smoking status: Current Some Day Smoker     Types: Cigars   • Smokeless tobacco: Never Used   • Tobacco comment: once a week   Substance and Sexual Activity   • Alcohol use: Yes   • Drug use: Yes     Types: Marijuana   • Sexual activity: Yes   Other Topics Concern   • Not on file   Social History Narrative    PMHx from Previous PCP Dr. Herbert            Arthritis (716.90) (M19.90)    Cortisol deficiency (255.41) (E27.49)    CRP elevated (790.95) (R79.82)    Erectile dysfunction (607.84) (N52.9)    Excess or deficiency of vitamin D (268.9)    Fatigue (780.79) (R53.83)    Hyperlipidemia (272.4) (E78.5)    Hypertriglyceridemia (272.1) (E78.1)    Hypomagnesemia (275.2) (E83.42)    Low vitamin B12 level (266.2) (E53.8)    Muscle spasm (728.85) (M62.838)    Neck pain (723.1) (M54.2)    On statin therapy (V58.69) (Z79.899)    PSA elevation (790.93) (R97.20)    Sore throat (462) (J02.9)         Past Medical History         back comes and goes flexeril ok after work    1x month flare with ladder move and take few days to flare down    if feels out of alignment...rolls and pops into place    used to be low back but now upper        Seeing dr doty since 2018, chiro?    Stim    exericse        Changed lunch        Neuropathy insulin resistance as of               Family History    dad stroke and heart  dec 2016    mom heart    wife irvin    walk dog few times week about 1 mile rescue dog few times week    sibs on ly child (adopeted sis colon ca) adopted bro ok    kids.          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    on feet all day        Sleep 830    Up 430        Vitamins    As of 7 20    b12    Vit d    centrum        Allergies    Singulair TABS     Social Determinants of Health     Financial Resource Strain:    • Social Determinants: Financial Resource Strain:    Food Insecurity:    • Social Determinants: Food Insecurity:    Transportation Needs:    • Lack of Transportation (Medical):    • Lack of Transportation (Non-Medical):    Physical Activity: Insufficiently Active   • Days of Exercise per Week: 1 day   • Minutes of Exercise per Session: 50 min   Stress:    • Social Determinants: Stress:    Social Connections:    • Social Determinants: Social Connections:    Intimate Partner Violence:    • Social Determinants: Intimate Partner Violence Past Fear:    • Social Determinants: Intimate Partner Violence Current Fear:      Family History   Problem Relation Age of Onset   • Diabetes Mother    • Hyperlipidemia Mother    • Hearing Loss Mother    • Hearing Loss Father    • Cancer Father         skin cancer on scalp   • Hypertension Father    • Diabetes Maternal Grandfather          Health Maintenance Summary     Colorectal Cancer Screen- (Colonoscopy - Every 10 Years)  Overdue - never done    Depression Screening (Yearly)  Next due on 9/24/2022    Pneumococcal Vaccine 0-64 (2 of 2 - PPSV23)  Next due on 7/9/2027    DTaP/Tdap/Td Vaccine (2 - Td or Tdap)  Next due on 12/22/2030    Hepatitis C Screening   Completed    COVID-19 Vaccine   Completed    Influenza Vaccine   Completed    Shingles Vaccine   Completed    Hepatitis B Vaccine   Aged Out    Meningococcal Vaccine   Aged Out    HPV Vaccine   Aged Out          Review of Systems   Constitutional: Negative. Negative for  malaise/fatigue.   HENT: Negative.    Cardiovascular: Negative for chest pain and claudication.   Respiratory: Negative.  Negative for cough.    Endocrine: Negative.    Hematologic/Lymphatic: Negative.    Musculoskeletal: Negative.    Gastrointestinal: Negative.    Genitourinary: Negative.    Psychiatric/Behavioral: Negative.        Physical Exam  Vitals reviewed.   Constitutional:       General: He is not in acute distress.     Appearance: Normal appearance. He is not ill-appearing or diaphoretic.   HENT:      Head: Normocephalic and atraumatic.      Right Ear: Tympanic membrane, ear canal and external ear normal.      Left Ear: Tympanic membrane, ear canal and external ear normal.      Nose: Nose normal. No congestion or rhinorrhea.      Mouth/Throat:      Mouth: Mucous membranes are moist.      Pharynx: No oropharyngeal exudate or posterior oropharyngeal erythema.   Eyes:      Conjunctiva/sclera: Conjunctivae normal.      Pupils: Pupils are equal, round, and reactive to light.   Cardiovascular:      Rate and Rhythm: Normal rate and regular rhythm.      Pulses: Normal pulses.      Heart sounds: Normal heart sounds. No murmur heard.     Pulmonary:      Effort: Pulmonary effort is normal. No respiratory distress.      Breath sounds: Normal breath sounds. No wheezing.   Abdominal:      General: Abdomen is flat. Bowel sounds are normal. There is no distension.      Palpations: Abdomen is soft. There is no mass.      Tenderness: There is no abdominal tenderness. There is no guarding or rebound.      Hernia: No hernia is present.   Musculoskeletal:         General: No swelling or tenderness. Normal range of motion.      Cervical back: Normal range of motion.   Lymphadenopathy:      Cervical: No cervical adenopathy.   Skin:     General: Skin is warm.      Findings: No bruising, erythema, lesion or rash.   Neurological:      General: No focal deficit present.      Mental Status: He is alert and oriented to person, place,  and time.      Cranial Nerves: No cranial nerve deficit.      Sensory: No sensory deficit.      Motor: No weakness.      Coordination: Coordination normal.      Gait: Gait normal.      Deep Tendon Reflexes: Reflexes normal.   Psychiatric:         Mood and Affect: Mood normal.         Thought Content: Thought content normal.          Vitals:    09/24/21 0802   BP: (!) 142/86   Pulse: 79   Resp: 16   Temp: 97.3 °F (36.3 °C)   TempSrc: Temporal   SpO2: 99%   Weight: 110.2 kg (243 lb)   Height: 6' 4\" (1.93 m)   PainSc: 5    PainLoc: Back         Assessment   Chauncey was seen today for annual exam and imm/inj.  Diagnoses and all orders for this visit:    Encounter for general adult medical examination with abnormal findings  -     LIPID PANEL WITH REFLEX  -     GLYCOHEMOGLOBIN  -     COMPREHENSIVE METABOLIC PANEL  -     MICROALBUMIN URINE RANDOM  -     CBC WITH DIFFERENTIAL    Hyperlipidemia, unspecified hyperlipidemia type  -     LIPID PANEL WITH REFLEX    Hypertriglyceridemia  -     LIPID PANEL WITH REFLEX    Essential hypertension  Not at goal, increase to 5mg daily  -     LIPID PANEL WITH REFLEX  -     GLYCOHEMOGLOBIN  -     COMPREHENSIVE METABOLIC PANEL  -     MICROALBUMIN URINE RANDOM  -     amLODIPine (NORVASC) 5 MG tablet; Take 1 tablet by mouth daily.    IGT (impaired glucose tolerance)  Can continue metformin as previously prescribed.  -     LIPID PANEL WITH REFLEX  -     GLYCOHEMOGLOBIN  -     COMPREHENSIVE METABOLIC PANEL  -     MICROALBUMIN URINE RANDOM    Flu vaccine need  -     INFLUENZA QUADRIVALENT SPLIT PRES FREE 0.5 ML VACC, IM (FLULAVAL,FLUARIX,FLUZONE)    Need for vaccination  -     ZOSTER SHINGLES RECOMBINANT ADJUVANTED IM(SHINGRIX)    Low testosterone  Encounter for monitoring testosterone replacement therapy  He still has a supply of testosterone at home, is on lower dose from original dose prescribed by previous PCP.  -     PSA  -     TESTOSTERONE, TOTAL, FREE AND BIOAVAILABLE, MALE  -     CBC WITH  DIFFERENTIAL  -     Lipid panel    Idiopathic peripheral neuropathy  Seeing neurology    F/u 6 months    [This note utilized Dragon technology. Transcription errors/typos may have occurred and not corrected prior to electronically signing the note. Please consult the clinician for interpretation if needed or allow for contextual interpretation]    Jaleel Smith MD  9/24/2021 1:09 PM

## 2024-12-17 NOTE — PROGRESS NOTES
Subjective:      Patient ID:  Jaimie Bradford is a 74 y.o. female.    HPI:    Jaimie Bradford presents for 1 year audiogram, cerumen, and allergies. Patient denied hearing changes since previous encounter. Patient allergies have been stable, continues to use Astelin. No OTC secondary to heart condition. Kenalog shot December 1 secondary to leg itching vasccular.     Past Medical History:   Diagnosis Date    Bladder prolapse, female, acquired     Bone cancer (HCC) 02/2021    Breast cancer (HCC) 04/2017    Broken rib     left    Broken toe     right    Chronic atrial fibrillation (HCC)     Fibroadenosis of breast     Headache     History of therapeutic radiation     Hyperlipidemia     Hypertension      Past Surgical History:   Procedure Laterality Date    [facility]GERY  1999    [facility]    BONE BIOPSY  04/2021    BREAST LUMPECTOMY Right 04/26/2017    COLONOSCOPY      CT BIOPSY DEEP BONE PERCUTANEOUS  04/07/2021    CT BIOPSY PERCUTANEOUS DEEP BONE 4/7/2021 Ashkan Avelar II, MD [facility]    ENDOSCOPIC VEIN LASER TREATMENT      10/23    EYE SURGERY      SUDHIR LENSE IMPLANTS    OTHER SURGICAL HISTORY      port removal    TONSILLECTOMY      TUNNELED VENOUS PORT PLACEMENT  05/26/2017    Dr. Rucker     Family History   Problem Relation Age of Onset    Cancer Maternal Cousin 81        lung    Heart Disease Mother     Other Mother         Ages [place] Palsy    Other Father         gall bladder problem, hip fx; AAA rupture    Coronary Art Dis Father         MI     Social History     Socioeconomic History    Marital status:      Spouse name: Seymour    Number of children: None    Years of education: None    Highest education level: None   Occupational History    Occupation: retired      Comment: Retired 4 th    Tobacco Use    Smoking status: Never    Smokeless tobacco: Never   Substance and Sexual Activity    Alcohol use: No     Comment: occasionally pop    Drug use: No    Sexual activity: Not Currently

## 2024-12-18 NOTE — PROGRESS NOTES
This patient was referred for audiometric and tympanometric testing by Dr. Askew due to hearing loss.     Audiometry using pure tone air and bone conduction testing revealed hearing sensitivity within normal limits through 500 Hz, sloping to severe sensorineural hearing loss, bilaterally. Reliability was good. Speech reception thresholds were in good agreement with the pure tone averages, bilaterally. Speech discrimination scores were fair (76%), in the right ear and normal middle ear pressure and compliance, in the left ear.    Tympanometry revealed normal middle ear peak pressure and compliance, bilaterally.    The results were reviewed with the patient and ordering provider.     Recommendations for follow up will be made pending ordering provider consult.    Patient reported left hearing aid was not working. Put in  and patient's left hearing aid battery needed changed. Changed battery and put new serial number in patient's hearing aid for battery. Patient satisfied. Under warranty, no charge for battery.    Chin Yen/CCC-A  [place] Lic [id number]  Electronically signed by Chin Yen on 12/18/2024 at 12:27 PM

## 2024-12-19 ENCOUNTER — OFFICE VISIT (OUTPATIENT)
Dept: FAMILY MEDICINE CLINIC | Age: 74
End: 2024-12-19

## 2024-12-19 VITALS
OXYGEN SATURATION: 98 % | TEMPERATURE: 97.6 F | HEIGHT: 67 IN | BODY MASS INDEX: 27.47 KG/M2 | SYSTOLIC BLOOD PRESSURE: 176 MMHG | DIASTOLIC BLOOD PRESSURE: 60 MMHG | WEIGHT: 175 LBS | HEART RATE: 63 BPM

## 2024-12-19 DIAGNOSIS — J02.9 ACUTE PHARYNGITIS, UNSPECIFIED ETIOLOGY: Primary | ICD-10-CM

## 2024-12-19 LAB — S PYO AG THROAT QL: NORMAL

## 2024-12-19 RX ORDER — AMOXICILLIN 500 MG/1
500 CAPSULE ORAL 2 TIMES DAILY
Qty: 20 CAPSULE | Refills: 0 | Status: SHIPPED | OUTPATIENT
Start: 2024-12-19 | End: 2024-12-29

## 2024-12-19 NOTE — PROGRESS NOTES
Chief Complaint       Pharyngitis (X2 days) and Ear Pain      History of Present Illness   Source of history provided by: patient.     Jaimie Bradford is a 74 y.o. old female presenting to the walk in clinic for evaluation of sore throat x 2 days. Reports pain is radiating into her left ear. Denies any fever, chills, loss of taste/smell, dyspnea, dysphagia, CP, SOB, cough, nausea, vomiting, rash, or lethargy. Denies any known strep exposures.  Of note, patient is chronically anticoagulated on warfarin.    ROS    Unless otherwise stated in this report or unable to obtain because of the patient's clinical or mental status as evidenced by the medical record, this patients's positive and negative responses for Review of Systems, constitutional, psych, eyes, ENT, cardiovascular, respiratory, gastrointestinal, neurological, genitourinary, musculoskeletal, integument systems and systems related to the presenting problem are either stated in the preceding or were not pertinent or were negative for the symptoms and/or complaints related to the medical problem.    Past Medical History:  has a past medical history of Bladder prolapse, female, acquired, Bone cancer (HCC), Breast cancer (HCC), Broken rib, Broken toe, Chronic atrial fibrillation (HCC), Fibroadenosis of breast, Headache, History of therapeutic radiation, Hyperlipidemia, and Hypertension.  Past Surgical History:  has a past surgical history that includes Atrial ablation surgery (1999); Colonoscopy; Tonsillectomy; eye surgery; Breast lumpectomy (Right, 04/26/2017); Tunneled venous port placement (05/26/2017); other surgical history; CT BIOPSY DEEP BONE PERCUTANEOUS (04/07/2021); bone biopsy (04/2021); and Endoscopic vein laser treatment.  Social History:  reports that she has never smoked. She has never used smokeless tobacco. She reports that she does not drink alcohol and does not use drugs.  Family History: family history includes Cancer (age of onset: 81) in

## 2024-12-24 DIAGNOSIS — F06.4 ANXIETY DISORDER DUE TO GENERAL MEDICAL CONDITION: ICD-10-CM

## 2024-12-24 RX ORDER — ESCITALOPRAM OXALATE 10 MG/1
10 TABLET ORAL EVERY EVENING
Qty: 90 TABLET | Refills: 1 | OUTPATIENT
Start: 2024-12-24

## 2024-12-31 ENCOUNTER — PROCEDURE VISIT (OUTPATIENT)
Dept: PODIATRY | Age: 74
End: 2024-12-31
Payer: MEDICARE

## 2024-12-31 VITALS — TEMPERATURE: 97.7 F | OXYGEN SATURATION: 97 % | BODY MASS INDEX: 27.41 KG/M2 | HEART RATE: 66 BPM | WEIGHT: 175 LBS

## 2024-12-31 DIAGNOSIS — L84 CORNS AND CALLOSITIES: ICD-10-CM

## 2024-12-31 DIAGNOSIS — Z79.01 ENCOUNTER FOR CURRENT LONG-TERM USE OF ANTICOAGULANTS: ICD-10-CM

## 2024-12-31 DIAGNOSIS — B35.1 TINEA UNGUIUM: Primary | ICD-10-CM

## 2024-12-31 DIAGNOSIS — I10 ESSENTIAL HYPERTENSION: ICD-10-CM

## 2024-12-31 DIAGNOSIS — I48.91 ATRIAL FIBRILLATION, UNSPECIFIED TYPE (HCC): ICD-10-CM

## 2024-12-31 DIAGNOSIS — G60.8 HEREDITARY SENSORY NEUROPATHY: ICD-10-CM

## 2024-12-31 PROCEDURE — 11721 DEBRIDE NAIL 6 OR MORE: CPT | Performed by: PODIATRIST

## 2024-12-31 PROCEDURE — 11056 PARNG/CUTG B9 HYPRKR LES 2-4: CPT | Performed by: PODIATRIST

## 2024-12-31 RX ORDER — VERAPAMIL HYDROCHLORIDE 180 MG/1
180 TABLET, EXTENDED RELEASE ORAL DAILY
Qty: 90 TABLET | Refills: 1 | Status: SHIPPED | OUTPATIENT
Start: 2024-12-31

## 2024-12-31 NOTE — PROGRESS NOTES
Patient here for nail care. Patient has no new concerns at this time.   Jae Dumont MD   Electronically signed by Yi Artis LPN on 12/31/2024 at 11:14 AM      CC:    Follow-up foot and ankle exam.    HPI:   Follow-up foot and ankle exam.  History Coumadin.  No open wounds.  No recent injury or trauma.    ROS:  Const: Denies constitutional symptoms  Musculo: Denies symptoms other than stated above  Skin: Denies symptoms other than stated above       Current Outpatient Medications:     verapamil (CALAN SR) 180 MG extended release tablet, TAKE 1 TABLET BY MOUTH DAILY, Disp: 90 tablet, Rfl: 1    warfarin (COUMADIN) 6 MG tablet, TAKE 1/2 TABLET BY MOUTH ON MONDAY AND FRIDAY AND 1 TABLET ON ALL OTHER DAYS OR AS DIRECTED, Disp: 30 tablet, Rfl: 5    azelastine (ASTELIN) 0.1 % nasal spray, 2 sprays by Nasal route 2 times daily Use in each nostril as directed, Disp: 120 mL, Rfl: 3    escitalopram (LEXAPRO) 10 MG tablet, Take 1 tablet by mouth every evening, Disp: 90 tablet, Rfl: 1    propafenone (RYTHMOL) 225 MG tablet, Take 1 tablet by mouth 3 times daily, Disp: 270 tablet, Rfl: 1    clotrimazole-betamethasone (LOTRISONE) 1-0.05 % cream, Apply topically 2 times daily., Disp: 45 g, Rfl: 1    topiramate (TOPAMAX) 50 MG tablet, Take 1 tablet by mouth 2 times daily, Disp: 180 tablet, Rfl: 1    triamcinolone (KENALOG) 0.1 % cream, Apply topically 2 times daily., Disp: 80 g, Rfl: 5    azelastine (ASTELIN) 0.1 % nasal spray, 2 sprays by Nasal route 2 times daily Use in each nostril as directed, Disp: 120 mL, Rfl: 3    olopatadine (PATANOL) 0.1 % ophthalmic solution, Place 1 drop into both eyes in the morning and at bedtime, Disp: 5 mL, Rfl: 1    ammonium lactate (LAC-HYDRIN) 12 % lotion, Apply topically twice daily, Disp: 400 g, Rfl: 2    clotrimazole-betamethasone (LOTRISONE) 1-0.05 % cream, Apply topically 2 times daily., Disp: 45 g, Rfl: 2    palbociclib (IBRANCE) 75 MG capsule, Take 75 mg by mouth Takes 3 weeks out

## 2024-12-31 NOTE — TELEPHONE ENCOUNTER
Name of Medication(s) Requested:  Requested Prescriptions     Pending Prescriptions Disp Refills    verapamil (CALAN SR) 180 MG extended release tablet [Pharmacy Med Name: VERAPAMIL ER 180MG TABLETS] 90 tablet 1     Sig: TAKE 1 TABLET BY MOUTH DAILY       Medication is on current medication list Yes    Dosage and directions were verified? Yes    Quantity verified: 90 day supply     Pharmacy Verified?  Yes    Last Appointment:  11/12/2024    Future appts:  Future Appointments   Date Time Provider Department Center   12/31/2024 11:00 AM Galindo Mcmullen DPM [facility] Pod[facility]   1/14/2025 10:30 AM Jae Dumont MD [facility] E[facility] DEP   1/6/2026  1:30 PM Fahad Askew DO [facility] [facility]        (If no appt send self scheduling link. .REFILLAPPT)  Scheduling request sent?     [] Yes  [x] No    Does patient need updated?  [] Yes  [x] No

## 2025-01-11 SDOH — ECONOMIC STABILITY: FOOD INSECURITY: WITHIN THE PAST 12 MONTHS, THE FOOD YOU BOUGHT JUST DIDN'T LAST AND YOU DIDN'T HAVE MONEY TO GET MORE.: NEVER TRUE

## 2025-01-11 SDOH — ECONOMIC STABILITY: FOOD INSECURITY: WITHIN THE PAST 12 MONTHS, YOU WORRIED THAT YOUR FOOD WOULD RUN OUT BEFORE YOU GOT MONEY TO BUY MORE.: NEVER TRUE

## 2025-01-11 SDOH — ECONOMIC STABILITY: INCOME INSECURITY: IN THE LAST 12 MONTHS, WAS THERE A TIME WHEN YOU WERE NOT ABLE TO PAY THE MORTGAGE OR RENT ON TIME?: NO

## 2025-01-11 SDOH — ECONOMIC STABILITY: TRANSPORTATION INSECURITY
IN THE PAST 12 MONTHS, HAS THE LACK OF TRANSPORTATION KEPT YOU FROM MEDICAL APPOINTMENTS OR FROM GETTING MEDICATIONS?: NO

## 2025-01-11 ASSESSMENT — PATIENT HEALTH QUESTIONNAIRE - PHQ9
SUM OF ALL RESPONSES TO PHQ QUESTIONS 1-9: 0
1. LITTLE INTEREST OR PLEASURE IN DOING THINGS: NOT AT ALL
SUM OF ALL RESPONSES TO PHQ QUESTIONS 1-9: 0
2. FEELING DOWN, DEPRESSED OR HOPELESS: NOT AT ALL
SUM OF ALL RESPONSES TO PHQ QUESTIONS 1-9: 0
SUM OF ALL RESPONSES TO PHQ9 QUESTIONS 1 & 2: 0
SUM OF ALL RESPONSES TO PHQ9 QUESTIONS 1 & 2: 0
SUM OF ALL RESPONSES TO PHQ QUESTIONS 1-9: 0
1. LITTLE INTEREST OR PLEASURE IN DOING THINGS: NOT AT ALL
2. FEELING DOWN, DEPRESSED OR HOPELESS: NOT AT ALL

## 2025-01-14 ENCOUNTER — OFFICE VISIT (OUTPATIENT)
Dept: FAMILY MEDICINE CLINIC | Age: 75
End: 2025-01-14

## 2025-01-14 VITALS
BODY MASS INDEX: 27.15 KG/M2 | DIASTOLIC BLOOD PRESSURE: 70 MMHG | SYSTOLIC BLOOD PRESSURE: 132 MMHG | HEIGHT: 67 IN | WEIGHT: 173 LBS | OXYGEN SATURATION: 98 % | TEMPERATURE: 97.1 F | HEART RATE: 68 BPM

## 2025-01-14 DIAGNOSIS — G43.009 MIGRAINE WITHOUT AURA, NOT REFRACTORY: ICD-10-CM

## 2025-01-14 DIAGNOSIS — I48.91 ATRIAL FIBRILLATION, UNSPECIFIED TYPE (HCC): ICD-10-CM

## 2025-01-14 DIAGNOSIS — Z00.00 MEDICARE ANNUAL WELLNESS VISIT, SUBSEQUENT: Primary | ICD-10-CM

## 2025-01-14 LAB
INTERNATIONAL NORMALIZATION RATIO, POC: 2.5
PROTHROMBIN TIME, POC: 30.5

## 2025-01-14 RX ORDER — TOPIRAMATE 50 MG/1
50 TABLET, FILM COATED ORAL 2 TIMES DAILY
Qty: 180 TABLET | Refills: 1 | Status: SHIPPED | OUTPATIENT
Start: 2025-01-14

## 2025-01-14 SDOH — HEALTH STABILITY: PHYSICAL HEALTH: ON AVERAGE, HOW MANY MINUTES DO YOU ENGAGE IN EXERCISE AT THIS LEVEL?: 40 MIN

## 2025-01-14 SDOH — HEALTH STABILITY: PHYSICAL HEALTH: ON AVERAGE, HOW MANY DAYS PER WEEK DO YOU ENGAGE IN MODERATE TO STRENUOUS EXERCISE (LIKE A BRISK WALK)?: 4 DAYS

## 2025-01-14 ASSESSMENT — LIFESTYLE VARIABLES
HOW MANY STANDARD DRINKS CONTAINING ALCOHOL DO YOU HAVE ON A TYPICAL DAY: PATIENT DOES NOT DRINK
HOW OFTEN DO YOU HAVE A DRINK CONTAINING ALCOHOL: NEVER
HOW MANY STANDARD DRINKS CONTAINING ALCOHOL DO YOU HAVE ON A TYPICAL DAY: 0
HOW OFTEN DO YOU HAVE A DRINK CONTAINING ALCOHOL: 1
HOW OFTEN DO YOU HAVE SIX OR MORE DRINKS ON ONE OCCASION: 1

## 2025-01-14 NOTE — PROGRESS NOTES
0.1 % ophthalmic solution Place 1 drop into both eyes in the morning and at bedtime Yes Lonnie Duran DO   ammonium lactate (LAC-HYDRIN) 12 % lotion Apply topically twice daily Yes Galindo Mcmullen DPM   clotrimazole-betamethasone (LOTRISONE) 1-0.05 % cream Apply topically 2 times daily. Yes Galindo Mcmullen DPM   palbociclib (IBRANCE) 75 MG capsule Take 75 mg by mouth Takes 3 weeks out of the month then off for one week then repeat Yes Edmundo Velazco MD   fulvestrant (FASLODEX) 250 MG/5ML SOLN IM injection Inject 10 mLs into the muscle once Yes Edmundo Velazco MD   denosumab (XGEVA) 120 MG/1.7ML SOLN SC injection Inject 1.7 mLs into the skin once Yes Edmundo Velazco MD   calcium carbonate-vitamin D (CALTRATE) 600-400 MG-UNIT TABS per tab Take 1 tablet by mouth daily Yes Edmundo Velazco MD   Alpha-Lipoic Acid 100 MG CAPS Take by mouth Yes Edmundo Velazco MD   b complex vitamins capsule Take 1 capsule by mouth daily Yes Edmundo Velazco MD       CareTeam (Including outside providers/suppliers regularly involved in providing care):   Patient Care Team:  Jae Dumont MD as PCP - General (Family Medicine)  [facility]  Nemo Edward MD as Consulting Physician (Cardiology)  Galindo Mcmullen DPM as Consulting Physician (Podiatry)  Calvin Fuentes MD (Ophthalmology)  Mary Gallego MD as Consulting Physician (Hematology and Oncology)  Roselyn Coleman MD as Surgeon (General Surgery)  Mary Gallego MD as Consulting Physician (Hematology and Oncology)     Recommendations for Preventive Services Due: see orders and patient instructions/AVS.  Recommended screening schedule for the next 5-10 years is provided to the patient in written form: see Patient Instructions/AVS.     Reviewed and updated this visit:  Allergies  Meds

## 2025-01-19 DIAGNOSIS — I48.91 ATRIAL FIBRILLATION, UNSPECIFIED TYPE (HCC): ICD-10-CM

## 2025-01-20 RX ORDER — PROPAFENONE HYDROCHLORIDE 225 MG/1
225 TABLET, FILM COATED ORAL 3 TIMES DAILY
Qty: 270 TABLET | Refills: 1 | Status: SHIPPED | OUTPATIENT
Start: 2025-01-20

## 2025-01-20 NOTE — TELEPHONE ENCOUNTER
Last Appointment:  1/14/2025  Future Appointments   Date Time Provider Department Center   2/7/2025 11:00 AM [facility] NUC MED INJ [facility]   2/7/2025  1:00 PM [facility] CT2 BD [facility] CT [facility]   2/7/2025  2:00 PM [facility] NUC MED RM 2 [facility]   2/18/2025  1:15 PM Jae Dumont MD [facility] E[facility] DEP   3/4/2025 10:00 AM Galindo Mcmullen DPM [facility] Podiatry [facility]   1/6/2026  1:30 PM Fahad Askew DO [place] [facility]

## 2025-01-21 ENCOUNTER — TELEPHONE (OUTPATIENT)
Dept: CARDIOLOGY CLINIC | Age: 75
End: 2025-01-21

## 2025-01-21 NOTE — TELEPHONE ENCOUNTER
I CALLED AND LEFT A MESSAGE AT [facility].  THEY NEED CLEARANCE FOR EYE SURGERY AND DIRECTIONS FOR HOLDING WARFARIN.     I INFORMED THEM THAT WE HAVE NOT SEEN MAYI SINCE 2020 AND THAT HER WARFARIN IS CONTROLLED BY DR RODNEY SO THEY WILL HAVE TO CHECK WITH HIM.  DONNA

## 2025-02-07 ENCOUNTER — HOSPITAL ENCOUNTER (OUTPATIENT)
Dept: NUCLEAR MEDICINE | Age: 75
Discharge: HOME | End: 2025-02-07
Attending: INTERNAL MEDICINE
Payer: MEDICARE

## 2025-02-07 ENCOUNTER — HOSPITAL ENCOUNTER (OUTPATIENT)
Dept: CT IMAGING | Age: 75
Discharge: HOME | End: 2025-02-09
Attending: INTERNAL MEDICINE
Payer: MEDICARE

## 2025-02-07 ENCOUNTER — HOSPITAL ENCOUNTER (OUTPATIENT)
Age: 75
Discharge: HOME | End: 2025-02-07
Attending: INTERNAL MEDICINE
Payer: MEDICARE

## 2025-02-07 DIAGNOSIS — Z17.1 ESTROGEN RECEPTOR NEGATIVE STATUS (ER-): ICD-10-CM

## 2025-02-07 DIAGNOSIS — C79.51 SECONDARY MALIGNANT NEOPLASM OF BONE (HCC): ICD-10-CM

## 2025-02-07 DIAGNOSIS — C50.211 MALIGNANT NEOPLASM OF UPPER-INNER QUADRANT OF RIGHT FEMALE BREAST, UNSPECIFIED ESTROGEN RECEPTOR STATUS (HCC): ICD-10-CM

## 2025-02-07 LAB
BUN SERPL-MCNC: 13 MG/DL (ref 6–23)
CREAT SERPL-MCNC: 1 MG/DL (ref 0.5–1)
GFR, ESTIMATED: 57 ML/MIN/1.73M2

## 2025-02-07 PROCEDURE — 36415 COLL VENOUS BLD VENIPUNCTURE: CPT

## 2025-02-07 PROCEDURE — 84520 ASSAY OF UREA NITROGEN: CPT

## 2025-02-07 PROCEDURE — A9503 TC99M MEDRONATE: HCPCS | Performed by: RADIOLOGY

## 2025-02-07 PROCEDURE — 71260 CT THORAX DX C+: CPT

## 2025-02-07 PROCEDURE — 3430000000 HC RX DIAGNOSTIC RADIOPHARMACEUTICAL: Performed by: RADIOLOGY

## 2025-02-07 PROCEDURE — 78306 BONE IMAGING WHOLE BODY: CPT | Performed by: INTERNAL MEDICINE

## 2025-02-07 PROCEDURE — 82565 ASSAY OF CREATININE: CPT

## 2025-02-07 PROCEDURE — 6360000004 HC RX CONTRAST MEDICATION: Performed by: RADIOLOGY

## 2025-02-07 PROCEDURE — 74177 CT ABD & PELVIS W/CONTRAST: CPT

## 2025-02-07 RX ORDER — IOPAMIDOL 755 MG/ML
18 INJECTION, SOLUTION INTRAVASCULAR
Status: COMPLETED | OUTPATIENT
Start: 2025-02-07 | End: 2025-02-07

## 2025-02-07 RX ORDER — TC 99M MEDRONATE 20 MG/10ML
25 INJECTION, POWDER, LYOPHILIZED, FOR SOLUTION INTRAVENOUS
Status: COMPLETED | OUTPATIENT
Start: 2025-02-07 | End: 2025-02-07

## 2025-02-07 RX ORDER — IOPAMIDOL 755 MG/ML
75 INJECTION, SOLUTION INTRAVASCULAR
Status: COMPLETED | OUTPATIENT
Start: 2025-02-07 | End: 2025-02-07

## 2025-02-07 RX ADMIN — IOPAMIDOL 75 ML: 755 INJECTION, SOLUTION INTRAVENOUS at 12:13

## 2025-02-07 RX ADMIN — TC 99M MEDRONATE 25 MILLICURIE: 20 INJECTION, POWDER, LYOPHILIZED, FOR SOLUTION INTRAVENOUS at 11:04

## 2025-02-07 RX ADMIN — IOPAMIDOL 18 ML: 755 INJECTION, SOLUTION INTRAVENOUS at 12:14

## 2025-02-18 ENCOUNTER — OFFICE VISIT (OUTPATIENT)
Dept: FAMILY MEDICINE CLINIC | Age: 75
End: 2025-02-18

## 2025-02-18 VITALS
DIASTOLIC BLOOD PRESSURE: 68 MMHG | TEMPERATURE: 97.8 F | HEART RATE: 70 BPM | OXYGEN SATURATION: 99 % | BODY MASS INDEX: 26.68 KG/M2 | HEIGHT: 67 IN | WEIGHT: 170 LBS | SYSTOLIC BLOOD PRESSURE: 130 MMHG

## 2025-02-18 DIAGNOSIS — I48.91 ATRIAL FIBRILLATION, UNSPECIFIED TYPE (HCC): ICD-10-CM

## 2025-02-18 DIAGNOSIS — N18.31 STAGE 3A CHRONIC KIDNEY DISEASE (HCC): ICD-10-CM

## 2025-02-18 DIAGNOSIS — S81.802A WOUND OF LEFT LOWER EXTREMITY, INITIAL ENCOUNTER: Primary | ICD-10-CM

## 2025-02-18 LAB
INTERNATIONAL NORMALIZATION RATIO, POC: 3.5
PROTHROMBIN TIME, POC: 42

## 2025-02-18 RX ORDER — MUPIROCIN 20 MG/G
OINTMENT TOPICAL 2 TIMES DAILY
Qty: 1 G | Refills: 0 | Status: SHIPPED | OUTPATIENT
Start: 2025-02-18

## 2025-02-18 ASSESSMENT — ENCOUNTER SYMPTOMS
VOMITING: 0
CHEST TIGHTNESS: 0
SHORTNESS OF BREATH: 0
RHINORRHEA: 0
SINUS PRESSURE: 0
NAUSEA: 0
SORE THROAT: 1
COUGH: 0
SINUS PAIN: 1
ABDOMINAL PAIN: 0

## 2025-02-18 NOTE — PROGRESS NOTES
topically 2 times daily 1 g 0    propafenone (RYTHMOL) 225 MG tablet TAKE 1 TABLET BY MOUTH THREE TIMES A  tablet 1    topiramate (TOPAMAX) 50 MG tablet Take 1 tablet by mouth 2 times daily 180 tablet 1    verapamil (CALAN SR) 180 MG extended release tablet TAKE 1 TABLET BY MOUTH DAILY 90 tablet 1    warfarin (COUMADIN) 6 MG tablet TAKE 1/2 TABLET BY MOUTH ON MONDAY AND FRIDAY AND 1 TABLET ON ALL OTHER DAYS OR AS DIRECTED 30 tablet 5    azelastine (ASTELIN) 0.1 % nasal spray 2 sprays by Nasal route 2 times daily Use in each nostril as directed 120 mL 3    escitalopram (LEXAPRO) 10 MG tablet Take 1 tablet by mouth every evening 90 tablet 1    clotrimazole-betamethasone (LOTRISONE) 1-0.05 % cream Apply topically 2 times daily. 45 g 1    triamcinolone (KENALOG) 0.1 % cream Apply topically 2 times daily. 80 g 5    azelastine (ASTELIN) 0.1 % nasal spray 2 sprays by Nasal route 2 times daily Use in each nostril as directed 120 mL 3    olopatadine (PATANOL) 0.1 % ophthalmic solution Place 1 drop into both eyes in the morning and at bedtime 5 mL 1    ammonium lactate (LAC-HYDRIN) 12 % lotion Apply topically twice daily 400 g 2    clotrimazole-betamethasone (LOTRISONE) 1-0.05 % cream Apply topically 2 times daily. 45 g 2    palbociclib (IBRANCE) 75 MG capsule Take 75 mg by mouth Takes 3 weeks out of the month then off for one week then repeat      fulvestrant (FASLODEX) 250 MG/5ML SOLN IM injection Inject 10 mLs into the muscle once      denosumab (XGEVA) 120 MG/1.7ML SOLN SC injection Inject 1.7 mLs into the skin once      calcium carbonate-vitamin D (CALTRATE) 600-400 MG-UNIT TABS per tab Take 1 tablet by mouth daily      Alpha-Lipoic Acid 100 MG CAPS Take by mouth      b complex vitamins capsule Take 1 capsule by mouth daily       No current facility-administered medications for this visit.        Jae Dumont MD     This document may have been prepared at least partially through the use of voice recognition

## 2025-02-21 ENCOUNTER — TELEPHONE (OUTPATIENT)
Dept: FAMILY MEDICINE CLINIC | Age: 75
End: 2025-02-21

## 2025-02-21 NOTE — TELEPHONE ENCOUNTER
Patient saw derm, they did not order an ABT.   Blister is improved some. She is using the medication you ordered. And keeping it covered.

## 2025-02-26 ENCOUNTER — NURSE ONLY (OUTPATIENT)
Dept: FAMILY MEDICINE CLINIC | Age: 75
End: 2025-02-26
Payer: MEDICARE

## 2025-02-26 ENCOUNTER — ANTI-COAG VISIT (OUTPATIENT)
Dept: FAMILY MEDICINE CLINIC | Age: 75
End: 2025-02-26

## 2025-02-26 DIAGNOSIS — I48.91 ATRIAL FIBRILLATION, UNSPECIFIED TYPE (HCC): ICD-10-CM

## 2025-02-26 LAB
INTERNATIONAL NORMALIZATION RATIO, POC: 2.3
PROTHROMBIN TIME, POC: 27.7

## 2025-02-26 PROCEDURE — 93793 ANTICOAG MGMT PT WARFARIN: CPT | Performed by: STUDENT IN AN ORGANIZED HEALTH CARE EDUCATION/TRAINING PROGRAM

## 2025-02-26 PROCEDURE — 85610 PROTHROMBIN TIME: CPT | Performed by: STUDENT IN AN ORGANIZED HEALTH CARE EDUCATION/TRAINING PROGRAM

## 2025-02-26 NOTE — PROGRESS NOTES
Advised Jaimie Lindquist that the INR results are at goal and to Continue 3 mg MWF and 6 mg all other days  Recheck in a month

## 2025-03-06 ENCOUNTER — OFFICE VISIT (OUTPATIENT)
Dept: CARDIOLOGY CLINIC | Age: 75
End: 2025-03-06
Payer: MEDICARE

## 2025-03-06 VITALS
HEART RATE: 67 BPM | RESPIRATION RATE: 20 BRPM | WEIGHT: 171 LBS | DIASTOLIC BLOOD PRESSURE: 58 MMHG | HEIGHT: 67 IN | BODY MASS INDEX: 26.84 KG/M2 | SYSTOLIC BLOOD PRESSURE: 132 MMHG

## 2025-03-06 DIAGNOSIS — Z01.818 PRE-OP EXAMINATION: Primary | ICD-10-CM

## 2025-03-06 PROCEDURE — 3078F DIAST BP <80 MM HG: CPT | Performed by: INTERNAL MEDICINE

## 2025-03-06 PROCEDURE — 1159F MED LIST DOCD IN RCRD: CPT | Performed by: INTERNAL MEDICINE

## 2025-03-06 PROCEDURE — 93000 ELECTROCARDIOGRAM COMPLETE: CPT | Performed by: INTERNAL MEDICINE

## 2025-03-06 PROCEDURE — 3075F SYST BP GE 130 - 139MM HG: CPT | Performed by: INTERNAL MEDICINE

## 2025-03-06 PROCEDURE — 99204 OFFICE O/P NEW MOD 45 MIN: CPT | Performed by: INTERNAL MEDICINE

## 2025-03-06 PROCEDURE — 1123F ACP DISCUSS/DSCN MKR DOCD: CPT | Performed by: INTERNAL MEDICINE

## 2025-03-06 ASSESSMENT — ENCOUNTER SYMPTOMS
NAUSEA: 0
BLOOD IN STOOL: 0
VOMITING: 0
ABDOMINAL PAIN: 0
CHEST TIGHTNESS: 0
SHORTNESS OF BREATH: 0
COUGH: 0
BACK PAIN: 0

## 2025-03-06 NOTE — PROGRESS NOTES
Wagner Mcfarland MD  Interventional cardiology / structural heart disease    [facility]  [address] [id number] [phone]

## 2025-03-07 ENCOUNTER — PROCEDURE VISIT (OUTPATIENT)
Dept: PODIATRY | Age: 75
End: 2025-03-07

## 2025-03-07 VITALS — BODY MASS INDEX: 26.84 KG/M2 | HEIGHT: 67 IN | WEIGHT: 171 LBS

## 2025-03-07 DIAGNOSIS — G60.8 HEREDITARY SENSORY NEUROPATHY: ICD-10-CM

## 2025-03-07 DIAGNOSIS — B35.1 TINEA UNGUIUM: Primary | ICD-10-CM

## 2025-03-07 DIAGNOSIS — L84 CORNS AND CALLOSITIES: ICD-10-CM

## 2025-03-07 DIAGNOSIS — Z79.01 ENCOUNTER FOR CURRENT LONG-TERM USE OF ANTICOAGULANTS: ICD-10-CM

## 2025-03-07 NOTE — PROGRESS NOTES
Disp: 45 g, Rfl: 2    palbociclib (IBRANCE) 75 MG capsule, Take 75 mg by mouth Takes 3 weeks out of the month then off for one week then repeat, Disp: , Rfl:     fulvestrant (FASLODEX) 250 MG/5ML SOLN IM injection, Inject 10 mLs into the muscle once, Disp: , Rfl:     denosumab (XGEVA) 120 MG/1.7ML SOLN SC injection, Inject 1.7 mLs into the skin once, Disp: , Rfl:     calcium carbonate-vitamin D (CALTRATE) 600-400 MG-UNIT TABS per tab, Take 1 tablet by mouth daily, Disp: , Rfl:     Alpha-Lipoic Acid 100 MG CAPS, Take by mouth, Disp: , Rfl:     b complex vitamins capsule, Take 1 capsule by mouth daily, Disp: , Rfl:   Allergies   Allergen Reactions    Diltiazem Hcl Other (See Comments)     Other reaction(s): weak    Sulfa Antibiotics     Cardizem [Diltiazem] Palpitations    Cefuroxime Axetil Nausea And Vomiting, Other (See Comments) and Palpitations    Claritin [Loratadine] Palpitations    Erythromycin Nausea And Vomiting    Morphine Nausea And Vomiting    Mucinex [Guaifenesin Er] Palpitations    Propoxyphene Other (See Comments) and Nausea And Vomiting     DIZZY  Other reaction(s): Free Text    Zyrtec [Cetirizine] Nausea And Vomiting and Other (See Comments)     WEAK       Past Medical History:   Diagnosis Date    Bladder prolapse, female, acquired     Bone cancer (HCC) 02/2021    Breast cancer (HCC) 04/2017    Broken rib     left    Broken toe     right    Chronic atrial fibrillation (HCC)     Fibroadenosis of breast     Headache     History of therapeutic radiation     Hyperlipidemia     Hypertension            Vitals:    03/07/25 1032   Weight: 77.6 kg (171 lb)   Height: 1.702 m (5' 7\")             Work History/Social History:  Focused Lower Extremity Physical Exam:    Neurovascular examination:    Dorsalis Pedis palpable bilateral.  Posterior tibialis palpable bilateral.    Capillary Refill Time:  Immediate return  Hair growth:  Symmetrical and bilateral   Skin:  Not atrophic  Edema: Mild edema dorsal foot

## 2025-03-14 NOTE — TELEPHONE ENCOUNTER
Does the patient have an active anti-cancer treatment plan? (oral, injection, or IV)  No, I am the RN accountable to the care of this patient.      I see nothing in Media or notes.  She needs seen in EXP

## 2025-03-15 ENCOUNTER — OFFICE VISIT (OUTPATIENT)
Dept: FAMILY MEDICINE CLINIC | Age: 75
End: 2025-03-15

## 2025-03-15 VITALS
RESPIRATION RATE: 18 BRPM | HEIGHT: 67 IN | SYSTOLIC BLOOD PRESSURE: 130 MMHG | HEART RATE: 66 BPM | TEMPERATURE: 98.2 F | OXYGEN SATURATION: 99 % | BODY MASS INDEX: 26.68 KG/M2 | DIASTOLIC BLOOD PRESSURE: 52 MMHG | WEIGHT: 170 LBS

## 2025-03-15 DIAGNOSIS — R82.90 MALODOROUS URINE: ICD-10-CM

## 2025-03-15 DIAGNOSIS — N30.00 ACUTE CYSTITIS WITHOUT HEMATURIA: Primary | ICD-10-CM

## 2025-03-15 LAB
BILIRUBIN, POC: ABNORMAL
BLOOD URINE, POC: ABNORMAL
CLARITY, POC: CLEAR
COLOR, POC: YELLOW
GLUCOSE URINE, POC: ABNORMAL MG/DL
KETONES, POC: ABNORMAL MG/DL
LEUKOCYTE EST, POC: ABNORMAL
NITRITE, POC: ABNORMAL
PH, POC: 5.5
PROTEIN, POC: ABNORMAL MG/DL
SPECIFIC GRAVITY, POC: >=1.03
UROBILINOGEN, POC: 0.2 MG/DL

## 2025-03-15 RX ORDER — AMOXICILLIN 875 MG/1
875 TABLET, COATED ORAL 2 TIMES DAILY
Qty: 14 TABLET | Refills: 0 | Status: SHIPPED | OUTPATIENT
Start: 2025-03-15 | End: 2025-03-22

## 2025-03-15 NOTE — PROGRESS NOTES
Chief Complaint       Abdominal Pain (X 2 days, lower abdominal cramping and lower back ache) and Other (Urine has a funny odor since this morning, urgency and frequency)      History of Present Illness   Source of history provided by:  patient.      Jaimie Bradford is a 74 y.o. old female presenting to the walk in clinic for evaluation of suprapubic pressure and mild low back pain for the past 2 days.  Reports that her pain has improved but she noticed malodorous urine upon waking this morning.  Reports associated urinary frequency and urgency.  Denies gross hematuria.  Denies associated flank pain. Denies any fever, chills, vaginal discharge, vaginal bleeding, possibility of pregnancy, vomiting, diarrhea, or lethargy.  No LMP recorded. Patient is postmenopausal.  Also of note, patient is chronically anticoagulated on warfarin.      ROS    Unless otherwise stated in this report or unable to obtain because of the patient's clinical or mental status as evidenced by the medical record, this patients's positive and negative responses for Review of Systems, constitutional, psych, eyes, ENT, cardiovascular, respiratory, gastrointestinal, neurological, genitourinary, musculoskeletal, integument systems and systems related to the presenting problem are either stated in the preceding or were not pertinent or were negative for the symptoms and/or complaints related to the medical problem.    Past Medical History:  has a past medical history of Bladder prolapse, female, acquired, Bone cancer (HCC), Breast cancer (HCC), Broken rib, Broken toe, Chronic atrial fibrillation (HCC), Fibroadenosis of breast, Headache, History of therapeutic radiation, Hyperlipidemia, and Hypertension.  Past Surgical History:  has a past surgical history that includes Atrial ablation surgery (1999); Colonoscopy; Tonsillectomy; eye surgery; Breast lumpectomy (Right, 04/26/2017); Tunneled venous port placement (05/26/2017); other surgical history; CT

## 2025-03-18 ENCOUNTER — RESULTS FOLLOW-UP (OUTPATIENT)
Dept: FAMILY MEDICINE CLINIC | Age: 75
End: 2025-03-18

## 2025-03-18 LAB
CULTURE: ABNORMAL
SPECIMEN DESCRIPTION: ABNORMAL

## 2025-03-26 ENCOUNTER — OFFICE VISIT (OUTPATIENT)
Dept: FAMILY MEDICINE CLINIC | Age: 75
End: 2025-03-26
Payer: MEDICARE

## 2025-03-26 VITALS
DIASTOLIC BLOOD PRESSURE: 60 MMHG | BODY MASS INDEX: 26.63 KG/M2 | SYSTOLIC BLOOD PRESSURE: 134 MMHG | WEIGHT: 170 LBS | HEART RATE: 72 BPM | OXYGEN SATURATION: 97 % | TEMPERATURE: 97.6 F

## 2025-03-26 DIAGNOSIS — I10 ESSENTIAL HYPERTENSION: ICD-10-CM

## 2025-03-26 DIAGNOSIS — I48.91 ATRIAL FIBRILLATION, UNSPECIFIED TYPE (HCC): ICD-10-CM

## 2025-03-26 DIAGNOSIS — N18.31 STAGE 3A CHRONIC KIDNEY DISEASE (HCC): Primary | ICD-10-CM

## 2025-03-26 LAB
INTERNATIONAL NORMALIZATION RATIO, POC: 1.5
PROTHROMBIN TIME, POC: 17.5

## 2025-03-26 PROCEDURE — 36415 COLL VENOUS BLD VENIPUNCTURE: CPT | Performed by: STUDENT IN AN ORGANIZED HEALTH CARE EDUCATION/TRAINING PROGRAM

## 2025-03-26 PROCEDURE — 1123F ACP DISCUSS/DSCN MKR DOCD: CPT | Performed by: STUDENT IN AN ORGANIZED HEALTH CARE EDUCATION/TRAINING PROGRAM

## 2025-03-26 PROCEDURE — 1159F MED LIST DOCD IN RCRD: CPT | Performed by: STUDENT IN AN ORGANIZED HEALTH CARE EDUCATION/TRAINING PROGRAM

## 2025-03-26 PROCEDURE — 85610 PROTHROMBIN TIME: CPT | Performed by: STUDENT IN AN ORGANIZED HEALTH CARE EDUCATION/TRAINING PROGRAM

## 2025-03-26 PROCEDURE — 3075F SYST BP GE 130 - 139MM HG: CPT | Performed by: STUDENT IN AN ORGANIZED HEALTH CARE EDUCATION/TRAINING PROGRAM

## 2025-03-26 PROCEDURE — 99214 OFFICE O/P EST MOD 30 MIN: CPT | Performed by: STUDENT IN AN ORGANIZED HEALTH CARE EDUCATION/TRAINING PROGRAM

## 2025-03-26 PROCEDURE — G2211 COMPLEX E/M VISIT ADD ON: HCPCS | Performed by: STUDENT IN AN ORGANIZED HEALTH CARE EDUCATION/TRAINING PROGRAM

## 2025-03-26 PROCEDURE — 3078F DIAST BP <80 MM HG: CPT | Performed by: STUDENT IN AN ORGANIZED HEALTH CARE EDUCATION/TRAINING PROGRAM

## 2025-03-26 ASSESSMENT — ENCOUNTER SYMPTOMS
RHINORRHEA: 0
CHEST TIGHTNESS: 0
SINUS PAIN: 1
SHORTNESS OF BREATH: 0
VOMITING: 0
COUGH: 0
SINUS PRESSURE: 0
ABDOMINAL PAIN: 0
NAUSEA: 0
SORE THROAT: 1

## 2025-03-26 NOTE — PROGRESS NOTES
extended release tablet TAKE 1 TABLET BY MOUTH DAILY 90 tablet 1    warfarin (COUMADIN) 6 MG tablet TAKE 1/2 TABLET BY MOUTH ON MONDAY AND FRIDAY AND 1 TABLET ON ALL OTHER DAYS OR AS DIRECTED 30 tablet 5    azelastine (ASTELIN) 0.1 % nasal spray 2 sprays by Nasal route 2 times daily Use in each nostril as directed 120 mL 3    escitalopram (LEXAPRO) 10 MG tablet Take 1 tablet by mouth every evening 90 tablet 1    clotrimazole-betamethasone (LOTRISONE) 1-0.05 % cream Apply topically 2 times daily. 45 g 1    triamcinolone (KENALOG) 0.1 % cream Apply topically 2 times daily. 80 g 5    azelastine (ASTELIN) 0.1 % nasal spray 2 sprays by Nasal route 2 times daily Use in each nostril as directed 120 mL 3    olopatadine (PATANOL) 0.1 % ophthalmic solution Place 1 drop into both eyes in the morning and at bedtime 5 mL 1    ammonium lactate (LAC-HYDRIN) 12 % lotion Apply topically twice daily 400 g 2    palbociclib (IBRANCE) 75 MG capsule Take 75 mg by mouth Takes 3 weeks out of the month then off for one week then repeat      fulvestrant (FASLODEX) 250 MG/5ML SOLN IM injection Inject 10 mLs into the muscle once      denosumab (XGEVA) 120 MG/1.7ML SOLN SC injection Inject 1.7 mLs into the skin once      calcium carbonate-vitamin D (CALTRATE) 600-400 MG-UNIT TABS per tab Take 1 tablet by mouth daily      Alpha-Lipoic Acid 100 MG CAPS Take by mouth      b complex vitamins capsule Take 1 capsule by mouth daily       No current facility-administered medications for this visit.        Jae Dumont MD     This document may have been prepared at least partially through the use of voice recognition software. Although effort is taken to assure the accuracy ofthis document, it is possible that grammatical, syntax, or spelling errors may occur.

## 2025-04-02 ENCOUNTER — ANTI-COAG VISIT (OUTPATIENT)
Dept: FAMILY MEDICINE CLINIC | Age: 75
End: 2025-04-02

## 2025-04-02 ENCOUNTER — CLINICAL SUPPORT (OUTPATIENT)
Dept: FAMILY MEDICINE CLINIC | Age: 75
End: 2025-04-02
Payer: MEDICARE

## 2025-04-02 DIAGNOSIS — I48.91 ATRIAL FIBRILLATION, UNSPECIFIED TYPE (HCC): ICD-10-CM

## 2025-04-02 LAB
INTERNATIONAL NORMALIZATION RATIO, POC: 1.7
PROTHROMBIN TIME, POC: 20.9

## 2025-04-02 PROCEDURE — 93793 ANTICOAG MGMT PT WARFARIN: CPT | Performed by: STUDENT IN AN ORGANIZED HEALTH CARE EDUCATION/TRAINING PROGRAM

## 2025-04-02 PROCEDURE — 85610 PROTHROMBIN TIME: CPT | Performed by: STUDENT IN AN ORGANIZED HEALTH CARE EDUCATION/TRAINING PROGRAM

## 2025-04-02 NOTE — PROGRESS NOTES
Pt called and advised.   She has possible  jury duty next week so she's hesitant to schedule right now.   She will call next week when she knows more about jury duty schedule

## 2025-04-02 NOTE — PROGRESS NOTES
Lab Results   Component Value Date    INR 1.7 04/02/2025       Patient Findings       Negatives:  Signs/symptoms of thrombosis, Signs/symptoms of bleeding, Laboratory test error suspected, Change in health, Change in alcohol use, Change in activity, Upcoming invasive procedure, Emergency department visit, Upcoming dental procedure, Missed doses, Extra doses, Change in medications, Change in diet/appetite, Hospital admission, Bruising, Other complaints    Comments:  4/2/25  INR - 1.7  3mg M,W,F  6mg T,Th,Sat,Sun            Plan: Start taking 6 mg every day except Monday and Friday  Take 3 mg Mondays and Fridays  Recheck 1 week    Return date  As of 4/2/2025      TTR:  67.5% (11.2 mo)   Next INR check:  --

## 2025-04-08 ENCOUNTER — TELEPHONE (OUTPATIENT)
Dept: FAMILY MEDICINE CLINIC | Age: 75
End: 2025-04-08

## 2025-04-08 NOTE — TELEPHONE ENCOUNTER
----- Message from Keila SLAUGHTER sent at 4/7/2025  9:33 AM EDT -----  Regarding: ECC Message to Provider  ECC Message to Provider    Relationship to Patient: Self     Additional Information: Patient want to in form he providers  office  that she needs to be scheduled for her APT-INR check on Wednesday April 9th, since her jury duty has been cancelled so the Patient will be  available for the week.  --------------------------------------------------------------------------------------------------------------------------    Call Back Information: OK to leave message on voicemail  Preferred Call Back Number: Phone [phone] (home)

## 2025-04-09 ENCOUNTER — OFFICE VISIT (OUTPATIENT)
Dept: FAMILY MEDICINE CLINIC | Age: 75
End: 2025-04-09

## 2025-04-09 ENCOUNTER — ANTI-COAG VISIT (OUTPATIENT)
Dept: FAMILY MEDICINE CLINIC | Age: 75
End: 2025-04-09

## 2025-04-09 ENCOUNTER — LAB (OUTPATIENT)
Dept: FAMILY MEDICINE CLINIC | Age: 75
End: 2025-04-09
Payer: MEDICARE

## 2025-04-09 VITALS
HEIGHT: 67 IN | DIASTOLIC BLOOD PRESSURE: 76 MMHG | RESPIRATION RATE: 20 BRPM | TEMPERATURE: 97.7 F | HEART RATE: 53 BPM | SYSTOLIC BLOOD PRESSURE: 170 MMHG | OXYGEN SATURATION: 98 % | WEIGHT: 172 LBS | BODY MASS INDEX: 27 KG/M2

## 2025-04-09 DIAGNOSIS — I48.91 ATRIAL FIBRILLATION, UNSPECIFIED TYPE (HCC): ICD-10-CM

## 2025-04-09 DIAGNOSIS — R42 VERTIGO: Primary | ICD-10-CM

## 2025-04-09 DIAGNOSIS — I48.91 ATRIAL FIBRILLATION, UNSPECIFIED TYPE (HCC): Primary | ICD-10-CM

## 2025-04-09 DIAGNOSIS — H69.91 ETD (EUSTACHIAN TUBE DYSFUNCTION), RIGHT: ICD-10-CM

## 2025-04-09 LAB
INTERNATIONAL NORMALIZATION RATIO, POC: 2.4
PROTHROMBIN TIME, POC: 29.3

## 2025-04-09 PROCEDURE — 85610 PROTHROMBIN TIME: CPT | Performed by: STUDENT IN AN ORGANIZED HEALTH CARE EDUCATION/TRAINING PROGRAM

## 2025-04-09 RX ORDER — AZITHROMYCIN 250 MG/1
TABLET, FILM COATED ORAL
Qty: 6 TABLET | Refills: 0 | Status: SHIPPED | OUTPATIENT
Start: 2025-04-09 | End: 2025-04-19

## 2025-04-09 RX ORDER — MECLIZINE HCL 12.5 MG 12.5 MG/1
12.5 TABLET ORAL 3 TIMES DAILY PRN
Qty: 15 TABLET | Refills: 0 | Status: SHIPPED | OUTPATIENT
Start: 2025-04-09 | End: 2025-04-19

## 2025-04-09 NOTE — PROGRESS NOTES
Lab Results   Component Value Date    INR 2.4 04/09/2025       Patient Findings       Negatives:  Signs/symptoms of thrombosis, Signs/symptoms of bleeding, Laboratory test error suspected, Change in health, Change in alcohol use, Change in activity, Upcoming invasive procedure, Emergency department visit, Upcoming dental procedure, Missed doses, Extra doses, Change in medications, Change in diet/appetite, Hospital admission, Bruising, Other complaints    Comments:  04/09/25   2.4 today  Taking 3mg on M & F  6 mg all other days            Plan: continue current regimen as above. Recheck before her surgery    Return date  As of 4/9/2025      TTR:  67.3% (11.4 mo)   Next INR check:  5/1/2025

## 2025-04-09 NOTE — PROGRESS NOTES
ACUTE PRIMARY CARE VISIT  25  Name: Jaimie Bradford   : 1950   Age: 74 y.o.  Sex: female   Chief Complaint   Patient presents with    Dizziness     HPI:     History of Present Illness  The patient is a 74-year-old female with ear discomfort.    Ear Discomfort  - Reports dizziness, imbalance, and obstruction in her right ear, occasionally painful  - Postnasal drip persists despite saline and nasal spray use, leading to choking on 2025  - No nasal discharge, chest pain, or shortness of breath  - Hearing aids cause ear irritation    Blood Pressure and Medication  - On Coumadin, awaiting next INR check. INR today 2.4.  - BP elevated in office today    Supplemental information: None  Objective:     Vitals:    25 1452   BP: (!) 170/76   BP Site: Left Upper Arm   Patient Position: Sitting   BP Cuff Size: Medium Adult   Pulse: 53   Resp: 20   Temp: 97.7 °F (36.5 °C)   TempSrc: Temporal   SpO2: 98%   Weight: 78 kg (172 lb)   Height: 1.702 m (5' 7\")     Physical Exam  - Ears:    - Right ear: fluid    - Left ear: normal  Physical Exam  Vitals and nursing note reviewed.   Constitutional:       Appearance: Normal appearance. She is normal weight.   HENT:      Head: Normocephalic and atraumatic.      Right Ear: External ear normal. A middle ear effusion is present.      Left Ear: External ear normal.  No middle ear effusion.      Nose: Nose normal.      Mouth/Throat:      Mouth: Mucous membranes are moist.   Eyes:      Conjunctiva/sclera: Conjunctivae normal.      Pupils: Pupils are equal, round, and reactive to light.   Cardiovascular:      Rate and Rhythm: Normal rate and regular rhythm.      Pulses: Normal pulses.      Heart sounds: Normal heart sounds.   Pulmonary:      Effort: Pulmonary effort is normal.      Breath sounds: Normal breath sounds.   Abdominal:      General: Abdomen is flat.   Musculoskeletal:         General: Normal range of motion.      Cervical back: Normal range of motion and neck

## 2025-05-01 ENCOUNTER — LAB (OUTPATIENT)
Dept: FAMILY MEDICINE CLINIC | Age: 75
End: 2025-05-01
Payer: MEDICARE

## 2025-05-01 ENCOUNTER — ANTI-COAG VISIT (OUTPATIENT)
Dept: FAMILY MEDICINE CLINIC | Age: 75
End: 2025-05-01

## 2025-05-01 DIAGNOSIS — I48.91 ATRIAL FIBRILLATION, UNSPECIFIED TYPE (HCC): Primary | ICD-10-CM

## 2025-05-01 LAB
INTERNATIONAL NORMALIZATION RATIO, POC: 1.1
PROTHROMBIN TIME, POC: 13

## 2025-05-01 PROCEDURE — 85610 PROTHROMBIN TIME: CPT | Performed by: STUDENT IN AN ORGANIZED HEALTH CARE EDUCATION/TRAINING PROGRAM

## 2025-05-01 PROCEDURE — 93793 ANTICOAG MGMT PT WARFARIN: CPT | Performed by: STUDENT IN AN ORGANIZED HEALTH CARE EDUCATION/TRAINING PROGRAM

## 2025-05-01 NOTE — PROGRESS NOTES
INR results faxed to [facility] at this time.    Fax# [phone]

## 2025-05-01 NOTE — PROGRESS NOTES
Pt advised.   She will stop next Thursday after her follow up at the surgeon. No appt was put on the schedule, she isn't sure what time she will be able to get here

## 2025-05-01 NOTE — PROGRESS NOTES
Lab Results   Component Value Date    INR 1.1 05/01/2025       Patient Findings       Positives:  Missed doses    Comments:  Patient has been off the medication for upcoming procedure x4 days             Plan: Holding for procedure. Restart Saturday with previous dosing (6 mg on Sat, Sun, Tue, Wed, Thu and 3 mg Mon, Fri).    Recheck next Thursday    Return date  As of 5/1/2025      TTR:  65.1% (1 y)   Next INR check:  7/1/2025

## 2025-05-08 ENCOUNTER — ANTI-COAG VISIT (OUTPATIENT)
Dept: FAMILY MEDICINE CLINIC | Age: 75
End: 2025-05-08

## 2025-05-08 ENCOUNTER — CLINICAL SUPPORT (OUTPATIENT)
Dept: FAMILY MEDICINE CLINIC | Age: 75
End: 2025-05-08
Payer: MEDICARE

## 2025-05-08 DIAGNOSIS — I48.91 ATRIAL FIBRILLATION, UNSPECIFIED TYPE (HCC): Primary | ICD-10-CM

## 2025-05-08 LAB
INTERNATIONAL NORMALIZATION RATIO, POC: 1.1
PROTHROMBIN TIME, POC: 13.8

## 2025-05-08 PROCEDURE — 85610 PROTHROMBIN TIME: CPT | Performed by: STUDENT IN AN ORGANIZED HEALTH CARE EDUCATION/TRAINING PROGRAM

## 2025-05-08 NOTE — PROGRESS NOTES
Lab Results   Component Value Date    INR 1.1 05/08/2025       Patient Findings       Positives:  Missed doses    Negatives:  Signs/symptoms of thrombosis, Signs/symptoms of bleeding, Laboratory test error suspected, Change in health, Change in alcohol use, Change in activity, Upcoming invasive procedure, Emergency department visit, Upcoming dental procedure, Extra doses, Change in medications, Change in diet/appetite, Hospital admission, Bruising, Other complaints    Comments:  05/08/25 : 1.1 today  Medication held for 3 doses prior to surgery on 05/02/25  Resumed medication on 5/3/25    Resumed dosing at 3mg on Monday/Friday, 6mg all other days            Plan:   Take an extra 3 mg tomorrow/Friday (for a total of 6 mg) and then continue 6 mg every day except Monday(take 3 mg), recheck next Thursday or Friday.     Return date  As of 5/8/2025      TTR:  63.9% (1 y)   Next INR check:  5/15/2025

## 2025-05-15 ENCOUNTER — CLINICAL SUPPORT (OUTPATIENT)
Dept: FAMILY MEDICINE CLINIC | Age: 75
End: 2025-05-15
Payer: MEDICARE

## 2025-05-15 ENCOUNTER — ANTI-COAG VISIT (OUTPATIENT)
Dept: FAMILY MEDICINE CLINIC | Age: 75
End: 2025-05-15

## 2025-05-15 DIAGNOSIS — I48.91 ATRIAL FIBRILLATION, UNSPECIFIED TYPE (HCC): ICD-10-CM

## 2025-05-15 LAB
INTERNATIONAL NORMALIZATION RATIO, POC: 2.6
PROTHROMBIN TIME, POC: 31

## 2025-05-15 PROCEDURE — 85610 PROTHROMBIN TIME: CPT | Performed by: STUDENT IN AN ORGANIZED HEALTH CARE EDUCATION/TRAINING PROGRAM

## 2025-05-15 PROCEDURE — 36415 COLL VENOUS BLD VENIPUNCTURE: CPT | Performed by: STUDENT IN AN ORGANIZED HEALTH CARE EDUCATION/TRAINING PROGRAM

## 2025-05-15 NOTE — PROGRESS NOTES
Lab Results   Component Value Date    INR 2.6 05/15/2025       Patient Findings       Comments:  Patient states that she took 6 mg (5/9/2025), 3 mg Mondays, and 6 mg every day   Denies missed doses/diet changes         INR back within range    Plan: 6 mg every day except Monday and Friday.  Take 3 mg on Monday and Friday.  Can she check one last time the same day she is here for her visit with Dr. Mcmullen on 5/23? Then if normal we can go back to Monthly    Return date  As of 5/15/2025      TTR:  63.5% (1 y)   Next INR check:  6/16/2025

## 2025-05-15 NOTE — PROGRESS NOTES
Pt called and advised. Appt sched for 5/23.     Has 2ng eyelid surgery sched for 5/30. Has to hold coumadin 3 days prior and have INR checked on 5/29. Appt made

## 2025-05-16 RX ORDER — CLOTRIMAZOLE AND BETAMETHASONE DIPROPIONATE 10; .64 MG/G; MG/G
CREAM TOPICAL
Qty: 45 G | Refills: 1 | Status: SHIPPED | OUTPATIENT
Start: 2025-05-16

## 2025-05-16 NOTE — TELEPHONE ENCOUNTER
Last Appointment:  3/7/2025  Future Appointments   Date Time Provider Department Center   5/23/2025 10:30 AM Galindo Mcmullen DPM [facility]   5/23/2025 10:50 AM SCHEDULE, KRISHNA RANGEL [facility]   5/29/2025 10:50 AM SCHEDULE, [facility] RANG[facility]   1/6/2026  1:30 PM Fahad Askew DO [facility]

## 2025-05-23 ENCOUNTER — LAB (OUTPATIENT)
Dept: FAMILY MEDICINE CLINIC | Age: 75
End: 2025-05-23

## 2025-05-23 ENCOUNTER — ANTI-COAG VISIT (OUTPATIENT)
Dept: FAMILY MEDICINE CLINIC | Age: 75
End: 2025-05-23

## 2025-05-23 ENCOUNTER — PROCEDURE VISIT (OUTPATIENT)
Dept: PODIATRY | Age: 75
End: 2025-05-23

## 2025-05-23 VITALS — WEIGHT: 172 LBS | BODY MASS INDEX: 26.94 KG/M2

## 2025-05-23 DIAGNOSIS — Z79.01 ENCOUNTER FOR CURRENT LONG-TERM USE OF ANTICOAGULANTS: ICD-10-CM

## 2025-05-23 DIAGNOSIS — Z79.01 LONG TERM (CURRENT) USE OF ANTICOAGULANTS: Primary | ICD-10-CM

## 2025-05-23 DIAGNOSIS — L84 CORNS AND CALLOSITIES: ICD-10-CM

## 2025-05-23 DIAGNOSIS — G60.8 HEREDITARY SENSORY NEUROPATHY: ICD-10-CM

## 2025-05-23 DIAGNOSIS — I48.91 ATRIAL FIBRILLATION, UNSPECIFIED TYPE (HCC): ICD-10-CM

## 2025-05-23 DIAGNOSIS — B35.1 TINEA UNGUIUM: Primary | ICD-10-CM

## 2025-05-23 LAB
INTERNATIONAL NORMALIZATION RATIO, POC: 4
PROTHROMBIN TIME, POC: 48.3

## 2025-05-23 NOTE — PROGRESS NOTES
Patient in for nail and callous care  Jae Dumont MD  LOV 3/26/25      Electronically signed by Galindo Mcmullen DPM on 5/23/2025 at 1:07 PM    CC:    Follow-up foot and ankle exam.    HPI:   Follow-up foot and ankle exam.  No open wounds.  No recent injury or trauma.  History of warfarin.  No additional pedal complaints.        ROS:  Const: Denies constitutional symptoms  Musculo: Denies symptoms other than stated above  Skin: Denies symptoms other than stated above       Current Outpatient Medications:     clotrimazole-betamethasone (LOTRISONE) 1-0.05 % cream, APPLY TOPICALLY TO THE AFFECTED AREA TWICE DAILY, Disp: 45 g, Rfl: 1    mupirocin (BACTROBAN) 2 % ointment, Apply topically 2 times daily, Disp: 1 g, Rfl: 0    propafenone (RYTHMOL) 225 MG tablet, TAKE 1 TABLET BY MOUTH THREE TIMES A DAY, Disp: 270 tablet, Rfl: 1    topiramate (TOPAMAX) 50 MG tablet, Take 1 tablet by mouth 2 times daily, Disp: 180 tablet, Rfl: 1    verapamil (CALAN SR) 180 MG extended release tablet, TAKE 1 TABLET BY MOUTH DAILY, Disp: 90 tablet, Rfl: 1    warfarin (COUMADIN) 6 MG tablet, TAKE 1/2 TABLET BY MOUTH ON MONDAY AND FRIDAY AND 1 TABLET ON ALL OTHER DAYS OR AS DIRECTED, Disp: 30 tablet, Rfl: 5    azelastine (ASTELIN) 0.1 % nasal spray, 2 sprays by Nasal route 2 times daily Use in each nostril as directed, Disp: 120 mL, Rfl: 3    escitalopram (LEXAPRO) 10 MG tablet, Take 1 tablet by mouth every evening, Disp: 90 tablet, Rfl: 1    triamcinolone (KENALOG) 0.1 % cream, Apply topically 2 times daily., Disp: 80 g, Rfl: 5    azelastine (ASTELIN) 0.1 % nasal spray, 2 sprays by Nasal route 2 times daily Use in each nostril as directed, Disp: 120 mL, Rfl: 3    olopatadine (PATANOL) 0.1 % ophthalmic solution, Place 1 drop into both eyes in the morning and at bedtime, Disp: 5 mL, Rfl: 1    ammonium lactate (LAC-HYDRIN) 12 % lotion, Apply topically twice daily, Disp: 400 g, Rfl: 2    palbociclib (IBRANCE) 75 MG capsule, Take 75 mg by

## 2025-05-23 NOTE — PROGRESS NOTES
Lab Results   Component Value Date    INR 4.0 05/23/2025       Patient Findings       Negatives:  Signs/symptoms of thrombosis, Signs/symptoms of bleeding, Laboratory test error suspected, Change in health, Change in alcohol use, Change in activity, Upcoming invasive procedure, Emergency department visit, Upcoming dental procedure, Missed doses, Extra doses, Change in medications, Change in diet/appetite, Hospital admission, Bruising, Other complaints    Comments:  05/23/25:  4.0/48.3 today (was 2.6 on 5/15/25)    Taking 3mg on Mon and Friday, 6 mg all other days.   Pt stated she will be stopping coumadin on Sunday in preparation for upcoming eye surgery. She already has appt for INR check on 05/29/25            Plan: Take 3 mg tomorrow and then stop for surgery.     Return date  As of 5/23/2025      TTR:  62.7% (1.1 y)   Next INR check:  6/4/2025

## 2025-05-29 ENCOUNTER — ANTI-COAG VISIT (OUTPATIENT)
Dept: FAMILY MEDICINE CLINIC | Age: 75
End: 2025-05-29
Payer: MEDICARE

## 2025-05-29 ENCOUNTER — LAB (OUTPATIENT)
Dept: FAMILY MEDICINE CLINIC | Age: 75
End: 2025-05-29
Payer: MEDICARE

## 2025-05-29 DIAGNOSIS — I48.91 ATRIAL FIBRILLATION, UNSPECIFIED TYPE (HCC): ICD-10-CM

## 2025-05-29 DIAGNOSIS — Z79.01 ENCOUNTER FOR CURRENT LONG-TERM USE OF ANTICOAGULANTS: Primary | ICD-10-CM

## 2025-05-29 LAB
INTERNATIONAL NORMALIZATION RATIO, POC: 1
PROTHROMBIN TIME, POC: NORMAL

## 2025-05-29 PROCEDURE — 93793 ANTICOAG MGMT PT WARFARIN: CPT | Performed by: STUDENT IN AN ORGANIZED HEALTH CARE EDUCATION/TRAINING PROGRAM

## 2025-05-29 PROCEDURE — 85610 PROTHROMBIN TIME: CPT | Performed by: STUDENT IN AN ORGANIZED HEALTH CARE EDUCATION/TRAINING PROGRAM

## 2025-05-29 NOTE — PROGRESS NOTES
Lab Results   Component Value Date    INR 1.0 05/29/2025       Patient Findings       Positives:  Missed doses    Negatives:  Signs/symptoms of thrombosis, Signs/symptoms of bleeding, Laboratory test error suspected, Change in health, Change in alcohol use, Change in activity, Upcoming invasive procedure, Emergency department visit, Upcoming dental procedure, Extra doses, Change in medications, Change in diet/appetite, Hospital admission, Bruising, Other complaints    Comments:  05/29/25  1.0 today. Took 3mg on Saturday. Has held medication since Sunday for surgical procedure on eye.     Will resume on 5/31/25 per Dr. Dumont instruction            Plan: Resume dosing on 5/31 after eye procedure. 6 mg  every day except take 3 mg on Monday and Friday. Recheck 6/5    Return date  As of 5/29/2025      TTR:  62.3% (1.1 y)   Next INR check:  --

## 2025-06-05 ENCOUNTER — ANTI-COAG VISIT (OUTPATIENT)
Dept: FAMILY MEDICINE CLINIC | Age: 75
End: 2025-06-05
Payer: MEDICARE

## 2025-06-05 DIAGNOSIS — I48.91 ATRIAL FIBRILLATION, UNSPECIFIED TYPE (HCC): ICD-10-CM

## 2025-06-05 LAB
INTERNATIONAL NORMALIZATION RATIO, POC: 1.2
PROTHROMBIN TIME, POC: 15

## 2025-06-05 PROCEDURE — 36415 COLL VENOUS BLD VENIPUNCTURE: CPT | Performed by: STUDENT IN AN ORGANIZED HEALTH CARE EDUCATION/TRAINING PROGRAM

## 2025-06-05 PROCEDURE — 85610 PROTHROMBIN TIME: CPT | Performed by: STUDENT IN AN ORGANIZED HEALTH CARE EDUCATION/TRAINING PROGRAM

## 2025-06-05 PROCEDURE — 93793 ANTICOAG MGMT PT WARFARIN: CPT | Performed by: STUDENT IN AN ORGANIZED HEALTH CARE EDUCATION/TRAINING PROGRAM

## 2025-06-05 NOTE — PROGRESS NOTES
Lab Results   Component Value Date    INR 1.2 06/05/2025       Patient Findings       Negatives:  Signs/symptoms of thrombosis, Signs/symptoms of bleeding, Laboratory test error suspected, Change in health, Change in alcohol use, Change in activity, Upcoming invasive procedure, Emergency department visit, Upcoming dental procedure, Missed doses, Extra doses, Change in medications, Change in diet/appetite, Hospital admission, Bruising, Other complaints    Comments:  Held the 2 days then started   3 mg every Mon, Fri; 6 mg all other days               Plan: Take 6 mg tomorrow (Fridays dose is usually 3 mg) and then take 6 mg every day going forward except Monday take 3 mg.  Recheck in 1 week    Return date  As of 6/5/2025      TTR:  61.2% (1.1 y)   Next INR check:  6/12/2025

## 2025-06-12 ENCOUNTER — ANTI-COAG VISIT (OUTPATIENT)
Dept: FAMILY MEDICINE CLINIC | Age: 75
End: 2025-06-12
Payer: MEDICARE

## 2025-06-12 DIAGNOSIS — Z79.01 ANTICOAGULANT LONG-TERM USE: Primary | ICD-10-CM

## 2025-06-12 DIAGNOSIS — I48.91 ATRIAL FIBRILLATION, UNSPECIFIED TYPE (HCC): ICD-10-CM

## 2025-06-12 LAB
INTERNATIONAL NORMALIZATION RATIO, POC: 1.9
PROTHROMBIN TIME, POC: NORMAL

## 2025-06-12 PROCEDURE — 36415 COLL VENOUS BLD VENIPUNCTURE: CPT | Performed by: STUDENT IN AN ORGANIZED HEALTH CARE EDUCATION/TRAINING PROGRAM

## 2025-06-12 PROCEDURE — 85610 PROTHROMBIN TIME: CPT | Performed by: STUDENT IN AN ORGANIZED HEALTH CARE EDUCATION/TRAINING PROGRAM

## 2025-06-15 DIAGNOSIS — I48.91 ATRIAL FIBRILLATION, UNSPECIFIED TYPE (HCC): ICD-10-CM

## 2025-06-15 DIAGNOSIS — F06.4 ANXIETY DISORDER DUE TO GENERAL MEDICAL CONDITION: ICD-10-CM

## 2025-06-16 RX ORDER — ESCITALOPRAM OXALATE 10 MG/1
10 TABLET ORAL EVERY EVENING
Qty: 90 TABLET | Refills: 1 | Status: SHIPPED | OUTPATIENT
Start: 2025-06-16

## 2025-06-16 RX ORDER — WARFARIN SODIUM 6 MG/1
TABLET ORAL
Qty: 30 TABLET | Refills: 5 | Status: SHIPPED | OUTPATIENT
Start: 2025-06-16

## 2025-06-26 ENCOUNTER — OFFICE VISIT (OUTPATIENT)
Dept: FAMILY MEDICINE CLINIC | Age: 75
End: 2025-06-26
Payer: MEDICARE

## 2025-06-26 ENCOUNTER — ANTI-COAG VISIT (OUTPATIENT)
Dept: FAMILY MEDICINE CLINIC | Age: 75
End: 2025-06-26
Payer: MEDICARE

## 2025-06-26 VITALS
BODY MASS INDEX: 26.94 KG/M2 | HEART RATE: 67 BPM | OXYGEN SATURATION: 98 % | TEMPERATURE: 97 F | WEIGHT: 172 LBS | SYSTOLIC BLOOD PRESSURE: 140 MMHG | DIASTOLIC BLOOD PRESSURE: 58 MMHG

## 2025-06-26 DIAGNOSIS — I48.91 ATRIAL FIBRILLATION, UNSPECIFIED TYPE (HCC): ICD-10-CM

## 2025-06-26 DIAGNOSIS — H92.01 RIGHT EAR PAIN: Primary | ICD-10-CM

## 2025-06-26 DIAGNOSIS — H61.20 CERUMEN IN AUDITORY CANAL ON EXAMINATION: ICD-10-CM

## 2025-06-26 DIAGNOSIS — Z79.01 ANTICOAGULATED: ICD-10-CM

## 2025-06-26 DIAGNOSIS — I10 ESSENTIAL HYPERTENSION: ICD-10-CM

## 2025-06-26 LAB
INTERNATIONAL NORMALIZATION RATIO, POC: 1.8
PROTHROMBIN TIME, POC: 21.7

## 2025-06-26 PROCEDURE — 3077F SYST BP >= 140 MM HG: CPT | Performed by: INTERNAL MEDICINE

## 2025-06-26 PROCEDURE — 93793 ANTICOAG MGMT PT WARFARIN: CPT | Performed by: STUDENT IN AN ORGANIZED HEALTH CARE EDUCATION/TRAINING PROGRAM

## 2025-06-26 PROCEDURE — 1123F ACP DISCUSS/DSCN MKR DOCD: CPT | Performed by: INTERNAL MEDICINE

## 2025-06-26 PROCEDURE — 1159F MED LIST DOCD IN RCRD: CPT | Performed by: INTERNAL MEDICINE

## 2025-06-26 PROCEDURE — 3078F DIAST BP <80 MM HG: CPT | Performed by: INTERNAL MEDICINE

## 2025-06-26 PROCEDURE — 85610 PROTHROMBIN TIME: CPT | Performed by: STUDENT IN AN ORGANIZED HEALTH CARE EDUCATION/TRAINING PROGRAM

## 2025-06-26 PROCEDURE — 99213 OFFICE O/P EST LOW 20 MIN: CPT | Performed by: INTERNAL MEDICINE

## 2025-06-26 RX ORDER — VERAPAMIL HYDROCHLORIDE 180 MG/1
180 TABLET, FILM COATED, EXTENDED RELEASE ORAL DAILY
Qty: 90 TABLET | Refills: 1 | Status: SHIPPED | OUTPATIENT
Start: 2025-06-26

## 2025-06-26 RX ORDER — AMOXICILLIN 500 MG/1
500 CAPSULE ORAL 3 TIMES DAILY
Qty: 21 CAPSULE | Refills: 0 | Status: SHIPPED | OUTPATIENT
Start: 2025-06-26 | End: 2025-07-03

## 2025-06-26 ASSESSMENT — ENCOUNTER SYMPTOMS
SINUS PAIN: 0
SORE THROAT: 0
SHORTNESS OF BREATH: 0
CHEST TIGHTNESS: 0
SINUS PRESSURE: 0
EYES NEGATIVE: 1
WHEEZING: 0
COUGH: 0

## 2025-06-26 NOTE — PROGRESS NOTES
Lab Results   Component Value Date    INR 1.8 06/26/2025       Patient Findings       Negatives:  Missed doses            Plan: Increase from 3 mg every M and F and 6 every other day to 3 mg every Monday and 6 mg all other days. Discussed while patient was here    Return date  As of 6/26/2025      TTR:  58.1% (1.2 y)   Next INR check:  7/10/2025

## 2025-06-26 NOTE — TELEPHONE ENCOUNTER
Name of Medication(s) Requested:  Requested Prescriptions     Pending Prescriptions Disp Refills    verapamil (CALAN SR) 180 MG extended release tablet [Pharmacy Med Name: VERAPAMIL ER 180MG TABLETS] 90 tablet 1     Sig: TAKE 1 TABLET BY MOUTH DAILY       Medication is on current medication list Yes    Dosage and directions were verified? Yes    Quantity verified: 90 day supply     Pharmacy Verified?  Yes    Last Appointment:  3/26/2025    Future appts:  Future Appointments   Date Time Provider Department Center   6/26/2025  2:00 PM SCHEDULE, KRISHNA RANGEL [facility]   8/1/2025 10:00 AM Galindo Mcmullen DPM [facility] Podiat[facility]   1/6/2026  1:30 PM Fahad Askew DO [facility]        (If no appt send self scheduling link. .REFILLAPPT)  Scheduling request sent?     [] Yes  [x] No    Does patient need updated?  [] Yes  [x] No

## 2025-07-07 ENCOUNTER — TELEPHONE (OUTPATIENT)
Dept: ENT CLINIC | Age: 75
End: 2025-07-07

## 2025-07-07 NOTE — TELEPHONE ENCOUNTER
Went to [facility] for [facility] pain, is now on antibiotics for 7 days. Pt was told by provider that \"after the 7 days she would need to come see  to remove a large solid piece of ear wax.\" Pt cannot put hearing aid into ear because of this wax.     Please advise on scheduling,     Electronically signed by Martha Pierre on 7/7/2025 at 1:55 PM

## 2025-07-08 NOTE — TELEPHONE ENCOUNTER
Called patient to schedule appointment for ear pain finished 7 day course antibiotics,impacted cerumen.

## 2025-07-10 ENCOUNTER — ANTI-COAG VISIT (OUTPATIENT)
Dept: FAMILY MEDICINE CLINIC | Age: 75
End: 2025-07-10
Payer: MEDICARE

## 2025-07-10 DIAGNOSIS — I48.91 ATRIAL FIBRILLATION, UNSPECIFIED TYPE (HCC): ICD-10-CM

## 2025-07-10 LAB
INTERNATIONAL NORMALIZATION RATIO, POC: 1.9
PROTHROMBIN TIME, POC: 22.9

## 2025-07-10 PROCEDURE — 93793 ANTICOAG MGMT PT WARFARIN: CPT | Performed by: STUDENT IN AN ORGANIZED HEALTH CARE EDUCATION/TRAINING PROGRAM

## 2025-07-10 PROCEDURE — 85610 PROTHROMBIN TIME: CPT | Performed by: STUDENT IN AN ORGANIZED HEALTH CARE EDUCATION/TRAINING PROGRAM

## 2025-07-10 NOTE — PROGRESS NOTES
Lab Results   Component Value Date    INR 1.9 07/10/2025       Patient Findings       Positives:  Change in medications    Negatives:  Signs/symptoms of thrombosis, Signs/symptoms of bleeding, Laboratory test error suspected, Change in health, Change in alcohol use, Change in activity, Upcoming invasive procedure, Emergency department visit, Upcoming dental procedure, Missed doses, Extra doses, Change in diet/appetite, Hospital admission, Bruising, Other complaints    Comments:  07/10/25 1.9 today  Pt took 7 day course of ABX (amoxil)  6/26 to 7/3            Plan: Increase to 6 mg every day.  (Previously taking 6 mg daily except Monday was 3 mg)    Return date  As of 7/10/2025      TTR:  56.3% (1.2 y)   Next INR check:  --

## 2025-07-11 ENCOUNTER — OFFICE VISIT (OUTPATIENT)
Dept: ENT CLINIC | Age: 75
End: 2025-07-11
Payer: MEDICARE

## 2025-07-11 VITALS
HEIGHT: 67 IN | BODY MASS INDEX: 27.03 KG/M2 | RESPIRATION RATE: 15 BRPM | WEIGHT: 172.2 LBS | DIASTOLIC BLOOD PRESSURE: 66 MMHG | HEART RATE: 62 BPM | SYSTOLIC BLOOD PRESSURE: 151 MMHG | OXYGEN SATURATION: 96 %

## 2025-07-11 DIAGNOSIS — J30.9 ALLERGIC RHINITIS, UNSPECIFIED SEASONALITY, UNSPECIFIED TRIGGER: Primary | ICD-10-CM

## 2025-07-11 DIAGNOSIS — H90.3 SENSORINEURAL HEARING LOSS (SNHL) OF BOTH EARS: ICD-10-CM

## 2025-07-11 DIAGNOSIS — H61.23 BILATERAL IMPACTED CERUMEN: ICD-10-CM

## 2025-07-11 DIAGNOSIS — H10.13 ALLERGIC CONJUNCTIVITIS OF BOTH EYES: ICD-10-CM

## 2025-07-11 PROCEDURE — 99214 OFFICE O/P EST MOD 30 MIN: CPT | Performed by: OTOLARYNGOLOGY

## 2025-07-11 PROCEDURE — 3077F SYST BP >= 140 MM HG: CPT | Performed by: OTOLARYNGOLOGY

## 2025-07-11 PROCEDURE — 69210 REMOVE IMPACTED EAR WAX UNI: CPT | Performed by: OTOLARYNGOLOGY

## 2025-07-11 PROCEDURE — 3078F DIAST BP <80 MM HG: CPT | Performed by: OTOLARYNGOLOGY

## 2025-07-11 PROCEDURE — 1123F ACP DISCUSS/DSCN MKR DOCD: CPT | Performed by: OTOLARYNGOLOGY

## 2025-07-11 NOTE — PROGRESS NOTES
Subjective:      Patient ID:  Jaimie Bradford is a 74 y.o. female.    HPI:    Jaimie Bradford presents for follow up cerumen, and allergies. Right ear pain, started June 26th, was seen at urgent care and started on antibiotic. Has completed antibiotic, ear pain resolved. Residual ear fullness and soreness. Continues to have muffled hearing.     Also follows with audio    Past Medical History:   Diagnosis Date    Bladder prolapse, female, acquired     Bone cancer (HCC) 02/2021    Breast cancer (HCC) 04/2017    Broken rib     left    Broken toe     right    Chronic atrial fibrillation (HCC)     Fibroadenosis of breast     Headache     Hearing loss 2022    History of therapeutic radiation     Hyperlipidemia     Hypertension     Inverted nipple      Past Surgical History:   Procedure Laterality Date    [facility]GERY  1999    [facility]    BONE BIOPSY  04/2021    BREAST BIOPSY      BREAST LUMPECTOMY Right 04/26/2017    COLONOSCOPY      CT BIOPSY DEEP BONE PERCUTANEOUS  04/07/2021    CT BIOPSY PERCUTANEOUS DEEP BONE 4/7/2021 Ashkan Avelar II, MD [facility]    ENDOSCOPIC VEIN LASER TREATMENT      10/23    EYE SURGERY      SUDHIR LENSE IMPLANTS    OTHER SURGICAL HISTORY      port removal    TONSILLECTOMY      TUNNELED VENOUS PORT PLACEMENT  05/26/2017    Dr. Rucker     Family History   Problem Relation Age of Onset    Cancer Maternal Cousin 81        lung    Heart Disease Mother     Other Mother         [place] Palsy    Arthritis Mother     Stroke Mother     Other Father         gall bladder problem, hip fx; AAA rupture    Coronary Art Dis Father         MI     Social History     Socioeconomic History    Marital status:      Spouse name: Seymour    Number of children: None    Years of education: None    Highest education level: None   Occupational History    Occupation: retired      Comment: Retired 4 th    Tobacco Use    Smoking status: Never    Smokeless tobacco: Never   Substance and Sexual

## 2025-07-14 RX ORDER — CLOTRIMAZOLE AND BETAMETHASONE DIPROPIONATE 10; .64 MG/G; MG/G
CREAM TOPICAL
Qty: 45 G | Refills: 1 | Status: SHIPPED | OUTPATIENT
Start: 2025-07-14

## 2025-07-24 ENCOUNTER — ANTI-COAG VISIT (OUTPATIENT)
Dept: FAMILY MEDICINE CLINIC | Age: 75
End: 2025-07-24
Payer: MEDICARE

## 2025-07-24 DIAGNOSIS — I48.91 ATRIAL FIBRILLATION, UNSPECIFIED TYPE (HCC): ICD-10-CM

## 2025-07-24 LAB
INTERNATIONAL NORMALIZATION RATIO, POC: 2.8
PROTHROMBIN TIME, POC: 34

## 2025-07-24 PROCEDURE — 85610 PROTHROMBIN TIME: CPT | Performed by: STUDENT IN AN ORGANIZED HEALTH CARE EDUCATION/TRAINING PROGRAM

## 2025-07-24 PROCEDURE — 93793 ANTICOAG MGMT PT WARFARIN: CPT | Performed by: STUDENT IN AN ORGANIZED HEALTH CARE EDUCATION/TRAINING PROGRAM

## 2025-07-24 NOTE — PROGRESS NOTES
Lab Results   Component Value Date    INR 2.8 07/24/2025       Patient Findings       Negatives:  Signs/symptoms of thrombosis, Signs/symptoms of bleeding, Laboratory test error suspected, Change in health, Change in alcohol use, Change in activity, Upcoming invasive procedure, Emergency department visit, Upcoming dental procedure, Missed doses, Extra doses, Change in medications, Change in diet/appetite, Hospital admission, Bruising, Other complaints    Comments:  07/24/25   2.8 today  Taking 6mg daily            Plan: at goal. Continue 6 mg daily. Recheck in 4 weeks    Return date  As of 7/24/2025      TTR:  57.3% (1.2 y)   Next INR check:  --

## 2025-07-25 ENCOUNTER — TRANSCRIBE ORDERS (OUTPATIENT)
Dept: ADMINISTRATIVE | Age: 75
End: 2025-07-25

## 2025-07-25 DIAGNOSIS — C79.51 SECONDARY MALIGNANT NEOPLASM OF BONE AND BONE MARROW (HCC): Primary | ICD-10-CM

## 2025-07-25 DIAGNOSIS — C79.52 SECONDARY MALIGNANT NEOPLASM OF BONE AND BONE MARROW (HCC): Primary | ICD-10-CM

## 2025-07-25 DIAGNOSIS — Z17.1 ESTROGEN RECEPTOR NEGATIVE STATUS (ER-): ICD-10-CM

## 2025-07-25 DIAGNOSIS — C50.211 MALIGNANT NEOPLASM OF UPPER-INNER QUADRANT OF RIGHT FEMALE BREAST, UNSPECIFIED ESTROGEN RECEPTOR STATUS (HCC): ICD-10-CM

## 2025-08-01 ENCOUNTER — PROCEDURE VISIT (OUTPATIENT)
Dept: PODIATRY | Age: 75
End: 2025-08-01

## 2025-08-01 VITALS — WEIGHT: 172 LBS | BODY MASS INDEX: 26.94 KG/M2

## 2025-08-01 DIAGNOSIS — L84 CORNS AND CALLOSITIES: ICD-10-CM

## 2025-08-01 DIAGNOSIS — B35.1 TINEA UNGUIUM: Primary | ICD-10-CM

## 2025-08-01 DIAGNOSIS — Z12.31 ENCOUNTER FOR SCREENING MAMMOGRAM FOR MALIGNANT NEOPLASM OF BREAST: Primary | ICD-10-CM

## 2025-08-01 DIAGNOSIS — G60.8 HEREDITARY SENSORY NEUROPATHY: ICD-10-CM

## 2025-08-01 DIAGNOSIS — Z79.01 ENCOUNTER FOR CURRENT LONG-TERM USE OF ANTICOAGULANTS: ICD-10-CM

## 2025-08-01 NOTE — PROGRESS NOTES
Patient in for nail and callous care  Jae Dumont MD  LOV 7/24/25      Electronically signed by Galindo Mcmullen DPM on 8/1/2025 at 11:38 AM        CC:    Follow-up foot and ankle exam.    HPI:   Follow-up foot ankle exam.  History anticoagulant therapy with Coumadin.  Does have some tenderness right great toenail.  Callus tissue both feet.  No open wounds.    ROS:  Const: Denies constitutional symptoms  Musculo: Denies symptoms other than stated above  Skin: Denies symptoms other than stated above       Current Outpatient Medications:     clotrimazole-betamethasone (LOTRISONE) 1-0.05 % cream, APPLY TOPICALLY TO THE AFFECTED AREA TWICE DAILY, Disp: 45 g, Rfl: 1    verapamil (CALAN SR) 180 MG extended release tablet, TAKE 1 TABLET BY MOUTH DAILY, Disp: 90 tablet, Rfl: 1    warfarin (COUMADIN) 6 MG tablet, TAKE 1/2 TABLET BY MOUTH ON MONDAY AND FRIDAYS AND 1 TABLET ON EVERY OTHER DAY OF THE WEEK OR AS DIRECTED, Disp: 30 tablet, Rfl: 5    escitalopram (LEXAPRO) 10 MG tablet, TAKE 1 TABLET BY MOUTH EVERY EVENING, Disp: 90 tablet, Rfl: 1    mupirocin (BACTROBAN) 2 % ointment, Apply topically 2 times daily, Disp: 1 g, Rfl: 0    propafenone (RYTHMOL) 225 MG tablet, TAKE 1 TABLET BY MOUTH THREE TIMES A DAY, Disp: 270 tablet, Rfl: 1    topiramate (TOPAMAX) 50 MG tablet, Take 1 tablet by mouth 2 times daily, Disp: 180 tablet, Rfl: 1    azelastine (ASTELIN) 0.1 % nasal spray, 2 sprays by Nasal route 2 times daily Use in each nostril as directed, Disp: 120 mL, Rfl: 3    triamcinolone (KENALOG) 0.1 % cream, Apply topically 2 times daily., Disp: 80 g, Rfl: 5    azelastine (ASTELIN) 0.1 % nasal spray, 2 sprays by Nasal route 2 times daily Use in each nostril as directed, Disp: 120 mL, Rfl: 3    olopatadine (PATANOL) 0.1 % ophthalmic solution, Place 1 drop into both eyes in the morning and at bedtime, Disp: 5 mL, Rfl: 1    ammonium lactate (LAC-HYDRIN) 12 % lotion, Apply topically twice daily, Disp: 400 g, Rfl: 2

## 2025-08-12 ENCOUNTER — TRANSCRIBE ORDERS (OUTPATIENT)
Dept: ADMINISTRATIVE | Age: 75
End: 2025-08-12

## 2025-08-12 DIAGNOSIS — C50.211 MALIGNANT NEOPLASM OF UPPER-INNER QUADRANT OF RIGHT FEMALE BREAST, UNSPECIFIED ESTROGEN RECEPTOR STATUS (HCC): Primary | ICD-10-CM

## 2025-08-12 DIAGNOSIS — C79.51 SECONDARY MALIGNANT NEOPLASM OF BONE AND BONE MARROW (HCC): ICD-10-CM

## 2025-08-12 DIAGNOSIS — C79.52 SECONDARY MALIGNANT NEOPLASM OF BONE AND BONE MARROW (HCC): ICD-10-CM

## 2025-08-15 ENCOUNTER — HOSPITAL ENCOUNTER (OUTPATIENT)
Dept: AUDIOLOGY | Age: 75
Discharge: HOME | End: 2025-08-15
Payer: MEDICARE

## 2025-08-15 PROCEDURE — V5011 HEARING AID FITTING/CHECKING: HCPCS | Performed by: AUDIOLOGIST

## 2025-08-15 PROCEDURE — 92593 HC HEARING AID CHECK, BOTH EARS: CPT | Performed by: AUDIOLOGIST

## 2025-08-20 ENCOUNTER — ANTI-COAG VISIT (OUTPATIENT)
Dept: FAMILY MEDICINE CLINIC | Age: 75
End: 2025-08-20
Payer: MEDICARE

## 2025-08-20 DIAGNOSIS — I48.91 ATRIAL FIBRILLATION, UNSPECIFIED TYPE (HCC): ICD-10-CM

## 2025-08-20 LAB
INTERNATIONAL NORMALIZATION RATIO, POC: 3
PROTHROMBIN TIME, POC: 35.5

## 2025-08-20 PROCEDURE — 93793 ANTICOAG MGMT PT WARFARIN: CPT | Performed by: STUDENT IN AN ORGANIZED HEALTH CARE EDUCATION/TRAINING PROGRAM

## 2025-08-20 PROCEDURE — 85610 PROTHROMBIN TIME: CPT | Performed by: STUDENT IN AN ORGANIZED HEALTH CARE EDUCATION/TRAINING PROGRAM
